# Patient Record
Sex: FEMALE | Race: WHITE | Employment: OTHER
[De-identification: names, ages, dates, MRNs, and addresses within clinical notes are randomized per-mention and may not be internally consistent; named-entity substitution may affect disease eponyms.]

---

## 2017-01-03 PROBLEM — N18.32 CKD STAGE G3B/A2, GFR 30-44 AND ALBUMIN CREATININE RATIO 30-299 MG/G (HCC): Chronic | Status: ACTIVE | Noted: 2017-01-03

## 2017-01-04 DIAGNOSIS — N18.30 CHRONIC RENAL INSUFFICIENCY, STAGE 3 (MODERATE) (HCC): Primary | ICD-10-CM

## 2017-01-18 ENCOUNTER — OFFICE VISIT (OUTPATIENT)
Dept: CARDIOLOGY | Facility: CLINIC | Age: 82
End: 2017-01-18

## 2017-01-18 VITALS
BODY MASS INDEX: 32.23 KG/M2 | OXYGEN SATURATION: 95 % | HEIGHT: 64 IN | SYSTOLIC BLOOD PRESSURE: 124 MMHG | RESPIRATION RATE: 16 BRPM | HEART RATE: 64 BPM | DIASTOLIC BLOOD PRESSURE: 75 MMHG | WEIGHT: 188.8 LBS

## 2017-01-18 DIAGNOSIS — E86.0 DEHYDRATION, MILD: ICD-10-CM

## 2017-01-18 DIAGNOSIS — I50.32 CHRONIC DIASTOLIC HF (HEART FAILURE), NYHA CLASS 3 (HCC): Primary | ICD-10-CM

## 2017-01-18 DIAGNOSIS — R53.81 PHYSICAL DECONDITIONING: ICD-10-CM

## 2017-01-18 PROBLEM — I50.42 CHRONIC COMBINED SYSTOLIC AND DIASTOLIC CHF, NYHA CLASS 3 (HCC): Status: ACTIVE | Noted: 2017-01-18

## 2017-01-18 PROCEDURE — 99214 OFFICE O/P EST MOD 30 MIN: CPT | Performed by: FAMILY MEDICINE

## 2017-01-18 PROCEDURE — 1036F TOBACCO NON-USER: CPT | Performed by: FAMILY MEDICINE

## 2017-01-18 PROCEDURE — G8484 FLU IMMUNIZE NO ADMIN: HCPCS | Performed by: FAMILY MEDICINE

## 2017-01-18 PROCEDURE — 1111F DSCHRG MED/CURRENT MED MERGE: CPT | Performed by: FAMILY MEDICINE

## 2017-01-18 PROCEDURE — G8427 DOCREV CUR MEDS BY ELIG CLIN: HCPCS | Performed by: FAMILY MEDICINE

## 2017-01-18 PROCEDURE — 1090F PRES/ABSN URINE INCON ASSESS: CPT | Performed by: FAMILY MEDICINE

## 2017-01-18 PROCEDURE — 4040F PNEUMOC VAC/ADMIN/RCVD: CPT | Performed by: FAMILY MEDICINE

## 2017-01-18 PROCEDURE — G8419 CALC BMI OUT NRM PARAM NOF/U: HCPCS | Performed by: FAMILY MEDICINE

## 2017-01-18 PROCEDURE — 1123F ACP DISCUSS/DSCN MKR DOCD: CPT | Performed by: FAMILY MEDICINE

## 2017-01-18 RX ORDER — FUROSEMIDE 20 MG/1
20 TABLET ORAL DAILY
Qty: 90 TABLET | Refills: 3 | Status: SHIPPED | OUTPATIENT
Start: 2017-01-18 | End: 2017-04-26

## 2017-01-18 RX ORDER — LISINOPRIL 20 MG/1
20 TABLET ORAL DAILY
Qty: 90 TABLET | Refills: 3 | Status: ON HOLD | OUTPATIENT
Start: 2017-01-18 | End: 2017-09-01

## 2017-01-18 RX ORDER — CARVEDILOL 25 MG/1
25 TABLET ORAL 2 TIMES DAILY
Qty: 180 TABLET | Refills: 3 | Status: SHIPPED | OUTPATIENT
Start: 2017-01-18 | End: 2017-07-12 | Stop reason: SDUPTHER

## 2017-02-22 PROBLEM — N18.32 CKD STAGE G3B/A2, GFR 30-44 AND ALBUMIN CREATININE RATIO 30-299 MG/G (HCC): Chronic | Status: RESOLVED | Noted: 2017-01-03 | Resolved: 2017-02-22

## 2017-02-22 PROBLEM — E86.0 DEHYDRATION, MILD: Status: RESOLVED | Noted: 2017-01-18 | Resolved: 2017-02-22

## 2017-02-22 PROBLEM — I50.32 CHRONIC DIASTOLIC HF (HEART FAILURE), NYHA CLASS 3 (HCC): Chronic | Status: ACTIVE | Noted: 2017-01-18

## 2017-02-22 PROBLEM — N18.4 CKD (CHRONIC KIDNEY DISEASE) STAGE 4, GFR 15-29 ML/MIN (HCC): Status: ACTIVE | Noted: 2017-02-22

## 2017-02-22 PROBLEM — N18.4 CKD (CHRONIC KIDNEY DISEASE) STAGE 4, GFR 15-29 ML/MIN (HCC): Chronic | Status: ACTIVE | Noted: 2017-02-22

## 2017-03-30 ENCOUNTER — HOSPITAL ENCOUNTER (OUTPATIENT)
Age: 82
Discharge: HOME | End: 2017-03-30
Payer: MEDICARE

## 2017-03-30 LAB
ANION GAP SERPL CALCULATED.3IONS-SCNC: 14 MMOL/L (ref 9–17)
BUN BLDV-MCNC: 46 MG/DL (ref 8–23)
BUN/CREAT BLD: 19 (ref 9–20)
CALCIUM SERPL-MCNC: 9.7 MG/DL (ref 8.6–10.4)
CHLORIDE BLD-SCNC: 98 MMOL/L (ref 98–107)
CO2: 24 MMOL/L (ref 20–31)
CREAT SERPL-MCNC: 2.36 MG/DL (ref 0.5–0.9)
GFR AFRICAN AMERICAN: 23 ML/MIN
GFR NON-AFRICAN AMERICAN: 19 ML/MIN
GFR SERPL CREATININE-BSD FRML MDRD: ABNORMAL ML/MIN/{1.73_M2}
GFR SERPL CREATININE-BSD FRML MDRD: ABNORMAL ML/MIN/{1.73_M2}
GLUCOSE BLD-MCNC: 131 MG/DL (ref 70–99)
POTASSIUM SERPL-SCNC: 5.3 MMOL/L (ref 3.7–5.3)
SODIUM BLD-SCNC: 136 MMOL/L (ref 135–144)

## 2017-03-30 PROCEDURE — 36415 COLL VENOUS BLD VENIPUNCTURE: CPT

## 2017-03-30 PROCEDURE — 80048 BASIC METABOLIC PNL TOTAL CA: CPT

## 2017-04-18 ENCOUNTER — HOSPITAL ENCOUNTER (OUTPATIENT)
Age: 82
Discharge: HOME | End: 2017-04-18
Payer: MEDICARE

## 2017-04-18 LAB
ANION GAP SERPL CALCULATED.3IONS-SCNC: 10 MMOL/L (ref 9–17)
BUN BLDV-MCNC: 49 MG/DL (ref 8–23)
BUN/CREAT BLD: 20 (ref 9–20)
CALCIUM SERPL-MCNC: 9.6 MG/DL (ref 8.6–10.4)
CHLORIDE BLD-SCNC: 100 MMOL/L (ref 98–107)
CO2: 28 MMOL/L (ref 20–31)
CREAT SERPL-MCNC: 2.45 MG/DL (ref 0.5–0.9)
GFR AFRICAN AMERICAN: 22 ML/MIN
GFR NON-AFRICAN AMERICAN: 19 ML/MIN
GFR SERPL CREATININE-BSD FRML MDRD: ABNORMAL ML/MIN/{1.73_M2}
GFR SERPL CREATININE-BSD FRML MDRD: ABNORMAL ML/MIN/{1.73_M2}
GLUCOSE BLD-MCNC: 147 MG/DL (ref 70–99)
POTASSIUM SERPL-SCNC: 5.6 MMOL/L (ref 3.7–5.3)
SODIUM BLD-SCNC: 138 MMOL/L (ref 135–144)
T3 FREE: 2.43 PG/ML (ref 2.02–4.43)
T4 TOTAL: 6.1 UG/DL (ref 4.5–12)
TSH SERPL DL<=0.05 MIU/L-ACNC: 3.99 MIU/L (ref 0.3–5)

## 2017-04-18 PROCEDURE — 84443 ASSAY THYROID STIM HORMONE: CPT

## 2017-04-18 PROCEDURE — 84481 FREE ASSAY (FT-3): CPT

## 2017-04-18 PROCEDURE — 80048 BASIC METABOLIC PNL TOTAL CA: CPT

## 2017-04-18 PROCEDURE — 36415 COLL VENOUS BLD VENIPUNCTURE: CPT

## 2017-04-18 PROCEDURE — 84436 ASSAY OF TOTAL THYROXINE: CPT

## 2017-04-26 ENCOUNTER — OFFICE VISIT (OUTPATIENT)
Dept: CARDIOLOGY | Age: 82
End: 2017-04-26
Payer: MEDICARE

## 2017-04-26 ENCOUNTER — TELEPHONE (OUTPATIENT)
Dept: CARDIOLOGY | Age: 82
End: 2017-04-26

## 2017-04-26 VITALS
RESPIRATION RATE: 20 BRPM | HEIGHT: 64 IN | BODY MASS INDEX: 31 KG/M2 | HEART RATE: 62 BPM | OXYGEN SATURATION: 93 % | DIASTOLIC BLOOD PRESSURE: 69 MMHG | WEIGHT: 181.6 LBS | SYSTOLIC BLOOD PRESSURE: 111 MMHG

## 2017-04-26 DIAGNOSIS — I50.32 CHRONIC DIASTOLIC HF (HEART FAILURE), NYHA CLASS 3 (HCC): Chronic | ICD-10-CM

## 2017-04-26 DIAGNOSIS — N17.9 ACUTE RENAL FAILURE, UNSPECIFIED ACUTE RENAL FAILURE TYPE (HCC): Primary | ICD-10-CM

## 2017-04-26 PROBLEM — I36.1 NON-RHEUMATIC TRICUSPID VALVE INSUFFICIENCY: Status: ACTIVE | Noted: 2017-04-26

## 2017-04-26 PROBLEM — I27.20 PULMONARY HTN (HCC): Status: ACTIVE | Noted: 2017-04-26

## 2017-04-26 PROBLEM — I27.20 PULMONARY HTN (HCC): Chronic | Status: ACTIVE | Noted: 2017-04-26

## 2017-04-26 PROBLEM — I36.1 NON-RHEUMATIC TRICUSPID VALVE INSUFFICIENCY: Chronic | Status: ACTIVE | Noted: 2017-04-26

## 2017-04-26 PROCEDURE — G8428 CUR MEDS NOT DOCUMENT: HCPCS | Performed by: FAMILY MEDICINE

## 2017-04-26 PROCEDURE — 99214 OFFICE O/P EST MOD 30 MIN: CPT | Performed by: FAMILY MEDICINE

## 2017-04-26 PROCEDURE — 1090F PRES/ABSN URINE INCON ASSESS: CPT | Performed by: FAMILY MEDICINE

## 2017-04-26 PROCEDURE — 1036F TOBACCO NON-USER: CPT | Performed by: FAMILY MEDICINE

## 2017-04-26 PROCEDURE — 1123F ACP DISCUSS/DSCN MKR DOCD: CPT | Performed by: FAMILY MEDICINE

## 2017-04-26 PROCEDURE — 4040F PNEUMOC VAC/ADMIN/RCVD: CPT | Performed by: FAMILY MEDICINE

## 2017-04-26 PROCEDURE — G8417 CALC BMI ABV UP PARAM F/U: HCPCS | Performed by: FAMILY MEDICINE

## 2017-05-02 ENCOUNTER — HOSPITAL ENCOUNTER (OUTPATIENT)
Age: 82
Discharge: HOME | End: 2017-05-02
Payer: MEDICARE

## 2017-05-02 DIAGNOSIS — N17.9 ACUTE RENAL FAILURE, UNSPECIFIED ACUTE RENAL FAILURE TYPE (HCC): ICD-10-CM

## 2017-05-02 LAB
ANION GAP SERPL CALCULATED.3IONS-SCNC: 12 MMOL/L (ref 9–17)
BUN BLDV-MCNC: 52 MG/DL (ref 8–23)
BUN/CREAT BLD: 25 (ref 9–20)
CALCIUM SERPL-MCNC: 9.8 MG/DL (ref 8.6–10.4)
CHLORIDE BLD-SCNC: 100 MMOL/L (ref 98–107)
CO2: 26 MMOL/L (ref 20–31)
CREAT SERPL-MCNC: 2.1 MG/DL (ref 0.5–0.9)
GFR AFRICAN AMERICAN: 27 ML/MIN
GFR NON-AFRICAN AMERICAN: 22 ML/MIN
GFR SERPL CREATININE-BSD FRML MDRD: ABNORMAL ML/MIN/{1.73_M2}
GFR SERPL CREATININE-BSD FRML MDRD: ABNORMAL ML/MIN/{1.73_M2}
GLUCOSE BLD-MCNC: 153 MG/DL (ref 70–99)
POTASSIUM SERPL-SCNC: 5 MMOL/L (ref 3.7–5.3)
SODIUM BLD-SCNC: 138 MMOL/L (ref 135–144)

## 2017-05-02 PROCEDURE — 36415 COLL VENOUS BLD VENIPUNCTURE: CPT

## 2017-05-02 PROCEDURE — 80048 BASIC METABOLIC PNL TOTAL CA: CPT

## 2017-06-03 ENCOUNTER — HOSPITAL ENCOUNTER (OUTPATIENT)
Age: 82
Discharge: HOME | End: 2017-06-03
Payer: MEDICARE

## 2017-06-03 LAB
ABSOLUTE EOS #: 0.1 K/UL (ref 0–0.4)
ABSOLUTE LYMPH #: 1.7 K/UL (ref 1–4.8)
ABSOLUTE MONO #: 0.4 K/UL (ref 0–1)
ALBUMIN SERPL-MCNC: 4 G/DL (ref 3.5–5.2)
ALBUMIN/GLOBULIN RATIO: 1.4 (ref 1–2.5)
ALP BLD-CCNC: 44 U/L (ref 35–104)
ALT SERPL-CCNC: 7 U/L (ref 5–33)
ANION GAP SERPL CALCULATED.3IONS-SCNC: 12 MMOL/L (ref 9–17)
AST SERPL-CCNC: 12 U/L
BASOPHILS # BLD: 0 %
BASOPHILS ABSOLUTE: 0 K/UL (ref 0–0.2)
BILIRUB SERPL-MCNC: 0.57 MG/DL (ref 0.3–1.2)
BUN BLDV-MCNC: 62 MG/DL (ref 8–23)
BUN/CREAT BLD: 32 (ref 9–20)
CALCIUM SERPL-MCNC: 9.7 MG/DL (ref 8.6–10.4)
CHLORIDE BLD-SCNC: 104 MMOL/L (ref 98–107)
CHOLESTEROL/HDL RATIO: 4
CHOLESTEROL: 160 MG/DL
CO2: 23 MMOL/L (ref 20–31)
CREAT SERPL-MCNC: 1.95 MG/DL (ref 0.5–0.9)
DIFFERENTIAL TYPE: ABNORMAL
EOSINOPHILS RELATIVE PERCENT: 3 %
ESTIMATED AVERAGE GLUCOSE: 108 MG/DL
GFR AFRICAN AMERICAN: 29 ML/MIN
GFR NON-AFRICAN AMERICAN: 24 ML/MIN
GFR SERPL CREATININE-BSD FRML MDRD: ABNORMAL ML/MIN/{1.73_M2}
GFR SERPL CREATININE-BSD FRML MDRD: ABNORMAL ML/MIN/{1.73_M2}
GLUCOSE BLD-MCNC: 98 MG/DL (ref 70–99)
HBA1C MFR BLD: 5.4 % (ref 4.8–5.9)
HCT VFR BLD CALC: 35.3 % (ref 36–46)
HDLC SERPL-MCNC: 40 MG/DL
HEMOGLOBIN: 12 G/DL (ref 12–16)
LDL CHOLESTEROL: 77 MG/DL (ref 0–130)
LYMPHOCYTES # BLD: 30 %
MCH RBC QN AUTO: 33.4 PG (ref 26–34)
MCHC RBC AUTO-ENTMCNC: 34 G/DL (ref 31–37)
MCV RBC AUTO: 98.3 FL (ref 80–100)
MONOCYTES # BLD: 7 %
PDW BLD-RTO: 13.9 % (ref 12.1–15.2)
PLATELET # BLD: 211 K/UL (ref 140–450)
PLATELET ESTIMATE: ABNORMAL
PMV BLD AUTO: ABNORMAL FL (ref 6–12)
POTASSIUM SERPL-SCNC: 5.4 MMOL/L (ref 3.7–5.3)
RBC # BLD: 3.59 M/UL (ref 4–5.2)
RBC # BLD: ABNORMAL 10*6/UL
SEG NEUTROPHILS: 60 %
SEGMENTED NEUTROPHILS ABSOLUTE COUNT: 3.5 K/UL (ref 1.8–7.7)
SODIUM BLD-SCNC: 139 MMOL/L (ref 135–144)
TOTAL PROTEIN: 6.9 G/DL (ref 6.4–8.3)
TRIGL SERPL-MCNC: 213 MG/DL
VLDLC SERPL CALC-MCNC: ABNORMAL MG/DL (ref 1–30)
WBC # BLD: 5.8 K/UL (ref 3.5–11)
WBC # BLD: ABNORMAL 10*3/UL

## 2017-06-03 PROCEDURE — 36415 COLL VENOUS BLD VENIPUNCTURE: CPT

## 2017-06-03 PROCEDURE — 83036 HEMOGLOBIN GLYCOSYLATED A1C: CPT

## 2017-06-03 PROCEDURE — 80061 LIPID PANEL: CPT

## 2017-06-03 PROCEDURE — 80053 COMPREHEN METABOLIC PANEL: CPT

## 2017-06-03 PROCEDURE — 85025 COMPLETE CBC W/AUTO DIFF WBC: CPT

## 2017-06-25 ENCOUNTER — APPOINTMENT (OUTPATIENT)
Dept: GENERAL RADIOLOGY | Age: 82
DRG: 309 | End: 2017-06-25
Payer: MEDICARE

## 2017-06-25 ENCOUNTER — HOSPITAL ENCOUNTER (INPATIENT)
Age: 82
LOS: 4 days | Discharge: ANOTHER HOSPITAL | DRG: 309 | End: 2017-06-29
Attending: EMERGENCY MEDICINE | Admitting: FAMILY MEDICINE
Payer: MEDICARE

## 2017-06-25 DIAGNOSIS — N17.9 ACUTE KIDNEY INJURY (HCC): ICD-10-CM

## 2017-06-25 DIAGNOSIS — R55 SYNCOPE AND COLLAPSE: Primary | ICD-10-CM

## 2017-06-25 LAB
-: NORMAL
ABSOLUTE EOS #: 0.1 K/UL (ref 0–0.4)
ABSOLUTE LYMPH #: 1.6 K/UL (ref 1–4.8)
ABSOLUTE MONO #: 0.6 K/UL (ref 0–1)
ALBUMIN SERPL-MCNC: 4.1 G/DL (ref 3.5–5.2)
ALBUMIN/GLOBULIN RATIO: 1.3 (ref 1–2.5)
ALP BLD-CCNC: 45 U/L (ref 35–104)
ALT SERPL-CCNC: 11 U/L (ref 5–33)
AMORPHOUS: NORMAL
ANION GAP SERPL CALCULATED.3IONS-SCNC: 12 MMOL/L (ref 9–17)
AST SERPL-CCNC: 13 U/L
BACTERIA: NORMAL
BASOPHILS # BLD: 0 %
BASOPHILS ABSOLUTE: 0 K/UL (ref 0–0.2)
BILIRUB SERPL-MCNC: 0.63 MG/DL (ref 0.3–1.2)
BILIRUBIN URINE: NEGATIVE
BUN BLDV-MCNC: 47 MG/DL (ref 8–23)
BUN/CREAT BLD: 22 (ref 9–20)
CALCIUM SERPL-MCNC: 9.6 MG/DL (ref 8.6–10.4)
CASTS UA: NORMAL /LPF
CHLORIDE BLD-SCNC: 100 MMOL/L (ref 98–107)
CO2: 25 MMOL/L (ref 20–31)
COLOR: YELLOW
COMMENT UA: NORMAL
CREAT SERPL-MCNC: 2.09 MG/DL (ref 0.5–0.9)
CRYSTALS, UA: NORMAL /HPF
DIFFERENTIAL TYPE: ABNORMAL
EOSINOPHILS RELATIVE PERCENT: 1 %
EPITHELIAL CELLS UA: NORMAL /HPF (ref 0–25)
ESTIMATED AVERAGE GLUCOSE: 111 MG/DL
GFR AFRICAN AMERICAN: 27 ML/MIN
GFR NON-AFRICAN AMERICAN: 22 ML/MIN
GFR SERPL CREATININE-BSD FRML MDRD: ABNORMAL ML/MIN/{1.73_M2}
GFR SERPL CREATININE-BSD FRML MDRD: ABNORMAL ML/MIN/{1.73_M2}
GLUCOSE BLD-MCNC: 96 MG/DL (ref 74–100)
GLUCOSE BLD-MCNC: 99 MG/DL (ref 70–99)
GLUCOSE BLD-MCNC: 99 MG/DL (ref 74–100)
GLUCOSE URINE: NEGATIVE
HBA1C MFR BLD: 5.5 % (ref 4.8–5.9)
HCT VFR BLD CALC: 37 % (ref 36–46)
HEMOGLOBIN: 12.6 G/DL (ref 12–16)
KETONES, URINE: NEGATIVE
LEUKOCYTE ESTERASE, URINE: NEGATIVE
LYMPHOCYTES # BLD: 20 %
MCH RBC QN AUTO: 33.4 PG (ref 26–34)
MCHC RBC AUTO-ENTMCNC: 34.2 G/DL (ref 31–37)
MCV RBC AUTO: 97.9 FL (ref 80–100)
MONOCYTES # BLD: 8 %
MUCUS: NORMAL
NITRITE, URINE: NEGATIVE
OTHER OBSERVATIONS UA: NORMAL
PDW BLD-RTO: 13.5 % (ref 12.1–15.2)
PH UA: 6 (ref 5–9)
PLATELET # BLD: 167 K/UL (ref 140–450)
PLATELET ESTIMATE: ABNORMAL
PMV BLD AUTO: ABNORMAL FL (ref 6–12)
POTASSIUM SERPL-SCNC: 5.4 MMOL/L (ref 3.7–5.3)
PROTEIN UA: NEGATIVE
RBC # BLD: 3.78 M/UL (ref 4–5.2)
RBC # BLD: ABNORMAL 10*6/UL
RBC UA: NORMAL /HPF (ref 0–2)
RENAL EPITHELIAL, UA: NORMAL /HPF
SEG NEUTROPHILS: 71 %
SEGMENTED NEUTROPHILS ABSOLUTE COUNT: 5.6 K/UL (ref 1.8–7.7)
SODIUM BLD-SCNC: 137 MMOL/L (ref 135–144)
SPECIFIC GRAVITY UA: 1.01 (ref 1.01–1.02)
TOTAL PROTEIN: 7.3 G/DL (ref 6.4–8.3)
TRICHOMONAS: NORMAL
TROPONIN INTERP: NORMAL
TROPONIN T: <0.03 NG/ML
TSH SERPL DL<=0.05 MIU/L-ACNC: 2.95 MIU/L (ref 0.3–5)
TURBIDITY: CLEAR
URINE HGB: NEGATIVE
UROBILINOGEN, URINE: NORMAL
WBC # BLD: 8 K/UL (ref 3.5–11)
WBC # BLD: ABNORMAL 10*3/UL
WBC UA: NORMAL /HPF (ref 0–5)
YEAST: NORMAL

## 2017-06-25 PROCEDURE — 93005 ELECTROCARDIOGRAM TRACING: CPT

## 2017-06-25 PROCEDURE — 1200000000 HC SEMI PRIVATE

## 2017-06-25 PROCEDURE — 94761 N-INVAS EAR/PLS OXIMETRY MLT: CPT

## 2017-06-25 PROCEDURE — 99285 EMERGENCY DEPT VISIT HI MDM: CPT

## 2017-06-25 PROCEDURE — 6370000000 HC RX 637 (ALT 250 FOR IP): Performed by: EMERGENCY MEDICINE

## 2017-06-25 PROCEDURE — 81001 URINALYSIS AUTO W/SCOPE: CPT

## 2017-06-25 PROCEDURE — 83036 HEMOGLOBIN GLYCOSYLATED A1C: CPT

## 2017-06-25 PROCEDURE — 84443 ASSAY THYROID STIM HORMONE: CPT

## 2017-06-25 PROCEDURE — 36415 COLL VENOUS BLD VENIPUNCTURE: CPT

## 2017-06-25 PROCEDURE — 85025 COMPLETE CBC W/AUTO DIFF WBC: CPT

## 2017-06-25 PROCEDURE — 6370000000 HC RX 637 (ALT 250 FOR IP): Performed by: FAMILY MEDICINE

## 2017-06-25 PROCEDURE — 82947 ASSAY GLUCOSE BLOOD QUANT: CPT

## 2017-06-25 PROCEDURE — 84484 ASSAY OF TROPONIN QUANT: CPT

## 2017-06-25 PROCEDURE — 71020 XR CHEST STANDARD TWO VW: CPT

## 2017-06-25 PROCEDURE — 80053 COMPREHEN METABOLIC PANEL: CPT

## 2017-06-25 PROCEDURE — 2580000003 HC RX 258: Performed by: FAMILY MEDICINE

## 2017-06-25 RX ORDER — SIMVASTATIN 10 MG
5 TABLET ORAL NIGHTLY
Status: DISCONTINUED | OUTPATIENT
Start: 2017-06-25 | End: 2017-06-29 | Stop reason: HOSPADM

## 2017-06-25 RX ORDER — HYDROCODONE BITARTRATE AND ACETAMINOPHEN 5; 325 MG/1; MG/1
1 TABLET ORAL EVERY 6 HOURS PRN
Status: DISCONTINUED | OUTPATIENT
Start: 2017-06-25 | End: 2017-06-27

## 2017-06-25 RX ORDER — FUROSEMIDE 20 MG/1
10 TABLET ORAL DAILY
COMMUNITY
End: 2017-07-24 | Stop reason: ALTCHOICE

## 2017-06-25 RX ORDER — FEBUXOSTAT 40 MG/1
40 TABLET, FILM COATED ORAL
Status: DISCONTINUED | OUTPATIENT
Start: 2017-06-26 | End: 2017-06-26

## 2017-06-25 RX ORDER — LEVOTHYROXINE SODIUM 0.05 MG/1
50 TABLET ORAL DAILY
Status: DISCONTINUED | OUTPATIENT
Start: 2017-06-26 | End: 2017-06-29 | Stop reason: HOSPADM

## 2017-06-25 RX ORDER — ACETAMINOPHEN 325 MG/1
650 TABLET ORAL EVERY 4 HOURS PRN
Status: DISCONTINUED | OUTPATIENT
Start: 2017-06-25 | End: 2017-06-29 | Stop reason: HOSPADM

## 2017-06-25 RX ORDER — REPAGLINIDE 0.5 MG/1
0.5 TABLET ORAL
Status: DISCONTINUED | OUTPATIENT
Start: 2017-06-25 | End: 2017-06-29 | Stop reason: HOSPADM

## 2017-06-25 RX ORDER — NIFEDIPINE 30 MG/1
30 TABLET, EXTENDED RELEASE ORAL DAILY
Status: DISCONTINUED | OUTPATIENT
Start: 2017-06-26 | End: 2017-06-26

## 2017-06-25 RX ORDER — LISINOPRIL 20 MG/1
20 TABLET ORAL DAILY
Status: DISCONTINUED | OUTPATIENT
Start: 2017-06-26 | End: 2017-06-29 | Stop reason: HOSPADM

## 2017-06-25 RX ORDER — CARVEDILOL 25 MG/1
25 TABLET ORAL 2 TIMES DAILY
Status: DISCONTINUED | OUTPATIENT
Start: 2017-06-25 | End: 2017-06-27

## 2017-06-25 RX ORDER — NICOTINE POLACRILEX 4 MG
15 LOZENGE BUCCAL PRN
Status: DISCONTINUED | OUTPATIENT
Start: 2017-06-25 | End: 2017-06-29 | Stop reason: HOSPADM

## 2017-06-25 RX ORDER — SODIUM CHLORIDE 9 MG/ML
INJECTION, SOLUTION INTRAVENOUS CONTINUOUS
Status: DISCONTINUED | OUTPATIENT
Start: 2017-06-25 | End: 2017-06-26

## 2017-06-25 RX ORDER — FUROSEMIDE 20 MG/1
10 TABLET ORAL DAILY
Status: DISCONTINUED | OUTPATIENT
Start: 2017-06-26 | End: 2017-06-26

## 2017-06-25 RX ORDER — DEXTROSE MONOHYDRATE 50 MG/ML
100 INJECTION, SOLUTION INTRAVENOUS PRN
Status: DISCONTINUED | OUTPATIENT
Start: 2017-06-25 | End: 2017-06-29 | Stop reason: HOSPADM

## 2017-06-25 RX ORDER — METOPROLOL TARTRATE 5 MG/5ML
5 INJECTION INTRAVENOUS EVERY 6 HOURS PRN
Status: DISCONTINUED | OUTPATIENT
Start: 2017-06-25 | End: 2017-06-29 | Stop reason: HOSPADM

## 2017-06-25 RX ORDER — SODIUM CHLORIDE 0.9 % (FLUSH) 0.9 %
10 SYRINGE (ML) INJECTION EVERY 12 HOURS SCHEDULED
Status: DISCONTINUED | OUTPATIENT
Start: 2017-06-25 | End: 2017-06-29 | Stop reason: HOSPADM

## 2017-06-25 RX ORDER — ONDANSETRON 2 MG/ML
4 INJECTION INTRAMUSCULAR; INTRAVENOUS EVERY 6 HOURS PRN
Status: DISCONTINUED | OUTPATIENT
Start: 2017-06-25 | End: 2017-06-29 | Stop reason: HOSPADM

## 2017-06-25 RX ORDER — DEXTROSE MONOHYDRATE 25 G/50ML
12.5 INJECTION, SOLUTION INTRAVENOUS PRN
Status: DISCONTINUED | OUTPATIENT
Start: 2017-06-25 | End: 2017-06-29 | Stop reason: HOSPADM

## 2017-06-25 RX ORDER — ASPIRIN 81 MG/1
81 TABLET ORAL EVERY OTHER DAY
Status: DISCONTINUED | OUTPATIENT
Start: 2017-06-27 | End: 2017-06-29 | Stop reason: HOSPADM

## 2017-06-25 RX ORDER — ACETAMINOPHEN 500 MG
1000 TABLET ORAL ONCE
Status: COMPLETED | OUTPATIENT
Start: 2017-06-25 | End: 2017-06-25

## 2017-06-25 RX ORDER — SODIUM CHLORIDE 0.9 % (FLUSH) 0.9 %
10 SYRINGE (ML) INJECTION PRN
Status: DISCONTINUED | OUTPATIENT
Start: 2017-06-25 | End: 2017-06-29 | Stop reason: HOSPADM

## 2017-06-25 RX ADMIN — REPAGLINIDE 0.5 MG: 0.5 TABLET ORAL at 18:20

## 2017-06-25 RX ADMIN — SODIUM CHLORIDE: 9 INJECTION, SOLUTION INTRAVENOUS at 18:52

## 2017-06-25 RX ADMIN — SIMVASTATIN 5 MG: 10 TABLET, FILM COATED ORAL at 20:46

## 2017-06-25 RX ADMIN — HYDROCODONE BITARTRATE AND ACETAMINOPHEN 1 TABLET: 5; 325 TABLET ORAL at 18:20

## 2017-06-25 RX ADMIN — CARVEDILOL 25 MG: 25 TABLET, FILM COATED ORAL at 20:45

## 2017-06-25 RX ADMIN — ACETAMINOPHEN 1000 MG: 500 TABLET ORAL at 15:19

## 2017-06-25 ASSESSMENT — ENCOUNTER SYMPTOMS
NAUSEA: 0
SORE THROAT: 0
ABDOMINAL PAIN: 0
BACK PAIN: 0
DIARRHEA: 0
WHEEZING: 0
FACIAL SWELLING: 0
BLOOD IN STOOL: 0
TROUBLE SWALLOWING: 0
PHOTOPHOBIA: 0
VOICE CHANGE: 0
COUGH: 0
SHORTNESS OF BREATH: 0
VOMITING: 0
CHEST TIGHTNESS: 0

## 2017-06-25 ASSESSMENT — PAIN SCALES - GENERAL
PAINLEVEL_OUTOF10: 8
PAINLEVEL_OUTOF10: 4
PAINLEVEL_OUTOF10: 6
PAINLEVEL_OUTOF10: 8
PAINLEVEL_OUTOF10: 3

## 2017-06-25 ASSESSMENT — PAIN DESCRIPTION - PAIN TYPE
TYPE: ACUTE PAIN
TYPE: ACUTE PAIN

## 2017-06-25 ASSESSMENT — PAIN DESCRIPTION - LOCATION
LOCATION: LEG
LOCATION: CHEST
LOCATION: LEG

## 2017-06-25 ASSESSMENT — PAIN DESCRIPTION - ORIENTATION
ORIENTATION: LEFT
ORIENTATION: LEFT

## 2017-06-25 ASSESSMENT — PAIN DESCRIPTION - DESCRIPTORS
DESCRIPTORS: CONSTANT
DESCRIPTORS: TIGHTNESS

## 2017-06-26 ENCOUNTER — APPOINTMENT (OUTPATIENT)
Dept: VASCULAR LAB | Age: 82
DRG: 309 | End: 2017-06-26
Payer: MEDICARE

## 2017-06-26 ENCOUNTER — APPOINTMENT (OUTPATIENT)
Dept: GENERAL RADIOLOGY | Age: 82
DRG: 309 | End: 2017-06-26
Payer: MEDICARE

## 2017-06-26 PROBLEM — N28.9 ACUTE ON CHRONIC RENAL INSUFFICIENCY: Status: ACTIVE | Noted: 2017-06-26

## 2017-06-26 PROBLEM — N18.9 ACUTE ON CHRONIC RENAL INSUFFICIENCY: Status: ACTIVE | Noted: 2017-06-26

## 2017-06-26 PROBLEM — E86.0 DEHYDRATION, MODERATE: Status: ACTIVE | Noted: 2017-06-26

## 2017-06-26 LAB
ALBUMIN SERPL-MCNC: 3.8 G/DL (ref 3.5–5.2)
ALBUMIN/GLOBULIN RATIO: 1.4 (ref 1–2.5)
ALP BLD-CCNC: 38 U/L (ref 35–104)
ALT SERPL-CCNC: 10 U/L (ref 5–33)
ANION GAP SERPL CALCULATED.3IONS-SCNC: 14 MMOL/L (ref 9–17)
AST SERPL-CCNC: 13 U/L
BILIRUB SERPL-MCNC: 0.63 MG/DL (ref 0.3–1.2)
BUN BLDV-MCNC: 37 MG/DL (ref 8–23)
BUN/CREAT BLD: 25 (ref 9–20)
CALCIUM SERPL-MCNC: 9 MG/DL (ref 8.6–10.4)
CHLORIDE BLD-SCNC: 100 MMOL/L (ref 98–107)
CO2: 23 MMOL/L (ref 20–31)
CREAT SERPL-MCNC: 1.46 MG/DL (ref 0.5–0.9)
EKG ATRIAL RATE: 56 BPM
EKG P AXIS: 47 DEGREES
EKG P-R INTERVAL: 180 MS
EKG Q-T INTERVAL: 460 MS
EKG QRS DURATION: 150 MS
EKG QTC CALCULATION (BAZETT): 443 MS
EKG R AXIS: -32 DEGREES
EKG T AXIS: 125 DEGREES
EKG VENTRICULAR RATE: 56 BPM
GFR AFRICAN AMERICAN: 41 ML/MIN
GFR NON-AFRICAN AMERICAN: 34 ML/MIN
GFR SERPL CREATININE-BSD FRML MDRD: ABNORMAL ML/MIN/{1.73_M2}
GFR SERPL CREATININE-BSD FRML MDRD: ABNORMAL ML/MIN/{1.73_M2}
GLUCOSE BLD-MCNC: 81 MG/DL (ref 74–100)
GLUCOSE BLD-MCNC: 91 MG/DL (ref 70–99)
GLUCOSE BLD-MCNC: 92 MG/DL (ref 74–100)
GLUCOSE BLD-MCNC: 94 MG/DL (ref 74–100)
GLUCOSE BLD-MCNC: 99 MG/DL (ref 74–100)
LV EF: 55 %
LVEF MODALITY: NORMAL
POTASSIUM SERPL-SCNC: 4.9 MMOL/L (ref 3.7–5.3)
SODIUM BLD-SCNC: 137 MMOL/L (ref 135–144)
TOTAL PROTEIN: 6.5 G/DL (ref 6.4–8.3)

## 2017-06-26 PROCEDURE — 6360000002 HC RX W HCPCS: Performed by: FAMILY MEDICINE

## 2017-06-26 PROCEDURE — 82947 ASSAY GLUCOSE BLOOD QUANT: CPT

## 2017-06-26 PROCEDURE — 97110 THERAPEUTIC EXERCISES: CPT

## 2017-06-26 PROCEDURE — G8987 SELF CARE CURRENT STATUS: HCPCS

## 2017-06-26 PROCEDURE — 73502 X-RAY EXAM HIP UNI 2-3 VIEWS: CPT

## 2017-06-26 PROCEDURE — 97530 THERAPEUTIC ACTIVITIES: CPT

## 2017-06-26 PROCEDURE — 6370000000 HC RX 637 (ALT 250 FOR IP): Performed by: FAMILY MEDICINE

## 2017-06-26 PROCEDURE — G8988 SELF CARE GOAL STATUS: HCPCS

## 2017-06-26 PROCEDURE — 1200000000 HC SEMI PRIVATE

## 2017-06-26 PROCEDURE — G8979 MOBILITY GOAL STATUS: HCPCS

## 2017-06-26 PROCEDURE — 93971 EXTREMITY STUDY: CPT

## 2017-06-26 PROCEDURE — 97166 OT EVAL MOD COMPLEX 45 MIN: CPT

## 2017-06-26 PROCEDURE — 2580000003 HC RX 258: Performed by: FAMILY MEDICINE

## 2017-06-26 PROCEDURE — 80053 COMPREHEN METABOLIC PANEL: CPT

## 2017-06-26 PROCEDURE — 93306 TTE W/DOPPLER COMPLETE: CPT

## 2017-06-26 PROCEDURE — 93880 EXTRACRANIAL BILAT STUDY: CPT

## 2017-06-26 PROCEDURE — 36415 COLL VENOUS BLD VENIPUNCTURE: CPT

## 2017-06-26 PROCEDURE — 97162 PT EVAL MOD COMPLEX 30 MIN: CPT

## 2017-06-26 PROCEDURE — 99222 1ST HOSP IP/OBS MODERATE 55: CPT | Performed by: FAMILY MEDICINE

## 2017-06-26 PROCEDURE — 97116 GAIT TRAINING THERAPY: CPT

## 2017-06-26 PROCEDURE — G8978 MOBILITY CURRENT STATUS: HCPCS

## 2017-06-26 RX ADMIN — CARVEDILOL 25 MG: 25 TABLET, FILM COATED ORAL at 09:14

## 2017-06-26 RX ADMIN — FUROSEMIDE 10 MG: 20 TABLET ORAL at 09:14

## 2017-06-26 RX ADMIN — NIFEDIPINE 30 MG: 30 TABLET, FILM COATED, EXTENDED RELEASE ORAL at 09:14

## 2017-06-26 RX ADMIN — REPAGLINIDE 0.5 MG: 0.5 TABLET ORAL at 17:14

## 2017-06-26 RX ADMIN — REPAGLINIDE 0.5 MG: 0.5 TABLET ORAL at 07:06

## 2017-06-26 RX ADMIN — SIMVASTATIN 5 MG: 10 TABLET, FILM COATED ORAL at 20:37

## 2017-06-26 RX ADMIN — Medication 10 ML: at 20:38

## 2017-06-26 RX ADMIN — Medication 400 MG: at 09:15

## 2017-06-26 RX ADMIN — LEVOTHYROXINE SODIUM 50 MCG: 50 TABLET ORAL at 07:06

## 2017-06-26 RX ADMIN — CARVEDILOL 25 MG: 25 TABLET, FILM COATED ORAL at 20:38

## 2017-06-26 RX ADMIN — HYDROCODONE BITARTRATE AND ACETAMINOPHEN 1 TABLET: 5; 325 TABLET ORAL at 11:38

## 2017-06-26 RX ADMIN — ACETAMINOPHEN 650 MG: 325 TABLET, FILM COATED ORAL at 09:14

## 2017-06-26 RX ADMIN — LISINOPRIL 20 MG: 20 TABLET ORAL at 09:15

## 2017-06-26 RX ADMIN — HYDROCODONE BITARTRATE AND ACETAMINOPHEN 1 TABLET: 5; 325 TABLET ORAL at 17:57

## 2017-06-26 RX ADMIN — ENOXAPARIN SODIUM 30 MG: 30 INJECTION SUBCUTANEOUS at 09:13

## 2017-06-26 RX ADMIN — HYDROCODONE BITARTRATE AND ACETAMINOPHEN 1 TABLET: 5; 325 TABLET ORAL at 00:11

## 2017-06-26 ASSESSMENT — PAIN SCALES - GENERAL
PAINLEVEL_OUTOF10: 5
PAINLEVEL_OUTOF10: 3
PAINLEVEL_OUTOF10: 2
PAINLEVEL_OUTOF10: 8
PAINLEVEL_OUTOF10: 8
PAINLEVEL_OUTOF10: 7
PAINLEVEL_OUTOF10: 4
PAINLEVEL_OUTOF10: 6
PAINLEVEL_OUTOF10: 8
PAINLEVEL_OUTOF10: 4
PAINLEVEL_OUTOF10: 3
PAINLEVEL_OUTOF10: 8

## 2017-06-26 ASSESSMENT — PAIN DESCRIPTION - LOCATION
LOCATION: LEG;CHEST;BACK
LOCATION: LEG
LOCATION: LEG;CHEST;BACK
LOCATION: LEG
LOCATION: LEG

## 2017-06-26 ASSESSMENT — PAIN DESCRIPTION - PAIN TYPE
TYPE: ACUTE PAIN

## 2017-06-26 ASSESSMENT — PAIN DESCRIPTION - ORIENTATION
ORIENTATION: LEFT

## 2017-06-26 ASSESSMENT — PAIN DESCRIPTION - DESCRIPTORS
DESCRIPTORS: ACHING;STABBING
DESCRIPTORS: ACHING
DESCRIPTORS: ACHING;STABBING
DESCRIPTORS: CONSTANT
DESCRIPTORS: ACHING

## 2017-06-26 ASSESSMENT — PAIN DESCRIPTION - PROGRESSION
CLINICAL_PROGRESSION: GRADUALLY IMPROVING
CLINICAL_PROGRESSION: GRADUALLY WORSENING
CLINICAL_PROGRESSION: GRADUALLY IMPROVING

## 2017-06-27 ENCOUNTER — APPOINTMENT (OUTPATIENT)
Dept: CT IMAGING | Age: 82
DRG: 309 | End: 2017-06-27
Payer: MEDICARE

## 2017-06-27 LAB
ALBUMIN SERPL-MCNC: 3.9 G/DL (ref 3.5–5.2)
ALBUMIN/GLOBULIN RATIO: 1.4 (ref 1–2.5)
ALP BLD-CCNC: 43 U/L (ref 35–104)
ALT SERPL-CCNC: 9 U/L (ref 5–33)
ANION GAP SERPL CALCULATED.3IONS-SCNC: 14 MMOL/L (ref 9–17)
AST SERPL-CCNC: 14 U/L
BILIRUB SERPL-MCNC: 0.6 MG/DL (ref 0.3–1.2)
BUN BLDV-MCNC: 33 MG/DL (ref 8–23)
BUN/CREAT BLD: 21 (ref 9–20)
CALCIUM SERPL-MCNC: 9.3 MG/DL (ref 8.6–10.4)
CHLORIDE BLD-SCNC: 101 MMOL/L (ref 98–107)
CO2: 23 MMOL/L (ref 20–31)
CREAT SERPL-MCNC: 1.54 MG/DL (ref 0.5–0.9)
EKG ATRIAL RATE: 42 BPM
EKG ATRIAL RATE: 65 BPM
EKG P AXIS: 22 DEGREES
EKG P AXIS: 36 DEGREES
EKG P-R INTERVAL: 188 MS
EKG P-R INTERVAL: 234 MS
EKG Q-T INTERVAL: 430 MS
EKG Q-T INTERVAL: 430 MS
EKG QRS DURATION: 152 MS
EKG QRS DURATION: 152 MS
EKG QTC CALCULATION (BAZETT): 418 MS
EKG QTC CALCULATION (BAZETT): 447 MS
EKG R AXIS: -14 DEGREES
EKG R AXIS: -24 DEGREES
EKG T AXIS: 140 DEGREES
EKG T AXIS: 147 DEGREES
EKG VENTRICULAR RATE: 57 BPM
EKG VENTRICULAR RATE: 65 BPM
GFR AFRICAN AMERICAN: 38 ML/MIN
GFR NON-AFRICAN AMERICAN: 32 ML/MIN
GFR SERPL CREATININE-BSD FRML MDRD: ABNORMAL ML/MIN/{1.73_M2}
GFR SERPL CREATININE-BSD FRML MDRD: ABNORMAL ML/MIN/{1.73_M2}
GLUCOSE BLD-MCNC: 100 MG/DL (ref 74–100)
GLUCOSE BLD-MCNC: 102 MG/DL (ref 74–100)
GLUCOSE BLD-MCNC: 89 MG/DL (ref 74–100)
GLUCOSE BLD-MCNC: 95 MG/DL (ref 70–99)
GLUCOSE BLD-MCNC: 95 MG/DL (ref 74–100)
POTASSIUM SERPL-SCNC: 4.6 MMOL/L (ref 3.7–5.3)
SODIUM BLD-SCNC: 138 MMOL/L (ref 135–144)
TOTAL PROTEIN: 6.7 G/DL (ref 6.4–8.3)

## 2017-06-27 PROCEDURE — 93005 ELECTROCARDIOGRAM TRACING: CPT

## 2017-06-27 PROCEDURE — 36415 COLL VENOUS BLD VENIPUNCTURE: CPT

## 2017-06-27 PROCEDURE — 6370000000 HC RX 637 (ALT 250 FOR IP): Performed by: FAMILY MEDICINE

## 2017-06-27 PROCEDURE — 82947 ASSAY GLUCOSE BLOOD QUANT: CPT

## 2017-06-27 PROCEDURE — 97530 THERAPEUTIC ACTIVITIES: CPT

## 2017-06-27 PROCEDURE — 1200000000 HC SEMI PRIVATE

## 2017-06-27 PROCEDURE — 80053 COMPREHEN METABOLIC PANEL: CPT

## 2017-06-27 PROCEDURE — 72131 CT LUMBAR SPINE W/O DYE: CPT

## 2017-06-27 PROCEDURE — 6360000002 HC RX W HCPCS: Performed by: FAMILY MEDICINE

## 2017-06-27 PROCEDURE — 2580000003 HC RX 258: Performed by: FAMILY MEDICINE

## 2017-06-27 PROCEDURE — 97110 THERAPEUTIC EXERCISES: CPT

## 2017-06-27 RX ORDER — TRAMADOL HYDROCHLORIDE 50 MG/1
50 TABLET ORAL EVERY 6 HOURS PRN
Status: DISCONTINUED | OUTPATIENT
Start: 2017-06-27 | End: 2017-06-29 | Stop reason: HOSPADM

## 2017-06-27 RX ADMIN — Medication 10 ML: at 20:52

## 2017-06-27 RX ADMIN — REPAGLINIDE 0.5 MG: 0.5 TABLET ORAL at 07:48

## 2017-06-27 RX ADMIN — LEVOTHYROXINE SODIUM 50 MCG: 50 TABLET ORAL at 07:48

## 2017-06-27 RX ADMIN — Medication 400 MG: at 08:41

## 2017-06-27 RX ADMIN — TRAMADOL HYDROCHLORIDE 50 MG: 50 TABLET, FILM COATED ORAL at 07:42

## 2017-06-27 RX ADMIN — LISINOPRIL 20 MG: 20 TABLET ORAL at 08:41

## 2017-06-27 RX ADMIN — ENOXAPARIN SODIUM 30 MG: 30 INJECTION SUBCUTANEOUS at 08:41

## 2017-06-27 RX ADMIN — SIMVASTATIN 5 MG: 10 TABLET, FILM COATED ORAL at 20:31

## 2017-06-27 RX ADMIN — CARVEDILOL 25 MG: 25 TABLET, FILM COATED ORAL at 08:41

## 2017-06-27 RX ADMIN — TRAMADOL HYDROCHLORIDE 50 MG: 50 TABLET, FILM COATED ORAL at 14:21

## 2017-06-27 RX ADMIN — ASPIRIN 81 MG: 81 TABLET, COATED ORAL at 08:41

## 2017-06-27 RX ADMIN — REPAGLINIDE 0.5 MG: 0.5 TABLET ORAL at 16:19

## 2017-06-27 RX ADMIN — Medication 10 ML: at 08:49

## 2017-06-27 ASSESSMENT — PAIN DESCRIPTION - FREQUENCY
FREQUENCY: CONTINUOUS
FREQUENCY: CONTINUOUS

## 2017-06-27 ASSESSMENT — PAIN DESCRIPTION - ORIENTATION
ORIENTATION: LEFT
ORIENTATION: LEFT
ORIENTATION: LEFT;UPPER
ORIENTATION: LEFT

## 2017-06-27 ASSESSMENT — PAIN SCALES - GENERAL
PAINLEVEL_OUTOF10: 8
PAINLEVEL_OUTOF10: 3
PAINLEVEL_OUTOF10: 8
PAINLEVEL_OUTOF10: 3
PAINLEVEL_OUTOF10: 8
PAINLEVEL_OUTOF10: 4

## 2017-06-27 ASSESSMENT — PAIN DESCRIPTION - PAIN TYPE
TYPE: ACUTE PAIN

## 2017-06-27 ASSESSMENT — PAIN DESCRIPTION - PROGRESSION: CLINICAL_PROGRESSION: GRADUALLY WORSENING

## 2017-06-27 ASSESSMENT — PAIN DESCRIPTION - ONSET: ONSET: ON-GOING

## 2017-06-27 ASSESSMENT — PAIN DESCRIPTION - LOCATION
LOCATION: HIP
LOCATION: HIP
LOCATION: LEG
LOCATION: HIP

## 2017-06-27 ASSESSMENT — PAIN DESCRIPTION - DESCRIPTORS
DESCRIPTORS: SHARP
DESCRIPTORS: ACHING;CONSTANT;SHARP
DESCRIPTORS: SHARP

## 2017-06-28 PROBLEM — N18.4 ACUTE RENAL FAILURE SUPERIMPOSED ON STAGE 4 CHRONIC KIDNEY DISEASE (HCC): Status: ACTIVE | Noted: 2017-06-28

## 2017-06-28 PROBLEM — I44.1 MOBITZ TYPE 2 SECOND DEGREE HEART BLOCK: Status: ACTIVE | Noted: 2017-06-28

## 2017-06-28 PROBLEM — I44.1 AV BLOCK, 2ND DEGREE: Status: ACTIVE | Noted: 2017-06-28

## 2017-06-28 PROBLEM — N17.9 ACUTE RENAL FAILURE SUPERIMPOSED ON STAGE 4 CHRONIC KIDNEY DISEASE (HCC): Status: ACTIVE | Noted: 2017-06-28

## 2017-06-28 LAB
ALBUMIN SERPL-MCNC: 3.6 G/DL (ref 3.5–5.2)
ALBUMIN/GLOBULIN RATIO: 1.4 (ref 1–2.5)
ALP BLD-CCNC: 38 U/L (ref 35–104)
ALT SERPL-CCNC: 10 U/L (ref 5–33)
ANION GAP SERPL CALCULATED.3IONS-SCNC: 12 MMOL/L (ref 9–17)
AST SERPL-CCNC: 17 U/L
BILIRUB SERPL-MCNC: 0.53 MG/DL (ref 0.3–1.2)
BUN BLDV-MCNC: 34 MG/DL (ref 8–23)
BUN/CREAT BLD: 21 (ref 9–20)
CALCIUM SERPL-MCNC: 9.1 MG/DL (ref 8.6–10.4)
CHLORIDE BLD-SCNC: 102 MMOL/L (ref 98–107)
CO2: 24 MMOL/L (ref 20–31)
CREAT SERPL-MCNC: 1.65 MG/DL (ref 0.5–0.9)
GFR AFRICAN AMERICAN: 35 ML/MIN
GFR NON-AFRICAN AMERICAN: 29 ML/MIN
GFR SERPL CREATININE-BSD FRML MDRD: ABNORMAL ML/MIN/{1.73_M2}
GFR SERPL CREATININE-BSD FRML MDRD: ABNORMAL ML/MIN/{1.73_M2}
GLUCOSE BLD-MCNC: 77 MG/DL (ref 74–100)
GLUCOSE BLD-MCNC: 78 MG/DL (ref 74–100)
GLUCOSE BLD-MCNC: 83 MG/DL (ref 70–99)
GLUCOSE BLD-MCNC: 86 MG/DL (ref 74–100)
GLUCOSE BLD-MCNC: 88 MG/DL (ref 74–100)
POTASSIUM SERPL-SCNC: 4.7 MMOL/L (ref 3.7–5.3)
SODIUM BLD-SCNC: 138 MMOL/L (ref 135–144)
TOTAL PROTEIN: 6.2 G/DL (ref 6.4–8.3)

## 2017-06-28 PROCEDURE — 99233 SBSQ HOSP IP/OBS HIGH 50: CPT | Performed by: FAMILY MEDICINE

## 2017-06-28 PROCEDURE — 97110 THERAPEUTIC EXERCISES: CPT

## 2017-06-28 PROCEDURE — 97116 GAIT TRAINING THERAPY: CPT

## 2017-06-28 PROCEDURE — 6360000002 HC RX W HCPCS: Performed by: FAMILY MEDICINE

## 2017-06-28 PROCEDURE — 36415 COLL VENOUS BLD VENIPUNCTURE: CPT

## 2017-06-28 PROCEDURE — 80053 COMPREHEN METABOLIC PANEL: CPT

## 2017-06-28 PROCEDURE — 82947 ASSAY GLUCOSE BLOOD QUANT: CPT

## 2017-06-28 PROCEDURE — 1200000000 HC SEMI PRIVATE

## 2017-06-28 PROCEDURE — 2580000003 HC RX 258: Performed by: FAMILY MEDICINE

## 2017-06-28 PROCEDURE — 6370000000 HC RX 637 (ALT 250 FOR IP): Performed by: FAMILY MEDICINE

## 2017-06-28 RX ADMIN — REPAGLINIDE 0.5 MG: 0.5 TABLET ORAL at 07:34

## 2017-06-28 RX ADMIN — TRAMADOL HYDROCHLORIDE 50 MG: 50 TABLET, FILM COATED ORAL at 02:35

## 2017-06-28 RX ADMIN — Medication 10 ML: at 08:26

## 2017-06-28 RX ADMIN — TRAMADOL HYDROCHLORIDE 50 MG: 50 TABLET, FILM COATED ORAL at 16:35

## 2017-06-28 RX ADMIN — MAGNESIUM HYDROXIDE 30 ML: 400 SUSPENSION ORAL at 08:26

## 2017-06-28 RX ADMIN — Medication 400 MG: at 08:26

## 2017-06-28 RX ADMIN — LEVOTHYROXINE SODIUM 50 MCG: 50 TABLET ORAL at 07:34

## 2017-06-28 RX ADMIN — ENOXAPARIN SODIUM 30 MG: 30 INJECTION SUBCUTANEOUS at 08:26

## 2017-06-28 RX ADMIN — SIMVASTATIN 5 MG: 10 TABLET, FILM COATED ORAL at 21:37

## 2017-06-28 RX ADMIN — LISINOPRIL 20 MG: 20 TABLET ORAL at 08:26

## 2017-06-28 RX ADMIN — Medication 10 ML: at 21:39

## 2017-06-28 RX ADMIN — TRAMADOL HYDROCHLORIDE 50 MG: 50 TABLET, FILM COATED ORAL at 08:26

## 2017-06-28 RX ADMIN — REPAGLINIDE 0.5 MG: 0.5 TABLET ORAL at 16:35

## 2017-06-28 ASSESSMENT — PAIN DESCRIPTION - FREQUENCY
FREQUENCY: INTERMITTENT
FREQUENCY: CONTINUOUS

## 2017-06-28 ASSESSMENT — PAIN DESCRIPTION - LOCATION
LOCATION: HIP

## 2017-06-28 ASSESSMENT — PAIN DESCRIPTION - ORIENTATION
ORIENTATION: LEFT

## 2017-06-28 ASSESSMENT — PAIN DESCRIPTION - PAIN TYPE
TYPE: ACUTE PAIN

## 2017-06-28 ASSESSMENT — PAIN SCALES - GENERAL
PAINLEVEL_OUTOF10: 7
PAINLEVEL_OUTOF10: 5
PAINLEVEL_OUTOF10: 4
PAINLEVEL_OUTOF10: 7
PAINLEVEL_OUTOF10: 2
PAINLEVEL_OUTOF10: 7
PAINLEVEL_OUTOF10: 7
PAINLEVEL_OUTOF10: 3
PAINLEVEL_OUTOF10: 3

## 2017-06-28 ASSESSMENT — PAIN DESCRIPTION - DESCRIPTORS
DESCRIPTORS: SHARP
DESCRIPTORS: SHARP

## 2017-06-29 ENCOUNTER — HOSPITAL ENCOUNTER (INPATIENT)
Age: 82
LOS: 1 days | Discharge: NURSING, REHAB OR LONG-TERM CARE | DRG: 243 | End: 2017-06-30
Attending: INTERNAL MEDICINE | Admitting: INTERNAL MEDICINE
Payer: MEDICARE

## 2017-06-29 VITALS
SYSTOLIC BLOOD PRESSURE: 140 MMHG | RESPIRATION RATE: 16 BRPM | HEIGHT: 64 IN | BODY MASS INDEX: 29.47 KG/M2 | WEIGHT: 172.6 LBS | TEMPERATURE: 98 F | DIASTOLIC BLOOD PRESSURE: 60 MMHG | HEART RATE: 59 BPM | OXYGEN SATURATION: 95 %

## 2017-06-29 LAB
GLUCOSE BLD-MCNC: 117 MG/DL (ref 65–105)
GLUCOSE BLD-MCNC: 86 MG/DL (ref 65–105)
GLUCOSE BLD-MCNC: 90 MG/DL (ref 65–105)
GLUCOSE BLD-MCNC: 91 MG/DL (ref 65–105)
GLUCOSE BLD-MCNC: 96 MG/DL (ref 65–105)

## 2017-06-29 PROCEDURE — 2500000003 HC RX 250 WO HCPCS

## 2017-06-29 PROCEDURE — 0JH606Z INSERTION OF PACEMAKER, DUAL CHAMBER INTO CHEST SUBCUTANEOUS TISSUE AND FASCIA, OPEN APPROACH: ICD-10-PCS | Performed by: INTERNAL MEDICINE

## 2017-06-29 PROCEDURE — C1785 PMKR, DUAL, RATE-RESP: HCPCS

## 2017-06-29 PROCEDURE — 2060000000 HC ICU INTERMEDIATE R&B

## 2017-06-29 PROCEDURE — C1894 INTRO/SHEATH, NON-LASER: HCPCS

## 2017-06-29 PROCEDURE — 6370000000 HC RX 637 (ALT 250 FOR IP): Performed by: INTERNAL MEDICINE

## 2017-06-29 PROCEDURE — 2580000003 HC RX 258

## 2017-06-29 PROCEDURE — 02HK3JZ INSERTION OF PACEMAKER LEAD INTO RIGHT VENTRICLE, PERCUTANEOUS APPROACH: ICD-10-PCS | Performed by: INTERNAL MEDICINE

## 2017-06-29 PROCEDURE — 2580000003 HC RX 258: Performed by: INTERNAL MEDICINE

## 2017-06-29 PROCEDURE — 6360000002 HC RX W HCPCS

## 2017-06-29 PROCEDURE — 33208 INSRT HEART PM ATRIAL & VENT: CPT | Performed by: INTERNAL MEDICINE

## 2017-06-29 PROCEDURE — C1779 LEAD, PMKR, TRANSVENOUS VDD: HCPCS

## 2017-06-29 PROCEDURE — C1777 LEAD, AICD, ENDO SINGLE COIL: HCPCS

## 2017-06-29 PROCEDURE — 6370000000 HC RX 637 (ALT 250 FOR IP): Performed by: FAMILY MEDICINE

## 2017-06-29 PROCEDURE — 82947 ASSAY GLUCOSE BLOOD QUANT: CPT

## 2017-06-29 PROCEDURE — 02H63JZ INSERTION OF PACEMAKER LEAD INTO RIGHT ATRIUM, PERCUTANEOUS APPROACH: ICD-10-PCS | Performed by: INTERNAL MEDICINE

## 2017-06-29 RX ORDER — SIMVASTATIN 5 MG
5 TABLET ORAL NIGHTLY
Status: DISCONTINUED | OUTPATIENT
Start: 2017-06-29 | End: 2017-06-30 | Stop reason: HOSPADM

## 2017-06-29 RX ORDER — NIFEDIPINE 30 MG/1
30 TABLET, EXTENDED RELEASE ORAL DAILY
Status: DISCONTINUED | OUTPATIENT
Start: 2017-06-29 | End: 2017-06-30 | Stop reason: HOSPADM

## 2017-06-29 RX ORDER — LEVOTHYROXINE SODIUM 0.05 MG/1
50 TABLET ORAL DAILY
Status: DISCONTINUED | OUTPATIENT
Start: 2017-06-29 | End: 2017-06-30 | Stop reason: HOSPADM

## 2017-06-29 RX ORDER — ACETAMINOPHEN 325 MG/1
650 TABLET ORAL EVERY 4 HOURS PRN
Status: DISCONTINUED | OUTPATIENT
Start: 2017-06-29 | End: 2017-06-29

## 2017-06-29 RX ORDER — DEXTROSE MONOHYDRATE 50 MG/ML
100 INJECTION, SOLUTION INTRAVENOUS PRN
Status: DISCONTINUED | OUTPATIENT
Start: 2017-06-29 | End: 2017-06-30 | Stop reason: HOSPADM

## 2017-06-29 RX ORDER — DEXTROSE MONOHYDRATE 25 G/50ML
12.5 INJECTION, SOLUTION INTRAVENOUS PRN
Status: DISCONTINUED | OUTPATIENT
Start: 2017-06-29 | End: 2017-06-30 | Stop reason: HOSPADM

## 2017-06-29 RX ORDER — SODIUM CHLORIDE 0.9 % (FLUSH) 0.9 %
10 SYRINGE (ML) INJECTION PRN
Status: DISCONTINUED | OUTPATIENT
Start: 2017-06-29 | End: 2017-06-30 | Stop reason: HOSPADM

## 2017-06-29 RX ORDER — SODIUM CHLORIDE 0.9 % (FLUSH) 0.9 %
10 SYRINGE (ML) INJECTION EVERY 12 HOURS SCHEDULED
Status: DISCONTINUED | OUTPATIENT
Start: 2017-06-29 | End: 2017-06-30 | Stop reason: HOSPADM

## 2017-06-29 RX ORDER — ACETAMINOPHEN 325 MG/1
650 TABLET ORAL EVERY 4 HOURS PRN
Status: DISCONTINUED | OUTPATIENT
Start: 2017-06-29 | End: 2017-06-30 | Stop reason: HOSPADM

## 2017-06-29 RX ORDER — NICOTINE POLACRILEX 4 MG
15 LOZENGE BUCCAL PRN
Status: DISCONTINUED | OUTPATIENT
Start: 2017-06-29 | End: 2017-06-30 | Stop reason: HOSPADM

## 2017-06-29 RX ORDER — TRAMADOL HYDROCHLORIDE 50 MG/1
25 TABLET ORAL EVERY 6 HOURS PRN
Status: DISCONTINUED | OUTPATIENT
Start: 2017-06-29 | End: 2017-06-30 | Stop reason: HOSPADM

## 2017-06-29 RX ORDER — ONDANSETRON 2 MG/ML
4 INJECTION INTRAMUSCULAR; INTRAVENOUS EVERY 6 HOURS PRN
Status: DISCONTINUED | OUTPATIENT
Start: 2017-06-29 | End: 2017-06-29 | Stop reason: SDUPTHER

## 2017-06-29 RX ORDER — ACETAMINOPHEN 325 MG/1
650 TABLET ORAL EVERY 4 HOURS PRN
Status: DISCONTINUED | OUTPATIENT
Start: 2017-06-29 | End: 2017-06-29 | Stop reason: SDUPTHER

## 2017-06-29 RX ORDER — ONDANSETRON 2 MG/ML
4 INJECTION INTRAMUSCULAR; INTRAVENOUS EVERY 6 HOURS PRN
Status: DISCONTINUED | OUTPATIENT
Start: 2017-06-29 | End: 2017-06-30 | Stop reason: HOSPADM

## 2017-06-29 RX ORDER — ASPIRIN 81 MG/1
81 TABLET, CHEWABLE ORAL DAILY
Status: DISCONTINUED | OUTPATIENT
Start: 2017-06-30 | End: 2017-06-30 | Stop reason: HOSPADM

## 2017-06-29 RX ORDER — VANCOMYCIN HYDROCHLORIDE 1 G/200ML
1000 INJECTION, SOLUTION INTRAVENOUS ONCE
Status: DISCONTINUED | OUTPATIENT
Start: 2017-06-29 | End: 2017-06-30 | Stop reason: HOSPADM

## 2017-06-29 RX ORDER — HYDRALAZINE HYDROCHLORIDE 20 MG/ML
5 INJECTION INTRAMUSCULAR; INTRAVENOUS EVERY 6 HOURS PRN
Status: DISCONTINUED | OUTPATIENT
Start: 2017-06-29 | End: 2017-06-30 | Stop reason: HOSPADM

## 2017-06-29 RX ORDER — LISINOPRIL 20 MG/1
20 TABLET ORAL DAILY
Status: DISCONTINUED | OUTPATIENT
Start: 2017-06-29 | End: 2017-06-30 | Stop reason: HOSPADM

## 2017-06-29 RX ADMIN — Medication 10 ML: at 20:15

## 2017-06-29 RX ADMIN — TRAMADOL HYDROCHLORIDE 25 MG: 50 TABLET, FILM COATED ORAL at 09:57

## 2017-06-29 RX ADMIN — SIMVASTATIN 5 MG: 5 TABLET, FILM COATED ORAL at 20:15

## 2017-06-29 RX ADMIN — Medication 10 ML: at 20:16

## 2017-06-29 RX ADMIN — TRAMADOL HYDROCHLORIDE 25 MG: 50 TABLET, FILM COATED ORAL at 19:31

## 2017-06-29 RX ADMIN — NIFEDIPINE 30 MG: 30 TABLET, FILM COATED, EXTENDED RELEASE ORAL at 08:31

## 2017-06-29 RX ADMIN — LEVOTHYROXINE SODIUM 50 MCG: 50 TABLET ORAL at 08:31

## 2017-06-29 RX ADMIN — LISINOPRIL 20 MG: 20 TABLET ORAL at 08:30

## 2017-06-29 RX ADMIN — TRAMADOL HYDROCHLORIDE 50 MG: 50 TABLET, FILM COATED ORAL at 02:53

## 2017-06-29 ASSESSMENT — PAIN SCALES - GENERAL
PAINLEVEL_OUTOF10: 0
PAINLEVEL_OUTOF10: 3
PAINLEVEL_OUTOF10: 3
PAINLEVEL_OUTOF10: 6
PAINLEVEL_OUTOF10: 6
PAINLEVEL_OUTOF10: 0
PAINLEVEL_OUTOF10: 7

## 2017-06-29 ASSESSMENT — PAIN DESCRIPTION - FREQUENCY
FREQUENCY: CONTINUOUS
FREQUENCY: CONTINUOUS

## 2017-06-29 ASSESSMENT — PAIN DESCRIPTION - ONSET
ONSET: ON-GOING
ONSET: ON-GOING

## 2017-06-29 ASSESSMENT — PAIN DESCRIPTION - ORIENTATION
ORIENTATION: LEFT
ORIENTATION: LEFT

## 2017-06-29 ASSESSMENT — PAIN DESCRIPTION - PROGRESSION
CLINICAL_PROGRESSION: GRADUALLY WORSENING
CLINICAL_PROGRESSION: GRADUALLY WORSENING

## 2017-06-29 ASSESSMENT — PAIN DESCRIPTION - PAIN TYPE
TYPE: ACUTE PAIN
TYPE: ACUTE PAIN

## 2017-06-29 ASSESSMENT — PAIN DESCRIPTION - LOCATION
LOCATION: HIP
LOCATION: HIP

## 2017-06-30 ENCOUNTER — APPOINTMENT (OUTPATIENT)
Dept: GENERAL RADIOLOGY | Age: 82
DRG: 243 | End: 2017-06-30
Attending: INTERNAL MEDICINE
Payer: MEDICARE

## 2017-06-30 VITALS
DIASTOLIC BLOOD PRESSURE: 61 MMHG | BODY MASS INDEX: 29.14 KG/M2 | HEIGHT: 64 IN | HEART RATE: 66 BPM | WEIGHT: 170.7 LBS | OXYGEN SATURATION: 98 % | RESPIRATION RATE: 18 BRPM | TEMPERATURE: 97.6 F | SYSTOLIC BLOOD PRESSURE: 153 MMHG

## 2017-06-30 LAB
ANION GAP SERPL CALCULATED.3IONS-SCNC: 10 MMOL/L (ref 9–17)
BUN BLDV-MCNC: 37 MG/DL (ref 8–23)
BUN/CREAT BLD: ABNORMAL (ref 9–20)
CALCIUM SERPL-MCNC: 9.1 MG/DL (ref 8.6–10.4)
CHLORIDE BLD-SCNC: 97 MMOL/L (ref 98–107)
CO2: 26 MMOL/L (ref 20–31)
CREAT SERPL-MCNC: 1.58 MG/DL (ref 0.5–0.9)
GFR AFRICAN AMERICAN: 37 ML/MIN
GFR NON-AFRICAN AMERICAN: 31 ML/MIN
GFR SERPL CREATININE-BSD FRML MDRD: ABNORMAL ML/MIN/{1.73_M2}
GFR SERPL CREATININE-BSD FRML MDRD: ABNORMAL ML/MIN/{1.73_M2}
GLUCOSE BLD-MCNC: 115 MG/DL (ref 65–105)
GLUCOSE BLD-MCNC: 81 MG/DL (ref 65–105)
GLUCOSE BLD-MCNC: 87 MG/DL (ref 65–105)
GLUCOSE BLD-MCNC: 90 MG/DL (ref 70–99)
GLUCOSE BLD-MCNC: 92 MG/DL (ref 65–105)
HCT VFR BLD CALC: 35.4 % (ref 36–46)
HEMOGLOBIN: 12.1 G/DL (ref 12–16)
PLATELET # BLD: 141 K/UL (ref 140–450)
POTASSIUM SERPL-SCNC: 5.2 MMOL/L (ref 3.7–5.3)
SODIUM BLD-SCNC: 133 MMOL/L (ref 135–144)

## 2017-06-30 PROCEDURE — G8987 SELF CARE CURRENT STATUS: HCPCS

## 2017-06-30 PROCEDURE — 80048 BASIC METABOLIC PNL TOTAL CA: CPT

## 2017-06-30 PROCEDURE — 71020 XR CHEST STANDARD TWO VW: CPT

## 2017-06-30 PROCEDURE — 6370000000 HC RX 637 (ALT 250 FOR IP): Performed by: INTERNAL MEDICINE

## 2017-06-30 PROCEDURE — 36415 COLL VENOUS BLD VENIPUNCTURE: CPT

## 2017-06-30 PROCEDURE — 2580000003 HC RX 258: Performed by: INTERNAL MEDICINE

## 2017-06-30 PROCEDURE — 97162 PT EVAL MOD COMPLEX 30 MIN: CPT

## 2017-06-30 PROCEDURE — 97166 OT EVAL MOD COMPLEX 45 MIN: CPT

## 2017-06-30 PROCEDURE — 82947 ASSAY GLUCOSE BLOOD QUANT: CPT

## 2017-06-30 PROCEDURE — 97535 SELF CARE MNGMENT TRAINING: CPT

## 2017-06-30 PROCEDURE — 85014 HEMATOCRIT: CPT

## 2017-06-30 PROCEDURE — 85049 AUTOMATED PLATELET COUNT: CPT

## 2017-06-30 PROCEDURE — G8979 MOBILITY GOAL STATUS: HCPCS

## 2017-06-30 PROCEDURE — 85018 HEMOGLOBIN: CPT

## 2017-06-30 PROCEDURE — 6360000002 HC RX W HCPCS: Performed by: INTERNAL MEDICINE

## 2017-06-30 PROCEDURE — G8978 MOBILITY CURRENT STATUS: HCPCS

## 2017-06-30 PROCEDURE — G8988 SELF CARE GOAL STATUS: HCPCS

## 2017-06-30 RX ORDER — TRAMADOL HYDROCHLORIDE 50 MG/1
25 TABLET ORAL EVERY 6 HOURS PRN
Qty: 20 TABLET | Refills: 0 | Status: SHIPPED | OUTPATIENT
Start: 2017-06-30 | End: 2017-07-10

## 2017-06-30 RX ORDER — ACETAMINOPHEN 325 MG/1
650 TABLET ORAL EVERY 4 HOURS PRN
Qty: 120 TABLET | Refills: 3 | Status: SHIPPED | OUTPATIENT
Start: 2017-06-30

## 2017-06-30 RX ADMIN — NIFEDIPINE 30 MG: 30 TABLET, FILM COATED, EXTENDED RELEASE ORAL at 08:11

## 2017-06-30 RX ADMIN — Medication 10 ML: at 08:11

## 2017-06-30 RX ADMIN — ASPIRIN 81 MG: 81 TABLET, CHEWABLE ORAL at 08:11

## 2017-06-30 RX ADMIN — LEVOTHYROXINE SODIUM 50 MCG: 50 TABLET ORAL at 08:11

## 2017-06-30 RX ADMIN — ENOXAPARIN SODIUM 30 MG: 30 INJECTION SUBCUTANEOUS at 08:11

## 2017-06-30 RX ADMIN — TRAMADOL HYDROCHLORIDE 25 MG: 50 TABLET, FILM COATED ORAL at 14:57

## 2017-06-30 RX ADMIN — TRAMADOL HYDROCHLORIDE 25 MG: 50 TABLET, FILM COATED ORAL at 01:47

## 2017-06-30 RX ADMIN — LISINOPRIL 20 MG: 20 TABLET ORAL at 08:11

## 2017-06-30 RX ADMIN — ACETAMINOPHEN 650 MG: 325 TABLET ORAL at 18:56

## 2017-06-30 RX ADMIN — TRAMADOL HYDROCHLORIDE 25 MG: 50 TABLET, FILM COATED ORAL at 08:10

## 2017-06-30 ASSESSMENT — PAIN SCALES - GENERAL
PAINLEVEL_OUTOF10: 6
PAINLEVEL_OUTOF10: 6
PAINLEVEL_OUTOF10: 5
PAINLEVEL_OUTOF10: 8

## 2017-07-10 PROBLEM — I10 ESSENTIAL HYPERTENSION: Chronic | Status: ACTIVE | Noted: 2017-07-10

## 2017-07-10 PROBLEM — M54.42 CHRONIC MIDLINE LOW BACK PAIN WITH LEFT-SIDED SCIATICA: Status: ACTIVE | Noted: 2017-07-10

## 2017-07-10 PROBLEM — G89.29 CHRONIC MIDLINE LOW BACK PAIN WITH LEFT-SIDED SCIATICA: Status: ACTIVE | Noted: 2017-07-10

## 2017-07-12 ENCOUNTER — TELEPHONE (OUTPATIENT)
Dept: CARDIOLOGY | Age: 82
End: 2017-07-12

## 2017-07-12 ENCOUNTER — OFFICE VISIT (OUTPATIENT)
Dept: CARDIOLOGY | Age: 82
End: 2017-07-12
Payer: MEDICARE

## 2017-07-12 ENCOUNTER — HOSPITAL ENCOUNTER (OUTPATIENT)
Dept: NON INVASIVE DIAGNOSTICS | Age: 82
Discharge: HOME | End: 2017-07-12
Payer: COMMERCIAL

## 2017-07-12 ENCOUNTER — HOSPITAL ENCOUNTER (OUTPATIENT)
Age: 82
Discharge: HOME | End: 2017-07-12
Payer: COMMERCIAL

## 2017-07-12 VITALS
OXYGEN SATURATION: 94 % | SYSTOLIC BLOOD PRESSURE: 93 MMHG | RESPIRATION RATE: 18 BRPM | WEIGHT: 168.8 LBS | HEIGHT: 64 IN | HEART RATE: 60 BPM | DIASTOLIC BLOOD PRESSURE: 60 MMHG | BODY MASS INDEX: 28.82 KG/M2

## 2017-07-12 DIAGNOSIS — R07.89 CHEST PRESSURE: ICD-10-CM

## 2017-07-12 DIAGNOSIS — I95.0 IDIOPATHIC HYPOTENSION: Primary | ICD-10-CM

## 2017-07-12 DIAGNOSIS — I95.0 IDIOPATHIC HYPOTENSION: ICD-10-CM

## 2017-07-12 DIAGNOSIS — Z95.0 CARDIAC PACEMAKER IN SITU: ICD-10-CM

## 2017-07-12 LAB
ANION GAP SERPL CALCULATED.3IONS-SCNC: 13 MMOL/L (ref 9–17)
BUN BLDV-MCNC: 64 MG/DL (ref 8–23)
BUN/CREAT BLD: 27 (ref 9–20)
CALCIUM SERPL-MCNC: 9.4 MG/DL (ref 8.6–10.4)
CHLORIDE BLD-SCNC: 99 MMOL/L (ref 98–107)
CO2: 25 MMOL/L (ref 20–31)
CREAT SERPL-MCNC: 2.35 MG/DL (ref 0.5–0.9)
GFR AFRICAN AMERICAN: 24 ML/MIN
GFR NON-AFRICAN AMERICAN: 19 ML/MIN
GFR SERPL CREATININE-BSD FRML MDRD: ABNORMAL ML/MIN/{1.73_M2}
GFR SERPL CREATININE-BSD FRML MDRD: ABNORMAL ML/MIN/{1.73_M2}
GLUCOSE BLD-MCNC: 111 MG/DL (ref 70–99)
LV EF: 50 %
LVEF MODALITY: NORMAL
POTASSIUM SERPL-SCNC: 5.6 MMOL/L (ref 3.7–5.3)
SODIUM BLD-SCNC: 137 MMOL/L (ref 135–144)

## 2017-07-12 PROCEDURE — 36415 COLL VENOUS BLD VENIPUNCTURE: CPT

## 2017-07-12 PROCEDURE — 93306 TTE W/DOPPLER COMPLETE: CPT

## 2017-07-12 PROCEDURE — 1111F DSCHRG MED/CURRENT MED MERGE: CPT | Performed by: FAMILY MEDICINE

## 2017-07-12 PROCEDURE — 4040F PNEUMOC VAC/ADMIN/RCVD: CPT | Performed by: FAMILY MEDICINE

## 2017-07-12 PROCEDURE — 93288 INTERROG EVL PM/LDLS PM IP: CPT | Performed by: FAMILY MEDICINE

## 2017-07-12 PROCEDURE — 1036F TOBACCO NON-USER: CPT | Performed by: FAMILY MEDICINE

## 2017-07-12 PROCEDURE — 99214 OFFICE O/P EST MOD 30 MIN: CPT | Performed by: FAMILY MEDICINE

## 2017-07-12 PROCEDURE — 93000 ELECTROCARDIOGRAM COMPLETE: CPT | Performed by: FAMILY MEDICINE

## 2017-07-12 PROCEDURE — 80048 BASIC METABOLIC PNL TOTAL CA: CPT

## 2017-07-12 PROCEDURE — 1090F PRES/ABSN URINE INCON ASSESS: CPT | Performed by: FAMILY MEDICINE

## 2017-07-12 PROCEDURE — G8427 DOCREV CUR MEDS BY ELIG CLIN: HCPCS | Performed by: FAMILY MEDICINE

## 2017-07-12 PROCEDURE — 1123F ACP DISCUSS/DSCN MKR DOCD: CPT | Performed by: FAMILY MEDICINE

## 2017-07-12 PROCEDURE — G8417 CALC BMI ABV UP PARAM F/U: HCPCS | Performed by: FAMILY MEDICINE

## 2017-07-12 RX ORDER — CALCIUM CARBONATE 200(500)MG
1 TABLET,CHEWABLE ORAL EVERY 6 HOURS PRN
COMMUNITY
End: 2017-09-26

## 2017-07-12 RX ORDER — NITROGLYCERIN 0.4 MG/1
0.4 TABLET SUBLINGUAL EVERY 5 MIN PRN
COMMUNITY
End: 2017-08-29 | Stop reason: ALTCHOICE

## 2017-07-12 RX ORDER — CARVEDILOL 25 MG/1
12.5 TABLET ORAL 2 TIMES DAILY
Qty: 90 TABLET | Refills: 3 | Status: ON HOLD
Start: 2017-07-12 | End: 2017-10-05

## 2017-07-12 RX ORDER — TRAMADOL HYDROCHLORIDE 50 MG/1
50 TABLET ORAL EVERY 6 HOURS PRN
COMMUNITY
End: 2017-12-29 | Stop reason: ALTCHOICE

## 2017-07-13 DIAGNOSIS — Z95.0 CARDIAC PACEMAKER IN SITU: Primary | ICD-10-CM

## 2017-07-13 DIAGNOSIS — I44.1 AV BLOCK, 2ND DEGREE: ICD-10-CM

## 2017-07-13 DIAGNOSIS — I44.1 MOBITZ TYPE 2 SECOND DEGREE HEART BLOCK: ICD-10-CM

## 2017-07-14 ENCOUNTER — HOSPITAL ENCOUNTER (OUTPATIENT)
Age: 82
Setting detail: SPECIMEN
Discharge: HOME | End: 2017-07-14
Payer: MEDICARE

## 2017-07-14 LAB
ANION GAP SERPL CALCULATED.3IONS-SCNC: 12 MMOL/L (ref 9–17)
BUN BLDV-MCNC: 56 MG/DL (ref 8–23)
BUN/CREAT BLD: 29 (ref 9–20)
CALCIUM SERPL-MCNC: 9 MG/DL (ref 8.6–10.4)
CHLORIDE BLD-SCNC: 102 MMOL/L (ref 98–107)
CO2: 22 MMOL/L (ref 20–31)
CREAT SERPL-MCNC: 1.93 MG/DL (ref 0.5–0.9)
GFR AFRICAN AMERICAN: 30 ML/MIN
GFR NON-AFRICAN AMERICAN: 24 ML/MIN
GFR SERPL CREATININE-BSD FRML MDRD: ABNORMAL ML/MIN/{1.73_M2}
GFR SERPL CREATININE-BSD FRML MDRD: ABNORMAL ML/MIN/{1.73_M2}
GLUCOSE BLD-MCNC: 75 MG/DL (ref 70–99)
POTASSIUM SERPL-SCNC: 5.2 MMOL/L (ref 3.7–5.3)
SODIUM BLD-SCNC: 136 MMOL/L (ref 135–144)

## 2017-07-14 PROCEDURE — 80048 BASIC METABOLIC PNL TOTAL CA: CPT

## 2017-07-14 PROCEDURE — 36415 COLL VENOUS BLD VENIPUNCTURE: CPT

## 2017-07-14 PROCEDURE — P9604 ONE-WAY ALLOW PRORATED TRIP: HCPCS

## 2017-07-17 ENCOUNTER — HOSPITAL ENCOUNTER (OUTPATIENT)
Age: 82
Setting detail: SPECIMEN
Discharge: HOME | End: 2017-07-17
Payer: MEDICARE

## 2017-07-17 LAB
ANION GAP SERPL CALCULATED.3IONS-SCNC: 13 MMOL/L (ref 9–17)
BUN BLDV-MCNC: 30 MG/DL (ref 8–23)
BUN/CREAT BLD: 19 (ref 9–20)
CALCIUM SERPL-MCNC: 9.5 MG/DL (ref 8.6–10.4)
CHLORIDE BLD-SCNC: 100 MMOL/L (ref 98–107)
CO2: 24 MMOL/L (ref 20–31)
CREAT SERPL-MCNC: 1.61 MG/DL (ref 0.5–0.9)
GFR AFRICAN AMERICAN: 36 ML/MIN
GFR NON-AFRICAN AMERICAN: 30 ML/MIN
GFR SERPL CREATININE-BSD FRML MDRD: ABNORMAL ML/MIN/{1.73_M2}
GFR SERPL CREATININE-BSD FRML MDRD: ABNORMAL ML/MIN/{1.73_M2}
GLUCOSE BLD-MCNC: 95 MG/DL (ref 70–99)
POTASSIUM SERPL-SCNC: 4.5 MMOL/L (ref 3.7–5.3)
SODIUM BLD-SCNC: 137 MMOL/L (ref 135–144)

## 2017-07-17 PROCEDURE — 36415 COLL VENOUS BLD VENIPUNCTURE: CPT

## 2017-07-17 PROCEDURE — 80048 BASIC METABOLIC PNL TOTAL CA: CPT

## 2017-07-17 PROCEDURE — P9604 ONE-WAY ALLOW PRORATED TRIP: HCPCS

## 2017-07-24 ENCOUNTER — HOSPITAL ENCOUNTER (OUTPATIENT)
Age: 82
Setting detail: SPECIMEN
Discharge: HOME | End: 2017-07-24
Payer: MEDICARE

## 2017-07-24 LAB
ANION GAP SERPL CALCULATED.3IONS-SCNC: 10 MMOL/L (ref 9–17)
BUN BLDV-MCNC: 46 MG/DL (ref 8–23)
BUN/CREAT BLD: 20 (ref 9–20)
CALCIUM SERPL-MCNC: 9.4 MG/DL (ref 8.6–10.4)
CHLORIDE BLD-SCNC: 98 MMOL/L (ref 98–107)
CO2: 26 MMOL/L (ref 20–31)
CREAT SERPL-MCNC: 2.29 MG/DL (ref 0.5–0.9)
GFR AFRICAN AMERICAN: 24 ML/MIN
GFR NON-AFRICAN AMERICAN: 20 ML/MIN
GFR SERPL CREATININE-BSD FRML MDRD: ABNORMAL ML/MIN/{1.73_M2}
GFR SERPL CREATININE-BSD FRML MDRD: ABNORMAL ML/MIN/{1.73_M2}
GLUCOSE BLD-MCNC: 79 MG/DL (ref 70–99)
POTASSIUM SERPL-SCNC: 5.7 MMOL/L (ref 3.7–5.3)
SODIUM BLD-SCNC: 134 MMOL/L (ref 135–144)

## 2017-07-24 PROCEDURE — 36415 COLL VENOUS BLD VENIPUNCTURE: CPT

## 2017-07-24 PROCEDURE — 80048 BASIC METABOLIC PNL TOTAL CA: CPT

## 2017-07-24 PROCEDURE — P9604 ONE-WAY ALLOW PRORATED TRIP: HCPCS

## 2017-07-25 ENCOUNTER — HOSPITAL ENCOUNTER (OUTPATIENT)
Age: 82
Setting detail: SPECIMEN
Discharge: HOME | End: 2017-07-25
Payer: MEDICARE

## 2017-07-25 LAB
ANION GAP SERPL CALCULATED.3IONS-SCNC: 10 MMOL/L (ref 9–17)
BUN BLDV-MCNC: 44 MG/DL (ref 8–23)
BUN/CREAT BLD: 21 (ref 9–20)
CALCIUM SERPL-MCNC: 9.1 MG/DL (ref 8.6–10.4)
CHLORIDE BLD-SCNC: 99 MMOL/L (ref 98–107)
CO2: 26 MMOL/L (ref 20–31)
CREAT SERPL-MCNC: 2.14 MG/DL (ref 0.5–0.9)
GFR AFRICAN AMERICAN: 26 ML/MIN
GFR NON-AFRICAN AMERICAN: 22 ML/MIN
GFR SERPL CREATININE-BSD FRML MDRD: ABNORMAL ML/MIN/{1.73_M2}
GFR SERPL CREATININE-BSD FRML MDRD: ABNORMAL ML/MIN/{1.73_M2}
GLUCOSE BLD-MCNC: 85 MG/DL (ref 70–99)
POTASSIUM SERPL-SCNC: 5.4 MMOL/L (ref 3.7–5.3)
SODIUM BLD-SCNC: 135 MMOL/L (ref 135–144)

## 2017-07-25 PROCEDURE — 36415 COLL VENOUS BLD VENIPUNCTURE: CPT

## 2017-07-25 PROCEDURE — 80048 BASIC METABOLIC PNL TOTAL CA: CPT

## 2017-07-25 PROCEDURE — P9604 ONE-WAY ALLOW PRORATED TRIP: HCPCS

## 2017-07-26 ENCOUNTER — HOSPITAL ENCOUNTER (OUTPATIENT)
Age: 82
Setting detail: SPECIMEN
Discharge: HOME | End: 2017-07-26
Payer: MEDICARE

## 2017-07-26 LAB
ANION GAP SERPL CALCULATED.3IONS-SCNC: 8 MMOL/L (ref 9–17)
BUN BLDV-MCNC: 37 MG/DL (ref 8–23)
BUN/CREAT BLD: 22 (ref 9–20)
CALCIUM SERPL-MCNC: 9.1 MG/DL (ref 8.6–10.4)
CHLORIDE BLD-SCNC: 102 MMOL/L (ref 98–107)
CO2: 27 MMOL/L (ref 20–31)
CREAT SERPL-MCNC: 1.68 MG/DL (ref 0.5–0.9)
GFR AFRICAN AMERICAN: 35 ML/MIN
GFR NON-AFRICAN AMERICAN: 29 ML/MIN
GFR SERPL CREATININE-BSD FRML MDRD: ABNORMAL ML/MIN/{1.73_M2}
GFR SERPL CREATININE-BSD FRML MDRD: ABNORMAL ML/MIN/{1.73_M2}
GLUCOSE BLD-MCNC: 75 MG/DL (ref 70–99)
POTASSIUM SERPL-SCNC: 5.5 MMOL/L (ref 3.7–5.3)
SODIUM BLD-SCNC: 137 MMOL/L (ref 135–144)

## 2017-07-26 PROCEDURE — 80048 BASIC METABOLIC PNL TOTAL CA: CPT

## 2017-07-26 PROCEDURE — P9604 ONE-WAY ALLOW PRORATED TRIP: HCPCS

## 2017-07-26 PROCEDURE — 36415 COLL VENOUS BLD VENIPUNCTURE: CPT

## 2017-07-31 ENCOUNTER — HOSPITAL ENCOUNTER (OUTPATIENT)
Age: 82
Setting detail: SPECIMEN
Discharge: HOME | End: 2017-07-31
Payer: MEDICARE

## 2017-07-31 LAB
ANION GAP SERPL CALCULATED.3IONS-SCNC: 9 MMOL/L (ref 9–17)
BUN BLDV-MCNC: 32 MG/DL (ref 8–23)
BUN/CREAT BLD: 19 (ref 9–20)
CALCIUM SERPL-MCNC: 9.4 MG/DL (ref 8.6–10.4)
CHLORIDE BLD-SCNC: 101 MMOL/L (ref 98–107)
CO2: 28 MMOL/L (ref 20–31)
CREAT SERPL-MCNC: 1.71 MG/DL (ref 0.5–0.9)
GFR AFRICAN AMERICAN: 34 ML/MIN
GFR NON-AFRICAN AMERICAN: 28 ML/MIN
GFR SERPL CREATININE-BSD FRML MDRD: ABNORMAL ML/MIN/{1.73_M2}
GFR SERPL CREATININE-BSD FRML MDRD: ABNORMAL ML/MIN/{1.73_M2}
GLUCOSE BLD-MCNC: 84 MG/DL (ref 70–99)
POTASSIUM SERPL-SCNC: 5.2 MMOL/L (ref 3.7–5.3)
SODIUM BLD-SCNC: 138 MMOL/L (ref 135–144)

## 2017-07-31 PROCEDURE — 80048 BASIC METABOLIC PNL TOTAL CA: CPT

## 2017-07-31 PROCEDURE — P9604 ONE-WAY ALLOW PRORATED TRIP: HCPCS

## 2017-07-31 PROCEDURE — 36415 COLL VENOUS BLD VENIPUNCTURE: CPT

## 2017-08-02 ENCOUNTER — OFFICE VISIT (OUTPATIENT)
Dept: CARDIOLOGY | Age: 82
End: 2017-08-02
Payer: MEDICARE

## 2017-08-02 ENCOUNTER — HOSPITAL ENCOUNTER (OUTPATIENT)
Age: 82
Setting detail: SPECIMEN
Discharge: HOME | End: 2017-08-02
Payer: MEDICARE

## 2017-08-02 VITALS
HEART RATE: 60 BPM | OXYGEN SATURATION: 95 % | HEIGHT: 64 IN | SYSTOLIC BLOOD PRESSURE: 118 MMHG | WEIGHT: 171.6 LBS | BODY MASS INDEX: 29.3 KG/M2 | DIASTOLIC BLOOD PRESSURE: 70 MMHG | RESPIRATION RATE: 14 BRPM

## 2017-08-02 DIAGNOSIS — I44.1 AV BLOCK, 2ND DEGREE: ICD-10-CM

## 2017-08-02 DIAGNOSIS — Z95.0 CARDIAC PACEMAKER IN SITU: ICD-10-CM

## 2017-08-02 DIAGNOSIS — I50.32 CHRONIC DIASTOLIC HF (HEART FAILURE), NYHA CLASS 3 (HCC): Primary | Chronic | ICD-10-CM

## 2017-08-02 LAB
ANION GAP SERPL CALCULATED.3IONS-SCNC: 10 MMOL/L (ref 9–17)
BUN BLDV-MCNC: 34 MG/DL (ref 8–23)
BUN/CREAT BLD: 19 (ref 9–20)
CALCIUM SERPL-MCNC: 9.4 MG/DL (ref 8.6–10.4)
CHLORIDE BLD-SCNC: 97 MMOL/L (ref 98–107)
CO2: 28 MMOL/L (ref 20–31)
CREAT SERPL-MCNC: 1.79 MG/DL (ref 0.5–0.9)
GFR AFRICAN AMERICAN: 32 ML/MIN
GFR NON-AFRICAN AMERICAN: 27 ML/MIN
GFR SERPL CREATININE-BSD FRML MDRD: ABNORMAL ML/MIN/{1.73_M2}
GFR SERPL CREATININE-BSD FRML MDRD: ABNORMAL ML/MIN/{1.73_M2}
GLUCOSE BLD-MCNC: 88 MG/DL (ref 70–99)
POTASSIUM SERPL-SCNC: 4.8 MMOL/L (ref 3.7–5.3)
SODIUM BLD-SCNC: 135 MMOL/L (ref 135–144)

## 2017-08-02 PROCEDURE — 36415 COLL VENOUS BLD VENIPUNCTURE: CPT

## 2017-08-02 PROCEDURE — 1090F PRES/ABSN URINE INCON ASSESS: CPT | Performed by: FAMILY MEDICINE

## 2017-08-02 PROCEDURE — G8417 CALC BMI ABV UP PARAM F/U: HCPCS | Performed by: FAMILY MEDICINE

## 2017-08-02 PROCEDURE — P9604 ONE-WAY ALLOW PRORATED TRIP: HCPCS

## 2017-08-02 PROCEDURE — 4040F PNEUMOC VAC/ADMIN/RCVD: CPT | Performed by: FAMILY MEDICINE

## 2017-08-02 PROCEDURE — G8427 DOCREV CUR MEDS BY ELIG CLIN: HCPCS | Performed by: FAMILY MEDICINE

## 2017-08-02 PROCEDURE — 1036F TOBACCO NON-USER: CPT | Performed by: FAMILY MEDICINE

## 2017-08-02 PROCEDURE — 93288 INTERROG EVL PM/LDLS PM IP: CPT | Performed by: FAMILY MEDICINE

## 2017-08-02 PROCEDURE — 1123F ACP DISCUSS/DSCN MKR DOCD: CPT | Performed by: FAMILY MEDICINE

## 2017-08-02 PROCEDURE — 99213 OFFICE O/P EST LOW 20 MIN: CPT | Performed by: FAMILY MEDICINE

## 2017-08-02 PROCEDURE — 80048 BASIC METABOLIC PNL TOTAL CA: CPT

## 2017-08-10 DIAGNOSIS — I44.1 MOBITZ TYPE 2 SECOND DEGREE HEART BLOCK: ICD-10-CM

## 2017-08-10 DIAGNOSIS — Z95.0 CARDIAC PACEMAKER IN SITU: Primary | ICD-10-CM

## 2017-08-10 DIAGNOSIS — I44.1 AV BLOCK, 2ND DEGREE: ICD-10-CM

## 2017-08-18 ENCOUNTER — HOSPITAL ENCOUNTER (OUTPATIENT)
Age: 82
Setting detail: SPECIMEN
Discharge: HOME | End: 2017-08-18
Payer: MEDICARE

## 2017-08-18 LAB
ABSOLUTE EOS #: 0.3 K/UL (ref 0–0.4)
ABSOLUTE LYMPH #: 1.6 K/UL (ref 1–4.8)
ABSOLUTE MONO #: 0.7 K/UL (ref 0–1)
ALBUMIN SERPL-MCNC: 3.6 G/DL (ref 3.5–5.2)
ALBUMIN/GLOBULIN RATIO: 1.1 (ref 1–2.5)
ALP BLD-CCNC: 56 U/L (ref 35–104)
ALT SERPL-CCNC: 7 U/L (ref 5–33)
ANION GAP SERPL CALCULATED.3IONS-SCNC: 10 MMOL/L (ref 9–17)
AST SERPL-CCNC: 13 U/L
BASOPHILS # BLD: 1 %
BASOPHILS ABSOLUTE: 0 K/UL (ref 0–0.2)
BILIRUB SERPL-MCNC: 0.45 MG/DL (ref 0.3–1.2)
BUN BLDV-MCNC: 39 MG/DL (ref 8–23)
BUN/CREAT BLD: 18 (ref 9–20)
CALCIUM SERPL-MCNC: 9.7 MG/DL (ref 8.6–10.4)
CHLORIDE BLD-SCNC: 100 MMOL/L (ref 98–107)
CHOLESTEROL/HDL RATIO: 4.2
CHOLESTEROL: 156 MG/DL
CO2: 26 MMOL/L (ref 20–31)
CREAT SERPL-MCNC: 2.14 MG/DL (ref 0.5–0.9)
DIFFERENTIAL TYPE: ABNORMAL
EOSINOPHILS RELATIVE PERCENT: 3 %
ESTIMATED AVERAGE GLUCOSE: 100 MG/DL
GFR AFRICAN AMERICAN: 26 ML/MIN
GFR NON-AFRICAN AMERICAN: 22 ML/MIN
GFR SERPL CREATININE-BSD FRML MDRD: ABNORMAL ML/MIN/{1.73_M2}
GFR SERPL CREATININE-BSD FRML MDRD: ABNORMAL ML/MIN/{1.73_M2}
GLUCOSE BLD-MCNC: 85 MG/DL (ref 70–99)
HBA1C MFR BLD: 5.1 % (ref 4.8–5.9)
HCT VFR BLD CALC: 33.7 % (ref 36–46)
HDLC SERPL-MCNC: 37 MG/DL
HEMOGLOBIN: 11.3 G/DL (ref 12–16)
LDL CHOLESTEROL: 84 MG/DL (ref 0–130)
LYMPHOCYTES # BLD: 19 %
MCH RBC QN AUTO: 32.9 PG (ref 26–34)
MCHC RBC AUTO-ENTMCNC: 33.4 G/DL (ref 31–37)
MCV RBC AUTO: 98.4 FL (ref 80–100)
MONOCYTES # BLD: 8 %
PDW BLD-RTO: 13.7 % (ref 12.1–15.2)
PLATELET # BLD: 259 K/UL (ref 140–450)
PLATELET ESTIMATE: ABNORMAL
PMV BLD AUTO: 8.5 FL (ref 6–12)
POTASSIUM SERPL-SCNC: 5.2 MMOL/L (ref 3.7–5.3)
RBC # BLD: 3.42 M/UL (ref 4–5.2)
RBC # BLD: ABNORMAL 10*6/UL
SEG NEUTROPHILS: 69 %
SEGMENTED NEUTROPHILS ABSOLUTE COUNT: 5.8 K/UL (ref 1.8–7.7)
SODIUM BLD-SCNC: 136 MMOL/L (ref 135–144)
TOTAL PROTEIN: 6.8 G/DL (ref 6.4–8.3)
TRIGL SERPL-MCNC: 173 MG/DL
URIC ACID: 8.7 MG/DL (ref 2.4–5.7)
VLDLC SERPL CALC-MCNC: ABNORMAL MG/DL (ref 1–30)
WBC # BLD: 8.4 K/UL (ref 3.5–11)
WBC # BLD: ABNORMAL 10*3/UL

## 2017-08-18 PROCEDURE — 83036 HEMOGLOBIN GLYCOSYLATED A1C: CPT

## 2017-08-18 PROCEDURE — 84550 ASSAY OF BLOOD/URIC ACID: CPT

## 2017-08-18 PROCEDURE — 80053 COMPREHEN METABOLIC PANEL: CPT

## 2017-08-18 PROCEDURE — 80061 LIPID PANEL: CPT

## 2017-08-18 PROCEDURE — 85025 COMPLETE CBC W/AUTO DIFF WBC: CPT

## 2017-08-28 ENCOUNTER — HOSPITAL ENCOUNTER (OUTPATIENT)
Age: 82
Setting detail: SPECIMEN
Discharge: HOME | End: 2017-08-28
Payer: MEDICARE

## 2017-08-29 ENCOUNTER — APPOINTMENT (OUTPATIENT)
Dept: CT IMAGING | Age: 82
DRG: 291 | End: 2017-08-29
Payer: MEDICARE

## 2017-08-29 ENCOUNTER — APPOINTMENT (OUTPATIENT)
Dept: GENERAL RADIOLOGY | Age: 82
DRG: 291 | End: 2017-08-29
Payer: MEDICARE

## 2017-08-29 ENCOUNTER — HOSPITAL ENCOUNTER (EMERGENCY)
Age: 82
Discharge: HOME | DRG: 291 | End: 2017-08-29
Attending: EMERGENCY MEDICINE
Payer: MEDICARE

## 2017-08-29 ENCOUNTER — APPOINTMENT (OUTPATIENT)
Dept: NUCLEAR MEDICINE | Age: 82
DRG: 291 | End: 2017-08-29
Payer: MEDICARE

## 2017-08-29 ENCOUNTER — HOSPITAL ENCOUNTER (INPATIENT)
Age: 82
LOS: 2 days | Discharge: HOME | DRG: 291 | End: 2017-09-01
Attending: EMERGENCY MEDICINE | Admitting: INTERNAL MEDICINE
Payer: MEDICARE

## 2017-08-29 VITALS
OXYGEN SATURATION: 95 % | RESPIRATION RATE: 18 BRPM | SYSTOLIC BLOOD PRESSURE: 175 MMHG | HEART RATE: 60 BPM | TEMPERATURE: 98.6 F | DIASTOLIC BLOOD PRESSURE: 80 MMHG | BODY MASS INDEX: 29.18 KG/M2 | WEIGHT: 170 LBS

## 2017-08-29 DIAGNOSIS — N18.4 CKD (CHRONIC KIDNEY DISEASE) STAGE 4, GFR 15-29 ML/MIN (HCC): Chronic | ICD-10-CM

## 2017-08-29 DIAGNOSIS — R06.02 SOB (SHORTNESS OF BREATH): ICD-10-CM

## 2017-08-29 DIAGNOSIS — I50.22 CHRONIC SYSTOLIC CONGESTIVE HEART FAILURE (HCC): Primary | ICD-10-CM

## 2017-08-29 DIAGNOSIS — N18.9 ACUTE ON CHRONIC RENAL INSUFFICIENCY: ICD-10-CM

## 2017-08-29 DIAGNOSIS — N28.9 ACUTE ON CHRONIC RENAL INSUFFICIENCY: ICD-10-CM

## 2017-08-29 DIAGNOSIS — I50.9 CONGESTIVE HEART FAILURE, UNSPECIFIED CONGESTIVE HEART FAILURE CHRONICITY, UNSPECIFIED CONGESTIVE HEART FAILURE TYPE: Primary | ICD-10-CM

## 2017-08-29 LAB
-: NORMAL
ABSOLUTE EOS #: 0.1 K/UL (ref 0–0.4)
ABSOLUTE EOS #: 0.2 K/UL (ref 0–0.4)
ABSOLUTE LYMPH #: 1.3 K/UL (ref 1–4.8)
ABSOLUTE LYMPH #: 1.4 K/UL (ref 1–4.8)
ABSOLUTE MONO #: 0.7 K/UL (ref 0–1)
ABSOLUTE MONO #: 0.8 K/UL (ref 0–1)
AMORPHOUS: NORMAL
ANION GAP SERPL CALCULATED.3IONS-SCNC: 13 MMOL/L (ref 9–17)
ANION GAP SERPL CALCULATED.3IONS-SCNC: 15 MMOL/L (ref 9–17)
BACTERIA: NORMAL
BASOPHILS # BLD: 0 %
BASOPHILS # BLD: 0 %
BASOPHILS ABSOLUTE: 0 K/UL (ref 0–0.2)
BASOPHILS ABSOLUTE: 0 K/UL (ref 0–0.2)
BILIRUBIN URINE: NEGATIVE
BNP INTERPRETATION: ABNORMAL
BNP INTERPRETATION: ABNORMAL
BUN BLDV-MCNC: 41 MG/DL (ref 8–23)
BUN BLDV-MCNC: 46 MG/DL (ref 8–23)
BUN/CREAT BLD: 17 (ref 9–20)
BUN/CREAT BLD: 19 (ref 9–20)
CALCIUM SERPL-MCNC: 9.7 MG/DL (ref 8.6–10.4)
CALCIUM SERPL-MCNC: 9.9 MG/DL (ref 8.6–10.4)
CASTS UA: NORMAL /LPF
CHLORIDE BLD-SCNC: 100 MMOL/L (ref 98–107)
CHLORIDE BLD-SCNC: 95 MMOL/L (ref 98–107)
CO2: 25 MMOL/L (ref 20–31)
CO2: 25 MMOL/L (ref 20–31)
COLOR: YELLOW
COMMENT UA: NORMAL
CREAT SERPL-MCNC: 2.36 MG/DL (ref 0.5–0.9)
CREAT SERPL-MCNC: 2.43 MG/DL (ref 0.5–0.9)
CRYSTALS, UA: NORMAL /HPF
D-DIMER QUANTITATIVE: 15.28 MG/L FEU (ref 0.19–0.5)
DIFFERENTIAL TYPE: ABNORMAL
DIFFERENTIAL TYPE: ABNORMAL
EOSINOPHILS RELATIVE PERCENT: 2 %
EOSINOPHILS RELATIVE PERCENT: 3 %
EPITHELIAL CELLS UA: NORMAL /HPF (ref 0–25)
GFR AFRICAN AMERICAN: 23 ML/MIN
GFR AFRICAN AMERICAN: 23 ML/MIN
GFR NON-AFRICAN AMERICAN: 19 ML/MIN
GFR NON-AFRICAN AMERICAN: 19 ML/MIN
GFR SERPL CREATININE-BSD FRML MDRD: ABNORMAL ML/MIN/{1.73_M2}
GLUCOSE BLD-MCNC: 124 MG/DL (ref 70–99)
GLUCOSE BLD-MCNC: 142 MG/DL (ref 70–99)
GLUCOSE URINE: NEGATIVE
HCT VFR BLD CALC: 35.5 % (ref 36–46)
HCT VFR BLD CALC: 37.6 % (ref 36–46)
HEMOGLOBIN: 11.9 G/DL (ref 12–16)
HEMOGLOBIN: 12.4 G/DL (ref 12–16)
KETONES, URINE: NEGATIVE
LEUKOCYTE ESTERASE, URINE: NEGATIVE
LYMPHOCYTES # BLD: 16 %
LYMPHOCYTES # BLD: 17 %
MCH RBC QN AUTO: 32.7 PG (ref 26–34)
MCH RBC QN AUTO: 32.9 PG (ref 26–34)
MCHC RBC AUTO-ENTMCNC: 33.1 G/DL (ref 31–37)
MCHC RBC AUTO-ENTMCNC: 33.5 G/DL (ref 31–37)
MCV RBC AUTO: 98.1 FL (ref 80–100)
MCV RBC AUTO: 98.8 FL (ref 80–100)
MONOCYTES # BLD: 9 %
MONOCYTES # BLD: 9 %
MUCUS: NORMAL
NITRITE, URINE: NEGATIVE
OTHER OBSERVATIONS UA: NORMAL
PDW BLD-RTO: 13.6 % (ref 12.1–15.2)
PDW BLD-RTO: 13.6 % (ref 12.1–15.2)
PH UA: 6.5 (ref 5–9)
PLATELET # BLD: 222 K/UL (ref 140–450)
PLATELET # BLD: 235 K/UL (ref 140–450)
PLATELET ESTIMATE: ABNORMAL
PLATELET ESTIMATE: ABNORMAL
PMV BLD AUTO: 8.3 FL (ref 6–12)
PMV BLD AUTO: 9.1 FL (ref 6–12)
POTASSIUM SERPL-SCNC: 4.8 MMOL/L (ref 3.7–5.3)
POTASSIUM SERPL-SCNC: 4.8 MMOL/L (ref 3.7–5.3)
PRO-BNP: 2885 PG/ML
PRO-BNP: 5615 PG/ML
PROTEIN UA: NEGATIVE
RBC # BLD: 3.62 M/UL (ref 4–5.2)
RBC # BLD: 3.8 M/UL (ref 4–5.2)
RBC # BLD: ABNORMAL 10*6/UL
RBC # BLD: ABNORMAL 10*6/UL
RBC UA: NORMAL /HPF (ref 0–2)
RENAL EPITHELIAL, UA: NORMAL /HPF
SEG NEUTROPHILS: 72 %
SEG NEUTROPHILS: 72 %
SEGMENTED NEUTROPHILS ABSOLUTE COUNT: 5.6 K/UL (ref 1.8–7.7)
SEGMENTED NEUTROPHILS ABSOLUTE COUNT: 6.2 K/UL (ref 1.8–7.7)
SODIUM BLD-SCNC: 135 MMOL/L (ref 135–144)
SODIUM BLD-SCNC: 138 MMOL/L (ref 135–144)
SPECIFIC GRAVITY UA: 1.01 (ref 1.01–1.02)
TRICHOMONAS: NORMAL
TROPONIN INTERP: NORMAL
TROPONIN INTERP: NORMAL
TROPONIN T: <0.03 NG/ML
TROPONIN T: <0.03 NG/ML
TURBIDITY: CLEAR
URINE HGB: NEGATIVE
UROBILINOGEN, URINE: NORMAL
WBC # BLD: 7.8 K/UL (ref 3.5–11)
WBC # BLD: 8.7 K/UL (ref 3.5–11)
WBC # BLD: ABNORMAL 10*3/UL
WBC # BLD: ABNORMAL 10*3/UL
WBC UA: NORMAL /HPF (ref 0–5)
YEAST: NORMAL

## 2017-08-29 PROCEDURE — 71010 XR CHEST PORTABLE: CPT

## 2017-08-29 PROCEDURE — 84484 ASSAY OF TROPONIN QUANT: CPT

## 2017-08-29 PROCEDURE — 36415 COLL VENOUS BLD VENIPUNCTURE: CPT

## 2017-08-29 PROCEDURE — A9540 TC99M MAA: HCPCS | Performed by: EMERGENCY MEDICINE

## 2017-08-29 PROCEDURE — 6370000000 HC RX 637 (ALT 250 FOR IP): Performed by: EMERGENCY MEDICINE

## 2017-08-29 PROCEDURE — 6360000002 HC RX W HCPCS: Performed by: EMERGENCY MEDICINE

## 2017-08-29 PROCEDURE — 3430000000 HC RX DIAGNOSTIC RADIOPHARMACEUTICAL: Performed by: EMERGENCY MEDICINE

## 2017-08-29 PROCEDURE — 81001 URINALYSIS AUTO W/SCOPE: CPT

## 2017-08-29 PROCEDURE — 83880 ASSAY OF NATRIURETIC PEPTIDE: CPT

## 2017-08-29 PROCEDURE — 71250 CT THORAX DX C-: CPT

## 2017-08-29 PROCEDURE — 99285 EMERGENCY DEPT VISIT HI MDM: CPT

## 2017-08-29 PROCEDURE — 85025 COMPLETE CBC W/AUTO DIFF WBC: CPT

## 2017-08-29 PROCEDURE — 85379 FIBRIN DEGRADATION QUANT: CPT

## 2017-08-29 PROCEDURE — 93005 ELECTROCARDIOGRAM TRACING: CPT

## 2017-08-29 PROCEDURE — 80048 BASIC METABOLIC PNL TOTAL CA: CPT

## 2017-08-29 PROCEDURE — A9539 TC99M PENTETATE: HCPCS | Performed by: EMERGENCY MEDICINE

## 2017-08-29 PROCEDURE — 78582 LUNG VENTILAT&PERFUS IMAGING: CPT

## 2017-08-29 RX ORDER — FUROSEMIDE 10 MG/ML
60 INJECTION INTRAMUSCULAR; INTRAVENOUS ONCE
Status: COMPLETED | OUTPATIENT
Start: 2017-08-29 | End: 2017-08-29

## 2017-08-29 RX ORDER — FUROSEMIDE 20 MG/1
20 TABLET ORAL DAILY
Qty: 30 TABLET | Refills: 0 | Status: ON HOLD | OUTPATIENT
Start: 2017-08-29 | End: 2017-09-01 | Stop reason: HOSPADM

## 2017-08-29 RX ORDER — LISINOPRIL 10 MG/1
10 TABLET ORAL DAILY
Status: DISCONTINUED | OUTPATIENT
Start: 2017-08-29 | End: 2017-08-29 | Stop reason: HOSPADM

## 2017-08-29 RX ORDER — KIT FOR THE PREPARATION OF TECHNETIUM TC 99M PENTETATE 20 MG/1
38.6 INJECTION, POWDER, LYOPHILIZED, FOR SOLUTION INTRAVENOUS; RESPIRATORY (INHALATION)
Status: COMPLETED | OUTPATIENT
Start: 2017-08-29 | End: 2017-08-29

## 2017-08-29 RX ADMIN — FUROSEMIDE 60 MG: 10 INJECTION, SOLUTION INTRAMUSCULAR; INTRAVENOUS at 10:24

## 2017-08-29 RX ADMIN — KIT FOR THE PREPARATION OF TECHNETIUM TC 99M PENTETATE 38.6 MILLICURIE: 20 INJECTION, POWDER, LYOPHILIZED, FOR SOLUTION INTRAVENOUS; RESPIRATORY (INHALATION) at 11:53

## 2017-08-29 RX ADMIN — LISINOPRIL 10 MG: 10 TABLET ORAL at 10:45

## 2017-08-29 RX ADMIN — Medication 5.9 MILLICURIE: at 12:13

## 2017-08-29 ASSESSMENT — ENCOUNTER SYMPTOMS
ABDOMINAL PAIN: 0
WHEEZING: 0
GASTROINTESTINAL NEGATIVE: 1
CONSTIPATION: 0
EYES NEGATIVE: 1
EYE REDNESS: 0
FACIAL SWELLING: 0
WHEEZING: 0
FACIAL SWELLING: 0
GASTROINTESTINAL NEGATIVE: 1
SHORTNESS OF BREATH: 1
SHORTNESS OF BREATH: 1
VOMITING: 0
ABDOMINAL PAIN: 0
EYES NEGATIVE: 1
VOMITING: 0
COUGH: 0
BLOOD IN STOOL: 0
EYE PAIN: 0
EYE REDNESS: 0
DIARRHEA: 0
BLOOD IN STOOL: 0
SINUS PRESSURE: 0
CHEST TIGHTNESS: 0
EYE PAIN: 0
CONSTIPATION: 0
COUGH: 0
CHEST TIGHTNESS: 0
DIARRHEA: 0
SINUS PRESSURE: 0

## 2017-08-30 ENCOUNTER — APPOINTMENT (OUTPATIENT)
Dept: NON INVASIVE DIAGNOSTICS | Age: 82
DRG: 291 | End: 2017-08-30
Payer: MEDICARE

## 2017-08-30 LAB
ABSOLUTE EOS #: 0.2 K/UL (ref 0–0.4)
ABSOLUTE LYMPH #: 1.8 K/UL (ref 1–4.8)
ABSOLUTE MONO #: 0.9 K/UL (ref 0–1)
ANION GAP SERPL CALCULATED.3IONS-SCNC: 14 MMOL/L (ref 9–17)
BASOPHILS # BLD: 1 %
BASOPHILS ABSOLUTE: 0 K/UL (ref 0–0.2)
BNP INTERPRETATION: ABNORMAL
BUN BLDV-MCNC: 42 MG/DL (ref 8–23)
BUN/CREAT BLD: 19 (ref 9–20)
CALCIUM SERPL-MCNC: 9.6 MG/DL (ref 8.6–10.4)
CHLORIDE BLD-SCNC: 97 MMOL/L (ref 98–107)
CHOLESTEROL/HDL RATIO: 3.5
CHOLESTEROL: 145 MG/DL
CO2: 26 MMOL/L (ref 20–31)
CREAT SERPL-MCNC: 2.19 MG/DL (ref 0.5–0.9)
DIFFERENTIAL TYPE: ABNORMAL
EOSINOPHILS RELATIVE PERCENT: 3 %
GFR AFRICAN AMERICAN: 26 ML/MIN
GFR NON-AFRICAN AMERICAN: 21 ML/MIN
GFR SERPL CREATININE-BSD FRML MDRD: ABNORMAL ML/MIN/{1.73_M2}
GFR SERPL CREATININE-BSD FRML MDRD: ABNORMAL ML/MIN/{1.73_M2}
GLUCOSE BLD-MCNC: 94 MG/DL (ref 70–99)
HCT VFR BLD CALC: 36.2 % (ref 36–46)
HDLC SERPL-MCNC: 41 MG/DL
HEMOGLOBIN: 12.2 G/DL (ref 12–16)
LDL CHOLESTEROL: 78 MG/DL (ref 0–130)
LYMPHOCYTES # BLD: 25 %
MAGNESIUM: 2.7 MG/DL (ref 1.6–2.6)
MCH RBC QN AUTO: 33.1 PG (ref 26–34)
MCHC RBC AUTO-ENTMCNC: 33.6 G/DL (ref 31–37)
MCV RBC AUTO: 98.5 FL (ref 80–100)
MONOCYTES # BLD: 12 %
PDW BLD-RTO: 13.6 % (ref 12.1–15.2)
PLATELET # BLD: 229 K/UL (ref 140–450)
PLATELET ESTIMATE: ABNORMAL
PMV BLD AUTO: 8.8 FL (ref 6–12)
POTASSIUM SERPL-SCNC: 4.3 MMOL/L (ref 3.7–5.3)
PRO-BNP: 5337 PG/ML
RBC # BLD: 3.68 M/UL (ref 4–5.2)
RBC # BLD: ABNORMAL 10*6/UL
SEG NEUTROPHILS: 59 %
SEGMENTED NEUTROPHILS ABSOLUTE COUNT: 4.4 K/UL (ref 1.8–7.7)
SODIUM BLD-SCNC: 137 MMOL/L (ref 135–144)
THYROXINE, FREE: 1.24 NG/DL (ref 0.93–1.7)
TRIGL SERPL-MCNC: 131 MG/DL
TSH SERPL DL<=0.05 MIU/L-ACNC: 2.46 MIU/L (ref 0.3–5)
VLDLC SERPL CALC-MCNC: NORMAL MG/DL (ref 1–30)
WBC # BLD: 7.4 K/UL (ref 3.5–11)
WBC # BLD: ABNORMAL 10*3/UL

## 2017-08-30 PROCEDURE — 80061 LIPID PANEL: CPT

## 2017-08-30 PROCEDURE — 6370000000 HC RX 637 (ALT 250 FOR IP): Performed by: INTERNAL MEDICINE

## 2017-08-30 PROCEDURE — 6370000000 HC RX 637 (ALT 250 FOR IP): Performed by: PHYSICIAN ASSISTANT

## 2017-08-30 PROCEDURE — 1200000000 HC SEMI PRIVATE

## 2017-08-30 PROCEDURE — 6360000002 HC RX W HCPCS: Performed by: INTERNAL MEDICINE

## 2017-08-30 PROCEDURE — 2580000003 HC RX 258: Performed by: INTERNAL MEDICINE

## 2017-08-30 PROCEDURE — 36415 COLL VENOUS BLD VENIPUNCTURE: CPT

## 2017-08-30 PROCEDURE — 83880 ASSAY OF NATRIURETIC PEPTIDE: CPT

## 2017-08-30 PROCEDURE — 6360000002 HC RX W HCPCS

## 2017-08-30 PROCEDURE — 84439 ASSAY OF FREE THYROXINE: CPT

## 2017-08-30 PROCEDURE — 6370000000 HC RX 637 (ALT 250 FOR IP): Performed by: FAMILY MEDICINE

## 2017-08-30 PROCEDURE — 83735 ASSAY OF MAGNESIUM: CPT

## 2017-08-30 PROCEDURE — 80048 BASIC METABOLIC PNL TOTAL CA: CPT

## 2017-08-30 PROCEDURE — 85025 COMPLETE CBC W/AUTO DIFF WBC: CPT

## 2017-08-30 PROCEDURE — 84443 ASSAY THYROID STIM HORMONE: CPT

## 2017-08-30 RX ORDER — ONDANSETRON 2 MG/ML
4 INJECTION INTRAMUSCULAR; INTRAVENOUS EVERY 6 HOURS PRN
Status: DISCONTINUED | OUTPATIENT
Start: 2017-08-30 | End: 2017-09-01 | Stop reason: HOSPADM

## 2017-08-30 RX ORDER — LEVOTHYROXINE SODIUM 0.05 MG/1
50 TABLET ORAL DAILY
Status: DISCONTINUED | OUTPATIENT
Start: 2017-08-30 | End: 2017-09-01 | Stop reason: HOSPADM

## 2017-08-30 RX ORDER — SODIUM CHLORIDE 0.9 % (FLUSH) 0.9 %
10 SYRINGE (ML) INJECTION EVERY 12 HOURS SCHEDULED
Status: DISCONTINUED | OUTPATIENT
Start: 2017-08-30 | End: 2017-09-01 | Stop reason: HOSPADM

## 2017-08-30 RX ORDER — POTASSIUM CHLORIDE 20 MEQ/1
40 TABLET, EXTENDED RELEASE ORAL PRN
Status: DISCONTINUED | OUTPATIENT
Start: 2017-08-30 | End: 2017-09-01 | Stop reason: HOSPADM

## 2017-08-30 RX ORDER — CARVEDILOL 12.5 MG/1
12.5 TABLET ORAL 2 TIMES DAILY
Status: DISCONTINUED | OUTPATIENT
Start: 2017-08-30 | End: 2017-09-01 | Stop reason: HOSPADM

## 2017-08-30 RX ORDER — SIMVASTATIN 10 MG
5 TABLET ORAL NIGHTLY
Status: DISCONTINUED | OUTPATIENT
Start: 2017-08-30 | End: 2017-09-01 | Stop reason: HOSPADM

## 2017-08-30 RX ORDER — FUROSEMIDE 10 MG/ML
INJECTION INTRAMUSCULAR; INTRAVENOUS
Status: COMPLETED
Start: 2017-08-30 | End: 2017-08-30

## 2017-08-30 RX ORDER — POTASSIUM CHLORIDE 7.45 MG/ML
10 INJECTION INTRAVENOUS PRN
Status: DISCONTINUED | OUTPATIENT
Start: 2017-08-30 | End: 2017-09-01 | Stop reason: HOSPADM

## 2017-08-30 RX ORDER — SODIUM CHLORIDE 0.9 % (FLUSH) 0.9 %
10 SYRINGE (ML) INJECTION PRN
Status: DISCONTINUED | OUTPATIENT
Start: 2017-08-30 | End: 2017-09-01 | Stop reason: HOSPADM

## 2017-08-30 RX ORDER — ASPIRIN 81 MG/1
81 TABLET ORAL EVERY OTHER DAY
Status: DISCONTINUED | OUTPATIENT
Start: 2017-08-30 | End: 2017-09-01 | Stop reason: HOSPADM

## 2017-08-30 RX ORDER — REPAGLINIDE 0.5 MG/1
0.5 TABLET ORAL
Status: DISCONTINUED | OUTPATIENT
Start: 2017-08-30 | End: 2017-09-01 | Stop reason: HOSPADM

## 2017-08-30 RX ORDER — DIPHENHYDRAMINE HCL 12.5MG/5ML
12.5 LIQUID (ML) ORAL NIGHTLY PRN
Status: DISCONTINUED | OUTPATIENT
Start: 2017-08-30 | End: 2017-09-01 | Stop reason: HOSPADM

## 2017-08-30 RX ORDER — ACETAMINOPHEN 325 MG/1
650 TABLET ORAL EVERY 4 HOURS PRN
Status: DISCONTINUED | OUTPATIENT
Start: 2017-08-30 | End: 2017-09-01 | Stop reason: HOSPADM

## 2017-08-30 RX ORDER — FAMOTIDINE 20 MG/1
20 TABLET, FILM COATED ORAL DAILY
Status: DISCONTINUED | OUTPATIENT
Start: 2017-08-30 | End: 2017-09-01 | Stop reason: HOSPADM

## 2017-08-30 RX ORDER — POTASSIUM CHLORIDE 20MEQ/15ML
40 LIQUID (ML) ORAL PRN
Status: DISCONTINUED | OUTPATIENT
Start: 2017-08-30 | End: 2017-09-01 | Stop reason: HOSPADM

## 2017-08-30 RX ORDER — FUROSEMIDE 10 MG/ML
40 INJECTION INTRAMUSCULAR; INTRAVENOUS 2 TIMES DAILY
Status: DISCONTINUED | OUTPATIENT
Start: 2017-08-30 | End: 2017-08-31

## 2017-08-30 RX ORDER — LISINOPRIL 20 MG/1
20 TABLET ORAL DAILY
Status: DISCONTINUED | OUTPATIENT
Start: 2017-08-30 | End: 2017-08-30

## 2017-08-30 RX ORDER — TRAMADOL HYDROCHLORIDE 50 MG/1
50 TABLET ORAL EVERY 6 HOURS PRN
Status: DISCONTINUED | OUTPATIENT
Start: 2017-08-30 | End: 2017-09-01

## 2017-08-30 RX ORDER — LISINOPRIL 20 MG/1
20 TABLET ORAL 2 TIMES DAILY
Status: DISCONTINUED | OUTPATIENT
Start: 2017-08-30 | End: 2017-09-01 | Stop reason: HOSPADM

## 2017-08-30 RX ORDER — MAGNESIUM SULFATE 1 G/100ML
1 INJECTION INTRAVENOUS PRN
Status: DISCONTINUED | OUTPATIENT
Start: 2017-08-30 | End: 2017-09-01 | Stop reason: HOSPADM

## 2017-08-30 RX ADMIN — TRAMADOL HYDROCHLORIDE 50 MG: 50 TABLET, FILM COATED ORAL at 14:05

## 2017-08-30 RX ADMIN — TRAMADOL HYDROCHLORIDE 50 MG: 50 TABLET, FILM COATED ORAL at 20:26

## 2017-08-30 RX ADMIN — LISINOPRIL 20 MG: 20 TABLET ORAL at 07:44

## 2017-08-30 RX ADMIN — ASPIRIN 81 MG: 81 TABLET, COATED ORAL at 08:46

## 2017-08-30 RX ADMIN — Medication 10 ML: at 20:27

## 2017-08-30 RX ADMIN — SIMVASTATIN 5 MG: 10 TABLET, FILM COATED ORAL at 20:26

## 2017-08-30 RX ADMIN — CARVEDILOL 12.5 MG: 12.5 TABLET, FILM COATED ORAL at 07:44

## 2017-08-30 RX ADMIN — CARVEDILOL 12.5 MG: 12.5 TABLET, FILM COATED ORAL at 20:26

## 2017-08-30 RX ADMIN — REPAGLINIDE 0.5 MG: 0.5 TABLET ORAL at 07:13

## 2017-08-30 RX ADMIN — ENOXAPARIN SODIUM 30 MG: 30 INJECTION SUBCUTANEOUS at 08:46

## 2017-08-30 RX ADMIN — FUROSEMIDE 40 MG: 10 INJECTION, SOLUTION INTRAMUSCULAR; INTRAVENOUS at 18:47

## 2017-08-30 RX ADMIN — REPAGLINIDE 0.5 MG: 0.5 TABLET ORAL at 17:03

## 2017-08-30 RX ADMIN — FUROSEMIDE 40 MG: 10 INJECTION, SOLUTION INTRAMUSCULAR; INTRAVENOUS at 07:43

## 2017-08-30 RX ADMIN — LISINOPRIL 20 MG: 20 TABLET ORAL at 21:24

## 2017-08-30 RX ADMIN — LEVOTHYROXINE SODIUM 50 MCG: 0.05 TABLET ORAL at 07:44

## 2017-08-30 RX ADMIN — DIPHENHYDRAMINE HYDROCHLORIDE 12.5 MG: 12.5 SOLUTION ORAL at 20:26

## 2017-08-30 RX ADMIN — TRAMADOL HYDROCHLORIDE 50 MG: 50 TABLET, FILM COATED ORAL at 01:06

## 2017-08-30 RX ADMIN — TRAMADOL HYDROCHLORIDE 50 MG: 50 TABLET, FILM COATED ORAL at 07:44

## 2017-08-30 ASSESSMENT — PAIN SCALES - GENERAL
PAINLEVEL_OUTOF10: 8
PAINLEVEL_OUTOF10: 8
PAINLEVEL_OUTOF10: 4
PAINLEVEL_OUTOF10: 4
PAINLEVEL_OUTOF10: 8
PAINLEVEL_OUTOF10: 8
PAINLEVEL_OUTOF10: 7

## 2017-08-30 ASSESSMENT — PAIN DESCRIPTION - LOCATION
LOCATION: HIP

## 2017-08-30 ASSESSMENT — PAIN DESCRIPTION - FREQUENCY: FREQUENCY: CONTINUOUS

## 2017-08-30 ASSESSMENT — PAIN DESCRIPTION - PAIN TYPE
TYPE: CHRONIC PAIN
TYPE: CHRONIC PAIN
TYPE: ACUTE PAIN

## 2017-08-30 ASSESSMENT — PAIN DESCRIPTION - ONSET
ONSET: ON-GOING
ONSET: ON-GOING

## 2017-08-30 ASSESSMENT — PAIN DESCRIPTION - PROGRESSION
CLINICAL_PROGRESSION: GRADUALLY IMPROVING
CLINICAL_PROGRESSION: NOT CHANGED

## 2017-08-30 ASSESSMENT — PAIN DESCRIPTION - ORIENTATION
ORIENTATION: LEFT

## 2017-08-30 ASSESSMENT — PAIN DESCRIPTION - DESCRIPTORS
DESCRIPTORS: DISCOMFORT
DESCRIPTORS: SHARP

## 2017-08-31 PROBLEM — J81.0 FLASH PULMONARY EDEMA (HCC): Status: ACTIVE | Noted: 2017-08-31

## 2017-08-31 PROBLEM — I16.1 HYPERTENSIVE EMERGENCY: Status: ACTIVE | Noted: 2017-08-31

## 2017-08-31 LAB
ANION GAP SERPL CALCULATED.3IONS-SCNC: 13 MMOL/L (ref 9–17)
BUN BLDV-MCNC: 52 MG/DL (ref 8–23)
BUN/CREAT BLD: 21 (ref 9–20)
CALCIUM SERPL-MCNC: 9.6 MG/DL (ref 8.6–10.4)
CHLORIDE BLD-SCNC: 96 MMOL/L (ref 98–107)
CO2: 28 MMOL/L (ref 20–31)
CREAT SERPL-MCNC: 2.42 MG/DL (ref 0.5–0.9)
GFR AFRICAN AMERICAN: 23 ML/MIN
GFR NON-AFRICAN AMERICAN: 19 ML/MIN
GFR SERPL CREATININE-BSD FRML MDRD: ABNORMAL ML/MIN/{1.73_M2}
GFR SERPL CREATININE-BSD FRML MDRD: ABNORMAL ML/MIN/{1.73_M2}
GLUCOSE BLD-MCNC: 95 MG/DL (ref 70–99)
HCT VFR BLD CALC: 35.1 % (ref 36–46)
HEMOGLOBIN: 11.7 G/DL (ref 12–16)
MCH RBC QN AUTO: 32.8 PG (ref 26–34)
MCHC RBC AUTO-ENTMCNC: 33.3 G/DL (ref 31–37)
MCV RBC AUTO: 98.5 FL (ref 80–100)
PDW BLD-RTO: 13.9 % (ref 12.1–15.2)
PLATELET # BLD: 215 K/UL (ref 140–450)
PMV BLD AUTO: 8.9 FL (ref 6–12)
POTASSIUM SERPL-SCNC: 4.9 MMOL/L (ref 3.7–5.3)
RBC # BLD: 3.56 M/UL (ref 4–5.2)
SODIUM BLD-SCNC: 137 MMOL/L (ref 135–144)
WBC # BLD: 7.2 K/UL (ref 3.5–11)

## 2017-08-31 PROCEDURE — G8978 MOBILITY CURRENT STATUS: HCPCS

## 2017-08-31 PROCEDURE — G8988 SELF CARE GOAL STATUS: HCPCS

## 2017-08-31 PROCEDURE — 97116 GAIT TRAINING THERAPY: CPT

## 2017-08-31 PROCEDURE — 97530 THERAPEUTIC ACTIVITIES: CPT

## 2017-08-31 PROCEDURE — 97110 THERAPEUTIC EXERCISES: CPT

## 2017-08-31 PROCEDURE — 1200000000 HC SEMI PRIVATE

## 2017-08-31 PROCEDURE — 85027 COMPLETE CBC AUTOMATED: CPT

## 2017-08-31 PROCEDURE — 36415 COLL VENOUS BLD VENIPUNCTURE: CPT

## 2017-08-31 PROCEDURE — 2580000003 HC RX 258: Performed by: INTERNAL MEDICINE

## 2017-08-31 PROCEDURE — G8987 SELF CARE CURRENT STATUS: HCPCS

## 2017-08-31 PROCEDURE — 97162 PT EVAL MOD COMPLEX 30 MIN: CPT

## 2017-08-31 PROCEDURE — 6370000000 HC RX 637 (ALT 250 FOR IP): Performed by: PHYSICIAN ASSISTANT

## 2017-08-31 PROCEDURE — 6360000002 HC RX W HCPCS: Performed by: INTERNAL MEDICINE

## 2017-08-31 PROCEDURE — 80048 BASIC METABOLIC PNL TOTAL CA: CPT

## 2017-08-31 PROCEDURE — 99222 1ST HOSP IP/OBS MODERATE 55: CPT | Performed by: FAMILY MEDICINE

## 2017-08-31 PROCEDURE — G8979 MOBILITY GOAL STATUS: HCPCS

## 2017-08-31 PROCEDURE — 97165 OT EVAL LOW COMPLEX 30 MIN: CPT

## 2017-08-31 PROCEDURE — 6370000000 HC RX 637 (ALT 250 FOR IP): Performed by: FAMILY MEDICINE

## 2017-08-31 PROCEDURE — 6370000000 HC RX 637 (ALT 250 FOR IP): Performed by: INTERNAL MEDICINE

## 2017-08-31 RX ORDER — FUROSEMIDE 20 MG/1
20 TABLET ORAL EVERY OTHER DAY
Status: DISCONTINUED | OUTPATIENT
Start: 2017-09-01 | End: 2017-09-01

## 2017-08-31 RX ADMIN — Medication 10 ML: at 20:40

## 2017-08-31 RX ADMIN — FUROSEMIDE 40 MG: 10 INJECTION, SOLUTION INTRAMUSCULAR; INTRAVENOUS at 08:34

## 2017-08-31 RX ADMIN — TRAMADOL HYDROCHLORIDE 50 MG: 50 TABLET, FILM COATED ORAL at 13:26

## 2017-08-31 RX ADMIN — Medication 10 ML: at 08:34

## 2017-08-31 RX ADMIN — TRAMADOL HYDROCHLORIDE 50 MG: 50 TABLET, FILM COATED ORAL at 20:40

## 2017-08-31 RX ADMIN — SIMVASTATIN 5 MG: 10 TABLET, FILM COATED ORAL at 20:40

## 2017-08-31 RX ADMIN — REPAGLINIDE 0.5 MG: 0.5 TABLET ORAL at 16:48

## 2017-08-31 RX ADMIN — CARVEDILOL 12.5 MG: 12.5 TABLET, FILM COATED ORAL at 20:40

## 2017-08-31 RX ADMIN — ENOXAPARIN SODIUM 30 MG: 30 INJECTION SUBCUTANEOUS at 09:47

## 2017-08-31 RX ADMIN — CARVEDILOL 12.5 MG: 12.5 TABLET, FILM COATED ORAL at 10:00

## 2017-08-31 RX ADMIN — Medication 10 ML: at 09:00

## 2017-08-31 RX ADMIN — LEVOTHYROXINE SODIUM 50 MCG: 0.05 TABLET ORAL at 08:33

## 2017-08-31 RX ADMIN — LISINOPRIL 20 MG: 20 TABLET ORAL at 09:46

## 2017-08-31 RX ADMIN — TRAMADOL HYDROCHLORIDE 50 MG: 50 TABLET, FILM COATED ORAL at 05:27

## 2017-08-31 RX ADMIN — DIPHENHYDRAMINE HYDROCHLORIDE 12.5 MG: 12.5 SOLUTION ORAL at 20:40

## 2017-08-31 RX ADMIN — REPAGLINIDE 0.5 MG: 0.5 TABLET ORAL at 07:05

## 2017-08-31 RX ADMIN — FAMOTIDINE 20 MG: 20 TABLET ORAL at 09:46

## 2017-08-31 RX ADMIN — MAGNESIUM HYDROXIDE 30 ML: 400 SUSPENSION ORAL at 08:33

## 2017-08-31 RX ADMIN — LISINOPRIL 20 MG: 20 TABLET ORAL at 20:40

## 2017-08-31 ASSESSMENT — PAIN DESCRIPTION - ORIENTATION
ORIENTATION: LEFT

## 2017-08-31 ASSESSMENT — PAIN DESCRIPTION - DESCRIPTORS: DESCRIPTORS: SHARP

## 2017-08-31 ASSESSMENT — PAIN DESCRIPTION - LOCATION
LOCATION: HIP

## 2017-08-31 ASSESSMENT — PAIN DESCRIPTION - PAIN TYPE
TYPE: CHRONIC PAIN

## 2017-08-31 ASSESSMENT — PAIN SCALES - GENERAL
PAINLEVEL_OUTOF10: 0
PAINLEVEL_OUTOF10: 5
PAINLEVEL_OUTOF10: 7
PAINLEVEL_OUTOF10: 5
PAINLEVEL_OUTOF10: 0

## 2017-08-31 ASSESSMENT — PAIN DESCRIPTION - FREQUENCY: FREQUENCY: CONTINUOUS

## 2017-08-31 ASSESSMENT — PAIN DESCRIPTION - ONSET: ONSET: ON-GOING

## 2017-09-01 VITALS
WEIGHT: 163.9 LBS | SYSTOLIC BLOOD PRESSURE: 155 MMHG | DIASTOLIC BLOOD PRESSURE: 84 MMHG | OXYGEN SATURATION: 97 % | TEMPERATURE: 98 F | RESPIRATION RATE: 16 BRPM | HEIGHT: 64 IN | BODY MASS INDEX: 27.98 KG/M2 | HEART RATE: 68 BPM

## 2017-09-01 LAB
ANION GAP SERPL CALCULATED.3IONS-SCNC: 12 MMOL/L (ref 9–17)
BUN BLDV-MCNC: 61 MG/DL (ref 8–23)
BUN/CREAT BLD: 20 (ref 9–20)
CALCIUM SERPL-MCNC: 9.4 MG/DL (ref 8.6–10.4)
CHLORIDE BLD-SCNC: 96 MMOL/L (ref 98–107)
CO2: 28 MMOL/L (ref 20–31)
CREAT SERPL-MCNC: 3 MG/DL (ref 0.5–0.9)
EKG ATRIAL RATE: 79 BPM
EKG Q-T INTERVAL: 396 MS
EKG QRS DURATION: 140 MS
EKG QTC CALCULATION (BAZETT): 454 MS
EKG R AXIS: -27 DEGREES
EKG T AXIS: 153 DEGREES
EKG VENTRICULAR RATE: 79 BPM
GFR AFRICAN AMERICAN: 18 ML/MIN
GFR NON-AFRICAN AMERICAN: 15 ML/MIN
GFR SERPL CREATININE-BSD FRML MDRD: ABNORMAL ML/MIN/{1.73_M2}
GFR SERPL CREATININE-BSD FRML MDRD: ABNORMAL ML/MIN/{1.73_M2}
GLUCOSE BLD-MCNC: 83 MG/DL (ref 70–99)
HCT VFR BLD CALC: 35.4 % (ref 36–46)
HEMOGLOBIN: 11.8 G/DL (ref 12–16)
MCH RBC QN AUTO: 33 PG (ref 26–34)
MCHC RBC AUTO-ENTMCNC: 33.2 G/DL (ref 31–37)
MCV RBC AUTO: 99.2 FL (ref 80–100)
PDW BLD-RTO: 13.6 % (ref 12.1–15.2)
PLATELET # BLD: 227 K/UL (ref 140–450)
PMV BLD AUTO: 8.4 FL (ref 6–12)
POTASSIUM SERPL-SCNC: 5 MMOL/L (ref 3.7–5.3)
RBC # BLD: 3.56 M/UL (ref 4–5.2)
SODIUM BLD-SCNC: 136 MMOL/L (ref 135–144)
WBC # BLD: 7.7 K/UL (ref 3.5–11)

## 2017-09-01 PROCEDURE — 6370000000 HC RX 637 (ALT 250 FOR IP): Performed by: FAMILY MEDICINE

## 2017-09-01 PROCEDURE — 6370000000 HC RX 637 (ALT 250 FOR IP): Performed by: INTERNAL MEDICINE

## 2017-09-01 PROCEDURE — 2580000003 HC RX 258: Performed by: INTERNAL MEDICINE

## 2017-09-01 PROCEDURE — 85027 COMPLETE CBC AUTOMATED: CPT

## 2017-09-01 PROCEDURE — 6360000002 HC RX W HCPCS: Performed by: PHYSICIAN ASSISTANT

## 2017-09-01 PROCEDURE — 97116 GAIT TRAINING THERAPY: CPT

## 2017-09-01 PROCEDURE — 97110 THERAPEUTIC EXERCISES: CPT

## 2017-09-01 PROCEDURE — 6370000000 HC RX 637 (ALT 250 FOR IP): Performed by: PHYSICIAN ASSISTANT

## 2017-09-01 PROCEDURE — 36415 COLL VENOUS BLD VENIPUNCTURE: CPT

## 2017-09-01 PROCEDURE — 80048 BASIC METABOLIC PNL TOTAL CA: CPT

## 2017-09-01 PROCEDURE — 97535 SELF CARE MNGMENT TRAINING: CPT

## 2017-09-01 RX ORDER — HEPARIN SODIUM 5000 [USP'U]/ML
5000 INJECTION, SOLUTION INTRAVENOUS; SUBCUTANEOUS 2 TIMES DAILY
Status: DISCONTINUED | OUTPATIENT
Start: 2017-09-01 | End: 2017-09-01 | Stop reason: HOSPADM

## 2017-09-01 RX ORDER — LISINOPRIL 20 MG/1
20 TABLET ORAL 2 TIMES DAILY
Qty: 60 TABLET | Refills: 0 | Status: ON HOLD | OUTPATIENT
Start: 2017-09-01 | End: 2017-09-29

## 2017-09-01 RX ORDER — HEPARIN SODIUM 5000 [USP'U]/ML
5000 INJECTION, SOLUTION INTRAVENOUS; SUBCUTANEOUS 2 TIMES DAILY
Status: DISCONTINUED | OUTPATIENT
Start: 2017-09-01 | End: 2017-09-01

## 2017-09-01 RX ORDER — TRAMADOL HYDROCHLORIDE 50 MG/1
50 TABLET ORAL EVERY 12 HOURS PRN
Status: DISCONTINUED | OUTPATIENT
Start: 2017-09-01 | End: 2017-09-01 | Stop reason: HOSPADM

## 2017-09-01 RX ADMIN — FUROSEMIDE 20 MG: 20 TABLET ORAL at 08:41

## 2017-09-01 RX ADMIN — TRAMADOL HYDROCHLORIDE 50 MG: 50 TABLET, FILM COATED ORAL at 03:55

## 2017-09-01 RX ADMIN — FAMOTIDINE 20 MG: 20 TABLET ORAL at 08:41

## 2017-09-01 RX ADMIN — Medication 10 ML: at 08:41

## 2017-09-01 RX ADMIN — LEVOTHYROXINE SODIUM 50 MCG: 0.05 TABLET ORAL at 08:09

## 2017-09-01 RX ADMIN — TRAMADOL HYDROCHLORIDE 50 MG: 50 TABLET, FILM COATED ORAL at 13:40

## 2017-09-01 RX ADMIN — CARVEDILOL 12.5 MG: 12.5 TABLET, FILM COATED ORAL at 08:09

## 2017-09-01 RX ADMIN — LISINOPRIL 20 MG: 20 TABLET ORAL at 08:41

## 2017-09-01 RX ADMIN — HEPARIN SODIUM 5000 UNITS: 5000 INJECTION, SOLUTION INTRAVENOUS; SUBCUTANEOUS at 08:40

## 2017-09-01 RX ADMIN — ASPIRIN 81 MG: 81 TABLET, COATED ORAL at 08:41

## 2017-09-01 RX ADMIN — REPAGLINIDE 0.5 MG: 0.5 TABLET ORAL at 08:09

## 2017-09-01 ASSESSMENT — PAIN DESCRIPTION - ORIENTATION
ORIENTATION: LEFT

## 2017-09-01 ASSESSMENT — PAIN DESCRIPTION - DESCRIPTORS
DESCRIPTORS: SHARP;RADIATING
DESCRIPTORS: SHARP;RADIATING

## 2017-09-01 ASSESSMENT — PAIN SCALES - GENERAL
PAINLEVEL_OUTOF10: 6
PAINLEVEL_OUTOF10: 5

## 2017-09-01 ASSESSMENT — PAIN DESCRIPTION - ONSET
ONSET: ON-GOING
ONSET: ON-GOING

## 2017-09-01 ASSESSMENT — PAIN DESCRIPTION - PAIN TYPE
TYPE: CHRONIC PAIN

## 2017-09-01 ASSESSMENT — PAIN DESCRIPTION - LOCATION
LOCATION: HIP

## 2017-09-01 ASSESSMENT — PAIN DESCRIPTION - FREQUENCY
FREQUENCY: CONTINUOUS
FREQUENCY: CONTINUOUS

## 2017-09-05 ENCOUNTER — HOSPITAL ENCOUNTER (OUTPATIENT)
Age: 82
Discharge: HOME | End: 2017-09-05
Payer: MEDICARE

## 2017-09-05 DIAGNOSIS — N18.4 CKD (CHRONIC KIDNEY DISEASE) STAGE 4, GFR 15-29 ML/MIN (HCC): Chronic | ICD-10-CM

## 2017-09-05 DIAGNOSIS — N18.9 ACUTE ON CHRONIC RENAL INSUFFICIENCY: ICD-10-CM

## 2017-09-05 DIAGNOSIS — N28.9 ACUTE ON CHRONIC RENAL INSUFFICIENCY: ICD-10-CM

## 2017-09-05 LAB
ANION GAP SERPL CALCULATED.3IONS-SCNC: 10 MMOL/L (ref 9–17)
BUN BLDV-MCNC: 60 MG/DL (ref 8–23)
BUN/CREAT BLD: 26 (ref 9–20)
CALCIUM SERPL-MCNC: 9.6 MG/DL (ref 8.6–10.4)
CHLORIDE BLD-SCNC: 102 MMOL/L (ref 98–107)
CO2: 25 MMOL/L (ref 20–31)
CREAT SERPL-MCNC: 2.34 MG/DL (ref 0.5–0.9)
GFR AFRICAN AMERICAN: 24 ML/MIN
GFR NON-AFRICAN AMERICAN: 20 ML/MIN
GFR SERPL CREATININE-BSD FRML MDRD: ABNORMAL ML/MIN/{1.73_M2}
GFR SERPL CREATININE-BSD FRML MDRD: ABNORMAL ML/MIN/{1.73_M2}
GLUCOSE BLD-MCNC: 97 MG/DL (ref 70–99)
POTASSIUM SERPL-SCNC: 5.5 MMOL/L (ref 3.7–5.3)
SODIUM BLD-SCNC: 137 MMOL/L (ref 135–144)

## 2017-09-05 PROCEDURE — 80048 BASIC METABOLIC PNL TOTAL CA: CPT

## 2017-09-05 PROCEDURE — 36415 COLL VENOUS BLD VENIPUNCTURE: CPT

## 2017-09-06 LAB
EKG ATRIAL RATE: 75 BPM
EKG P AXIS: 42 DEGREES
EKG P-R INTERVAL: 198 MS
EKG Q-T INTERVAL: 450 MS
EKG QRS DURATION: 152 MS
EKG QTC CALCULATION (BAZETT): 502 MS
EKG R AXIS: -69 DEGREES
EKG T AXIS: 94 DEGREES
EKG VENTRICULAR RATE: 75 BPM

## 2017-09-07 ENCOUNTER — APPOINTMENT (OUTPATIENT)
Dept: GENERAL RADIOLOGY | Age: 82
End: 2017-09-07
Payer: MEDICARE

## 2017-09-07 ENCOUNTER — HOSPITAL ENCOUNTER (EMERGENCY)
Age: 82
Discharge: HOME | End: 2017-09-07
Attending: FAMILY MEDICINE
Payer: MEDICARE

## 2017-09-07 VITALS
DIASTOLIC BLOOD PRESSURE: 76 MMHG | BODY MASS INDEX: 28.68 KG/M2 | OXYGEN SATURATION: 94 % | HEART RATE: 65 BPM | RESPIRATION RATE: 18 BRPM | WEIGHT: 168 LBS | HEIGHT: 64 IN | TEMPERATURE: 98.1 F | SYSTOLIC BLOOD PRESSURE: 182 MMHG

## 2017-09-07 DIAGNOSIS — J06.9 UPPER RESPIRATORY TRACT INFECTION, UNSPECIFIED TYPE: ICD-10-CM

## 2017-09-07 DIAGNOSIS — K59.00 CONSTIPATION, UNSPECIFIED CONSTIPATION TYPE: ICD-10-CM

## 2017-09-07 DIAGNOSIS — I10 ACCELERATED ESSENTIAL HYPERTENSION: Primary | ICD-10-CM

## 2017-09-07 DIAGNOSIS — R82.90 URINE FINDINGS ABNORMAL: ICD-10-CM

## 2017-09-07 LAB
-: ABNORMAL
ABSOLUTE EOS #: 0.2 K/UL (ref 0–0.4)
ABSOLUTE LYMPH #: 1.3 K/UL (ref 1–4.8)
ABSOLUTE MONO #: 0.6 K/UL (ref 0–1)
AMORPHOUS: ABNORMAL
ANION GAP SERPL CALCULATED.3IONS-SCNC: 12 MMOL/L (ref 9–17)
BACTERIA: ABNORMAL
BASOPHILS # BLD: 1 %
BASOPHILS ABSOLUTE: 0.1 K/UL (ref 0–0.2)
BILIRUBIN URINE: NEGATIVE
BNP INTERPRETATION: ABNORMAL
BUN BLDV-MCNC: 62 MG/DL (ref 8–23)
BUN/CREAT BLD: 24 (ref 9–20)
CALCIUM SERPL-MCNC: 9.5 MG/DL (ref 8.6–10.4)
CASTS UA: ABNORMAL /LPF
CHLORIDE BLD-SCNC: 99 MMOL/L (ref 98–107)
CO2: 24 MMOL/L (ref 20–31)
COLOR: YELLOW
COMMENT UA: ABNORMAL
CREAT SERPL-MCNC: 2.59 MG/DL (ref 0.5–0.9)
CRYSTALS, UA: ABNORMAL /HPF
DIFFERENTIAL TYPE: ABNORMAL
EOSINOPHILS RELATIVE PERCENT: 2 %
EPITHELIAL CELLS UA: ABNORMAL /HPF (ref 0–25)
GFR AFRICAN AMERICAN: 21 ML/MIN
GFR NON-AFRICAN AMERICAN: 17 ML/MIN
GFR SERPL CREATININE-BSD FRML MDRD: ABNORMAL ML/MIN/{1.73_M2}
GFR SERPL CREATININE-BSD FRML MDRD: ABNORMAL ML/MIN/{1.73_M2}
GLUCOSE BLD-MCNC: 117 MG/DL (ref 70–99)
GLUCOSE URINE: NEGATIVE
HCT VFR BLD CALC: 34.8 % (ref 36–46)
HEMOGLOBIN: 11.7 G/DL (ref 12–16)
INR BLD: 1 (ref 0.9–1.2)
KETONES, URINE: NEGATIVE
LEUKOCYTE ESTERASE, URINE: ABNORMAL
LYMPHOCYTES # BLD: 18 %
MCH RBC QN AUTO: 32.8 PG (ref 26–34)
MCHC RBC AUTO-ENTMCNC: 33.5 G/DL (ref 31–37)
MCV RBC AUTO: 98.1 FL (ref 80–100)
MONOCYTES # BLD: 9 %
MUCUS: ABNORMAL
NITRITE, URINE: NEGATIVE
OTHER OBSERVATIONS UA: ABNORMAL
PDW BLD-RTO: 13.7 % (ref 12.1–15.2)
PH UA: 6.5 (ref 5–9)
PLATELET # BLD: 219 K/UL (ref 140–450)
PLATELET ESTIMATE: ABNORMAL
PMV BLD AUTO: 8.6 FL (ref 6–12)
POTASSIUM SERPL-SCNC: 5.5 MMOL/L (ref 3.7–5.3)
PRO-BNP: 2531 PG/ML
PROTEIN UA: NEGATIVE
PROTHROMBIN TIME: 10.7 SEC (ref 9.7–12.2)
RBC # BLD: 3.55 M/UL (ref 4–5.2)
RBC # BLD: ABNORMAL 10*6/UL
RBC UA: ABNORMAL /HPF (ref 0–2)
RENAL EPITHELIAL, UA: ABNORMAL /HPF
SEG NEUTROPHILS: 70 %
SEGMENTED NEUTROPHILS ABSOLUTE COUNT: 4.9 K/UL (ref 1.8–7.7)
SODIUM BLD-SCNC: 135 MMOL/L (ref 135–144)
SPECIFIC GRAVITY UA: 1.01 (ref 1.01–1.02)
TRICHOMONAS: ABNORMAL
TROPONIN INTERP: NORMAL
TROPONIN T: <0.03 NG/ML
TURBIDITY: CLEAR
URINE HGB: ABNORMAL
UROBILINOGEN, URINE: NORMAL
WBC # BLD: 7 K/UL (ref 3.5–11)
WBC # BLD: ABNORMAL 10*3/UL
WBC UA: ABNORMAL /HPF (ref 0–5)
YEAST: ABNORMAL

## 2017-09-07 PROCEDURE — 74000 XR ABDOMEN LIMITED (KUB): CPT

## 2017-09-07 PROCEDURE — 84484 ASSAY OF TROPONIN QUANT: CPT

## 2017-09-07 PROCEDURE — 6360000002 HC RX W HCPCS: Performed by: FAMILY MEDICINE

## 2017-09-07 PROCEDURE — 80048 BASIC METABOLIC PNL TOTAL CA: CPT

## 2017-09-07 PROCEDURE — 85025 COMPLETE CBC W/AUTO DIFF WBC: CPT

## 2017-09-07 PROCEDURE — 87077 CULTURE AEROBIC IDENTIFY: CPT

## 2017-09-07 PROCEDURE — 93005 ELECTROCARDIOGRAM TRACING: CPT

## 2017-09-07 PROCEDURE — 83880 ASSAY OF NATRIURETIC PEPTIDE: CPT

## 2017-09-07 PROCEDURE — 87086 URINE CULTURE/COLONY COUNT: CPT

## 2017-09-07 PROCEDURE — 85610 PROTHROMBIN TIME: CPT

## 2017-09-07 PROCEDURE — 99285 EMERGENCY DEPT VISIT HI MDM: CPT

## 2017-09-07 PROCEDURE — 87186 SC STD MICRODIL/AGAR DIL: CPT

## 2017-09-07 PROCEDURE — 6370000000 HC RX 637 (ALT 250 FOR IP): Performed by: FAMILY MEDICINE

## 2017-09-07 PROCEDURE — 96375 TX/PRO/DX INJ NEW DRUG ADDON: CPT

## 2017-09-07 PROCEDURE — 96374 THER/PROPH/DIAG INJ IV PUSH: CPT

## 2017-09-07 PROCEDURE — 2500000003 HC RX 250 WO HCPCS: Performed by: FAMILY MEDICINE

## 2017-09-07 PROCEDURE — 71010 XR CHEST PORTABLE: CPT

## 2017-09-07 PROCEDURE — 81001 URINALYSIS AUTO W/SCOPE: CPT

## 2017-09-07 RX ORDER — HYDRALAZINE HYDROCHLORIDE 20 MG/ML
10 INJECTION INTRAMUSCULAR; INTRAVENOUS ONCE
Status: COMPLETED | OUTPATIENT
Start: 2017-09-07 | End: 2017-09-07

## 2017-09-07 RX ORDER — ENALAPRILAT 2.5 MG/2ML
0.62 INJECTION INTRAVENOUS ONCE
Status: COMPLETED | OUTPATIENT
Start: 2017-09-07 | End: 2017-09-07

## 2017-09-07 RX ORDER — POLYETHYLENE GLYCOL 3350 17 G/17G
17 POWDER, FOR SOLUTION ORAL DAILY
Qty: 510 G | Refills: 0 | Status: SHIPPED | OUTPATIENT
Start: 2017-09-07 | End: 2017-10-07

## 2017-09-07 RX ORDER — ASPIRIN 81 MG/1
324 TABLET, CHEWABLE ORAL ONCE
Status: COMPLETED | OUTPATIENT
Start: 2017-09-07 | End: 2017-09-07

## 2017-09-07 RX ORDER — SULFAMETHOXAZOLE AND TRIMETHOPRIM 800; 160 MG/1; MG/1
1 TABLET ORAL ONCE
Status: COMPLETED | OUTPATIENT
Start: 2017-09-07 | End: 2017-09-07

## 2017-09-07 RX ORDER — SULFAMETHOXAZOLE AND TRIMETHOPRIM 800; 160 MG/1; MG/1
1 TABLET ORAL 2 TIMES DAILY
Qty: 20 TABLET | Refills: 0 | Status: SHIPPED | OUTPATIENT
Start: 2017-09-07 | End: 2017-09-17

## 2017-09-07 RX ADMIN — SULFAMETHOXAZOLE AND TRIMETHOPRIM 1 TABLET: 800; 160 TABLET ORAL at 17:53

## 2017-09-07 RX ADMIN — ENALAPRILAT 0.62 MG: 1.25 INJECTION INTRAVENOUS at 17:53

## 2017-09-07 RX ADMIN — ASPIRIN 324 MG: 81 TABLET, CHEWABLE ORAL at 15:23

## 2017-09-07 RX ADMIN — HYDRALAZINE HYDROCHLORIDE 10 MG: 20 INJECTION INTRAMUSCULAR; INTRAVENOUS at 15:42

## 2017-09-07 ASSESSMENT — PAIN DESCRIPTION - PAIN TYPE: TYPE: ACUTE PAIN

## 2017-09-07 ASSESSMENT — PAIN DESCRIPTION - LOCATION: LOCATION: CHEST

## 2017-09-07 ASSESSMENT — PAIN SCALES - GENERAL
PAINLEVEL_OUTOF10: 5
PAINLEVEL_OUTOF10: 0

## 2017-09-08 LAB
EKG ATRIAL RATE: 73 BPM
EKG P AXIS: 42 DEGREES
EKG P-R INTERVAL: 188 MS
EKG Q-T INTERVAL: 418 MS
EKG QRS DURATION: 150 MS
EKG QTC CALCULATION (BAZETT): 460 MS
EKG R AXIS: -28 DEGREES
EKG T AXIS: 138 DEGREES
EKG VENTRICULAR RATE: 73 BPM

## 2017-09-09 LAB
CULTURE: ABNORMAL
CULTURE: ABNORMAL
Lab: ABNORMAL
Lab: ABNORMAL
ORGANISM: ABNORMAL
SPECIMEN DESCRIPTION: ABNORMAL
SPECIMEN DESCRIPTION: ABNORMAL
STATUS: ABNORMAL

## 2017-09-26 ENCOUNTER — OFFICE VISIT (OUTPATIENT)
Dept: CARDIOLOGY | Age: 82
End: 2017-09-26
Payer: MEDICARE

## 2017-09-26 VITALS
DIASTOLIC BLOOD PRESSURE: 77 MMHG | HEART RATE: 80 BPM | SYSTOLIC BLOOD PRESSURE: 131 MMHG | RESPIRATION RATE: 18 BRPM | WEIGHT: 164 LBS | OXYGEN SATURATION: 96 % | BODY MASS INDEX: 28 KG/M2 | HEIGHT: 64 IN

## 2017-09-26 DIAGNOSIS — I50.32 CHRONIC DIASTOLIC HF (HEART FAILURE), NYHA CLASS 3 (HCC): Primary | Chronic | ICD-10-CM

## 2017-09-26 DIAGNOSIS — Z95.0 CARDIAC PACEMAKER IN SITU: ICD-10-CM

## 2017-09-26 DIAGNOSIS — I10 ESSENTIAL HYPERTENSION: Chronic | ICD-10-CM

## 2017-09-26 DIAGNOSIS — I44.1 MOBITZ TYPE 2 SECOND DEGREE HEART BLOCK: ICD-10-CM

## 2017-09-26 PROCEDURE — G8427 DOCREV CUR MEDS BY ELIG CLIN: HCPCS | Performed by: FAMILY MEDICINE

## 2017-09-26 PROCEDURE — 1090F PRES/ABSN URINE INCON ASSESS: CPT | Performed by: FAMILY MEDICINE

## 2017-09-26 PROCEDURE — 4040F PNEUMOC VAC/ADMIN/RCVD: CPT | Performed by: FAMILY MEDICINE

## 2017-09-26 PROCEDURE — G8417 CALC BMI ABV UP PARAM F/U: HCPCS | Performed by: FAMILY MEDICINE

## 2017-09-26 PROCEDURE — 1036F TOBACCO NON-USER: CPT | Performed by: FAMILY MEDICINE

## 2017-09-26 PROCEDURE — 1111F DSCHRG MED/CURRENT MED MERGE: CPT | Performed by: FAMILY MEDICINE

## 2017-09-26 PROCEDURE — 1123F ACP DISCUSS/DSCN MKR DOCD: CPT | Performed by: FAMILY MEDICINE

## 2017-09-26 PROCEDURE — 99213 OFFICE O/P EST LOW 20 MIN: CPT | Performed by: FAMILY MEDICINE

## 2017-09-26 RX ORDER — GABAPENTIN 100 MG/1
300 CAPSULE ORAL NIGHTLY
Status: ON HOLD | COMMUNITY
Start: 2017-09-22 | End: 2018-01-31 | Stop reason: HOSPADM

## 2017-09-27 ENCOUNTER — APPOINTMENT (OUTPATIENT)
Dept: GENERAL RADIOLOGY | Age: 82
DRG: 312 | End: 2017-09-27
Payer: MEDICARE

## 2017-09-27 ENCOUNTER — HOSPITAL ENCOUNTER (INPATIENT)
Age: 82
LOS: 2 days | Discharge: HOME | DRG: 312 | End: 2017-09-29
Attending: EMERGENCY MEDICINE | Admitting: FAMILY MEDICINE
Payer: MEDICARE

## 2017-09-27 DIAGNOSIS — N18.4 ACUTE RENAL FAILURE SUPERIMPOSED ON STAGE 4 CHRONIC KIDNEY DISEASE (HCC): ICD-10-CM

## 2017-09-27 DIAGNOSIS — N17.9 ACUTE RENAL FAILURE SUPERIMPOSED ON STAGE 4 CHRONIC KIDNEY DISEASE (HCC): ICD-10-CM

## 2017-09-27 DIAGNOSIS — N30.01 ACUTE CYSTITIS WITH HEMATURIA: ICD-10-CM

## 2017-09-27 DIAGNOSIS — R55 SYNCOPE, UNSPECIFIED SYNCOPE TYPE: Primary | ICD-10-CM

## 2017-09-27 DIAGNOSIS — I10 ESSENTIAL HYPERTENSION: ICD-10-CM

## 2017-09-27 DIAGNOSIS — E86.0 DEHYDRATION: ICD-10-CM

## 2017-09-27 PROBLEM — I16.0 HYPERTENSIVE URGENCY: Status: ACTIVE | Noted: 2017-09-27

## 2017-09-27 PROBLEM — N30.00 ACUTE CYSTITIS: Status: ACTIVE | Noted: 2017-09-27

## 2017-09-27 LAB
% CKMB: 6.6 % (ref 0–3)
-: ABNORMAL
ABSOLUTE EOS #: 0.2 K/UL (ref 0–0.4)
ABSOLUTE LYMPH #: 1.8 K/UL (ref 1–4.8)
ABSOLUTE MONO #: 0.7 K/UL (ref 0–1)
AMORPHOUS: ABNORMAL
ANION GAP SERPL CALCULATED.3IONS-SCNC: 12 MMOL/L (ref 9–17)
BACTERIA: ABNORMAL
BASOPHILS # BLD: 1 %
BASOPHILS ABSOLUTE: 0 K/UL (ref 0–0.2)
BILIRUBIN URINE: NEGATIVE
BNP INTERPRETATION: ABNORMAL
BUN BLDV-MCNC: 60 MG/DL (ref 8–23)
BUN/CREAT BLD: 28 (ref 9–20)
CALCIUM SERPL-MCNC: 9.2 MG/DL (ref 8.6–10.4)
CASTS UA: ABNORMAL /LPF
CHLORIDE BLD-SCNC: 104 MMOL/L (ref 98–107)
CK MB: 2.7 NG/ML
CKMB INTERPRETATION: ABNORMAL
CO2: 23 MMOL/L (ref 20–31)
COLOR: YELLOW
COMMENT UA: ABNORMAL
CREAT SERPL-MCNC: 2.13 MG/DL (ref 0.5–0.9)
CRYSTALS, UA: ABNORMAL /HPF
DIFFERENTIAL TYPE: ABNORMAL
EKG ATRIAL RATE: 65 BPM
EKG P AXIS: 57 DEGREES
EKG P-R INTERVAL: 222 MS
EKG Q-T INTERVAL: 410 MS
EKG QRS DURATION: 150 MS
EKG QTC CALCULATION (BAZETT): 426 MS
EKG R AXIS: -30 DEGREES
EKG T AXIS: -141 DEGREES
EKG VENTRICULAR RATE: 65 BPM
EOSINOPHILS RELATIVE PERCENT: 2 %
EPITHELIAL CELLS UA: ABNORMAL /HPF (ref 0–25)
GFR AFRICAN AMERICAN: 26 ML/MIN
GFR NON-AFRICAN AMERICAN: 22 ML/MIN
GFR SERPL CREATININE-BSD FRML MDRD: ABNORMAL ML/MIN/{1.73_M2}
GFR SERPL CREATININE-BSD FRML MDRD: ABNORMAL ML/MIN/{1.73_M2}
GLUCOSE BLD-MCNC: 116 MG/DL (ref 70–99)
GLUCOSE URINE: NEGATIVE
HCT VFR BLD CALC: 34.3 % (ref 36–46)
HEMOGLOBIN: 11.3 G/DL (ref 12–16)
INR BLD: 1 (ref 0.9–1.2)
KETONES, URINE: NEGATIVE
LEUKOCYTE ESTERASE, URINE: ABNORMAL
LYMPHOCYTES # BLD: 23 %
MAGNESIUM: 2 MG/DL (ref 1.6–2.6)
MCH RBC QN AUTO: 33 PG (ref 26–34)
MCHC RBC AUTO-ENTMCNC: 33 G/DL (ref 31–37)
MCV RBC AUTO: 100.1 FL (ref 80–100)
MONOCYTES # BLD: 9 %
MUCUS: ABNORMAL
MYOGLOBIN: 55 NG/ML (ref 25–58)
NITRITE, URINE: NEGATIVE
OTHER OBSERVATIONS UA: ABNORMAL
PARTIAL THROMBOPLASTIN TIME: 24.8 SEC (ref 23.2–34.4)
PDW BLD-RTO: 13.6 % (ref 12.1–15.2)
PH UA: 6 (ref 5–9)
PLATELET # BLD: 177 K/UL (ref 140–450)
PLATELET ESTIMATE: ABNORMAL
PMV BLD AUTO: 9.3 FL (ref 6–12)
POTASSIUM SERPL-SCNC: 5 MMOL/L (ref 3.7–5.3)
PRO-BNP: 3169 PG/ML
PROTEIN UA: NEGATIVE
PROTHROMBIN TIME: 10.7 SEC (ref 9.7–12.2)
RBC # BLD: 3.43 M/UL (ref 4–5.2)
RBC # BLD: ABNORMAL 10*6/UL
RBC UA: ABNORMAL /HPF (ref 0–2)
RENAL EPITHELIAL, UA: ABNORMAL /HPF
SEG NEUTROPHILS: 65 %
SEGMENTED NEUTROPHILS ABSOLUTE COUNT: 5.2 K/UL (ref 1.8–7.7)
SODIUM BLD-SCNC: 139 MMOL/L (ref 135–144)
SPECIFIC GRAVITY UA: 1.01 (ref 1.01–1.02)
TOTAL CK: 41 U/L (ref 26–192)
TRICHOMONAS: ABNORMAL
TROPONIN INTERP: NORMAL
TROPONIN T: <0.03 NG/ML
TURBIDITY: CLEAR
URINE HGB: ABNORMAL
UROBILINOGEN, URINE: NORMAL
WBC # BLD: 8 K/UL (ref 3.5–11)
WBC # BLD: ABNORMAL 10*3/UL
WBC UA: ABNORMAL /HPF (ref 0–5)
YEAST: ABNORMAL

## 2017-09-27 PROCEDURE — 84484 ASSAY OF TROPONIN QUANT: CPT

## 2017-09-27 PROCEDURE — 85730 THROMBOPLASTIN TIME PARTIAL: CPT

## 2017-09-27 PROCEDURE — 36415 COLL VENOUS BLD VENIPUNCTURE: CPT

## 2017-09-27 PROCEDURE — 2580000003 HC RX 258: Performed by: EMERGENCY MEDICINE

## 2017-09-27 PROCEDURE — 93280 PM DEVICE PROGR EVAL DUAL: CPT | Performed by: FAMILY MEDICINE

## 2017-09-27 PROCEDURE — 6370000000 HC RX 637 (ALT 250 FOR IP): Performed by: FAMILY MEDICINE

## 2017-09-27 PROCEDURE — 6370000000 HC RX 637 (ALT 250 FOR IP): Performed by: PHYSICIAN ASSISTANT

## 2017-09-27 PROCEDURE — 82553 CREATINE MB FRACTION: CPT

## 2017-09-27 PROCEDURE — 83874 ASSAY OF MYOGLOBIN: CPT

## 2017-09-27 PROCEDURE — 96361 HYDRATE IV INFUSION ADD-ON: CPT

## 2017-09-27 PROCEDURE — 99222 1ST HOSP IP/OBS MODERATE 55: CPT | Performed by: FAMILY MEDICINE

## 2017-09-27 PROCEDURE — 99285 EMERGENCY DEPT VISIT HI MDM: CPT

## 2017-09-27 PROCEDURE — 81001 URINALYSIS AUTO W/SCOPE: CPT

## 2017-09-27 PROCEDURE — 85025 COMPLETE CBC W/AUTO DIFF WBC: CPT

## 2017-09-27 PROCEDURE — 83735 ASSAY OF MAGNESIUM: CPT

## 2017-09-27 PROCEDURE — 71010 XR CHEST LIMITED ONE VIEW: CPT

## 2017-09-27 PROCEDURE — 83880 ASSAY OF NATRIURETIC PEPTIDE: CPT

## 2017-09-27 PROCEDURE — 6370000000 HC RX 637 (ALT 250 FOR IP): Performed by: EMERGENCY MEDICINE

## 2017-09-27 PROCEDURE — 6360000002 HC RX W HCPCS: Performed by: PHYSICIAN ASSISTANT

## 2017-09-27 PROCEDURE — 2580000003 HC RX 258: Performed by: PHYSICIAN ASSISTANT

## 2017-09-27 PROCEDURE — 2500000003 HC RX 250 WO HCPCS: Performed by: PHYSICIAN ASSISTANT

## 2017-09-27 PROCEDURE — 96360 HYDRATION IV INFUSION INIT: CPT

## 2017-09-27 PROCEDURE — 82550 ASSAY OF CK (CPK): CPT

## 2017-09-27 PROCEDURE — 1200000000 HC SEMI PRIVATE

## 2017-09-27 PROCEDURE — 93005 ELECTROCARDIOGRAM TRACING: CPT

## 2017-09-27 PROCEDURE — 80048 BASIC METABOLIC PNL TOTAL CA: CPT

## 2017-09-27 PROCEDURE — 85610 PROTHROMBIN TIME: CPT

## 2017-09-27 RX ORDER — ENALAPRILAT 2.5 MG/2ML
1.25 INJECTION INTRAVENOUS EVERY 6 HOURS PRN
Status: DISCONTINUED | OUTPATIENT
Start: 2017-09-27 | End: 2017-09-29 | Stop reason: HOSPADM

## 2017-09-27 RX ORDER — DILTIAZEM HYDROCHLORIDE 120 MG/1
120 CAPSULE, COATED, EXTENDED RELEASE ORAL DAILY
Status: DISCONTINUED | OUTPATIENT
Start: 2017-09-27 | End: 2017-09-28

## 2017-09-27 RX ORDER — GABAPENTIN 100 MG/1
100 CAPSULE ORAL 2 TIMES DAILY
Status: DISCONTINUED | OUTPATIENT
Start: 2017-09-27 | End: 2017-09-29 | Stop reason: HOSPADM

## 2017-09-27 RX ORDER — POLYETHYLENE GLYCOL 3350 17 G/17G
17 POWDER, FOR SOLUTION ORAL DAILY
Status: DISCONTINUED | OUTPATIENT
Start: 2017-09-28 | End: 2017-09-29 | Stop reason: HOSPADM

## 2017-09-27 RX ORDER — LISINOPRIL 20 MG/1
20 TABLET ORAL 2 TIMES DAILY
Status: DISCONTINUED | OUTPATIENT
Start: 2017-09-27 | End: 2017-09-28

## 2017-09-27 RX ORDER — SODIUM CHLORIDE 9 MG/ML
INJECTION, SOLUTION INTRAVENOUS CONTINUOUS
Status: DISCONTINUED | OUTPATIENT
Start: 2017-09-27 | End: 2017-09-29

## 2017-09-27 RX ORDER — ASPIRIN 81 MG/1
81 TABLET ORAL EVERY OTHER DAY
Status: DISCONTINUED | OUTPATIENT
Start: 2017-09-29 | End: 2017-09-29 | Stop reason: HOSPADM

## 2017-09-27 RX ORDER — FLUCONAZOLE 100 MG/1
200 TABLET ORAL ONCE
Status: COMPLETED | OUTPATIENT
Start: 2017-09-27 | End: 2017-09-27

## 2017-09-27 RX ORDER — SODIUM CHLORIDE 9 MG/ML
150 INJECTION, SOLUTION INTRAVENOUS CONTINUOUS
Status: DISCONTINUED | OUTPATIENT
Start: 2017-09-27 | End: 2017-09-27

## 2017-09-27 RX ORDER — SODIUM CHLORIDE 0.9 % (FLUSH) 0.9 %
10 SYRINGE (ML) INJECTION EVERY 12 HOURS SCHEDULED
Status: DISCONTINUED | OUTPATIENT
Start: 2017-09-27 | End: 2017-09-29 | Stop reason: HOSPADM

## 2017-09-27 RX ORDER — CARVEDILOL 12.5 MG/1
12.5 TABLET ORAL 2 TIMES DAILY
Status: DISCONTINUED | OUTPATIENT
Start: 2017-09-27 | End: 2017-09-29 | Stop reason: HOSPADM

## 2017-09-27 RX ORDER — REPAGLINIDE 0.5 MG/1
0.5 TABLET ORAL
Status: DISCONTINUED | OUTPATIENT
Start: 2017-09-27 | End: 2017-09-29 | Stop reason: HOSPADM

## 2017-09-27 RX ORDER — DOCUSATE SODIUM 100 MG/1
100 CAPSULE, LIQUID FILLED ORAL 2 TIMES DAILY
Status: DISCONTINUED | OUTPATIENT
Start: 2017-09-27 | End: 2017-09-29 | Stop reason: HOSPADM

## 2017-09-27 RX ORDER — SODIUM CHLORIDE 0.9 % (FLUSH) 0.9 %
10 SYRINGE (ML) INJECTION PRN
Status: DISCONTINUED | OUTPATIENT
Start: 2017-09-27 | End: 2017-09-29 | Stop reason: HOSPADM

## 2017-09-27 RX ORDER — LEVOTHYROXINE SODIUM 0.05 MG/1
50 TABLET ORAL DAILY
Status: DISCONTINUED | OUTPATIENT
Start: 2017-09-28 | End: 2017-09-29 | Stop reason: HOSPADM

## 2017-09-27 RX ORDER — SIMVASTATIN 10 MG
5 TABLET ORAL NIGHTLY
Status: DISCONTINUED | OUTPATIENT
Start: 2017-09-27 | End: 2017-09-28

## 2017-09-27 RX ORDER — SULFAMETHOXAZOLE AND TRIMETHOPRIM 800; 160 MG/1; MG/1
1 TABLET ORAL ONCE
Status: COMPLETED | OUTPATIENT
Start: 2017-09-27 | End: 2017-09-27

## 2017-09-27 RX ADMIN — DILTIAZEM HYDROCHLORIDE 120 MG: 120 CAPSULE, EXTENDED RELEASE ORAL at 17:54

## 2017-09-27 RX ADMIN — DOCUSATE SODIUM 100 MG: 100 CAPSULE, LIQUID FILLED ORAL at 20:25

## 2017-09-27 RX ADMIN — ENALAPRILAT 1.25 MG: 1.25 INJECTION INTRAVENOUS at 16:58

## 2017-09-27 RX ADMIN — REPAGLINIDE 0.5 MG: 0.5 TABLET ORAL at 15:47

## 2017-09-27 RX ADMIN — SIMVASTATIN 5 MG: 10 TABLET, FILM COATED ORAL at 20:25

## 2017-09-27 RX ADMIN — FLUCONAZOLE 200 MG: 100 TABLET ORAL at 13:58

## 2017-09-27 RX ADMIN — SODIUM CHLORIDE 150 ML/HR: 9 INJECTION, SOLUTION INTRAVENOUS at 11:34

## 2017-09-27 RX ADMIN — LISINOPRIL 20 MG: 20 TABLET ORAL at 20:25

## 2017-09-27 RX ADMIN — ENOXAPARIN SODIUM 30 MG: 30 INJECTION SUBCUTANEOUS at 15:46

## 2017-09-27 RX ADMIN — GABAPENTIN 100 MG: 100 CAPSULE ORAL at 20:25

## 2017-09-27 RX ADMIN — CARVEDILOL 12.5 MG: 12.5 TABLET, FILM COATED ORAL at 20:25

## 2017-09-27 RX ADMIN — SULFAMETHOXAZOLE AND TRIMETHOPRIM 1 TABLET: 800; 160 TABLET ORAL at 13:58

## 2017-09-27 RX ADMIN — CEFTRIAXONE 1 G: 1 INJECTION, POWDER, FOR SOLUTION INTRAMUSCULAR; INTRAVENOUS at 15:46

## 2017-09-27 ASSESSMENT — ENCOUNTER SYMPTOMS
NAUSEA: 0
VOMITING: 0
SHORTNESS OF BREATH: 0
BACK PAIN: 0
COUGH: 0
TROUBLE SWALLOWING: 1
DIARRHEA: 0
ABDOMINAL PAIN: 0
CONSTIPATION: 0

## 2017-09-27 ASSESSMENT — PAIN SCALES - GENERAL
PAINLEVEL_OUTOF10: 0

## 2017-09-27 NOTE — CONSULTS
Inpatient consult to Cardiology  Consult performed by: Dejon Jacinto ordered by: Angelia Karimi  Reason for consult: Syncope          Patient: Lord Dominguez  : 3/7/1927  Date of Admission: 2017  Primary Care Physician: Taya Reed  Today's Date: 2017    REASON FOR CONSULTATION: Loss of Consciousness (pt at home on porch when LOC occured, Son-in-Law attempted CPR, upon starting chest compression pt woke up-pacemaker placed in 2017) and Dizziness (onset prior to LOC)      HPI: I had the pleasure of seeing your patient Lord Dominguez in consultation today. Ms. Juan Soto is a 80 y.o. female well known to me who presents with a history of a recent syncopal episode leading to hospitalization and this consultation. Ms. Juan Soto had similar episodes in the past, most recently in [place] that ultimately led to hospitalization, identification of 2nd degree AV block and a [employer] pacemaker for this. She also has a known history of difficult to control blood pressure. Apparently, she had been feeling fine at 8 am when she got up and took her medications. Around 8:30, she reported feeling a bit of lightheadedness and dizziness causing her to sit down. Then at 9:30 she apparently suddenly lost consciousness and apparently was not breathing leading to CPR for about 4-5 minutes leading to the ER visit. She denied any current or recent chest pain, shortness of breath, abdominal pain, bleeding problems, problems with her medications or any other concerns at this time. She denied any symptoms before or after the reported syncopal episode.       Past Medical History:   Diagnosis Date    Anxiety     Asthma     Cardiac pacemaker 2017    B[employer]ron[employer] Pacemaker implant    CHF [facility]congestive heart failure) ([facility] [address] [facility] 2016    Chronic kidney disease     Diabetes mellitus ([facility] [address]     Gout     Heart disease     Hyperlipidemia     Hypertension     Hypothyroidism     Mobitz type 2 second degree heart block     Syncope and collapse 06/25/2017       CURRENT ALLERGIES: Norco [hydrocodone-acetaminophen] REVIEW OF SYSTEMS: 10 systems were reviewed. Pertinent positives and negatives as above, all else negative. Past Surgical History:   Procedure Laterality Date    BREAST BIOPSY Bilateral 1980    BREAST SURGERY  1965    cyst removed    CARDIAC PACEMAKER PLACEMENT  06/29/2017    DR Michelle Roca EYE SURGERY      Bilat cataract    OVARY REMOVAL Right     PACEMAKER INSERTION  06/29/2017    Social History:  Social History   Substance Use Topics    Smoking status: Never Smoker    Smokeless tobacco: Never Used    Alcohol use 0.6 oz/week     1 Cans of beer per week      Comment: weekly        CURRENT MEDICATIONS:  Outpatient Prescriptions Marked as Taking for the 9/27/17 encounter [facility] Encounter)   Medication Sig Dispense Refill    gabapentin (NEURONTIN) 100 MG capsule Take by mouth 2 times daily       polyethylene glycol (MIRALAX) powder Take 17 g by mouth daily 510 g 0    lisinopril (PRINIVIL;ZESTRIL) 20 MG tablet Take 1 tablet by mouth 2 times daily 60 tablet 0    traMADol (ULTRAM) 50 MG tablet Take 50 mg by mouth every 6 hours as needed for Pain       carvedilol (COREG) 25 MG tablet Take 0.5 tablets by mouth 2 times daily 90 tablet 3    repaglinide (PRANDIN) 0.5 MG tablet Take 0.5 mg by mouth 2 times daily (before meals)      simvastatin (ZOCOR) 5 MG tablet Take 5 mg by mouth nightly      levothyroxine (SYNTHROID) 50 MCG tablet Take 50 mcg by mouth daily Takes 1 tablet daily except for 2 tabs on Sunday      aspirin 81 MG tablet Take 81 mg by mouth every other day         FAMILY HISTORY: family history includes Heart Attack in her father; Heart Disease in her father and mother. PHYSICAL EXAM:   BP (!) 191/92  Pulse 67  Temp 97.9 °F (36.6 °C) (Temporal)   Resp 18  Ht 5' 4\" (1.626 m)  Wt 168 lb 6.4 oz (76.4 kg)  SpO2 99%  BMI 28.91 kg/m2 Body mass index is 28.91 kg/(m^2).     Constitutional:

## 2017-09-27 NOTE — IP AVS SNAPSHOT
After Visit Summary  (Discharge Instructions)    Medication List for Home    Based on the information you provided to us as well as any changes during this visit, the following is your updated medication list.  Compare this with your prescription bottles at home. If you have any questions or concerns, contact your primary care physician's office. Daily Medication List (This medication list can be shared with any healthcare provider who is helping you manage your medications)      There are NEW medications for you.  START taking them after you leave the hospital        Last Dose    Next Dose Due AM NOON PM NIGHT    ciprofloxacin 500 MG tablet   Commonly known as:  CIPRO   Take 0.5 tablets by mouth 2 times daily for 10 days                  Today                            cloNIDine 0.1 MG tablet   Commonly known as:  CATAPRES   Take 1 tablet by mouth 2 times daily                0.1 mg on 9/29/2017  8:54 AM     Tonight                            diltiazem 180 MG extended release capsule   Commonly known as:  CARDIZEM CD   Take 1 capsule by mouth daily                180 mg on 9/29/2017  8:55 AM     Tomorrow                              You told us you were taking these medications at home, but the amount or how often you take this medication has CHANGED        Last Dose    Next Dose Due AM NOON PM NIGHT    lisinopril 20 MG tablet   Commonly known as:  PRINIVIL;ZESTRIL   Take 1 tablet by mouth daily   What changed:  when to take this                20 mg on 9/29/2017 10:25 AM     Tomorrow                              These are medications you told us you were taking at home, CONTINUE taking them after you leave the hospital        Last Dose    Next Dose Due AM NOON PM NIGHT    acetaminophen 325 MG tablet   Commonly known as:  TYLENOL   Take 2 tablets by mouth every 4 hours as needed for Pain                  Take as needed                         aspirin 81 MG tablet Take 81 mg by mouth every other day                  [place] 10/1/17                            carvedilol 25 MG tablet   Commonly known as:  COREG   Take 0.5 tablets by mouth 2 times daily                12.5 mg on 9/29/2017  8:54 AM     Tonight                            gabapentin 100 MG capsule   Commonly known as:  NEURONTIN   Take by mouth 2 times daily                100 mg on 9/29/2017  8:55 AM     Tonight                            levothyroxine 50 MCG tablet   Commonly known as:  SYNTHROID   Take 50 mcg by mouth daily Takes 1 tablet daily except for 2 tabs on Sunday                50 mcg on 9/29/2017  7:17 AM     Tomorrow                            polyethylene glycol powder   Commonly known as:  MIRALAX   Take 17 g by mouth daily                  Tomorrow                            repaglinide 0.5 MG tablet   Commonly known as:  PRANDIN   Take 0.5 mg by mouth 2 times daily (before meals)                0.5 mg on 9/29/2017  7:17 AM     Today                 Before supper             simvastatin 5 MG tablet   Commonly known as:  ZOCOR   Take 5 mg by mouth nightly                5 mg on 9/27/2017  8:25 PM     Tonight                            traMADol 50 MG tablet   Commonly known as:  ULTRAM   Take 50 mg by mouth every 6 hours as needed for Pain                  Take as needed                              Where to Get Your Medications      These medications were sent to [place] [address] [id number]  [address] [address]     Phone:  [phone]     ciprofloxacin 500 MG tablet    cloNIDine 0.1 MG tablet    diltiazem 180 MG extended release capsule         Information about where to get these medications is not yet available     !  Ask your nurse or doctor about these medications     lisinopril 20 MG tablet               Allergies as of 9/29/2017        Reactions    Norco [Hydrocodone-acetaminophen] Other (See Comments)    Confusion Immunizations as of 2017     Name Date Dose VIS Date Route    Influenza, Aleksandra Montenegro, 3 yrs and older, IM, Preservative Free 2017 0.5 mL 2015 Intramuscular    Comment: temperature 97.3    Pneumococcal 13-valent Conjugate (Fnibauw90) 2017 0.5 mL 2015 Intramuscular    Comment: [email or web address]      Last Vitals          Most Recent Value    Temp  98.2 °F (36.8 °C)    Pulse  80    Resp  18    BP  (!)  140/77 [heart rate 90]         After Visit Summary    This summary was created for you. Thank you for entrusting your care to us. The following information includes details about your hospital/visit stay along with steps you should take to help with your recovery once you leave the hospital.  In this packet, you will find information about the topics listed below:    · Instructions about your medications including a list of your home medications  · A summary of your hospital visit  · Follow-up appointments once you have left the hospital  · Your care plan at home      You may receive a survey regarding the care you received during your stay. Your input is valuable to us. We encourage you to complete and return your survey in the envelope provided. We hope you will choose us in the future for your healthcare needs. Patient Information     Patient Name RADHAMES Weller 3/7/1927      Care Provided at:     Name Address Phone       Raphael Waller [facility] [place] [phone]            Your Visit    Here you will find information about your visit, including the reason for your visit. Please take this sheet with you when you visit your doctor or other health care provider in the future. It will help determine the best possible medical care for you at that time. If you have any questions once you leave the hospital, please call the department phone number listed below.         Why you were here     Your primary diagnosis was:  Syncope And Collapse Your diagnoses also included:  Acute Cystitis, Chronic Diastolic Hf (Heart Failure), Nyha Class 3 (Hcc), Cardiac Pacemaker In Situ, Anxiety, Hypothyroidism, Pulmonary Htn (Hcc), Ckd (Chronic Kidney Disease) Stage 4, Gfr 15-29 Ml/Min (Hcc), Hypertensive Urgency, Mobitz Type 2 Second Degree Heart Block, Hyperkalemia      Visit Information     Date & Time Provider Department Dept. Phone    9/27/2017 [facility] [phone]       Follow-up Appointments    Below is a list of your follow-up and future appointments. This may not be a complete list as you may have made appointments directly with providers that we are not aware of or your providers may have made some for you. Please call your providers to confirm appointments. It is important to keep your appointments. Please bring your current insurance card, photo ID, co-pay, and all medication bottles to your appointment. If self-pay, payment is expected at the time of service. Follow-up Information     Follow up with [address] [place] [address]. Go on 10/2/2017. Specialty:  Family Medicine    Why:  at 2:00 PM, take discharge information with you. Have lab work completed prior to appt. Contact information:    [address] [place] [address]  [phone]          Follow up with Toribio Nyhan, MD. Schedule an appointment as soon as possible for a visit in 3 weeks. Specialty:  Cardiology    Contact information:    [facility]  [phone]        Future Appointments     Around 10/2/2017     Lab:  Basic Metabolic Panel        82/00/9973 11:30 AM     Appointment with Chucho Santiago MD at [facility] [address] [place] [address] ([phone])   Please arrive 15 minutes prior to appointment, bring photo ID and insurance card.    [address] [place] [address]       2/14/2018 9:00 AM     Appointment with Urban Metz MD at [address] [place] [address] ([phone]) Please arrive 15 minutes prior to appointment time, bring insurance card and photo ID. [address] [place] [address]       8/9/2018 8:00 AM     Appointment with SCHEDULE, KIRIT BROWN CAR BIOTRONIK at [facility] ([phone])   If this is a fasting lab, please do not eat or drink past midnight other than water. [address] [place] [address]         Preventive Care        Date Due    Tetanus Combination Vaccine (1 - Tdap) 3/7/1946    Zoster Vaccine 3/7/1987    Pneumococcal Vaccines (two) for age 72 years & over with: cerebrospinal fluid leaks, cochlear implants, hemoglobinopathies,  asplenia, immunodeficiencies, HIV infection, or chronic renal failure (2 of 2 - PPSV23) 2/27/2017    Yearly Flu Vaccine (1) 9/1/2017                 Care Plan Once You Return Home    This section includes instructions you will need to follow once you leave the hospital.  Your care team will discuss these with you, so you and those caring for you know how to best care for your health needs at home. This section may also include educational information about certain health topics that may be of help to you. Discharge Instructions       Dr. Tera Montez 3-4 weeks  BMP Monday      Labs and Other Follow-ups after Discharge     Basic Metabolic Panel       Results to Dr. Tonio Hooper records indicate that you have declined [employer] signup. View your information online  ? Review your current list of  medications, immunization, and allergies. ? Review your future test results online . ? Review your discharge instructions provided by your caregivers at discharge    Certain functionality such as prescription refills, scheduling appointments or sending messages to your provider are not activated if your provider does not use [employer] in his/her office    For questions regarding your [employer] account call [phone].  If you have a clinical question, please call your doctor's office. The information on all pages of the After Visit Summary has been reviewed with me, the patient and/or responsible adult, by my health care provider(s). I had the opportunity to ask questions regarding this information. I understand I should dispose of my armband safely at home to protect my health information. A complete copy of the After Visit Summary has been given to me, the patient and/or responsible adult.            Patient Signature/Responsible Adult:____________________    Clinician Signature:_____________________    Date:_____________________    Time:_____________________

## 2017-09-27 NOTE — ED PROVIDER NOTES
HPI the patient is a 80-year-old female, who had a syncopal episode. Earlier in the morning she had felt dizzy but the dizziness had resolved. She was sitting outside, where she became unresponsive. Her daughter tried to wake her up but the patient did not wake up. Her son-in-law did chest compressions and the patient woke up. Patient currently is denying any pain. There was no significant head injury. Glucose was 116. She did not notice any palpitations or irregular heartbeat. Her daughter states there was no seizure activity. Review of Systems   Constitutional: Positive for activity change. Negative for fatigue and fever. HENT: Positive for trouble swallowing. Negative for congestion. Eyes: Negative for visual disturbance. Respiratory: Negative for cough and shortness of breath. Cardiovascular: Negative for chest pain, palpitations and leg swelling. Gastrointestinal: Negative for abdominal pain, constipation, diarrhea, nausea and vomiting. Endocrine: Negative for cold intolerance and heat intolerance. Genitourinary: Negative for difficulty urinating, dysuria, flank pain, frequency and hematuria. Musculoskeletal: Negative for arthralgias, back pain, gait problem, myalgias, neck pain and neck stiffness. Neurological: Positive for dizziness and syncope. Negative for tremors, seizures, weakness, light-headedness, numbness and headaches. Psychiatric/Behavioral: Negative for confusion, decreased concentration and dysphoric mood. The patient is not nervous/anxious. Physical Exam   Constitutional: She is oriented to person, place, and time. She appears well-developed. No distress. HENT:   Head: Normocephalic and atraumatic. Right Ear: External ear normal.   Left Ear: External ear normal.   Mouth/Throat: Oropharynx is clear and moist.   Eyes: Conjunctivae and EOM are normal. Pupils are equal, round, and reactive to light. Right eye exhibits no discharge.  Left eye exhibits no

## 2017-09-27 NOTE — IP AVS SNAPSHOT
Patient Information     Patient Name RADHAMES Powers 3/7/1927      Important Information for Heart Failure       If your condition worsens or if you have any concerns, call your doctor or seek emergency medical services (dial 9--1) as needed. If you have any of the following symptoms/conditions, call your doctor. Call your primary care physician to obtain results of outstanding lab tests, cultures, x-rays, or other tests. If you have a current diagnosis or history of any of the following, please review the information carefully. HEART FAILURE PATIENTS:   DISCHARGE WEIGHT: Weight: 165 lb 9.6 oz (75.1 kg)  Record daily weights. Notify your doctor if you have a weight gain 2 pounds in a day, or 3-5 pounds in 1 week. Notify your doctor for increased shortness of breath, or swelling in legs or feet. Follow a low sodium diet. Resume normal activity unless otherwise instructed by your doctor.

## 2017-09-27 NOTE — H&P
Hospital Medicine  History and Physical    Patient:  Kathleen Iqbal  MRN: [id number]    Chief Complaint:  Syncope and collapse    History Obtained From:  patient, electronic medical record    PCP: [address] [place] [address]    History of Present Illness: The patient is a 80 y.o. female who presents with syncopal episode. Morning 9/27/17 she felt dizzy but the symptom had resolved. While she was sitting outside in 75+ degree weather, she became unresponsive. She does not remember passing out. Her daughter apparently tried to wake her up but the patient did not wake up. Her son-in-law did chest compressions and the patient woke up. Patient denies and SOB or chest pain There was no significant head injury or other fall. She did not notice any palpitations or irregular heartbeat. Her daughter states there was no seizure activity, loss of bowel or bladder control. Does report increased urinary frequency X 1 week, denies dysuria. She has pacer placed this year after similar syncopal event, resulting in a pacer for type II AV block. She was seen in Dr. Mello Coombs office 9/26/17 and was doing well. + U/A in ER. Dose bactrim and dilfucan given in ER. SBP 150s-190s since arrival. Dr. Montey Runner did see her in the ER and interrogated/adjusted her pacer. Admitted for syncope and collapse, UTI, hypertension.      Past Medical History:        Diagnosis Date    Anxiety     Asthma     Cardiac pacemaker 06/29/2017    Biotronik Pacemaker implant    CHF [facility]congestive heart failure) [facility] [facility] 2016    Chronic kidney disease     Diabetes mellitus ([facility] [facility]     Gout     Heart disease     Hyperlipidemia     Hypertension     Hypothyroidism     Mobitz type 2 second degree heart block     Syncope and collapse 06/25/2017       Past Surgical History:        Procedure Laterality Date    BREAST BIOPSY Bilateral 1980    BREAST SURGERY  1965    cyst removed    CARDIAC PACEMAKER PLACEMENT  06/29/2017    DR Emmanuel Harris   [facility] EYE SURGERY      Bilat cataract    OVARY REMOVAL Right     PACEMAKER INSERTION  06/29/2017       Family History:       Problem Relation Age of Onset    Heart Disease Mother      chf    Heart Disease Father     Heart Attack Father        Social History:   TOBACCO:   reports that she has never smoked. She has never used smokeless tobacco.  ETOH:   reports that she drinks about 0.6 oz of alcohol per week   OCCUPATION: none    Allergies:  Norco [hydrocodone-acetaminophen]    Medications Prior to Admission:    Prior to Admission medications    Medication Sig Start Date End Date Taking? Authorizing Provider   gabapentin (NEURONTIN) 100 MG capsule Take by mouth 2 times daily  9/22/17  Yes Historical Provider, MD   polyethylene glycol (MIRALAX) powder Take 17 g by mouth daily 9/7/17 10/7/17 Yes David Colon MD   lisinopril (PRINIVIL;ZESTRIL) 20 MG tablet Take 1 tablet by mouth 2 times daily 9/1/17  Yes Jeffery Phelps PA-C   traMADol (ULTRAM) 50 MG tablet Take 50 mg by mouth every 6 hours as needed for Pain    Yes Historical Provider, MD   carvedilol (COREG) 25 MG tablet Take 0.5 tablets by mouth 2 times daily 7/12/17  Yes Gordon Benavidez MD   repaglinide (PRANDIN) 0.5 MG tablet Take 0.5 mg by mouth 2 times daily (before meals)   Yes Historical Provider, MD   simvastatin (ZOCOR) 5 MG tablet Take 5 mg by mouth nightly   Yes Historical Provider, MD   levothyroxine (SYNTHROID) 50 MCG tablet Take 50 mcg by mouth daily Takes 1 tablet daily except for 2 tabs on Sunday   Yes Historical Provider, MD   aspirin 81 MG tablet Take 81 mg by mouth every other day   Yes Historical Provider, MD   acetaminophen (TYLENOL) 325 MG tablet Take 2 tablets by mouth every 4 hours as needed for Pain 6/30/17   Jamaal Courtney CNP       Review of Systems:  Constitutional:negative  for fevers, and negative for chills.   Eyes: negative for visual disturbance   ENT: negative for sore throat, negative nasal congestion, and negative for earache  Respiratory: negative for shortness of breath, negative for cough, and negative for wheezing  Cardiovascular: negative for chest pain, negative for palpitations, and negative for syncope  Gastrointestinal: negative for abdominal pain, negative for nausea,negative for vomiting, negative for diarrhea, negative for constipation, and negative for hematochezia or melena  Genitourinary: negative for dysuria, negative for urinary urgency, positive for urinary frequency, and negative for hematuria  Skin: negative for skin rash, and negative for skin lesions  Neurological: negative for unilateral weakness, numbness or tingling.     Physical Exam:    Vitals:   Temp: 97.7 °F (36.5 °C)  BP: (!) 191/92  Resp: 18  Pulse: 67  SpO2: 95 %  24HR INTAKE/OUTPUT:      Intake/Output Summary (Last 24 hours) at 09/27/17 1524  Last data filed at 09/27/17 1345   Gross per 24 hour   Intake              240 ml   Output                0 ml   Net              240 ml       Exam:  GEN:  alert and oriented to person, place and time, well-developed and well-nourished, in no acute distress  EYES: PERRL  NECK: normal, supple,  no carotid bruits  PULM: diminished bilaterally- no wheezes, rales or rhonchi, normal air movement, no respiratory distress  COR: regular rate & rhythm and systolic murmur, pacer  ABD: Obese, soft, non-tender, non-distended, normal bowel sounds, no masses or organomegaly  EXT:edema: none  NEURO: follows commands, GARCIA, no deficits  SKIN: no rashes or significant lesions  -----------------------------------------------------------------  Diagnostic Data:   Lab Results   Component Value Date    WBC 8.0 09/27/2017    HGB 11.3 (L) 09/27/2017     09/27/2017       Lab Results   Component Value Date    BUN 60 (H) 09/27/2017    CREATININE 2.13 (H) 09/27/2017     09/27/2017    K 5.0 09/27/2017    CALCIUM 9.2 09/27/2017     09/27/2017    CO2 23 09/27/2017    LABGLOM 22 (L) 09/27/2017       Lab Results   Component Value Date    WBCUA 5 TO 10 09/27/2017 RBCUA 2 TO 5 09/27/2017    EPITHUA 2 TO 5 09/27/2017    LEUKOCYTESUR SMALL (A) 09/27/2017    SPECGRAV 1.015 09/27/2017    GLUCOSEU NEGATIVE 09/27/2017    KETUA NEGATIVE 09/27/2017    PROTEINU NEGATIVE 09/27/2017    HGBUR 1+ (A) 09/27/2017    CASTUA NOT REPORTED 09/27/2017    CRYSTUA NOT REPORTED 09/27/2017    BACTERIA 2+ (A) 09/27/2017    YEAST 2+ (A) 09/27/2017       Lab Results   Component Value Date    MYOGLOBIN 55 09/27/2017    TROPONINT <0.03 09/27/2017    CKTOTAL 41 09/27/2017    CKMB 2.7 09/27/2017    PROBNP 3169 (H) 09/27/2017       Xr Chest 1 Vw    Result Date: 9/27/2017  REPORT: Portable AP radiograph of the chest obtained at 1100 hours INDICATION: Syncopal episode, chest pain FINDINGS: Compared to 9/7/2017. The lungs are well expanded and clear bilaterally. No focal consolidation, pleural effusion or pneumothorax seen. Stable cardiomegaly. Left-sided pacemaker. No free intraperitoneal air.   Final report electronically signed by Filomena Henley on 9/27/2017 11:27 AM    Stable chest        Assessment:    Principal Problem:    Syncope and collapse  Active Problems:    Hypothyroidism    Anxiety    Chronic diastolic HF (heart failure), NYHA class 3 ([facility])    CKD (chronic kidney disease) stage 4, GFR 15-29 ml/min ([facility])    Pulmonary HTN ([facility])    Cardiac pacemaker in situ    Acute cystitis    Hypertensive urgency      Patient Active Problem List    Diagnosis Date Noted    Syncope and collapse - secondary to 2nd degree AV block 06/25/2017     Priority: High    Acute on chronic combined systolic and diastolic CHF (congestive heart failure) ([facility] [address] 12/31/2016     Priority: High     Class: Acute    Syncope and collapse 09/27/2017    Acute cystitis 09/27/2017    Hypertensive urgency 09/27/2017    Hypertensive emergency 08/31/2017    Flash pulmonary edema ([facility] [address] 08/31/2017    Cardiac pacemaker in situ 07/12/2017    Chronic midline low back pain with left-sided sciatica 07/10/2017    Essential hypertension 07/10/2017

## 2017-09-27 NOTE — ED NOTES
Called Dr. Sidra Lunsford office said he is with a patient and will call us when he is avaliable.  [facility]  09/27/17 3018

## 2017-09-27 NOTE — H&P
I interviewed the patient and she described a prodrome of dizziness and \"weird\" sensation prior to her syncope  She admits to stool incontinence by the time squad brought her to ER  She had feeling of fatigue and tiredness with headache  No seizure activity witnessed but prior to collapse witness said she had a blank stare  We will get EEG

## 2017-09-28 ENCOUNTER — APPOINTMENT (OUTPATIENT)
Dept: CT IMAGING | Age: 82
DRG: 312 | End: 2017-09-28
Payer: MEDICARE

## 2017-09-28 PROBLEM — E87.5 HYPERKALEMIA: Status: ACTIVE | Noted: 2017-09-28

## 2017-09-28 LAB
ABSOLUTE EOS #: 0.1 K/UL (ref 0–0.4)
ABSOLUTE LYMPH #: 1.5 K/UL (ref 1–4.8)
ABSOLUTE MONO #: 0.6 K/UL (ref 0–1)
ANION GAP SERPL CALCULATED.3IONS-SCNC: 9 MMOL/L (ref 9–17)
BASOPHILS # BLD: 0 %
BASOPHILS ABSOLUTE: 0 K/UL (ref 0–0.2)
BUN BLDV-MCNC: 47 MG/DL (ref 8–23)
BUN/CREAT BLD: 29 (ref 9–20)
CALCIUM SERPL-MCNC: 9.2 MG/DL (ref 8.6–10.4)
CHLORIDE BLD-SCNC: 104 MMOL/L (ref 98–107)
CO2: 24 MMOL/L (ref 20–31)
CREAT SERPL-MCNC: 1.62 MG/DL (ref 0.5–0.9)
DIFFERENTIAL TYPE: ABNORMAL
EOSINOPHILS RELATIVE PERCENT: 2 %
GFR AFRICAN AMERICAN: 36 ML/MIN
GFR NON-AFRICAN AMERICAN: 30 ML/MIN
GFR SERPL CREATININE-BSD FRML MDRD: ABNORMAL ML/MIN/{1.73_M2}
GFR SERPL CREATININE-BSD FRML MDRD: ABNORMAL ML/MIN/{1.73_M2}
GLUCOSE BLD-MCNC: 89 MG/DL (ref 70–99)
HCT VFR BLD CALC: 33.4 % (ref 36–46)
HEMOGLOBIN: 11.1 G/DL (ref 12–16)
LYMPHOCYTES # BLD: 23 %
MCH RBC QN AUTO: 32.9 PG (ref 26–34)
MCHC RBC AUTO-ENTMCNC: 33.2 G/DL (ref 31–37)
MCV RBC AUTO: 99 FL (ref 80–100)
MONOCYTES # BLD: 9 %
PDW BLD-RTO: 13.8 % (ref 12.1–15.2)
PLATELET # BLD: 189 K/UL (ref 140–450)
PLATELET ESTIMATE: ABNORMAL
PMV BLD AUTO: 8.8 FL (ref 6–12)
POTASSIUM SERPL-SCNC: 5.6 MMOL/L (ref 3.7–5.3)
RBC # BLD: 3.37 M/UL (ref 4–5.2)
RBC # BLD: ABNORMAL 10*6/UL
SEG NEUTROPHILS: 66 %
SEGMENTED NEUTROPHILS ABSOLUTE COUNT: 4.3 K/UL (ref 1.8–7.7)
SODIUM BLD-SCNC: 137 MMOL/L (ref 135–144)
TOTAL CK: 23 U/L (ref 26–192)
TROPONIN INTERP: NORMAL
TROPONIN T: <0.03 NG/ML
WBC # BLD: 6.6 K/UL (ref 3.5–11)
WBC # BLD: ABNORMAL 10*3/UL

## 2017-09-28 PROCEDURE — 82550 ASSAY OF CK (CPK): CPT

## 2017-09-28 PROCEDURE — 84484 ASSAY OF TROPONIN QUANT: CPT

## 2017-09-28 PROCEDURE — 80048 BASIC METABOLIC PNL TOTAL CA: CPT

## 2017-09-28 PROCEDURE — 6360000002 HC RX W HCPCS: Performed by: PHYSICIAN ASSISTANT

## 2017-09-28 PROCEDURE — 2500000003 HC RX 250 WO HCPCS: Performed by: PHYSICIAN ASSISTANT

## 2017-09-28 PROCEDURE — 70450 CT HEAD/BRAIN W/O DYE: CPT

## 2017-09-28 PROCEDURE — 95816 EEG AWAKE AND DROWSY: CPT

## 2017-09-28 PROCEDURE — 6370000000 HC RX 637 (ALT 250 FOR IP): Performed by: PHYSICIAN ASSISTANT

## 2017-09-28 PROCEDURE — 36415 COLL VENOUS BLD VENIPUNCTURE: CPT

## 2017-09-28 PROCEDURE — 6370000000 HC RX 637 (ALT 250 FOR IP): Performed by: FAMILY MEDICINE

## 2017-09-28 PROCEDURE — 1200000000 HC SEMI PRIVATE

## 2017-09-28 PROCEDURE — 85025 COMPLETE CBC W/AUTO DIFF WBC: CPT

## 2017-09-28 PROCEDURE — 2580000003 HC RX 258: Performed by: PHYSICIAN ASSISTANT

## 2017-09-28 PROCEDURE — 99232 SBSQ HOSP IP/OBS MODERATE 35: CPT | Performed by: FAMILY MEDICINE

## 2017-09-28 RX ORDER — DILTIAZEM HYDROCHLORIDE 180 MG/1
180 CAPSULE, COATED, EXTENDED RELEASE ORAL DAILY
Status: DISCONTINUED | OUTPATIENT
Start: 2017-09-29 | End: 2017-09-29 | Stop reason: HOSPADM

## 2017-09-28 RX ORDER — CLONIDINE HYDROCHLORIDE 0.1 MG/1
0.1 TABLET ORAL 2 TIMES DAILY
Status: DISCONTINUED | OUTPATIENT
Start: 2017-09-28 | End: 2017-09-29 | Stop reason: HOSPADM

## 2017-09-28 RX ADMIN — ENOXAPARIN SODIUM 30 MG: 30 INJECTION SUBCUTANEOUS at 15:30

## 2017-09-28 RX ADMIN — CLONIDINE HYDROCHLORIDE 0.1 MG: 0.1 TABLET ORAL at 20:00

## 2017-09-28 RX ADMIN — CLONIDINE HYDROCHLORIDE 0.1 MG: 0.1 TABLET ORAL at 08:07

## 2017-09-28 RX ADMIN — ENALAPRILAT 1.25 MG: 1.25 INJECTION INTRAVENOUS at 15:36

## 2017-09-28 RX ADMIN — LEVOTHYROXINE SODIUM 50 MCG: 0.05 TABLET ORAL at 06:50

## 2017-09-28 RX ADMIN — CARVEDILOL 12.5 MG: 12.5 TABLET, FILM COATED ORAL at 20:00

## 2017-09-28 RX ADMIN — GABAPENTIN 100 MG: 100 CAPSULE ORAL at 08:07

## 2017-09-28 RX ADMIN — ENALAPRILAT 1.25 MG: 1.25 INJECTION INTRAVENOUS at 06:54

## 2017-09-28 RX ADMIN — DOCUSATE SODIUM 100 MG: 100 CAPSULE, LIQUID FILLED ORAL at 08:07

## 2017-09-28 RX ADMIN — REPAGLINIDE 0.5 MG: 0.5 TABLET ORAL at 06:50

## 2017-09-28 RX ADMIN — DILTIAZEM HYDROCHLORIDE 120 MG: 120 CAPSULE, EXTENDED RELEASE ORAL at 08:08

## 2017-09-28 RX ADMIN — SODIUM CHLORIDE: 9 INJECTION, SOLUTION INTRAVENOUS at 12:17

## 2017-09-28 RX ADMIN — DOCUSATE SODIUM 100 MG: 100 CAPSULE, LIQUID FILLED ORAL at 20:00

## 2017-09-28 RX ADMIN — CARVEDILOL 12.5 MG: 12.5 TABLET, FILM COATED ORAL at 08:07

## 2017-09-28 RX ADMIN — GABAPENTIN 100 MG: 100 CAPSULE ORAL at 20:00

## 2017-09-28 RX ADMIN — CEFTRIAXONE 1 G: 1 INJECTION, POWDER, FOR SOLUTION INTRAMUSCULAR; INTRAVENOUS at 15:33

## 2017-09-28 RX ADMIN — REPAGLINIDE 0.5 MG: 0.5 TABLET ORAL at 15:30

## 2017-09-28 ASSESSMENT — PAIN SCALES - GENERAL
PAINLEVEL_OUTOF10: 0

## 2017-09-28 NOTE — PLAN OF CARE
Problem: Falls - Risk of  Goal: Absence of falls  Outcome: Ongoing  Remains free from falls. Fall precautions in place, slipper socks on. Pathway clear. Call light and belongings in reach. Instructed to use call light prior to getting up. Problem: SAFETY  Goal: Free from accidental physical injury  Outcome: Ongoing  Remains free from injury. Pathway clear for ambulation. Bed in lowest position with wheels locked and side rails up x 2. Call light and belongings within reach. Problem: DAILY CARE  Goal: Daily care needs are met  Outcome: Ongoing  Pt. Completes daily care with minimal assistance for set up, completes to max abilities. Problem: PAIN  Goal: Patients pain/discomfort is manageable  Outcome: Ongoing  Pt. Denies and pain or discomfort. Will continue to monitor. Problem: Infection:  Goal: Will remain free from infection  Will remain free from infection   Outcome: Ongoing  Pt afebrile at this time. No signs or symptoms of infection present. Will continue to monitor.

## 2017-09-28 NOTE — PATIENT CARE CONFERENCE
Natalie Banerjee was admitted on 9/27/2017 10:06 AM with Syncope and collapse [R55]    Quality Flow Rounds held. Currently sleeping. Meeting held outside of room. Had EEG this AM.    Had dose of prn Vasotec due to elevated BP. Orthostatics ordered q8hr.   Stand and pivot - could use a PT eval  Lives at [address] [place] [address] or any other assistance - per social service

## 2017-09-28 NOTE — PLAN OF CARE
Problem: Safety:  Goal: Free from accidental physical injury  Free from accidental physical injury  Outcome: Ongoing  Pt has no falls and is continuing to be monitored. Fall precautions remain intact. Bracelet on pt, star on, bed low and locked, alarm on, side rails up, call light and personal belongings within reach, and room clear and clean of clutter. Problem: Daily Care:  Goal: Daily care needs are met  Daily care needs are met  Outcome: Ongoing  Patient's daily cares and needs are being assessed each shift. Consideration is given according to patient's ability to assist with ADL's, and hourly rounding consists of asking patient if any help is needed. Will continue to monitor. Problem: Pain:  Goal: Patients pain/discomfort is manageable  Patients pain/discomfort is manageable  Outcome: Ongoing  Patient's pain level has been assessed using the 1-10 scale and medication has been administered as ordered depending on stated pain level. Pain rating and characteristics are being charted to track progress and reassessment of pain is being assessed. Will continue to monitor. Problem: Skin Integrity:  Goal: Skin integrity will stabilize  Skin integrity will stabilize  Outcome: Ongoing  Patient's skin condition is being assessed each shift and changes are being charted. Pillows are being used to relieve bony prominences and patient is being reminded to turn frequently to help maintain integrity of skin. Heels are also being elevated when patient is in bed. Will continue to monitor.

## 2017-09-28 NOTE — CONSULTS
9/27/17 encounter [facility] Encounter)   Medication Sig Dispense Refill    gabapentin (NEURONTIN) 100 MG capsule Take by mouth 2 times daily       polyethylene glycol (MIRALAX) powder Take 17 g by mouth daily 510 g 0    lisinopril (PRINIVIL;ZESTRIL) 20 MG tablet Take 1 tablet by mouth 2 times daily 60 tablet 0    traMADol (ULTRAM) 50 MG tablet Take 50 mg by mouth every 6 hours as needed for Pain       carvedilol (COREG) 25 MG tablet Take 0.5 tablets by mouth 2 times daily 90 tablet 3    repaglinide (PRANDIN) 0.5 MG tablet Take 0.5 mg by mouth 2 times daily (before meals)      simvastatin (ZOCOR) 5 MG tablet Take 5 mg by mouth nightly      levothyroxine (SYNTHROID) 50 MCG tablet Take 50 mcg by mouth daily Takes 1 tablet daily except for 2 tabs on Sunday      aspirin 81 MG tablet Take 81 mg by mouth every other day         FAMILY HISTORY: family history includes Heart Attack in her father; Heart Disease in her father and mother. PHYSICAL EXAM:   BP (!) 161/68  Pulse 72  Temp 98.2 °F (36.8 °C) (Temporal)   Resp 16  Ht 5' 4\" (1.626 m)  Wt 168 lb (76.2 kg)  SpO2 96%  BMI 28.84 kg/m2 Body mass index is 28.84 kg/(m^2). Constitutional: She is oriented to person, place, and time. She appears well-developed and well-nourished. In no acute distress. HEENT: Normocephalic and atraumatic. No JVD present. Carotid bruit is not present. No mass and no thyromegaly present. No lymphadenopathy present. Cardiovascular: Normal rate, regular rhythm, normal heart sounds and intact distal pulses. Exam reveals no gallop and no friction rub. Pulmonary/Chest: Effort normal and breath sounds normal. No respiratory distress. She has no wheezes, rhonchi or rales. Abdominal: Soft, non-tender. Bowel sounds and aorta are normal. She exhibits no organomegaly, mass or bruit. Extremities: No edema, cyanosis, or clubbing. Pulses are 2+ radial/carotid/dorsalis pedis and posterior tibial bilaterally.   Neurological: She is alert and oriented to person, place, and time. No evidence of gross cranial nerve deficit. Coordination appeared normal.   Skin: Skin is warm and dry. There is no rash or diaphoresis. Psychiatric: She has a normal mood and affect. Her speech is normal and behavior is normal.      MOST RECENT LABS ON RECORD:   Lab Results   Component Value Date    WBC 6.6 09/28/2017    HGB 11.1 (L) 09/28/2017    HCT 33.4 (L) 09/28/2017     09/28/2017    CHOL 145 08/30/2017    TRIG 131 08/30/2017    HDL 41 08/30/2017    ALT 7 08/18/2017    AST 13 08/18/2017     09/28/2017    K 5.6 (H) 09/28/2017     09/28/2017    CREATININE 1.62 (H) 09/28/2017    BUN 47 (H) 09/28/2017    CO2 24 09/28/2017    TSH 2.46 08/30/2017    INR 1.0 09/27/2017    LABA1C 5.1 08/18/2017       ASSESSMENT:  1. Syncope, unspecified syncope type    2. Acute cystitis with hematuria    3. Essential hypertension    4. Dehydration       PLAN:  From a cardiovascular standpoint, I am uncertain as to the source of Ms. Lutz's recent syncopal episode and I think that an EEG sounds very reasonable. If the results of this are unremarkable, I think that tilt table testing may be reasonable, and although my preference would probably be to avoid an ischemic workup, if there is really no other obvious etiology, a stress test may be reasonable. Finally, I think that hypertensive emergency still might be a potential and therefore I further increased her cardizem to 180 mg daily. In the meantime, I encouraged Ms. Gee Alva to continue to take her current medications and follow up with you as previously scheduled. Once again, thank you for allowing me to participate in this patients care. Please do not hesitate to contact me could I be of further assistance. Sincerely,  Home Velazco MD, MS, F.A.C.C.   [facility]  [address], [place], [address] [place] [address]  Phone: [phone], Fax: [phone]  Based on the patients current medical conditions, I believe the risk of significant morbidity and mortality is: Intermediate-High

## 2017-09-29 VITALS
DIASTOLIC BLOOD PRESSURE: 77 MMHG | HEART RATE: 80 BPM | TEMPERATURE: 98.2 F | HEIGHT: 64 IN | SYSTOLIC BLOOD PRESSURE: 140 MMHG | WEIGHT: 165.6 LBS | RESPIRATION RATE: 18 BRPM | BODY MASS INDEX: 28.27 KG/M2 | OXYGEN SATURATION: 97 %

## 2017-09-29 LAB
ABSOLUTE EOS #: 0.1 K/UL (ref 0–0.4)
ABSOLUTE LYMPH #: 1.8 K/UL (ref 1–4.8)
ABSOLUTE MONO #: 0.5 K/UL (ref 0–1)
ANION GAP SERPL CALCULATED.3IONS-SCNC: 11 MMOL/L (ref 9–17)
BASOPHILS # BLD: 1 %
BASOPHILS ABSOLUTE: 0 K/UL (ref 0–0.2)
BUN BLDV-MCNC: 40 MG/DL (ref 8–23)
BUN/CREAT BLD: 26 (ref 9–20)
CALCIUM SERPL-MCNC: 9.2 MG/DL (ref 8.6–10.4)
CHLORIDE BLD-SCNC: 105 MMOL/L (ref 98–107)
CO2: 22 MMOL/L (ref 20–31)
CREAT SERPL-MCNC: 1.56 MG/DL (ref 0.5–0.9)
DIFFERENTIAL TYPE: ABNORMAL
EOSINOPHILS RELATIVE PERCENT: 2 %
GFR AFRICAN AMERICAN: 38 ML/MIN
GFR NON-AFRICAN AMERICAN: 31 ML/MIN
GFR SERPL CREATININE-BSD FRML MDRD: ABNORMAL ML/MIN/{1.73_M2}
GFR SERPL CREATININE-BSD FRML MDRD: ABNORMAL ML/MIN/{1.73_M2}
GLUCOSE BLD-MCNC: 91 MG/DL (ref 70–99)
HCT VFR BLD CALC: 33.2 % (ref 36–46)
HEMOGLOBIN: 11.2 G/DL (ref 12–16)
LYMPHOCYTES # BLD: 28 %
MCH RBC QN AUTO: 33 PG (ref 26–34)
MCHC RBC AUTO-ENTMCNC: 33.6 G/DL (ref 31–37)
MCV RBC AUTO: 98.1 FL (ref 80–100)
MONOCYTES # BLD: 9 %
PDW BLD-RTO: 13.8 % (ref 12.1–15.2)
PLATELET # BLD: 165 K/UL (ref 140–450)
PLATELET ESTIMATE: ABNORMAL
PMV BLD AUTO: 9.1 FL (ref 6–12)
POTASSIUM SERPL-SCNC: 5 MMOL/L (ref 3.7–5.3)
RBC # BLD: 3.38 M/UL (ref 4–5.2)
RBC # BLD: ABNORMAL 10*6/UL
SEG NEUTROPHILS: 60 %
SEGMENTED NEUTROPHILS ABSOLUTE COUNT: 3.8 K/UL (ref 1.8–7.7)
SODIUM BLD-SCNC: 138 MMOL/L (ref 135–144)
WBC # BLD: 6.3 K/UL (ref 3.5–11)
WBC # BLD: ABNORMAL 10*3/UL

## 2017-09-29 PROCEDURE — 97166 OT EVAL MOD COMPLEX 45 MIN: CPT

## 2017-09-29 PROCEDURE — G8988 SELF CARE GOAL STATUS: HCPCS

## 2017-09-29 PROCEDURE — 6370000000 HC RX 637 (ALT 250 FOR IP): Performed by: FAMILY MEDICINE

## 2017-09-29 PROCEDURE — 2580000003 HC RX 258: Performed by: PHYSICIAN ASSISTANT

## 2017-09-29 PROCEDURE — 97535 SELF CARE MNGMENT TRAINING: CPT

## 2017-09-29 PROCEDURE — G8987 SELF CARE CURRENT STATUS: HCPCS

## 2017-09-29 PROCEDURE — G8979 MOBILITY GOAL STATUS: HCPCS

## 2017-09-29 PROCEDURE — 6370000000 HC RX 637 (ALT 250 FOR IP): Performed by: PHYSICIAN ASSISTANT

## 2017-09-29 PROCEDURE — 80048 BASIC METABOLIC PNL TOTAL CA: CPT

## 2017-09-29 PROCEDURE — 85025 COMPLETE CBC W/AUTO DIFF WBC: CPT

## 2017-09-29 PROCEDURE — G8978 MOBILITY CURRENT STATUS: HCPCS

## 2017-09-29 PROCEDURE — 36415 COLL VENOUS BLD VENIPUNCTURE: CPT

## 2017-09-29 RX ORDER — CLONIDINE HYDROCHLORIDE 0.1 MG/1
0.1 TABLET ORAL 2 TIMES DAILY
Qty: 60 TABLET | Refills: 0 | Status: ON HOLD | OUTPATIENT
Start: 2017-09-29 | End: 2017-10-05 | Stop reason: HOSPADM

## 2017-09-29 RX ORDER — DILTIAZEM HYDROCHLORIDE 180 MG/1
180 CAPSULE, COATED, EXTENDED RELEASE ORAL DAILY
Qty: 30 CAPSULE | Refills: 0 | Status: ON HOLD | OUTPATIENT
Start: 2017-09-29 | End: 2017-10-05

## 2017-09-29 RX ORDER — LISINOPRIL 20 MG/1
20 TABLET ORAL DAILY
Status: DISCONTINUED | OUTPATIENT
Start: 2017-09-29 | End: 2017-09-29 | Stop reason: HOSPADM

## 2017-09-29 RX ORDER — CIPROFLOXACIN 500 MG/1
250 TABLET, FILM COATED ORAL 2 TIMES DAILY
Qty: 4 TABLET | Refills: 0 | Status: ON HOLD | OUTPATIENT
Start: 2017-09-29 | End: 2017-10-05 | Stop reason: HOSPADM

## 2017-09-29 RX ORDER — LISINOPRIL 20 MG/1
20 TABLET ORAL DAILY
Qty: 60 TABLET | Refills: 0 | Status: ON HOLD
Start: 2017-09-29 | End: 2017-10-05 | Stop reason: HOSPADM

## 2017-09-29 RX ADMIN — CLONIDINE HYDROCHLORIDE 0.1 MG: 0.1 TABLET ORAL at 08:54

## 2017-09-29 RX ADMIN — LISINOPRIL 20 MG: 20 TABLET ORAL at 10:25

## 2017-09-29 RX ADMIN — DILTIAZEM HYDROCHLORIDE 180 MG: 180 CAPSULE, COATED, EXTENDED RELEASE ORAL at 08:55

## 2017-09-29 RX ADMIN — POLYETHYLENE GLYCOL 3350 17 G: 17 POWDER, FOR SOLUTION ORAL at 08:55

## 2017-09-29 RX ADMIN — ASPIRIN 81 MG: 81 TABLET, COATED ORAL at 08:54

## 2017-09-29 RX ADMIN — GABAPENTIN 100 MG: 100 CAPSULE ORAL at 08:55

## 2017-09-29 RX ADMIN — CARVEDILOL 12.5 MG: 12.5 TABLET, FILM COATED ORAL at 08:54

## 2017-09-29 RX ADMIN — SODIUM CHLORIDE: 9 INJECTION, SOLUTION INTRAVENOUS at 00:50

## 2017-09-29 RX ADMIN — LEVOTHYROXINE SODIUM 50 MCG: 0.05 TABLET ORAL at 07:17

## 2017-09-29 RX ADMIN — REPAGLINIDE 0.5 MG: 0.5 TABLET ORAL at 07:17

## 2017-09-29 RX ADMIN — Medication 10 ML: at 08:55

## 2017-09-29 RX ADMIN — DOCUSATE SODIUM 100 MG: 100 CAPSULE, LIQUID FILLED ORAL at 08:55

## 2017-09-29 ASSESSMENT — PAIN SCALES - GENERAL
PAINLEVEL_OUTOF10: 0
PAINLEVEL_OUTOF10: 4
PAINLEVEL_OUTOF10: 0
PAINLEVEL_OUTOF10: 0

## 2017-09-29 ASSESSMENT — PAIN DESCRIPTION - ORIENTATION: ORIENTATION: LEFT

## 2017-09-29 ASSESSMENT — PAIN DESCRIPTION - PAIN TYPE: TYPE: CHRONIC PAIN

## 2017-09-29 ASSESSMENT — PAIN DESCRIPTION - LOCATION: LOCATION: HIP

## 2017-09-29 NOTE — PLAN OF CARE
Problem: Falls - Risk of  Goal: Absence of falls  Outcome: Ongoing  Patient is alert and oriented and has demonstrated the use of using the call light for assistance before getting up. Education has been provided to defer the use of the bed alarm and/or restraint free alarm as the patient has shown competency in calling for assistance and verbalizing the risk. Problem: SAFETY  Goal: Free from accidental physical injury  Outcome: Ongoing  Patient remain free from injury. Fall risk assessment complete. Bed in low position with bed wheels locked and bed alarm on. Call light within reach. ID band and fall band on. Problem: PAIN  Goal: Patients pain/discomfort is manageable  Outcome: Ongoing  Pain scale of 0-10 being used to assess pain. Patient rates pain at 0/10, denies at this time. Pain will be assessed with hourly rounding and medication administered as ordered. Problem: SKIN INTEGRITY  Goal: Skin integrity is maintained or improved  Outcome: Ongoing  Bruising noted to BUE/BLE, abrasion to posterior head. No open areas noted. Hira scale complete. Patient self turn in bed. Problem: KNOWLEDGE DEFICIT  Goal: Patient/S.O. demonstrates understanding of disease process, treatment plan, medications, and discharge instructions. Outcome: Ongoing  Patient verbalize understanding of treatment plan and medications being administered. Problem: Safety:  Goal: Free from accidental physical injury  Free from accidental physical injury   Outcome: Ongoing  Patient remain free from injury. Fall risk assessment complete. Bed in low position with bed wheels locked and bed alarm on. Call light within reach. ID band and fall band on.

## 2017-10-02 ENCOUNTER — HOSPITAL ENCOUNTER (EMERGENCY)
Age: 82
Discharge: ANOTHER HOSPITAL | End: 2017-10-02
Attending: FAMILY MEDICINE
Payer: MEDICARE

## 2017-10-02 ENCOUNTER — APPOINTMENT (OUTPATIENT)
Dept: GENERAL RADIOLOGY | Age: 82
DRG: 683 | End: 2017-10-02
Payer: MEDICARE

## 2017-10-02 ENCOUNTER — APPOINTMENT (OUTPATIENT)
Dept: CT IMAGING | Age: 82
End: 2017-10-02
Payer: MEDICARE

## 2017-10-02 ENCOUNTER — HOSPITAL ENCOUNTER (INPATIENT)
Age: 82
LOS: 3 days | Discharge: HOME | DRG: 683 | End: 2017-10-05
Attending: EMERGENCY MEDICINE | Admitting: INTERNAL MEDICINE
Payer: MEDICARE

## 2017-10-02 ENCOUNTER — APPOINTMENT (OUTPATIENT)
Dept: GENERAL RADIOLOGY | Age: 82
End: 2017-10-02
Payer: MEDICARE

## 2017-10-02 VITALS
BODY MASS INDEX: 28 KG/M2 | TEMPERATURE: 97.8 F | WEIGHT: 164 LBS | HEART RATE: 90 BPM | SYSTOLIC BLOOD PRESSURE: 119 MMHG | HEIGHT: 64 IN | OXYGEN SATURATION: 97 % | DIASTOLIC BLOOD PRESSURE: 69 MMHG | RESPIRATION RATE: 15 BRPM

## 2017-10-02 DIAGNOSIS — R55 SYNCOPE AND COLLAPSE: Primary | ICD-10-CM

## 2017-10-02 PROBLEM — R94.01 ABNORMAL EEG: Status: ACTIVE | Noted: 2017-10-02

## 2017-10-02 PROBLEM — N17.9 AKI (ACUTE KIDNEY INJURY) (HCC): Status: ACTIVE | Noted: 2017-10-02

## 2017-10-02 LAB
-: ABNORMAL
ABSOLUTE EOS #: 0.2 K/UL (ref 0–0.4)
ABSOLUTE LYMPH #: 2.1 K/UL (ref 1–4.8)
ABSOLUTE MONO #: 0.6 K/UL (ref 0–1)
AMORPHOUS: ABNORMAL
ANION GAP SERPL CALCULATED.3IONS-SCNC: 12 MMOL/L (ref 9–17)
BACTERIA: ABNORMAL
BASOPHILS # BLD: 0 %
BASOPHILS ABSOLUTE: 0 K/UL (ref 0–0.2)
BILIRUBIN URINE: NEGATIVE
BUN BLDV-MCNC: 53 MG/DL (ref 8–23)
BUN/CREAT BLD: 23 (ref 9–20)
CALCIUM SERPL-MCNC: 9.6 MG/DL (ref 8.6–10.4)
CASTS UA: ABNORMAL /LPF
CHLORIDE BLD-SCNC: 100 MMOL/L (ref 98–107)
CO2: 22 MMOL/L (ref 20–31)
COLOR: YELLOW
COMMENT UA: ABNORMAL
CREAT SERPL-MCNC: 2.29 MG/DL (ref 0.5–0.9)
CRYSTALS, UA: ABNORMAL /HPF
DIFFERENTIAL TYPE: ABNORMAL
EOSINOPHILS RELATIVE PERCENT: 2 %
EPITHELIAL CELLS UA: ABNORMAL /HPF (ref 0–25)
GFR AFRICAN AMERICAN: 24 ML/MIN
GFR NON-AFRICAN AMERICAN: 20 ML/MIN
GFR SERPL CREATININE-BSD FRML MDRD: ABNORMAL ML/MIN/{1.73_M2}
GFR SERPL CREATININE-BSD FRML MDRD: ABNORMAL ML/MIN/{1.73_M2}
GLUCOSE BLD-MCNC: 105 MG/DL (ref 65–105)
GLUCOSE BLD-MCNC: 109 MG/DL (ref 70–99)
GLUCOSE BLD-MCNC: 145 MG/DL (ref 65–105)
GLUCOSE URINE: NEGATIVE
HCT VFR BLD CALC: 35 % (ref 36–46)
HEMOGLOBIN: 11.5 G/DL (ref 12–16)
INR BLD: 1.1 (ref 0.9–1.2)
KETONES, URINE: NEGATIVE
LEUKOCYTE ESTERASE, URINE: NEGATIVE
LYMPHOCYTES # BLD: 24 %
MCH RBC QN AUTO: 32.8 PG (ref 26–34)
MCHC RBC AUTO-ENTMCNC: 32.9 G/DL (ref 31–37)
MCV RBC AUTO: 99.4 FL (ref 80–100)
MONOCYTES # BLD: 7 %
MUCUS: ABNORMAL
NITRITE, URINE: NEGATIVE
OTHER OBSERVATIONS UA: ABNORMAL
PDW BLD-RTO: 14.1 % (ref 12.1–15.2)
PH UA: 5.5 (ref 5–9)
PLATELET # BLD: 194 K/UL (ref 140–450)
PLATELET ESTIMATE: ABNORMAL
PMV BLD AUTO: 8.5 FL (ref 6–12)
POC TROPONIN I: 0.02 NG/ML (ref 0–0.1)
POC TROPONIN INTERP: NORMAL
POTASSIUM SERPL-SCNC: 5.5 MMOL/L (ref 3.7–5.3)
PROTEIN UA: NEGATIVE
PROTHROMBIN TIME: 11.6 SEC (ref 9.7–12.2)
RBC # BLD: 3.52 M/UL (ref 4–5.2)
RBC # BLD: ABNORMAL 10*6/UL
RBC UA: ABNORMAL /HPF (ref 0–2)
RENAL EPITHELIAL, UA: ABNORMAL /HPF
SEG NEUTROPHILS: 67 %
SEGMENTED NEUTROPHILS ABSOLUTE COUNT: 5.8 K/UL (ref 1.8–7.7)
SODIUM BLD-SCNC: 134 MMOL/L (ref 135–144)
SPECIFIC GRAVITY UA: 1.01 (ref 1.01–1.02)
TRICHOMONAS: ABNORMAL
TROPONIN INTERP: NORMAL
TROPONIN INTERP: NORMAL
TROPONIN T: <0.03 NG/ML
TROPONIN T: <0.03 NG/ML
TURBIDITY: CLEAR
URINE HGB: ABNORMAL
UROBILINOGEN, URINE: NORMAL
WBC # BLD: 8.7 K/UL (ref 3.5–11)
WBC # BLD: ABNORMAL 10*3/UL
WBC UA: ABNORMAL /HPF (ref 0–5)
YEAST: ABNORMAL

## 2017-10-02 PROCEDURE — 70450 CT HEAD/BRAIN W/O DYE: CPT

## 2017-10-02 PROCEDURE — 93005 ELECTROCARDIOGRAM TRACING: CPT

## 2017-10-02 PROCEDURE — 84484 ASSAY OF TROPONIN QUANT: CPT

## 2017-10-02 PROCEDURE — 1200000000 HC SEMI PRIVATE

## 2017-10-02 PROCEDURE — 36415 COLL VENOUS BLD VENIPUNCTURE: CPT

## 2017-10-02 PROCEDURE — 80048 BASIC METABOLIC PNL TOTAL CA: CPT

## 2017-10-02 PROCEDURE — 85610 PROTHROMBIN TIME: CPT

## 2017-10-02 PROCEDURE — 81001 URINALYSIS AUTO W/SCOPE: CPT

## 2017-10-02 PROCEDURE — 71010 XR CHEST PORTABLE: CPT

## 2017-10-02 PROCEDURE — 99223 1ST HOSP IP/OBS HIGH 75: CPT | Performed by: INTERNAL MEDICINE

## 2017-10-02 PROCEDURE — 6370000000 HC RX 637 (ALT 250 FOR IP): Performed by: INTERNAL MEDICINE

## 2017-10-02 PROCEDURE — 85025 COMPLETE CBC W/AUTO DIFF WBC: CPT

## 2017-10-02 PROCEDURE — 6360000002 HC RX W HCPCS: Performed by: INTERNAL MEDICINE

## 2017-10-02 PROCEDURE — 2580000003 HC RX 258: Performed by: INTERNAL MEDICINE

## 2017-10-02 PROCEDURE — 99285 EMERGENCY DEPT VISIT HI MDM: CPT

## 2017-10-02 PROCEDURE — 82947 ASSAY GLUCOSE BLOOD QUANT: CPT

## 2017-10-02 PROCEDURE — 99222 1ST HOSP IP/OBS MODERATE 55: CPT | Performed by: NURSE PRACTITIONER

## 2017-10-02 RX ORDER — REPAGLINIDE 0.5 MG/1
0.5 TABLET ORAL
Status: DISCONTINUED | OUTPATIENT
Start: 2017-10-02 | End: 2017-10-05 | Stop reason: HOSPADM

## 2017-10-02 RX ORDER — CARVEDILOL 12.5 MG/1
12.5 TABLET ORAL 2 TIMES DAILY
Status: DISCONTINUED | OUTPATIENT
Start: 2017-10-02 | End: 2017-10-04

## 2017-10-02 RX ORDER — CIPROFLOXACIN 500 MG/1
500 TABLET, FILM COATED ORAL ONCE
Status: COMPLETED | OUTPATIENT
Start: 2017-10-02 | End: 2017-10-02

## 2017-10-02 RX ORDER — SODIUM CHLORIDE 0.9 % (FLUSH) 0.9 %
10 SYRINGE (ML) INJECTION EVERY 12 HOURS SCHEDULED
Status: DISCONTINUED | OUTPATIENT
Start: 2017-10-02 | End: 2017-10-05 | Stop reason: HOSPADM

## 2017-10-02 RX ORDER — LEVOTHYROXINE SODIUM 0.05 MG/1
50 TABLET ORAL DAILY
Status: DISCONTINUED | OUTPATIENT
Start: 2017-10-02 | End: 2017-10-05 | Stop reason: HOSPADM

## 2017-10-02 RX ORDER — POLYETHYLENE GLYCOL 3350 17 G/17G
17 POWDER, FOR SOLUTION ORAL DAILY
Status: DISCONTINUED | OUTPATIENT
Start: 2017-10-02 | End: 2017-10-05 | Stop reason: HOSPADM

## 2017-10-02 RX ORDER — GABAPENTIN 600 MG/1
300 TABLET ORAL NIGHTLY
Status: DISCONTINUED | OUTPATIENT
Start: 2017-10-02 | End: 2017-10-05 | Stop reason: HOSPADM

## 2017-10-02 RX ORDER — DILTIAZEM HYDROCHLORIDE 180 MG/1
180 CAPSULE, COATED, EXTENDED RELEASE ORAL DAILY
Status: DISCONTINUED | OUTPATIENT
Start: 2017-10-02 | End: 2017-10-04

## 2017-10-02 RX ORDER — LISINOPRIL 20 MG/1
20 TABLET ORAL DAILY
Status: DISCONTINUED | OUTPATIENT
Start: 2017-10-02 | End: 2017-10-03

## 2017-10-02 RX ORDER — CLONIDINE HYDROCHLORIDE 0.1 MG/1
0.1 TABLET ORAL 2 TIMES DAILY
Status: DISCONTINUED | OUTPATIENT
Start: 2017-10-02 | End: 2017-10-04

## 2017-10-02 RX ORDER — ACETAMINOPHEN 325 MG/1
650 TABLET ORAL EVERY 4 HOURS PRN
Status: DISCONTINUED | OUTPATIENT
Start: 2017-10-02 | End: 2017-10-05 | Stop reason: HOSPADM

## 2017-10-02 RX ORDER — TRAMADOL HYDROCHLORIDE 50 MG/1
50 TABLET ORAL EVERY 6 HOURS PRN
Status: DISCONTINUED | OUTPATIENT
Start: 2017-10-02 | End: 2017-10-03

## 2017-10-02 RX ORDER — GABAPENTIN 100 MG/1
100 CAPSULE ORAL 2 TIMES DAILY
Status: DISCONTINUED | OUTPATIENT
Start: 2017-10-02 | End: 2017-10-02

## 2017-10-02 RX ORDER — LEVETIRACETAM 500 MG/1
500 TABLET ORAL 2 TIMES DAILY
Status: DISCONTINUED | OUTPATIENT
Start: 2017-10-02 | End: 2017-10-02

## 2017-10-02 RX ORDER — ASPIRIN 81 MG/1
81 TABLET, CHEWABLE ORAL EVERY OTHER DAY
Status: DISCONTINUED | OUTPATIENT
Start: 2017-10-02 | End: 2017-10-05 | Stop reason: HOSPADM

## 2017-10-02 RX ORDER — SODIUM CHLORIDE 0.9 % (FLUSH) 0.9 %
10 SYRINGE (ML) INJECTION PRN
Status: DISCONTINUED | OUTPATIENT
Start: 2017-10-02 | End: 2017-10-05 | Stop reason: HOSPADM

## 2017-10-02 RX ORDER — SIMVASTATIN 5 MG
5 TABLET ORAL NIGHTLY
Status: DISCONTINUED | OUTPATIENT
Start: 2017-10-02 | End: 2017-10-05 | Stop reason: HOSPADM

## 2017-10-02 RX ORDER — ONDANSETRON 2 MG/ML
4 INJECTION INTRAMUSCULAR; INTRAVENOUS EVERY 6 HOURS PRN
Status: DISCONTINUED | OUTPATIENT
Start: 2017-10-02 | End: 2017-10-05 | Stop reason: HOSPADM

## 2017-10-02 RX ADMIN — REPAGLINIDE 0.5 MG: 0.5 TABLET ORAL at 18:28

## 2017-10-02 RX ADMIN — POLYETHYLENE GLYCOL 3350 17 G: 17 POWDER, FOR SOLUTION ORAL at 22:09

## 2017-10-02 RX ADMIN — Medication 10 ML: at 21:42

## 2017-10-02 RX ADMIN — CARVEDILOL 12.5 MG: 12.5 TABLET, FILM COATED ORAL at 22:08

## 2017-10-02 RX ADMIN — CIPROFLOXACIN 500 MG: 500 TABLET, FILM COATED ORAL at 22:08

## 2017-10-02 RX ADMIN — CLONIDINE HYDROCHLORIDE 0.1 MG: 0.1 TABLET ORAL at 22:06

## 2017-10-02 RX ADMIN — ENOXAPARIN SODIUM 30 MG: 30 INJECTION SUBCUTANEOUS at 18:28

## 2017-10-02 RX ADMIN — SIMVASTATIN 5 MG: 5 TABLET, FILM COATED ORAL at 22:08

## 2017-10-02 RX ADMIN — GABAPENTIN 300 MG: 600 TABLET ORAL at 22:09

## 2017-10-02 ASSESSMENT — ENCOUNTER SYMPTOMS
WHEEZING: 0
ABDOMINAL DISTENTION: 0
ABDOMINAL PAIN: 0
DIARRHEA: 0
COUGH: 0
BLOOD IN STOOL: 0
STRIDOR: 0
VOMITING: 0
SHORTNESS OF BREATH: 0
NAUSEA: 0
CHEST TIGHTNESS: 0

## 2017-10-02 NOTE — ED PROVIDER NOTES
This is a 80year old female who presents as a transfer from [place] s/ a seizure vs syncopal episode. She has had three episodes of this in the past. The first episode was in June when she was found to have heart block and had a pacemaker placed. She had a second episode about a week ago and she was admitted to the hospital and had an EEG done of which the results are pending. Today, the daughter states that the patient slumped over in her chair and was unresponsive for about 5 minutes. She eventually awoke to verbal stimuli and was somewhat \"groggy\" per the daughter but was alert and oriented. Patient was taken to [facility] where she had a workup which included a negative head CT w/o contrast. She denies feeling sick recently. No fevers, chills, nausea, vomiting, urinary complaints, numbness/weakness in any extremities currently. Per the daughter the patient is at baseline mental status.

## 2017-10-02 NOTE — PROCEDURES
as well as intermittent generalized  slowing consistent with mild-to-moderate dysfunction of broad  underlying cortex. In addition, there is a focal rhythmic slowing suggesting injury to  the underlying cortex in the left posterior temporal area noted  electrically, maximal at T5. No clinical correlate was noted. This  is consistent with possible injury of the underlying focal cortex in  that area and may have epileptogenic potential.  Further clinical  correlation is advised.         Royal Hannah M.D.    D: 10/02/2017 13:26:00       T: 10/02/2017 14:16:59     [email or web address]  Job#: [id number]     Doc#: [id number]

## 2017-10-02 NOTE — CARE COORDINATION
Order was changed to inpatient as patient was coming up to [facility]. Spoke with Ruma Painter and [facility] is going to put in request for a med surg bed.

## 2017-10-02 NOTE — H&P
[address]    HISTORY AND PHYSICAL EXAMINATION            Date:   10/2/2017  Patient name:  Supriya Payne  Date of admission:  10/2/2017  1:50 PM  MRN:   [id number]  Account:  [de-identified]  YOB: 1927  PCP:    Alethea HOWARD [facility]  Room:   5977/6798-13  Code Status:    Prior    Chief Complaint:     Chief Complaint   Patient presents with    Loss of Consciousness     pt states that she was sitting in a chair and had syncopal episode. it's reported she was \"in a blank stare. \"  transfer from [place]. History Obtained From:     Patient, daughter at bedside    History of Present Illness:     Miss Milana Anton is a nice 80year old lady with history of hypertension, CKD stage III, recent pacemaker placement for second degree AV block in May 2017. Also has CHF with recent medication changes. She has mainly orthostatic dizziness started few months back, with three definite episodes of syncope. Got pacer placed with first episode of syncope. Today she seems to have syncope like episode while sat in chair, with no abnormal movements but confusion present, found by daughter slumped in chair, un able to wake her up for two minutes, then she got up but just staring for few minutes with slow return after, that lasted for around half an hour. No chest pain, palpitations, dizziness with todays visit. Recent admission for second episode of syncope with EEG at that time showing possible seizure focus.      Past Medical History:     Past Medical History:   Diagnosis Date    Anxiety     Asthma     Cardiac pacemaker 06/29/2017    Biotronik Pacemaker implant    CH[facility]congestive heart failure) ([facility] [address] 2016    Chronic kidney disease     Diabetes mellitus ([facility] [address]     Gout     Heart disease     Hyperlipidemia     Hypertension     Hypothyroidism     Mobitz type 2 second degree heart block     Syncope and collapse 06/25/2017      Past Surgical History: Past Surgical History:   Procedure Laterality Date    BREAST BIOPSY Bilateral 1980    BREAST SURGERY  1965    cyst removed    CARDIAC PACEMAKER PLACEMENT  06/29/2017    DR Su Brain    EYE SURGERY      Bilat cataract    OVARY REMOVAL Right     PACEMAKER INSERTION  06/29/2017        Medications Prior to Admission:     Prior to Admission medications    Medication Sig Start Date End Date Taking?  Authorizing Provider   cloNIDine (CATAPRES) 0.1 MG tablet Take 1 tablet by mouth 2 times daily 9/29/17  Yes Jeffery Phelps PA-C   lisinopril (PRINIVIL;ZESTRIL) 20 MG tablet Take 1 tablet by mouth daily 9/29/17  Yes Jeffery Phelps PA-C   diltiazem (CARDIZEM CD) 180 MG extended release capsule Take 1 capsule by mouth daily 9/29/17  Yes Jeffery Phelps PA-C   gabapentin (NEURONTIN) 100 MG capsule Take by mouth 2 times daily  9/22/17  Yes Historical Provider, MD   polyethylene glycol (MIRALAX) powder Take 17 g by mouth daily 9/7/17 10/7/17 Yes Hoang Crawford MD   traMADol (ULTRAM) 50 MG tablet Take 50 mg by mouth every 6 hours as needed for Pain    Yes Historical Provider, MD   carvedilol (COREG) 25 MG tablet Take 0.5 tablets by mouth 2 times daily 7/12/17  Yes Dakota Nation MD   acetaminophen (TYLENOL) 325 MG tablet Take 2 tablets by mouth every 4 hours as needed for Pain 6/30/17  Yes Rosa Maria Blanco CNP   repaglinide (PRANDIN) 0.5 MG tablet Take 0.5 mg by mouth 2 times daily (before meals)   Yes Historical Provider, MD   simvastatin (ZOCOR) 5 MG tablet Take 5 mg by mouth nightly   Yes Historical Provider, MD   levothyroxine (SYNTHROID) 50 MCG tablet Take 50 mcg by mouth daily Takes 1 tablet daily except for 2 tabs on Sunday   Yes Historical Provider, MD   aspirin 81 MG tablet Take 81 mg by mouth every other day   Yes Historical Provider, MD   ciprofloxacin (CIPRO) 500 MG tablet Take 0.5 tablets by mouth 2 times daily for 10 days 9/29/17 10/9/17  Angelia Phelps PA-C        Allergies:     Yuval Stokes POCT troponin    Collection Time: 10/02/17  2:58 PM   Result Value Ref Range    POC Troponin I 0.02 0.00 - 0.10 ng/mL    POC Troponin Interp       The Troponin-I (POC) results cannot be compared to the Troponin-T results. Assessment :      Principal Problem:    GRAYSON (acute kidney injury) ([facility] [address]  Active Problems:    Hypothyroidism    Diabetes mellitus ([facility] [address]    Chronic diastolic HF (heart failure), NYHA class 3 (HCC)    Acute renal failure superimposed on stage 4 chronic kidney disease (HCC)    Essential hypertension    Syncope    Abnormal EEG    Plan:     Principal Problem:    Syncope vs Seizure like episode: Un clear aetiology. Repeat EEG this admission. Cardiology and neurology consults. Pacer check. Telemetry continued. Discussed with family at bedside. Keppra started. Seizure precautions    GRAYSON (acute kidney injury) ([facility] [address]): Hold lisinopril. Recheck in am    Hypothyroidism, home dose continued    Diabetes mellitus ([facility] [address]    Chronic diastolic HF (heart failure), NYHA class 3 (HCC)    Acute renal failure superimposed on stage 4 chronic kidney disease ([facility] [address]    Essential hypertension      Consultations:   IP CONSULT TO INTERNAL MEDICINE  IP CONSULT TO HOSPITALIST  IP CONSULT TO CARDIOLOGY  IP CONSULT TO NEUROLOGY    Patient is admitted as inpatient status because of co-morbidities listed above, severity of signs and symptoms as outlined, requirement for current medical therapies and most importantly because of direct risk to patient if care not provided in a hospital setting.     Stephen Hammer MD  10/2/2017  4:09 PM    Copy sent to Dr. Esme Dutton

## 2017-10-02 NOTE — ED NOTES
Pt arrives per EMS transferred from [place] with possible seizure versus syncopal episode. Pt reports she was sitting in a chair talking with daughter when she \"blanked out\". Pt did not fall or hit head. Pt reports she was unresponsive for approx 3-5 minutes. Pt reports similar incident earlier this year and pt had pacemaker placed. Pt denies any chest pain, SOB, dizziness or n/v/d. CT of head performed at [place], which was negative.       Ezra Powers RN  10/02/17 1400

## 2017-10-02 NOTE — CARE COORDINATION
Case Management Initial Discharge [facility],         Readmission Risk              Readmission Risk:        24.75       Age 72 or Greater:  1    Admitted from SNF or Requires Paid or Family Care:  0    Currently has CHF,COPD,ARF,CRI,or is on dialysis:  4    Takes more than 5 Prescription Medications:  4    Takes Digoxin,Insulin,Anticoagulants,Narcotics or ASA/Plavix:  2    [address] in Past 12 Months:  10    On Disability:  0    Patient Considers own Health:  3.75            Met with:patient or family member patient and daughter to discuss discharge plans. Information verified: address, contacts, phone number, , insurance Yes  PCP: Lopez Sim  Date of last visit: 1 week ago    Insurance Provider: Medicare/ AARP supplement    Discharge Planning  Current Residence:  Private Residence, Independent Living  Living Arrangements:  Alone   Home has 1 stories/no stairs to climb  Support Systems:  Children  Current Services PTA:  Durable Medical Equipment Supplier: na  Patient able to perform ADL's:Independent  DME used to aid ambulation prior to admission: cane walker prn/during admission to be deterined    Potential Assistance Needed:  [address]: [employer] in [place]   Potential Assistance Purchasing Medications:  No  Does patient want to participate in local refill/ meds to beds program?  No    Patient agreeable to home care: No  [place] of choice provided:  n/a      Type of Home Care Services:  None  Patient expects to be discharged to: To be determined    Prior SNF/Rehab Placement and Facility: yes, [facility]  after pacemaker placement  Agreeable to SNF/Rehab: No  [place] of choice provided: n/a   Evaluation: yes    Expected Discharge date:      Follow Up Appointment: Best Day/ Time:      Transportation provider: family  Transportation arrangements needed for discharge: No    Discharge Plan: Pt lives alone in a senior living

## 2017-10-02 NOTE — PROGRESS NOTES
NEUROLOGY INPATIENT CONSULT NOTE    10/2/2017         I had the pleasure of seeing your patient in neurology consultation for her symptoms. As you would recall Antonio Boland is a  80 y.o. female with H/O HTN, HLD, CHF, CKD, who was admitted as a transfer from [facility] on 10/2/2017 with syncope. 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 She went back to [facility] on 9/27/2017 with another syncope with concern of a \"staring spell'; pacemaker settings & anti-HTN were adjusted. CT head - normal. EEG was done that reported epileptogenic potential L posterior temporal region where she had some focal rhythmic slowing; injury was suspected in that area. Now the pt has been transferred from [facility] when she had yet another syncope preceded by a \"staring spell\"; per daughter pt was unresponsive for almost 5 minute; when she came to she was confused, had a blank stare & took 30 minutes before being fully alert & oriented. Her BP was recorded at 50/30 mm Hg. CT head done at [facility] was normal. Orthostatic BP is borderline. EEG - ordered. Pt denied any other focal motor, sensory, visual or bulbar symptoms. At baseline, she usually ambulates with a walker; denied any falls. She gave up driving 3 yrs ago. Outpatient she takes ASA 81 mg QD, Zocor 5 mg HS, Tramadol 50 mg Q6hr PRN. No current facility-administered medications on file prior to encounter.       Current Outpatient Prescriptions on File Prior to Encounter   Medication Sig Dispense Refill    cloNIDine (CATAPRES) 0.1 MG tablet Take 1 tablet by mouth 2 times daily 60 tablet 0    lisinopril (PRINIVIL;ZESTRIL) 20 MG tablet Take 1 tablet by mouth daily 60 tablet 0    diltiazem (CARDIZEM CD) 180 MG extended release capsule Take 1 capsule by mouth daily 30 capsule 0    gabapentin (NEURONTIN) 100 MG capsule Take by mouth 2 times daily       polyethylene glycol (MIRALAX) powder Take 17 g by mouth daily 510 g 0    traMADol (ULTRAM) 50 MG tablet Take 50 mg by mouth every 6 hours as needed for Pain       carvedilol (COREG) 25 MG tablet Take 0.5 tablets by mouth 2 times daily 90 tablet 3    acetaminophen (TYLENOL) 325 MG tablet Take 2 tablets by mouth every 4 hours as needed for Pain 120 tablet 3    repaglinide (PRANDIN) 0.5 MG tablet Take 0.5 mg by mouth 2 times daily (before meals)      simvastatin (ZOCOR) 5 MG tablet Take 5 mg by mouth nightly      levothyroxine (SYNTHROID) 50 MCG tablet Take 50 mcg by mouth daily Takes 1 tablet daily except for 2 tabs on Sunday      aspirin 81 MG tablet Take 81 mg by mouth every other day      ciprofloxacin (CIPRO) 500 MG tablet Take 0.5 tablets by mouth 2 times daily for 10 days 4 tablet 0       Allergies: Dionne Fuller is allergic to norco [hydrocodone-acetaminophen]. Past Medical History:   Diagnosis Date    Anxiety     Asthma     Cardiac pacemaker 06/29/2017    Biotronik Pacemaker implant    CHF (congestive heart failure) ([facility] [address] 2016    Chronic kidney disease     Diabetes mellitus ([facility] [address]     Gout     Heart disease     Hyperlipidemia     Hypertension     Hypothyroidism     Mobitz type 2 second degree heart block     Syncope and collapse 06/25/2017       Past Surgical History:   Procedure Laterality Date    BREAST BIOPSY Bilateral 1980    BREAST SURGERY  1965    cyst removed    CARDIAC PACEMAKER PLACEMENT  06/29/2017    DR Jesse Lundborg    EYE SURGERY      Bilat cataract    OVARY REMOVAL Right     PACEMAKER INSERTION  06/29/2017       Social History: Dionne Fuller  reports that she has never smoked. She has never used smokeless tobacco. She reports that she drinks about 0.6 oz of alcohol per week  She reports that she does not use illicit drugs.     Family History   Problem Relation Age of Onset    Heart Disease Mother      chf    Heart Disease Father     Heart Attack Father        ROS:  Constitutional  Negative for fever and chills    HEENT  Negative for ear discharge, ear pain, nosebleed    Eyes  Negative for photophobia, pain and discharge    Respiratory  Negative for hemoptysis and sputum    Cardiovascular  Negative for orthopnea, claudication and PND    Gastrointestinal  Negative for abdominal pain, diarrhea, blood in stool    Musculoskeletal  Negative for joint pain, negative for myalgia    Skin  Negative for rash or itching    Endo/heme/allergies  Negative for polydipsia, environmental allergy    Psychiatric/behavioral  Negative for suicidal ideation. Patient is not anxious        Medications:     aspirin  81 mg Oral Every Other Day    carvedilol  12.5 mg Oral BID    cloNIDine  0.1 mg Oral BID    diltiazem  180 mg Oral Daily    gabapentin  100 mg Oral BID    levothyroxine  50 mcg Oral Daily    lisinopril  20 mg Oral Daily    polyethylene glycol  17 g Oral Daily    repaglinide  0.5 mg Oral BID AC    simvastatin  5 mg Oral Nightly    sodium chloride flush  10 mL Intravenous 2 times per day    enoxaparin  30 mg Subcutaneous Daily    levETIRAcetam  500 mg Oral BID     PRN Meds include: traMADol, sodium chloride flush, acetaminophen, magnesium hydroxide, ondansetron    Objective:   BP (!) 145/77  Pulse 75  Temp 96.7 °F (35.9 °C) (Oral)   Resp 18  Ht 5' 4\" (1.626 m)  Wt 166 lb 6.4 oz (75.5 kg)  SpO2 99%  BMI 28.56 kg/m2    Blood pressure range: Systolic (73QZW), OVX:382 , Min:78 , VDH:306   ; Diastolic (58YQH), GXL:87, Min:47, Max:80      General examination:   Head: Normocephalic, atraumatic  Eyes: Extraocular movements intact  Lungs: Respirations unlabored, chest wall no deformity  ENT: Normal external ear canals, no sinus tenderness  Heart: Regular rate rhythm  Abdomen: No masses, tenderness  Extremities: No cyanosis or edema, 2+ pulses  Skin: Intact, normal skin color      NEUROLOGIC EXAMINATION  GENERAL  Appears comfortable.  Jaw tremors    HEENT  NC/ AT addition, there is a focal rhythmic slowing suggesting injury to the underlying cortex in the left posterior temporal area; this may have epileptogenic potential. No clinical correlate was noted      EEG      ORTHOSTATIC BP:  Supine      184/102         160/83  Sitting        184/92           153/89  Standing    159/83           143/91                              Impression and Plan: Ms. Jenny Reyes is a 80 y.o. female with   Recurrent syncope x 3 since 6/2017; this time, recorded BP was 50/30 mm Hg; orthostatics borderline; will do EEG, if that is normal we don't recommend AEDs & attribute the syncope to hemodynamic issues. Lillie DC'd as per Dr. Prem Carrington    Type II AV block; s/p pacemaker insertion on 6/29/2017 after the 1st syncope; cardiology consulted to re-check the pacer    GRAYSON on CKD    UTI; is on Cipro    Continue ASA 81 mg QD, Zocor 5 mg HS    Continue PT/OT    Comorbid conditions - HTN, HLD, CHF     Will follow with you.  Thank you for the consult

## 2017-10-02 NOTE — ED PROVIDER NOTES
[facility] ED  Emergency Department Encounter  Emergency Medicine Resident     Pt Name: Steward Kayser  MRN: [id number]  Jose Davidgfpaulette 3/7/1927  Date of evaluation: 10/2/17  PCP:  Misael Patten       Chief Complaint   Patient presents with    Loss of Consciousness     pt states that she was sitting in a chair and had syncopal episode. it's reported she was \"in a blank stare. \"  transfer from [place]. HISTORY OF PRESENT ILLNESS  (Location/Symptom, Timing/Onset, Context/Setting, Quality, Duration, Modifying Factors, Severity.)      Steward Kayser is a 80 y.o. female who  presents as a transfer from [place] s/p a seizure vs syncopal episode. She has had three episodes of this in the past. The first episode was in June when she was found to have heart block and had a pacemaker placed. She had a second episode about a week ago and she was admitted to the hospital and had an EEG done of which the results are pending. Today, the daughter states that the patient slumped over in her chair and was unresponsive for about 5 minutes. She eventually awoke to verbal stimuli and was somewhat \"groggy\" per the daughter but was alert and oriented. Patient was taken to [place] where she had a workup which included a negative head CT w/o contrast. She denies feeling sick recently. No fevers, chills, nausea, vomiting, urinary complaints, numbness/weakness in any extremities currently. Per the daughter the patient is at baseline mental status. The history and the HPI was written by the medical student. I confirmed what is listed above and made recommendations where necessary      PAST MEDICAL / SURGICAL / SOCIAL / FAMILY HISTORY      has a past medical history of Anxiety; Asthma; Cardiac pacemaker; CHF (congestive heart failure) ([address] Chronic kidney disease; Diabetes mellitus ([facility] [address] Gout; Heart disease; Hyperlipidemia; Hypertension; Hypothyroidism;  Mobitz type 2 second degree heart block; and Syncope and collapse.     has a past surgical history that includes Ovary removal (Right); Breast surgery (1965); Breast biopsy (Bilateral, 1980); eye surgery; Cardiac pacemaker placement (06/29/2017); and Pacemaker insertion (06/29/2017). Social History     Social History    Marital status:      Spouse name: N/A    Number of children: N/A    Years of education: N/A     Occupational History    Not on file. Social History Main Topics    Smoking status: Never Smoker    Smokeless tobacco: Never Used    Alcohol use 0.6 oz/week     1 Cans of beer per week      Comment: weekly    Drug use: No    Sexual activity: Not on file     Other Topics Concern    Not on file     Social History Narrative       Family History   Problem Relation Age of Onset    Heart Disease Mother      chf    Heart Disease Father     Heart Attack Father        Allergies:  Larue Eisenmenger [hydrocodone-acetaminophen]    Home Medications:  Prior to Admission medications    Medication Sig Start Date End Date Taking?  Authorizing Provider   cloNIDine (CATAPRES) 0.1 MG tablet Take 1 tablet by mouth 2 times daily 9/29/17  Yes Jeffery Phelps PA-C   lisinopril (PRINIVIL;ZESTRIL) 20 MG tablet Take 1 tablet by mouth daily 9/29/17  Yes Jeffery Phelps PA-C   diltiazem (CARDIZEM CD) 180 MG extended release capsule Take 1 capsule by mouth daily 9/29/17  Yes Jeffery Phelps PA-C   gabapentin (NEURONTIN) 100 MG capsule Take by mouth 2 times daily  9/22/17  Yes Historical Provider, MD   polyethylene glycol (MIRALAX) powder Take 17 g by mouth daily 9/7/17 10/7/17 Yes Melvi Bullock MD   traMADol (ULTRAM) 50 MG tablet Take 50 mg by mouth every 6 hours as needed for Pain    Yes Historical Provider, MD   carvedilol (COREG) 25 MG tablet Take 0.5 tablets by mouth 2 times daily 7/12/17  Yes Nitesh Renteria MD   acetaminophen (TYLENOL) 325 MG tablet Take 2 tablets by mouth every 4 hours as needed for Pain 6/30/17  Yes Corey Haines, GOPAL normal and breath sounds normal. No respiratory distress. She has no wheezes. She has no rales. She exhibits no tenderness. Abdominal: Soft. Normal appearance and bowel sounds are normal. She exhibits no distension. There is no tenderness. There is no rebound and no guarding. Musculoskeletal: Normal range of motion. She exhibits no edema or tenderness. Neurological: She is alert and oriented to person, place, and time. No cranial nerve deficit. Skin: Skin is dry. No rash noted. No erythema. Psychiatric: She has a normal mood and affect. DIFFERENTIAL  DIAGNOSIS     PLAN (LABS / IMAGING / EKG):  Orders Placed This Encounter   Procedures    XR Chest Portable    Inpatient consult to Internal Medicine    Inpatient consult to Hospitalist    POCT troponin    POCT troponin    EKG 12 Lead    PATIENT STATUS (FROM ED OR OR/PROCEDURAL) Observation       MEDICATIONS ORDERED:  No orders of the defined types were placed in this encounter. DIAGNOSTIC RESULTS / EMERGENCY DEPARTMENT COURSE / MDM     LABS:  Results for orders placed or performed during the hospital encounter of 10/02/17   POCT troponin   Result Value Ref Range    POC Troponin I 0.02 0.00 - 0.10 ng/mL    POC Troponin Interp       The Troponin-I (POC) results cannot be compared to the Troponin-T results. IMPRESSION: Patient is a 80-year-old lady who presents as transfer from [facility] with a chief complaint of syncopal episode. This is the patient's third such episode over the last 4-5 months. First episode was in June 2017. time pacemaker was placed. She had another episode last week and then again today. Ibuprofen the lab work was negative, ECG was nondiagnostic. CT scan of the head was obtained and was normal.  Patient was positive for orthostatic blood pressure measurement and was transferred here for further cardiology evaluation. On my initial encounter the patient is awake, she is alert and oriented ×4.   Has no complaints review of systems. PHYSICAL exam auscultation of the lungs without any adventitious sounds, there is equal bilateral breath sounds. Auscultation of the heart without any murmurs rubs or gallops. The abdomen is soft, nondistended with normal bowel sounds. I didn't repeat the EKG) atrial paced rhythm otherwise nondiagnostic. We'll obtain a second set of cardiac enzymes and a chest x-ray. Plan admission to medicine for further cardiology workup and evaluation for syncope. I have discussed this with the patient and daughter and both in agreement. Also been placed to [facility] waiting for call back. RADIOLOGY:  Ct Head Wo Contrast    Result Date: 10/2/2017  REPORT: CT scan of the brain without contrast. INDICATION: Syncopal event; loss of consciousness at home. FINDINGS: There is mild generalized cerebral atrophy, not unusual for the patient's age. There is no shift of the midline structures. There is small vessel ischemic change involving the white matter. No other abnormal areas of increased or decreased density are seen within the brain. There is no evidence of an intracranial hemorrhage or hematoma. There is no apparent fracture involving the calvarium. The visualized paranasal sinuses and mastoid air cells are clear. No significant change is noted from the study of 9/28/2017. Final report electronically signed by Lianet Madrid on 10/2/2017 10:42 AM    GENERALIZED CEREBRAL ATROPHY. SMALL VESSEL ISCHEMIC CHANGE INVOLVING THE WHITE MATTER. NO APPARENT ACUTE ABNORMALITY OR INTRACRANIAL HEMORRHAGE OR HEMATOMA.     EKG  EKG Interpretation    Interpreted by emergency department physician    Rhythm: normal sinus   Rate: 88, paced  Axis: normal  Ectopy: none  Conduction: normal  ST Segments: normal, no acute infarction evident  T Waves: normal  Q Waves: none    EKG  Impression:   Paced rhythm EKG, NO STEMI    All EKG's are interpreted by the Emergency Department Physician who either signs or Co-signs this

## 2017-10-02 NOTE — PROGRESS NOTES
Pharmacy Note     Renal Dose Adjustment    Travis Romeo is a 80 y.o. female. Pharmacist assessment of renally cleared medications. Recent Labs      10/02/17   0902   BUN  53*       Recent Labs      10/02/17   0902   CREATININE  2.29*       Estimated Creatinine Clearance: 16 mL/min (based on Cr of 2.29). Estimated CrCl using Ideal Body Weight: 14.1 mL/min (based on IBW 54.7 kg)    Height:   Ht Readings from Last 1 Encounters:   10/02/17 5' 4\" (1.626 m)     Weight:  Wt Readings from Last 1 Encounters:   10/02/17 166 lb 6.4 oz (75.5 kg)       The following medication dose has been adjusted based upon renal function per P&T Guidelines:             Enoxaparin reduced to 30 mg q24h for CrCl below 30 ml/min.   Ramos Calix, PharmD, [facility]  10/2/2017  4:53 PM

## 2017-10-02 NOTE — IP AVS SNAPSHOT
After Visit Summary  (Discharge Instructions)    Medication List for Home    Based on the information you provided to us as well as any changes during this visit, the following is your updated medication list.  Compare this with your prescription bottles at home. If you have any questions or concerns, contact your primary care physician's office. Daily Medication List (This medication list can be shared with any healthcare provider who is helping you manage your medications)      There are NEW medications for you.  START taking them after you leave the hospital        Last Dose    Next Dose Due AM NOON PM NIGHT    fludrocortisone 0.1 MG tablet   Commonly known as:  FLORINEF   Take 0.5 tablets by mouth daily                  tomorrow                            You told us you were taking these medications at home, but the amount or how often you take this medication has CHANGED        Last Dose    Next Dose Due AM NOON PM NIGHT    carvedilol 6.25 MG tablet   Commonly known as:  COREG   Take 2 tablets by mouth 2 times daily   What changed:  medication strength                6.25 mg on 10/5/2017  9:25 AM     tonight                          diltiazem 120 MG extended release capsule   Commonly known as:  CARDIZEM CD   Take 1 capsule by mouth daily   What changed:    - medication strength  - how much to take                120 mg on 10/5/2017  9:25 AM     Tomorrow                             These are medications you told us you were taking at home, CONTINUE taking them after you leave the hospital        Last Dose    Next Dose Due AM NOON PM NIGHT    acetaminophen 325 MG tablet   Commonly known as:  TYLENOL   Take 2 tablets by mouth every 4 hours as needed for Pain                  As needed                       aspirin 81 MG tablet   Take 81 mg by mouth every other day                  Tomorrow morning                          gabapentin 100 MG capsule   Commonly known as:  NEURONTIN Take 300 mg by mouth nightly                  tonight                          levothyroxine 50 MCG tablet   Commonly known as:  SYNTHROID   Take 50 mcg by mouth daily Takes 1 tablet daily except for 2 tabs on Sunday                50 mcg on 10/5/2017  8:43 AM     Tomorrow morning                          polyethylene glycol powder   Commonly known as:  MIRALAX   Take 17 g by mouth daily                  As needed                       repaglinide 0.5 MG tablet   Commonly known as:  PRANDIN   Take 0.5 mg by mouth 2 times daily (before meals)                0.5 mg on 10/5/2017  8:44 AM     tonight                          simvastatin 5 MG tablet   Commonly known as:  ZOCOR   Take 5 mg by mouth nightly                5 mg on 10/4/2017 10:15 PM     tonight                          traMADol 50 MG tablet   Commonly known as:  ULTRAM   Take 50 mg by mouth every 6 hours as needed for Pain                50 mg on 10/5/2017  6:39 AM     As needed                         These are the medications you have told us you were taking at home, STOP taking them after you leave the hospital     ciprofloxacin 500 MG tablet   Commonly known as:  CIPRO       cloNIDine 0.1 MG tablet   Commonly known as:  CATAPRES       lisinopril 20 MG tablet   Commonly known as:  PRINIVIL;ZESTRIL            Where to Get Your Medications      These [facility] [place] [id number] [id number] [address] [address]     Phone:  [phone]     fludrocortisone 0.1 MG tablet         You can get these medications from any pharmacy     Bring a paper prescription for each of these medications     carvedilol 6.25 MG tablet    diltiazem 120 MG extended release capsule               Allergies as of 10/5/2017        Reactions    Norco [Hydrocodone-acetaminophen] Other (See Comments)    Confusion      Immunizations as of 10/5/2017  Reviewed on 10/3/2017 Name Date Dose VIS Date Route    Influenza, Osmani Newman, 3 yrs and older, IM, Preservative Free 10/3/2017 0.5 mL 2015 Intramuscular    Influenza, Quadv, 3 yrs and older, IM, Preservative Free 2017 0.5 mL 2015 Intramuscular    Comment: temperature 97.3    Pneumococcal 13-valent Conjugate ([id number]) 2017 0.5 mL 2015 Intramuscular    Comment: 5 CUPS and some sugar.      Last Vitals          Most Recent Value    Temp  97.4 °F (36.3 °C)    Pulse  79    Resp  15    BP  137/65         After Visit Summary    This summary was created for you. Thank you for entrusting your care to us. The following information includes details about your hospital/visit stay along with steps you should take to help with your recovery once you leave the hospital.  In this packet, you will find information about the topics listed below:    · Instructions about your medications including a list of your home medications  · A summary of your hospital visit  · Follow-up appointments once you have left the hospital  · Your care plan at home      You may receive a survey regarding the care you received during your stay. Your input is valuable to us. We encourage you to complete and return your survey in the envelope provided. We hope you will choose us in the future for your healthcare needs. Patient Information     Patient Name RADHAMES Rodriguez 3/7/1927      Care Provided at:     Name Address Phone       [facility] [address] [place] [address] [phone]            Your Visit    Here you will find information about your visit, including the reason for your visit. Please take this sheet with you when you visit your doctor or other health care provider in the future. It will help determine the best possible medical care for you at that time. If you have any questions once you leave the hospital, please call the department phone number listed below.         Why you were here Appointment with SCHEDULE, BREA BROWN CAR BIOTRONIK at [address] [place] [address] ([phone])   If this is a fasting lab, please do not eat or drink past midnight other than water. [address]  [place] [facility] [address]         Preventive Care        Date Due    Tetanus Combination Vaccine (1 - Tdap) 3/7/1946    Zoster Vaccine 3/7/1987    Pneumococcal Vaccines (two) for age 72 years & over with: cerebrospinal fluid leaks, cochlear implants, hemoglobinopathies,  asplenia, immunodeficiencies, HIV infection, or chronic renal failure (2 of 2 - PPSV23) 2/27/2017                 Care Plan Once You Return Home    This section includes instructions you will need to follow once you leave the hospital.  Your care team will discuss these with you, so you and those caring for you know how to best care for your health needs at home. This section may also include educational information about certain health topics that may be of help to you. Diet Instructions     ? Good nutrition is important when healing from an illness, injury, or surgery. Follow any nutrition recommendations given to you during your hospital stay. ? If you were given an oral nutrition supplement while in the hospital, continue to take this supplement at home. You can take it with meals, in-between meals, and/or before bedtime. These supplements can be purchased at most local grocery stores, pharmacies, and chain [employer]-stores. ? If you have any questions about your diet or nutrition, call the hospital and ask for the dietitian. Renal diet low in potassium            Activity Instructions     Please resume home activity             MyChart Signup     Our records indicate that you have declined MyChart signup. View your information online  ? Review your current list of  medications, immunization, and allergies. ? Review your future test results online . ? Review your discharge instructions provided by your caregivers at discharge    Certain functionality such as prescription refills, scheduling appointments or sending messages to your provider are not activated if your provider does not use [employer] in his/her office    For questions regarding your [employer] account call [phone]. If you have a clinical question, please call your doctor's office. The information on all pages of the After Visit Summary has been reviewed with me, the patient and/or responsible adult, by my health care provider(s). I had the opportunity to ask questions regarding this information. I understand I should dispose of my armband safely at home to protect my health information. A complete copy of the After Visit Summary has been given to me, the patient and/or responsible adult. Patient Signature/Responsible Adult:____________________    Clinician Signature:_____________________    Date:_____________________    Time:_____________________        More Information           Vasovagal Syncope: Care Instructions  Your Care Instructions    Vasovagal syncope (say \"wlk-zxy-EJI-emoryl XGJQ-[id number]-drn\") is sudden dizziness or fainting that can be set off by things such as pain, stress, fear, or trauma. You may sweat or feel lightheaded, sick to your stomach, or tingly. The problem causes the heart rate to slow and the blood vessels to widen, or dilate, for a short time. When this happens, blood pools in the lower body, and less blood goes to the brain. You can usually get relief by lying down with your legs raised (elevated). This helps more blood to flow to your brain and may help relieve symptoms like feeling dizzy. Some doctors may recommend a technique that involves tensing your fists and arms. This type of fainting is often easy to predict. For example, it happens to some people when they see blood or have to get a shot. They may feel symptoms before they faint.

## 2017-10-02 NOTE — IP AVS SNAPSHOT
Patient Information     Patient Name RADHAMES Norwood 3/7/1927         This is your updated medication list to keep with you all times      TAKE these medications     acetaminophen 325 MG tablet   Commonly known as:  TYLENOL   Take 2 tablets by mouth every 4 hours as needed for Pain       aspirin 81 MG tablet       carvedilol 6.25 MG tablet   Commonly known as:  COREG   Take 2 tablets by mouth 2 times daily       diltiazem 120 MG extended release capsule   Commonly known as:  CARDIZEM CD   Take 1 capsule by mouth daily       fludrocortisone 0.1 MG tablet   Commonly known as:  FLORINEF   Take 0.5 tablets by mouth daily       gabapentin 100 MG capsule   Commonly known as:  NEURONTIN       levothyroxine 50 MCG tablet   Commonly known as:  SYNTHROID       polyethylene glycol powder   Commonly known as:  MIRALAX   Take 17 g by mouth daily       repaglinide 0.5 MG tablet   Commonly known as:  PRANDIN       simvastatin 5 MG tablet   Commonly known as:  ZOCOR       traMADol 50 MG tablet   Commonly known as:  Raya Hendricks

## 2017-10-03 LAB
ABSOLUTE EOS #: 0.2 K/UL (ref 0–0.4)
ABSOLUTE LYMPH #: 1.5 K/UL (ref 1–4.8)
ABSOLUTE MONO #: 0.6 K/UL (ref 0.1–1.2)
ANION GAP SERPL CALCULATED.3IONS-SCNC: 17 MMOL/L (ref 9–17)
BASOPHILS # BLD: 0 %
BASOPHILS ABSOLUTE: 0 K/UL (ref 0–0.2)
BUN BLDV-MCNC: 55 MG/DL (ref 8–23)
BUN/CREAT BLD: ABNORMAL (ref 9–20)
CALCIUM SERPL-MCNC: 9.3 MG/DL (ref 8.6–10.4)
CHLORIDE BLD-SCNC: 104 MMOL/L (ref 98–107)
CO2: 18 MMOL/L (ref 20–31)
CREAT SERPL-MCNC: 2.12 MG/DL (ref 0.5–0.9)
DIFFERENTIAL TYPE: ABNORMAL
EKG ATRIAL RATE: 88 BPM
EKG P AXIS: 15 DEGREES
EKG P-R INTERVAL: 168 MS
EKG Q-T INTERVAL: 406 MS
EKG QRS DURATION: 158 MS
EKG QTC CALCULATION (BAZETT): 491 MS
EKG R AXIS: -68 DEGREES
EKG T AXIS: 96 DEGREES
EKG VENTRICULAR RATE: 88 BPM
EOSINOPHILS RELATIVE PERCENT: 3 %
GFR AFRICAN AMERICAN: 27 ML/MIN
GFR NON-AFRICAN AMERICAN: 22 ML/MIN
GFR SERPL CREATININE-BSD FRML MDRD: ABNORMAL ML/MIN/{1.73_M2}
GFR SERPL CREATININE-BSD FRML MDRD: ABNORMAL ML/MIN/{1.73_M2}
GLUCOSE BLD-MCNC: 136 MG/DL (ref 65–105)
GLUCOSE BLD-MCNC: 142 MG/DL (ref 65–105)
GLUCOSE BLD-MCNC: 89 MG/DL (ref 65–105)
GLUCOSE BLD-MCNC: 93 MG/DL (ref 65–105)
GLUCOSE BLD-MCNC: 93 MG/DL (ref 70–99)
HCT VFR BLD CALC: 32.9 % (ref 36–46)
HEMOGLOBIN: 10.9 G/DL (ref 12–16)
LYMPHOCYTES # BLD: 24 %
MAGNESIUM: 2.1 MG/DL (ref 1.6–2.6)
MCH RBC QN AUTO: 32.6 PG (ref 26–34)
MCHC RBC AUTO-ENTMCNC: 33.2 G/DL (ref 31–37)
MCV RBC AUTO: 98 FL (ref 80–100)
MONOCYTES # BLD: 9 %
PDW BLD-RTO: 14.5 % (ref 12.5–15.4)
PLATELET # BLD: 169 K/UL (ref 140–450)
PLATELET ESTIMATE: ABNORMAL
PMV BLD AUTO: 8.1 FL (ref 6–12)
POTASSIUM SERPL-SCNC: 5.8 MMOL/L (ref 3.7–5.3)
RBC # BLD: 3.36 M/UL (ref 4–5.2)
RBC # BLD: ABNORMAL 10*6/UL
SEG NEUTROPHILS: 64 %
SEGMENTED NEUTROPHILS ABSOLUTE COUNT: 4.1 K/UL (ref 1.8–7.7)
SODIUM BLD-SCNC: 139 MMOL/L (ref 135–144)
TROPONIN INTERP: NORMAL
TROPONIN T: <0.03 NG/ML
WBC # BLD: 6.5 K/UL (ref 3.5–11)
WBC # BLD: ABNORMAL 10*3/UL

## 2017-10-03 PROCEDURE — G0008 ADMIN INFLUENZA VIRUS VAC: HCPCS | Performed by: INTERNAL MEDICINE

## 2017-10-03 PROCEDURE — 36415 COLL VENOUS BLD VENIPUNCTURE: CPT

## 2017-10-03 PROCEDURE — 99232 SBSQ HOSP IP/OBS MODERATE 35: CPT | Performed by: INTERNAL MEDICINE

## 2017-10-03 PROCEDURE — 85025 COMPLETE CBC W/AUTO DIFF WBC: CPT

## 2017-10-03 PROCEDURE — 83735 ASSAY OF MAGNESIUM: CPT

## 2017-10-03 PROCEDURE — 6360000002 HC RX W HCPCS: Performed by: INTERNAL MEDICINE

## 2017-10-03 PROCEDURE — 6370000000 HC RX 637 (ALT 250 FOR IP): Performed by: INTERNAL MEDICINE

## 2017-10-03 PROCEDURE — 90686 IIV4 VACC NO PRSV 0.5 ML IM: CPT | Performed by: INTERNAL MEDICINE

## 2017-10-03 PROCEDURE — 95819 EEG AWAKE AND ASLEEP: CPT

## 2017-10-03 PROCEDURE — 1200000000 HC SEMI PRIVATE

## 2017-10-03 PROCEDURE — 2580000003 HC RX 258: Performed by: INTERNAL MEDICINE

## 2017-10-03 PROCEDURE — 80048 BASIC METABOLIC PNL TOTAL CA: CPT

## 2017-10-03 PROCEDURE — 99232 SBSQ HOSP IP/OBS MODERATE 35: CPT | Performed by: NURSE PRACTITIONER

## 2017-10-03 PROCEDURE — 82947 ASSAY GLUCOSE BLOOD QUANT: CPT

## 2017-10-03 PROCEDURE — 84484 ASSAY OF TROPONIN QUANT: CPT

## 2017-10-03 RX ORDER — DEXTROSE MONOHYDRATE 25 G/50ML
25 INJECTION, SOLUTION INTRAVENOUS ONCE
Status: COMPLETED | OUTPATIENT
Start: 2017-10-03 | End: 2017-10-03

## 2017-10-03 RX ORDER — TRAMADOL HYDROCHLORIDE 50 MG/1
50 TABLET ORAL EVERY 6 HOURS PRN
Status: DISCONTINUED | OUTPATIENT
Start: 2017-10-03 | End: 2017-10-05 | Stop reason: HOSPADM

## 2017-10-03 RX ADMIN — Medication 10 ML: at 10:06

## 2017-10-03 RX ADMIN — ENOXAPARIN SODIUM 30 MG: 30 INJECTION SUBCUTANEOUS at 10:05

## 2017-10-03 RX ADMIN — INFLUENZA VIRUS VACCINE 0.5 ML: 15; 15; 15; 15 SUSPENSION INTRAMUSCULAR at 10:10

## 2017-10-03 RX ADMIN — GABAPENTIN 300 MG: 600 TABLET ORAL at 21:22

## 2017-10-03 RX ADMIN — CLONIDINE HYDROCHLORIDE 0.1 MG: 0.1 TABLET ORAL at 10:04

## 2017-10-03 RX ADMIN — DEXTROSE MONOHYDRATE 25 G: 25 INJECTION, SOLUTION INTRAVENOUS at 12:16

## 2017-10-03 RX ADMIN — DILTIAZEM HYDROCHLORIDE 180 MG: 180 CAPSULE, COATED, EXTENDED RELEASE ORAL at 10:04

## 2017-10-03 RX ADMIN — INSULIN HUMAN 10 UNITS: 100 INJECTION, SOLUTION PARENTERAL at 12:17

## 2017-10-03 RX ADMIN — Medication 10 ML: at 21:22

## 2017-10-03 RX ADMIN — LEVOTHYROXINE SODIUM 50 MCG: 50 TABLET ORAL at 10:04

## 2017-10-03 RX ADMIN — LISINOPRIL 20 MG: 20 TABLET ORAL at 10:04

## 2017-10-03 RX ADMIN — POLYETHYLENE GLYCOL 3350 17 G: 17 POWDER, FOR SOLUTION ORAL at 10:04

## 2017-10-03 RX ADMIN — SIMVASTATIN 5 MG: 5 TABLET, FILM COATED ORAL at 21:22

## 2017-10-03 RX ADMIN — REPAGLINIDE 0.5 MG: 0.5 TABLET ORAL at 17:19

## 2017-10-03 RX ADMIN — TRAMADOL HYDROCHLORIDE 50 MG: 50 TABLET ORAL at 21:21

## 2017-10-03 RX ADMIN — MAGNESIUM HYDROXIDE 30 ML: 400 SUSPENSION ORAL at 21:34

## 2017-10-03 RX ADMIN — CARVEDILOL 12.5 MG: 12.5 TABLET, FILM COATED ORAL at 10:04

## 2017-10-03 RX ADMIN — REPAGLINIDE 0.5 MG: 0.5 TABLET ORAL at 10:04

## 2017-10-03 ASSESSMENT — PAIN SCALES - GENERAL: PAINLEVEL_OUTOF10: 8

## 2017-10-03 ASSESSMENT — PAIN DESCRIPTION - LOCATION: LOCATION: HIP

## 2017-10-03 ASSESSMENT — PAIN DESCRIPTION - DESCRIPTORS: DESCRIPTORS: ACHING;DISCOMFORT

## 2017-10-03 ASSESSMENT — PAIN DESCRIPTION - ORIENTATION: ORIENTATION: LEFT

## 2017-10-03 ASSESSMENT — PAIN DESCRIPTION - PAIN TYPE: TYPE: CHRONIC PAIN

## 2017-10-03 ASSESSMENT — PAIN DESCRIPTION - PROGRESSION: CLINICAL_PROGRESSION: GRADUALLY WORSENING

## 2017-10-03 NOTE — CONSULTS
PRN  sodium chloride flush 0.9 % injection 10 mL, 10 mL, Intravenous, 2 times per day  sodium chloride flush 0.9 % injection 10 mL, 10 mL, Intravenous, PRN  acetaminophen (TYLENOL) tablet 650 mg, 650 mg, Oral, Q4H PRN  magnesium hydroxide (MILK OF MAGNESIA) 400 MG/5ML suspension 30 mL, 30 mL, Oral, Daily PRN  ondansetron (ZOFRAN) injection 4 mg, 4 mg, Intravenous, Q6H PRN  enoxaparin (LOVENOX) injection 30 mg, 30 mg, Subcutaneous, Daily  gabapentin (NEURONTIN) tablet 300 mg, 300 mg, Oral, Nightly    Allergies:  Norco [hydrocodone-acetaminophen]    Social History:   reports that she has never smoked. She has never used smokeless tobacco. She reports that she drinks about 0.6 oz of alcohol per week  She reports that she does not use illicit drugs. Family History: family history includes Heart Attack in her father; Heart Disease in her father and mother. No h/o sudden cardiac death. No for premature CAD    REVIEW OF SYSTEMS:    · Constitutional: there has been no unanticipated weight loss. There's been No change in energy level, No change in activity level. · Eyes: No visual changes or diplopia. No scleral icterus. · ENT: No Headaches  · Cardiovascular: No cardiac history  · Respiratory: No previous pulmonary problems, No cough  · Gastrointestinal: No abdominal pain. No change in bowel or bladder habits. · Genitourinary: No dysuria, trouble voiding, or hematuria. · Musculoskeletal:  No gait disturbance, No weakness or joint complaints. · Integumentary: No rash or pruritis. · Neurological: No headache, diplopia, change in muscle strength, numbness or tingling. No change in gait, balance, coordination, mood, affect, memory, mentation, behavior. · Psychiatric: No anxiety, or depression. · Endocrine: No temperature intolerance. No excessive thirst, fluid intake, or urination. No tremor. · Hematologic/Lymphatic: No abnormal bruising or bleeding, blood clots or swollen lymph nodes.   · Allergic/Immunologic: No nasal congestion or hives. PHYSICAL EXAM:      /78  Pulse 85  Temp 97.5 °F (36.4 °C) (Oral)   Resp 14  Ht 5' 4\" (1.626 m)  Wt 166 lb 6.4 oz (75.5 kg)  SpO2 97%  BMI 28.56 kg/m2   Constitutional and General Appearance: alert, cooperative, no distress and appears stated age  HEENT: PERRL, no cervical lymphadenopathy. No masses palpable. Normal oral mucosa  Respiratory:  · Normal excursion and expansion without use of accessory muscles  · Resp Auscultation: Good respiratory effort. No for increased work of breathing. On auscultation: clear to auscultation bilaterally  Cardiovascular:  · The apical impulse is not displaced  · Heart tones are crisp and normal. regular S1 and S2.  · Jugular venous pulsation Normal  · The carotid upstroke is normal in amplitude and contour without delay or bruit  · Peripheral pulses are symmetrical and full   Abdomen:   · No masses or tenderness  · Bowel sounds present  Extremities:  ·  No Cyanosis or Clubbing  ·  Lower extremity edema: No  ·  Skin: Warm and dry  Neurological:  · Alert and oriented. · Moves all extremities well  · No abnormalities of mood, affect, memory, mentation, or behavior are noted    DATA:    Diagnostics:    EKG: 10/2/17  AV dual-paced rhythm  Abnormal ECG  When compared with ECG of 27-SEP-2017 10:14,  Electronic ventricular pacemaker has replaced Sinus rhythm  Confirmed by Min Bucio MD, Khoi Grandchild ([id number]) on 10/2/2017 11:30:13 AM    ECHO: 7/12/2017  Poor image quality, likely due to patient body habitus and/or lung disease. Global left ventricular function is difficult to assess but appears  preserved with an estimated EF of 50%. Mild left ventricular hypertrophy with a normal left ventricular cavity  size. Unable to assess specific wall motion abnormalities due to image quality. Myxomatous thickening of the mitral leaflets. Mitral annular calcification is seen with mild mitral regurgitation. Moderate tricuspid regurgitation.   Mild pulmonary

## 2017-10-03 NOTE — PROGRESS NOTES
Cardiac Testing:    TTE 7/12/17: EF 50%. Mild LVH. Myxomatous thickening of the mitral leaflets. Mild MR. Mod TR. RVSP 34. Mild DD.      PPM 6/29/17: [facility]

## 2017-10-03 NOTE — PROGRESS NOTES
gabapentin (NEURONTIN) 100 MG capsule Take 300 mg by mouth nightly       polyethylene glycol (MIRALAX) powder Take 17 g by mouth daily 510 g 0    traMADol (ULTRAM) 50 MG tablet Take 50 mg by mouth every 6 hours as needed for Pain       carvedilol (COREG) 25 MG tablet Take 0.5 tablets by mouth 2 times daily 90 tablet 3    acetaminophen (TYLENOL) 325 MG tablet Take 2 tablets by mouth every 4 hours as needed for Pain 120 tablet 3    repaglinide (PRANDIN) 0.5 MG tablet Take 0.5 mg by mouth 2 times daily (before meals)      simvastatin (ZOCOR) 5 MG tablet Take 5 mg by mouth nightly      levothyroxine (SYNTHROID) 50 MCG tablet Take 50 mcg by mouth daily Takes 1 tablet daily except for 2 tabs on Sunday      aspirin 81 MG tablet Take 81 mg by mouth every other day      ciprofloxacin (CIPRO) 500 MG tablet Take 0.5 tablets by mouth 2 times daily for 10 days 4 tablet 0       Allergies: Kathleen Iqbal is allergic to norco [hydrocodone-acetaminophen]. Past Medical History:   Diagnosis Date    Anxiety     Asthma     Cardiac pacemaker 06/29/2017    [employer] Pacemaker implant    [facility]est[facility] failure[facility]    Chronic kidney disease     Diabetes mellitus ([facility]     Gout     Heart disease     Hyperlipidemia     Hypertension     Hypothyroidism     Mobitz type 2 second degree heart block     Syncope and collapse 06/25/2017       Past Surgical History:   Procedure Laterality Date    BREAST BIOPSY Bilateral 1980    BREAST SURGERY  1965    cyst removed    CARDIAC PACEMAKER PLACEMENT  06/29/2017    DR Emmanuel Harris    EYE SURGERY      Bilat cataract    OVARY REMOVAL Right     PACEMAKER INSERTION  06/29/2017       Social History: Kathleen Iqbal  reports that she has never smoked. She has never used smokeless tobacco. She reports that she drinks about 0.6 oz of alcohol per week  She reports that she does not use illicit drugs.     Family History   Problem Relation Age of Onset    Heart Disease Mother      chf    Heart Disease Father     Heart Attack Father        ROS:  Constitutional  Negative for fever and chills    HEENT  Negative for ear discharge, ear pain, nosebleed    Eyes  Negative for photophobia, pain and discharge    Respiratory  Negative for hemoptysis and sputum    Cardiovascular  Negative for orthopnea, claudication and PND    Gastrointestinal  Negative for abdominal pain, diarrhea, blood in stool    Musculoskeletal  Negative for joint pain, negative for myalgia    Skin  Negative for rash or itching    Endo/heme/allergies  Negative for polydipsia, environmental allergy    Psychiatric/behavioral  Negative for suicidal ideation. Patient is not anxious        Medications:     aspirin  81 mg Oral Every Other Day    carvedilol  12.5 mg Oral BID    cloNIDine  0.1 mg Oral BID    diltiazem  180 mg Oral Daily    levothyroxine  50 mcg Oral Daily    lisinopril  20 mg Oral Daily    polyethylene glycol  17 g Oral Daily    repaglinide  0.5 mg Oral BID AC    simvastatin  5 mg Oral Nightly    sodium chloride flush  10 mL Intravenous 2 times per day    enoxaparin  30 mg Subcutaneous Daily    gabapentin  300 mg Oral Nightly     PRN Meds include: traMADol, sodium chloride flush, acetaminophen, magnesium hydroxide, ondansetron    Objective:   /78  Pulse 85  Temp 97.5 °F (36.4 °C) (Oral)   Resp 14  Ht 5' 4\" (1.626 m)  Wt 166 lb 6.4 oz (75.5 kg)  SpO2 97%  BMI 28.56 kg/m2    Blood pressure range: Systolic (59KYT), SSQ:658 , Min:119 , LML:424   ; Diastolic (31SQV), LDD:50, Min:61, Max:80      General examination:   Head: Normocephalic, atraumatic  Eyes: Extraocular movements intact  Lungs: Respirations unlabored, chest wall no deformity  ENT: Normal external ear canals, no sinus tenderness  Heart: Regular rate rhythm  Abdomen: No masses, tenderness  Extremities: No cyanosis or edema, 2+ pulses  Skin: Intact, normal skin color      NEUROLOGIC EXAMINATION  GENERAL  Appears comfortable.  Jaw

## 2017-10-03 NOTE — PROGRESS NOTES
Physician Review for Code 40   Transfer d\from Inpatient to Observation    Agree that patient does not meet strict syncope inpatient criteria as described to me. Agree with Code 44. However, due to significant risk issues feel this case might need additional analysis. 1.  Age > 48.  (actual age is 80). - Increased risk  2. Unclear if seizure is an issue - loaded with Keppra - eeg pending - Increased risk  3. Know cardiac issues - pacemaker, CHF,  - increased risk  4. Known hyperlipidemia - increased risk  5. Renal insufficiency with hyperkalemia of 5.8 on admission  6. Borderline HCT with Hg 10.9  7.   Multiple syncope issues over the last 6 months    Glen Payne MD  Reviewer

## 2017-10-03 NOTE — PROGRESS NOTES
[address] [facility]    Progress Note    10/3/2017    2:35 PM    Name:   Mal Cobian  MRN:     [id number]     Acct:      [de-identified]   Room:   2010/2010-01  IP Day:  1  Admit Date:  10/2/2017  1:50 PM    PCP:   Louie TORRES  Code Status:  Full Code    Subjective:     C/C:   Chief Complaint   Patient presents with    Loss of Consciousness     pt states that she was sitting in a chair and had syncopal episode. it's reported she was \"in a blank stare. \"  transfer from [place]. Interval History Status: improved. No new issues overnight  No syncope / seizure like spells overnight  BP stable hyperkalemic this morning    Review of Systems:     Constitutional:  negative for chills, fevers, sweats  Respiratory:  negative for cough, dyspnea on exertion, hemoptysis, shortness of breath, wheezing  Cardiovascular:  negative for chest pain, chest pressure/discomfort, lower extremity edema, palpitations  Gastrointestinal:  negative for abdominal pain, constipation, diarrhea, nausea, vomiting  Neurological:  negative for dizziness, headache    Medications: Allergies: Allergies   Allergen Reactions    Norco [Hydrocodone-Acetaminophen] Other (See Comments)     Confusion       Current Meds:   Scheduled Meds:    aspirin  81 mg Oral Every Other Day    carvedilol  12.5 mg Oral BID    cloNIDine  0.1 mg Oral BID    diltiazem  180 mg Oral Daily    levothyroxine  50 mcg Oral Daily    polyethylene glycol  17 g Oral Daily    repaglinide  0.5 mg Oral BID AC    simvastatin  5 mg Oral Nightly    sodium chloride flush  10 mL Intravenous 2 times per day    enoxaparin  30 mg Subcutaneous Daily    gabapentin  300 mg Oral Nightly     Continuous Infusions:    PRN Meds: traMADol, sodium chloride flush, acetaminophen, magnesium hydroxide, ondansetron    Data:     Past Medical History:   has a past medical history of Anxiety;  Asthma; Cardiac pacemaker; CHF

## 2017-10-03 NOTE — PROGRESS NOTES
Smoking Cessation - topics covered   []  Health Risks  []  Benefits of Quitting   []  Smoking Cessation  [x]  Patient has no history of tobacco use  []  Patient is former smoker. Patient quit in   [x]  No need for tobacco cessation education. []  Booklet given  []  Patient verbalizes understanding. []  Patient denies need for tobacco cessation education.   Laren Lunch  9:10 AM

## 2017-10-03 NOTE — ED PROVIDER NOTES
[address]  eMERGENCY dEPARTMENT eNCOUnter          [facility]       Chief Complaint   Patient presents with    Loss of Consciousness     momentarily at home       Nurses Notes reviewed and I agree except as noted in the HPI. HISTORY OF PRESENT ILLNESS    Galilea Espinoza is a 80 y.o. female who presents  To emergency room from daughter's home via EMS, with concerns over loss of consciousness event. Patient's daughter who was present, states patient had gotten up today though did appear to have a somewhat slowed affect,, had breakfast, had gotten up and shortly after was found \"laid over\", family initially felt he did not get a pulse or there was any breathing, patient was noted to have very sweaty hands at this time, no reported gross seizure activity, and EMS was called. Family states his last approximately 3-5 minutes total during the event. Patient did have similar episode 3 months prior, as well as episode one week prior, for which patient was admitted to the hospital and evaluated, and at that time not felt to be cardiac related. There was an EEG still pending at time of discharge, with planned follow-up with PCP. EMS initially called with reported unresponsive patient, state upon their arrival patient appeared postictal light, with initial blood pressure 50/30 though improved immediately by putting patient's legs aboveground, blood glucose level of 90. REVIEW OF SYSTEMS     Review of Systems   Unable to perform ROS: Acuity of condition          PAST MEDICAL HISTORY    has a past medical history of Anxiety; Asthma; Cardiac pacemaker; CHF (congestive heart failure) ([address] Chronic kidney disease; Diabetes mellitus ([address] Gout; Heart disease; Hyperlipidemia; Hypertension; Hypothyroidism; Mobitz type 2 second degree heart block; and Syncope and collapse.     SURGICAL HISTORY      has a past surgical history that includes Ovary removal (Right); Breast surgery (1965); Breast biopsy (Bilateral, ); eye surgery; Cardiac pacemaker placement (2017); and Pacemaker insertion (2017). CURRENT MEDICATIONS       Discharge Medication List as of 10/2/2017 12:41 PM      CONTINUE these medications which have NOT CHANGED    Details   cloNIDine (CATAPRES) 0.1 MG tablet Take 1 tablet by mouth 2 times daily, Disp-60 tablet, R-0Normal      lisinopril (PRINIVIL;ZESTRIL) 20 MG tablet Take 1 tablet by mouth daily, Disp-60 tablet, R-0NO PRINT      diltiazem (CARDIZEM CD) 180 MG extended release capsule Take 1 capsule by mouth daily, Disp-30 capsule, R-0Normal      ciprofloxacin (CIPRO) 500 MG tablet Take 0.5 tablets by mouth 2 times daily for 10 days, Disp-4 tablet, R-0Normal      gabapentin (NEURONTIN) 100 MG capsule Take by mouth 2 times daily Historical Med      polyethylene glycol (MIRALAX) powder Take 17 g by mouth daily, Disp-510 g, R-0Print      carvedilol (COREG) 25 MG tablet Take 0.5 tablets by mouth 2 times daily, Disp-90 tablet, R-3NO PRINT      repaglinide (PRANDIN) 0.5 MG tablet Take 0.5 mg by mouth 2 times daily (before meals)      simvastatin (ZOCOR) 5 MG tablet Take 5 mg by mouth nightly      levothyroxine (SYNTHROID) 50 MCG tablet Take 50 mcg by mouth daily Takes 1 tablet daily except for 2 tabs on       traMADol (ULTRAM) 50 MG tablet Take 50 mg by mouth every 6 hours as needed for Pain Historical Med      acetaminophen (TYLENOL) 325 MG tablet Take 2 tablets by mouth every 4 hours as needed for Pain, Disp-120 tablet, R-3Normal      aspirin 81 MG tablet Take 81 mg by mouth every other day             ALLERGIES     is allergic to norco [hydrocodone-acetaminophen]. FAMILY HISTORY     indicated that her mother is . She indicated that her father is . family history includes Heart Attack in her father; Heart Disease in her father and mother. SOCIAL HISTORY      reports that she has never smoked.  She has never used smokeless tobacco. She reports EKG's are interpreted by the Emergency Department Physician who either signs or Co-signs this chart in the absence of a cardiologist.  EKG    The patient had an EKG which is interpreted by me in the absence of a Cardiologist.   [] Without comparison to previous. [] With comparison to a previous EKG Dated   AV dual paced rhythm, rate 87, axis -72,   , as compared to prior 9/27/17, noted dual paced rhythm over sinus rhythm from prior      RADIOLOGY: non-plain film images(s) such as CT, Ultrasound and MRI are read by the radiologist.  CT Head WO Contrast   Final Result   GENERALIZED CEREBRAL ATROPHY. SMALL VESSEL ISCHEMIC CHANGE INVOLVING THE WHITE MATTER. NO APPARENT ACUTE ABNORMALITY OR INTRACRANIAL HEMORRHAGE OR HEMATOMA.           LABS:   Labs Reviewed   CBC WITH AUTO DIFFERENTIAL - Abnormal; Notable for the following:        Result Value    RBC 3.52 (*)     Hemoglobin 11.5 (*)     Hematocrit 35.0 (*)     All other components within normal limits   BASIC METABOLIC PANEL - Abnormal; Notable for the following:     Glucose 109 (*)     BUN 53 (*)     CREATININE 2.29 (*)     Bun/Cre Ratio 23 (*)     Sodium 134 (*)     Potassium 5.5 (*)     GFR Non- 20 (*)     GFR  24 (*)     All other components within normal limits   UA W/REFLEX CULTURE - Abnormal; Notable for the following:     Urine Hgb TRACE (*)     All other components within normal limits   MICROSCOPIC URINALYSIS - Abnormal; Notable for the following:     Yeast, UA PRESENCE NOTED (*)     All other components within normal limits   TROPONIN   PROTIME-INR       EMERGENCY DEPARTMENT COURSE:   Vitals:    Vitals:    10/02/17 1123 10/02/17 1131 10/02/17 1147 10/02/17 1201   BP: 131/80 135/77 123/70 119/69   Pulse: 90 86 90 90   Resp: 13 15 13 15   Temp:       TempSrc:       SpO2: 96% 99%  97%   Weight:       Height:         Patient history physical exam taken at bedside, discussed patient's symptoms and exam findings as well as plan workup to include EKG, CT head, blood and urine studies. Patient resting semi-Fowlers in bed with family at bedside    EKG as above    CT head report reviewed    Lab workup reviewed    Discussed with patient and patient's family concern over patient's reported history both 3 months in 1 week prior as well as events of today, discussed EMS concern for postictal type state, possible seizure-like activity. Patient has no history of seizures. Discussed need for neurology evaluation, there was discussion regarding potential transfer, patient and family acknowledged. See consult #1 and #2 below    Discussed the patient transfer at this time, acknowledges      CONSULTS:  1) Dr. Kaylin Gandhi, neurology at [facility], called and discussed patient's presentation and current workup, advised except through the emergency room    2) Dr. Carolina Arauz, ED physician at [facility], called and discussed patient's presentation and current workup as well as discussion with neurology, accepted for transfer    FINAL IMPRESSION      1. Syncope and collapse          DISPOSITION/PLAN   Transfer    PATIENT REFERRED TO:  No follow-up provider specified. DISCHARGE MEDICATIONS:  Discharge Medication List as of 10/2/2017 12:41 PM              Summation      Patient Course:  Transfer    ED Medications administered this visit:  Medications - No data to display    New Prescriptions from this visit:    Discharge Medication List as of 10/2/2017 12:41 PM          Follow-up:  No follow-up provider specified. Final Impression:   1.  Syncope and collapse               (Please note that portions of this note were completed with a voice recognition program.  Efforts were made to edit the dictations but occasionally words are mis-transcribed.)    MD Marilee Ramos MD  10/02/17 7171

## 2017-10-04 LAB
ABSOLUTE EOS #: 0.2 K/UL (ref 0–0.4)
ABSOLUTE LYMPH #: 1.4 K/UL (ref 1–4.8)
ABSOLUTE MONO #: 0.6 K/UL (ref 0.1–1.2)
ANION GAP SERPL CALCULATED.3IONS-SCNC: 11 MMOL/L (ref 9–17)
BASOPHILS # BLD: 1 %
BASOPHILS ABSOLUTE: 0 K/UL (ref 0–0.2)
BUN BLDV-MCNC: 59 MG/DL (ref 8–23)
BUN/CREAT BLD: ABNORMAL (ref 9–20)
CALCIUM SERPL-MCNC: 8.8 MG/DL (ref 8.6–10.4)
CHLORIDE BLD-SCNC: 104 MMOL/L (ref 98–107)
CO2: 22 MMOL/L (ref 20–31)
CREAT SERPL-MCNC: 2.42 MG/DL (ref 0.5–0.9)
DIFFERENTIAL TYPE: ABNORMAL
EKG ATRIAL RATE: 87 BPM
EKG P AXIS: 20 DEGREES
EKG P-R INTERVAL: 174 MS
EKG Q-T INTERVAL: 416 MS
EKG QRS DURATION: 154 MS
EKG QTC CALCULATION (BAZETT): 500 MS
EKG R AXIS: -72 DEGREES
EKG T AXIS: 90 DEGREES
EKG VENTRICULAR RATE: 87 BPM
EOSINOPHILS RELATIVE PERCENT: 3 %
GFR AFRICAN AMERICAN: 23 ML/MIN
GFR NON-AFRICAN AMERICAN: 19 ML/MIN
GFR SERPL CREATININE-BSD FRML MDRD: ABNORMAL ML/MIN/{1.73_M2}
GFR SERPL CREATININE-BSD FRML MDRD: ABNORMAL ML/MIN/{1.73_M2}
GLUCOSE BLD-MCNC: 100 MG/DL (ref 65–105)
GLUCOSE BLD-MCNC: 111 MG/DL (ref 65–105)
GLUCOSE BLD-MCNC: 129 MG/DL (ref 65–105)
GLUCOSE BLD-MCNC: 93 MG/DL (ref 70–99)
GLUCOSE BLD-MCNC: 98 MG/DL (ref 65–105)
HCT VFR BLD CALC: 31.8 % (ref 36–46)
HEMOGLOBIN: 10.6 G/DL (ref 12–16)
LYMPHOCYTES # BLD: 24 %
MCH RBC QN AUTO: 32.8 PG (ref 26–34)
MCHC RBC AUTO-ENTMCNC: 33.4 G/DL (ref 31–37)
MCV RBC AUTO: 98.2 FL (ref 80–100)
MONOCYTES # BLD: 10 %
PDW BLD-RTO: 14.6 % (ref 12.5–15.4)
PLATELET # BLD: 179 K/UL (ref 140–450)
PLATELET ESTIMATE: ABNORMAL
PMV BLD AUTO: 8.6 FL (ref 6–12)
POTASSIUM SERPL-SCNC: 5.6 MMOL/L (ref 3.7–5.3)
POTASSIUM SERPL-SCNC: 5.7 MMOL/L (ref 3.7–5.3)
RBC # BLD: 3.24 M/UL (ref 4–5.2)
RBC # BLD: ABNORMAL 10*6/UL
SEG NEUTROPHILS: 62 %
SEGMENTED NEUTROPHILS ABSOLUTE COUNT: 3.8 K/UL (ref 1.8–7.7)
SODIUM BLD-SCNC: 137 MMOL/L (ref 135–144)
WBC # BLD: 6.1 K/UL (ref 3.5–11)
WBC # BLD: ABNORMAL 10*3/UL

## 2017-10-04 PROCEDURE — G8978 MOBILITY CURRENT STATUS: HCPCS

## 2017-10-04 PROCEDURE — 84132 ASSAY OF SERUM POTASSIUM: CPT

## 2017-10-04 PROCEDURE — 36415 COLL VENOUS BLD VENIPUNCTURE: CPT

## 2017-10-04 PROCEDURE — G8980 MOBILITY D/C STATUS: HCPCS

## 2017-10-04 PROCEDURE — 85025 COMPLETE CBC W/AUTO DIFF WBC: CPT

## 2017-10-04 PROCEDURE — 6360000002 HC RX W HCPCS: Performed by: INTERNAL MEDICINE

## 2017-10-04 PROCEDURE — 82947 ASSAY GLUCOSE BLOOD QUANT: CPT

## 2017-10-04 PROCEDURE — G8979 MOBILITY GOAL STATUS: HCPCS

## 2017-10-04 PROCEDURE — 99232 SBSQ HOSP IP/OBS MODERATE 35: CPT | Performed by: INTERNAL MEDICINE

## 2017-10-04 PROCEDURE — 1200000000 HC SEMI PRIVATE

## 2017-10-04 PROCEDURE — 80048 BASIC METABOLIC PNL TOTAL CA: CPT

## 2017-10-04 PROCEDURE — 6370000000 HC RX 637 (ALT 250 FOR IP): Performed by: INTERNAL MEDICINE

## 2017-10-04 PROCEDURE — 2580000003 HC RX 258: Performed by: INTERNAL MEDICINE

## 2017-10-04 PROCEDURE — G8988 SELF CARE GOAL STATUS: HCPCS

## 2017-10-04 PROCEDURE — G8987 SELF CARE CURRENT STATUS: HCPCS

## 2017-10-04 PROCEDURE — 97535 SELF CARE MNGMENT TRAINING: CPT

## 2017-10-04 PROCEDURE — 97165 OT EVAL LOW COMPLEX 30 MIN: CPT

## 2017-10-04 PROCEDURE — 99232 SBSQ HOSP IP/OBS MODERATE 35: CPT | Performed by: NURSE PRACTITIONER

## 2017-10-04 PROCEDURE — 97162 PT EVAL MOD COMPLEX 30 MIN: CPT

## 2017-10-04 PROCEDURE — 2580000003 HC RX 258

## 2017-10-04 PROCEDURE — 97530 THERAPEUTIC ACTIVITIES: CPT

## 2017-10-04 RX ORDER — DEXTROSE 25 % IN WATER 25 %
25 SYRINGE (ML) INTRAVENOUS ONCE
Status: DISCONTINUED | OUTPATIENT
Start: 2017-10-04 | End: 2017-10-05 | Stop reason: HOSPADM

## 2017-10-04 RX ORDER — CARVEDILOL 6.25 MG/1
6.25 TABLET ORAL 2 TIMES DAILY
Status: DISCONTINUED | OUTPATIENT
Start: 2017-10-04 | End: 2017-10-05 | Stop reason: HOSPADM

## 2017-10-04 RX ORDER — DEXTROSE MONOHYDRATE 25 G/50ML
INJECTION, SOLUTION INTRAVENOUS
Status: COMPLETED
Start: 2017-10-04 | End: 2017-10-04

## 2017-10-04 RX ORDER — DILTIAZEM HYDROCHLORIDE 120 MG/1
120 CAPSULE, COATED, EXTENDED RELEASE ORAL DAILY
Status: DISCONTINUED | OUTPATIENT
Start: 2017-10-05 | End: 2017-10-05 | Stop reason: HOSPADM

## 2017-10-04 RX ADMIN — CARVEDILOL 6.25 MG: 6.25 TABLET, FILM COATED ORAL at 10:00

## 2017-10-04 RX ADMIN — REPAGLINIDE 0.5 MG: 0.5 TABLET ORAL at 09:26

## 2017-10-04 RX ADMIN — CARVEDILOL 6.25 MG: 6.25 TABLET, FILM COATED ORAL at 22:14

## 2017-10-04 RX ADMIN — DILTIAZEM HYDROCHLORIDE 180 MG: 180 CAPSULE, COATED, EXTENDED RELEASE ORAL at 09:34

## 2017-10-04 RX ADMIN — POLYETHYLENE GLYCOL 3350 17 G: 17 POWDER, FOR SOLUTION ORAL at 09:26

## 2017-10-04 RX ADMIN — REPAGLINIDE 0.5 MG: 0.5 TABLET ORAL at 17:27

## 2017-10-04 RX ADMIN — DEXTROSE MONOHYDRATE 50 ML: 25 INJECTION, SOLUTION INTRAVENOUS at 10:04

## 2017-10-04 RX ADMIN — GABAPENTIN 300 MG: 600 TABLET ORAL at 22:16

## 2017-10-04 RX ADMIN — LEVOTHYROXINE SODIUM 50 MCG: 50 TABLET ORAL at 09:29

## 2017-10-04 RX ADMIN — ASPIRIN 81 MG: 81 TABLET, CHEWABLE ORAL at 09:25

## 2017-10-04 RX ADMIN — SIMVASTATIN 5 MG: 5 TABLET, FILM COATED ORAL at 22:15

## 2017-10-04 RX ADMIN — Medication 10 ML: at 22:18

## 2017-10-04 RX ADMIN — INSULIN HUMAN 10 UNITS: 100 INJECTION, SOLUTION PARENTERAL at 10:05

## 2017-10-04 RX ADMIN — ENOXAPARIN SODIUM 30 MG: 30 INJECTION SUBCUTANEOUS at 09:25

## 2017-10-04 ASSESSMENT — PAIN SCALES - GENERAL
PAINLEVEL_OUTOF10: 0
PAINLEVEL_OUTOF10: 0

## 2017-10-04 NOTE — PROGRESS NOTES
failure) ([facility] Chronic kidney disease; Diabetes mellitus ([facility] Gout; Heart disease; Hyperlipidemia; Hypertension; Hypothyroidism; Mobitz type 2 second degree heart block; and Syncope and collapse. Social History:   reports that she has never smoked. She has never used smokeless tobacco. She reports that she drinks about 0.6 oz of alcohol per week  She reports that she does not use illicit drugs. Family History:   Family History   Problem Relation Age of Onset    Heart Disease Mother      chf    Heart Disease Father     Heart Attack Father        Vitals:  /65  Pulse 74  Temp 97.6 °F (36.4 °C) (Oral)   Resp 15  Ht 5' 4\" (1.626 m)  Wt 164 lb 3.2 oz (74.5 kg)  SpO2 99%  BMI 28.18 kg/m2  Temp (24hrs), Av.8 °F (36.6 °C), Min:97.5 °F (36.4 °C), Max:98.2 °F (36.8 °C)    Recent Labs      10/03/17   1155  10/03/17   1708  10/03/17   2205  10/04/17   0714   POCGLU  136*  89  142*  100       I/O (24Hr):     Intake/Output Summary (Last 24 hours) at 10/04/17 1310  Last data filed at 10/04/17 7023   Gross per 24 hour   Intake               10 ml   Output              450 ml   Net             -440 ml     Labs:    Physical Examination:        General appearance:  alert, cooperative and no distress  Mental Status:  oriented to person, place and time and normal affect  Lungs:  clear to auscultation bilaterally, normal effort  Heart:  regular rate and rhythm, no murmur  Abdomen:  soft, nontender, nondistended, normal bowel sounds, no masses, hepatomegaly, splenomegaly  Extremities:  no edema, redness, tenderness in the calves  Skin:  no gross lesions, rashes, induration    Assessment:        Primary Problem  GRAYSON (acute kidney injury) [facility])    DONG/[address]ub[address] Problems    Diagnosis Date Noted    GRAYSON (acute kidney injury) ([facility] [N17.9] 10/02/2017    Abnormal EEG [R94.01] 10/02/2017    Syncope [R55] 2017    Essential hypertension [I10] 07/10/2017    Acute renal failure superimposed on stage 4 chronic

## 2017-10-04 NOTE — PLAN OF CARE
Problem: Falls - Risk of  Goal: Absence of falls  Outcome: Met This Shift    Problem: Infection:  Goal: Will remain free from infection  Will remain free from infection  Outcome: Met This Shift    Problem: Safety:  Goal: Free from accidental physical injury  Free from accidental physical injury  Outcome: Met This Shift  Goal: Free from intentional harm  Free from intentional harm  Outcome: Met This Shift    Problem: Daily Care:  Goal: Daily care needs are met  Daily care needs are met  Outcome: Met This Shift    Problem: Pain:  Goal: Patients pain/discomfort is manageable  Patients pain/discomfort is manageable  Outcome: Ongoing    Problem: Skin Integrity:  Goal: Skin integrity will stabilize  Skin integrity will stabilize  Outcome: Met This Shift    Problem: Discharge Planning:  Goal: Patients continuum of care needs are met  Patients continuum of care needs are met  Outcome: Ongoing

## 2017-10-04 NOTE — CONSULTS
Range: 0.50 - 0.90 mg/dL 1.62 (H) 1.56 (H) 2.29 (H) 2.12 (H) 2.42 (H)     No history of recent contrast exposure, No h/o prolonged NSAIDs use in the past, No h/o nephrolithiasis, No recent skin rashes or arthralgias, No hematuria or pyuria noticed in the recent past. Doesn't report any reduction in the urine output recently. Non report of urgency, weak stream, straining while urination. Past History/Allergies? Social History:     Past Medical History:   Diagnosis Date    Anxiety     Asthma     Cardiac pacemaker 06/29/2017    Biotronik Pacemaker implant    [facility]estive heart failure) [address] 2016    Chronic kidney disease     Diabetes mellitus ([facility]     Gout     Heart disease     Hyperlipidemia     Hypertension     Hypothyroidism     Mobitz type 2 second degree heart block     Syncope and collapse 06/25/2017       Allergies   Allergen Reactions    Norco [Hydrocodone-Acetaminophen] Other (See Comments)     Confusion       Social History     Social History    Marital status:      Spouse name: N/A    Number of children: N/A    Years of education: N/A     Occupational History    Not on file.      Social History Main Topics    Smoking status: Never Smoker    Smokeless tobacco: Never Used    Alcohol use 0.6 oz/week     1 Cans of beer per week      Comment: weekly    Drug use: No    Sexual activity: Not on file     Other Topics Concern    Not on file     Social History Narrative       Family History:        Family History   Problem Relation Age of Onset    Heart Disease Mother      chf    Heart Disease Father     Heart Attack Father        Outpatient Medications:     Prescriptions Prior to Admission: cloNIDine (CATAPRES) 0.1 MG tablet, Take 1 tablet by mouth 2 times daily  lisinopril (PRINIVIL;ZESTRIL) 20 MG tablet, Take 1 tablet by mouth daily  diltiazem (CARDIZEM CD) 180 MG extended release capsule, Take 1 capsule by mouth daily  gabapentin (NEURONTIN) 100 MG capsule, Take 300 mg by in the [address] Input/Output:       I/O last 3 completed shifts: In: 10 [I.V.:10]  Out: 900 [Urine:900]    Vital Signs:   Temperature:  Temp: 97.6 °F (36.4 °C)  TMax:   Temp (24hrs), Av.8 °F (36.6 °C), Min:97.5 °F (36.4 °C), Max:98.2 °F (36.8 °C)    Respirations:  Resp: 15  Pulse:   Pulse: 74  BP:    BP: 137/65  BP Range: Systolic (85DEB), DPX:935 , Min:90 , LKD:661       Diastolic (25YVD), BSK:72, Min:51, Max:65      Physical Examination:     General:  AAO x 3, speaking in full sentences, no accessory muscle use. HEENT: Atraumatic, normocephalic, no throat congestion, moist mucosa. Eyes:   Pupils equal, round and reactive to light, EOMI. Neck:   No JVD, no thyromegaly, no lymphadenopathy. Chest:   Bilateral vesicular breath sounds, no rales or wheezes. Cardiac:  S1 S2 RR, soft systolic murmur heard at apex, gallops or rubs, JVP not raised. Abdomen: Soft, non-tender, no masses or organomegaly, BS audible. :   No suprapubic or flank tenderness. Neuro:  AAO x 3, No FND. UE and LE strength intact. Tremors(generalized, unintentional). SKIN:  No rashes, good skin turgor. Bruising noted on bilateral hands/UE. Extremities:  No edema, palpable peripheral pulses, no calf tenderness. Labs:       Recent Labs      10/02/17   0902  10/03/17   0634  10/04/17   0649   WBC  8.7  6.5  6.1   RBC  3.52*  3.36*  3.24*   HGB  11.5*  10.9*  10.6*   HCT  35.0*  32.9*  31.8*   MCV  99.4  98.0  98.2   MCH  32.8  32.6  32.8   MCHC  32.9  33.2  33.4   RDW  14.1  14.5  14.6   PLT  194  169  179   MPV  8.5  8.1  8.6      BMP:   Recent Labs      10/02/17   0902  10/03/17   0634  10/04/17   0649   NA  134*  139  137   K  5.5*  5.8*  5.7*   CL  100  104  104   CO2  22  18*  22   BUN  53*  55*  59*   CREATININE  2.29*  2.12*  2.42*   GLUCOSE  109*  93  93   CALCIUM  9.6  9.3  8.8      Phosphorus:   No results for input(s): PHOS in the last 72 hours.   Magnesium:    Recent Labs      10/03/17   0634   MG  2.1     Albumin:  No mg oral daily. 5. Agree with discontinuation of the clonidine. 6. Agree with decreasing the Coreg dose to 6.25 mg oral twice daily. Nutrition   Please ensure that patient is on a renal diet/TF. Avoid nephrotoxic drugs/contrast exposure.     Garrett Chun MD  [facility]

## 2017-10-04 NOTE — PROGRESS NOTES
Occupational Therapy   Occupational Therapy Initial Assessment  Date: 10/4/2017   Patient Name: Eric West  MRN: [id number]     : 3/7/1927     History of Present Illness:      Miss Yanna Roger is a nice 80year old lady with history of hypertension, CKD stage III, recent pacemaker placement for second degree AV block in May 2017. Also has CHF with recent medication changes. She has mainly orthostatic dizziness started few months back, with three definite episodes of syncope. Got pacer placed with first episode of syncope. Today she seems to have syncope like episode while sat in chair, with no abnormal movements but confusion present, found by daughter slumped in chair, un able to wake her up for two minutes, then she got up but just staring for few minutes with slow return after, that lasted for around half an hour. No chest pain, palpitations, dizziness with todays visit. Recent admission for second episode of syncope with EEG at that time showing possible seizure focus. Patient Diagnosis(es): The encounter diagnosis was Syncope and collapse.     has a past medical history of Anxiety; Asthma; Cardiac pacemaker; CHF (congestive heart failure) ([address] Chronic kidney disease; Diabetes mellitus ([facility] Gout; Heart disease; Hyperlipidemia; Hypertension; Hypothyroidism; Mobitz type 2 second degree heart block; and Syncope and collapse.   has a past surgical history that includes Ovary removal (Right); Breast surgery (1965); Breast biopsy (Bilateral, ); eye surgery; Cardiac pacemaker placement (2017); and Pacemaker insertion (2017). Restrictions  Restrictions/Precautions  Restrictions/Precautions: General Precautions, Seizure  Required Braces or Orthoses?: No  Position Activity Restriction  Other position/activity restrictions:  Up with assistance per  [facility]  Patient assessed for rehabilitation services?: Yes  Family / Caregiver Present: Yes (daughters )  Pain

## 2017-10-04 NOTE — PROGRESS NOTES
walker  Receives Help From: Family  ADL Assistance: Independent (Patient unable to put socks on in hospital bed however patient able to put socks on in hospital chair. Patient reports she gets dressed on her own at home.)  Homemaking Assistance: Needs assistance (Patient reports that daughter does the cooking and cleaning)  Homemaking Responsibilities: Yes  Ambulation Assistance: Independent  Transfer Assistance: Independent  Active : No  Patient's  Info: daughter  Mode of Transportation: Family  Additional Comments: Pt reports pt has 24/7 assistance, lives wiht daughter and daughter's spouse.  If daughter goes out, pt reports other daughter comes and stays with pt  Objective     Observation/Palpation  Posture: Fair    AROM RLE (degrees)  RLE AROM: WFL  AROM LLE (degrees)  LLE AROM : WFL  AROM RUE (degrees)  RUE AROM : WFL  AROM LUE (degrees)  LUE AROM : WFL  Strength RLE  Strength RLE: WFL  Strength LLE  Strength LLE: WFL  Strength RUE  Strength RUE: WFL  Strength LUE  Strength LUE: WFL  Strength Other  Other: Grossly 4/5  Motor Control  Gross Motor?: WFL     Bed mobility  Rolling to Left: Independent  Supine to Sit: Independent  Scooting: Independent  Comment: Patient reports no signs and symptoms of dizziness during bed mobility  Transfers  Sit to Stand: Stand by assistance  Stand to sit: Stand by assistance  Comment: Patient reports no signs and symptoms of dizziness during transfers  Ambulation  Ambulation?: Yes  Ambulation 1  Surface: level tile  Device: Rolling Walker  Assistance: Stand by assistance  Quality of Gait: Patient presents with decreased step and stride length  Distance: 300 ft  Comments: Patient reports no signs and symptoms of dizziness during ambulation     Balance  Posture: Fair  Sitting - Static: Fair;+  Sitting - Dynamic: Fair  Standing - Static: Fair;+  Standing - Dynamic: Fair  Comments: Patient reports no signs and symptoms of dizziness in seated positon or standing position Assessment   Body structures, Functions, Activity limitations: Decreased functional mobility ; Decreased strength;Decreased balance  Assessment: Patient was able to complete bed mobility independently, transfers with stand by assist, and ambulated 300ft with SBA. Patient would benefit from acute PT in order to decrease assistance needed for transfers and ambulation as well as working on negotiating stairs. Patient would also benefit from acute PT in order to increase LE and UE strength as well as working on balance. Prognosis: Good  Decision Making: Medium Complexity  REQUIRES PT FOLLOW UP: Yes  Activity Tolerance  Activity Tolerance: Patient Tolerated treatment well     Discharge Recommendations:  24 hour supervision or assist      Plan   Plan  Times per week: 5-6x/wk  Times per day: Daily  Current Treatment Recommendations: Strengthening, Balance Training, Gait Training, Stair training, Functional Mobility Training  Safety Devices  Type of devices: Bed alarm in place, Call light within reach, Gait belt, Left in chair    G-Code  PT G-Codes  Functional Assessment Tool Used: [place] tool  Score: 26  Functional Limitation: Mobility: Walking and moving around  Mobility: Walking and Moving Around Current Status (): At least 1 percent but less than 20 percent impaired, limited or restricted  Mobility: Walking and Moving Around Goal Status (): At least 1 percent but less than 20 percent impaired, limited or restricted  Mobility: Walking and Moving Around Discharge Status ():  At least 1 percent but less than 20 percent impaired, limited or restricted    Goals  Short term goals  Time Frame for Short term goals: 14 visits  Short term goal 1: Patient will ambulate 300ft with supervision with rolling walker without signs or symptoms of dizziness  Short term goal 2: Patient will completed sit to stand and stand to sit transfers independently without signs or symptoms of dizziness  Short term goal 3: Patient will negotiate 3 stairs with ARON handrails with supervision without signs or symptoms of dizziness  Short term goal 4: Patient will sit EOB with good posture without cueing for 5 min        Therapy Time   Individual Concurrent Group Co-treatment   Time In 0917         Time Out 0934         Minutes 16                 TAYLOR Horowitz    Evaluation/treatment performed by Student PT under the supervision of co-signing PT who agrees with all evaluation/treatment and documentation.

## 2017-10-04 NOTE — PLAN OF CARE
[facility]   [address]    Second Note  For more detailed information please refer to the progress note of the day      10/4/2017    5:09 PM    Name:   Dionne Fuller  MRN:     [id number]     [place]:      [de-identified]   Room:   2010/2010-01  IP Day:  2  Admit Date:  10/2/2017  1:50 PM    PCP:   Jamal TORRES  Code Status:  Full Code    No new issues since my visit  Long discussion with family at bedside  No seizure medications for now  BP well controlled today, adjusted multiple medications  Nephrology consult    Rain Griffith MD  10/4/2017  5:09 PM

## 2017-10-04 NOTE — PROGRESS NOTES
NEUROLOGY INPATIENT PROGRESS NOTE    10/4/2017   Chart reviewed. Pt examined. Discussed with RN & pt. No acute events overnight. No new c/o. No more syncope witnessed since admission. She reports feeling well today. Has been able to ambulate with PT. Denies confusion, headache, vision changes. As you would recall Natalie Banerjee is a  80 y.o. female with H/O HTN, HLD, CHF, CKD, who was admitted as a transfer from [facility] on 10/2/2017 with syncope. 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 She went back to [facility] on 9/27/2017 with another syncope with concern of a \"staring spell'; pacemaker settings & anti-HTN were adjusted. CT head - normal. EEG was done that reported epileptogenic potential L posterior temporal region where she had some focal rhythmic slowing; injury was suspected in that area. Now the pt has been transferred from [facility] when she had yet another syncope preceded by a \"staring spell\"; per daughter pt was unresponsive for almost 5 minutes; when she came to she was confused, had a blank stare & took about 2 minutes before being fully alert & oriented. Her BP was recorded at 50/30 mm Hg at that time. CT head done at [facility] was normal. Orthostatic BP is positive. EEG is normal. Pt denied any other focal motor, sensory, visual or bulbar symptoms. At baseline, she usually ambulates with a walker; denied any falls. She gave up driving 3 yrs ago. Outpatient she takes ASA 81 mg QD, Zocor 5 mg HS, Tramadol 50 mg Q6hr PRN. No current facility-administered medications on file prior to encounter.       Current Outpatient Prescriptions on File Prior to Encounter   Medication Sig Dispense Refill    cloNIDine (CATAPRES) 0.1 MG tablet Take 1 tablet by mouth 2 times daily 60 tablet 0    lisinopril (PRINIVIL;ZESTRIL) 20 MG tablet Take 1 tablet by mouth daily 60 tablet 0    diltiazem (CARDIZEM CD) 180 MG extended release capsule Take 1 capsule by mouth daily 30 capsule 0    gabapentin (NEURONTIN) 100 MG capsule Take 300 mg by mouth nightly       polyethylene glycol (MIRALAX) powder Take 17 g by mouth daily 510 g 0    traMADol (ULTRAM) 50 MG tablet Take 50 mg by mouth every 6 hours as needed for Pain       carvedilol (COREG) 25 MG tablet Take 0.5 tablets by mouth 2 times daily 90 tablet 3    acetaminophen (TYLENOL) 325 MG tablet Take 2 tablets by mouth every 4 hours as needed for Pain 120 tablet 3    repaglinide (PRANDIN) 0.5 MG tablet Take 0.5 mg by mouth 2 times daily (before meals)      simvastatin (ZOCOR) 5 MG tablet Take 5 mg by mouth nightly      levothyroxine (SYNTHROID) 50 MCG tablet Take 50 mcg by mouth daily Takes 1 tablet daily except for 2 tabs on Sunday      aspirin 81 MG tablet Take 81 mg by mouth every other day      ciprofloxacin (CIPRO) 500 MG tablet Take 0.5 tablets by mouth 2 times daily for 10 days 4 tablet 0       Allergies: Jayson Chris is allergic to norco [hydrocodone-acetaminophen]. Past Medical History:   Diagnosis Date    Anxiety     Asthma     Cardiac pacemaker 06/29/2017    Biotronik Pacemaker implant    [facility]congestive [facility] failure[facility] ([facility] 2016    Chronic kidney disease     Diabetes mellitus ([facility]     Gout     Heart disease     Hyperlipidemia     Hypertension     Hypothyroidism     Mobitz type 2 second degree heart block     Syncope and collapse 06/25/2017       Past Surgical History:   Procedure Laterality Date    BREAST BIOPSY Bilateral 1980    BREAST SURGERY  1965    cyst removed    CARDIAC PACEMAKER PLACEMENT  06/29/2017    DR Nelsy Abdi    EYE SURGERY      Bilat cataract    OVARY REMOVAL Right     PACEMAKER INSERTION  06/29/2017       Social History: Jayson Chris  reports that she has never smoked.  She has never used smokeless tobacco. She reports that she drinks about 0.6 oz of alcohol per week  She reports that she does not use illicit drugs. Family History   Problem Relation Age of Onset    Heart Disease Mother      chf    Heart Disease Father     Heart Attack Father        ROS:  Constitutional  Negative for fever and chills    HEENT  Negative for ear discharge, ear pain, nosebleed    Eyes  Negative for photophobia, pain and discharge    Respiratory  Negative for hemoptysis and sputum    Cardiovascular  Negative for orthopnea, claudication and PND    Gastrointestinal  Negative for abdominal pain, diarrhea, blood in stool    Musculoskeletal  Negative for joint pain, negative for myalgia    Skin  Negative for rash or itching    Endo/heme/allergies  Negative for polydipsia, environmental allergy    Psychiatric/behavioral  Negative for suicidal ideation.  Patient is not anxious        Medications:     carvedilol  6.25 mg Oral BID    insulin regular  10 Units Intravenous Once    dextrose  25 g Intravenous Once    aspirin  81 mg Oral Every Other Day    diltiazem  180 mg Oral Daily    levothyroxine  50 mcg Oral Daily    polyethylene glycol  17 g Oral Daily    repaglinide  0.5 mg Oral BID AC    simvastatin  5 mg Oral Nightly    sodium chloride flush  10 mL Intravenous 2 times per day    enoxaparin  30 mg Subcutaneous Daily    gabapentin  300 mg Oral Nightly     PRN Meds include: traMADol, sodium chloride flush, acetaminophen, magnesium hydroxide, ondansetron    Objective:   BP (!) 105/58  Pulse 84  Temp 98.2 °F (36.8 °C) (Oral)   Resp 15  Ht 5' 4\" (1.626 m)  Wt 164 lb 3.2 oz (74.5 kg)  SpO2 97%  BMI 28.18 kg/m2    Blood pressure range: Systolic (22DAM), CIV:80 , Min:90 , WUL:066   ; Diastolic (64KYJ), PLR:52, Min:51, Max:58      General examination:   Head: Normocephalic, atraumatic  Eyes: Extraocular movements intact  Lungs: Respirations unlabored, chest wall no deformity  ENT: Normal external ear canals, no sinus tenderness  Heart: Regular rate rhythm  Abdomen: No masses, atrophy with chronic microangiopathy      BRAIN MRI: Cannot be done due to pacemaker      CAROTID DOPPLER (6/26/2017): Unremarkable      ECHO (7/12/2017): EF 50%. Mild LVH. Moderate TR      EEG (9/28/2017): This EEG is abnormal due to the presence of a poorly organized and slow background as well as intermittent generalized slowing consistent with mild-to-moderate dysfunction of broad underlying cortex. In addition, there is a focal rhythmic slowing suggesting injury to the underlying cortex in the left posterior temporal area; this may have epileptogenic potential. No clinical correlate was noted      EEG (10/3/2017): This EEG is within normal limits for an awake, drowsy,  and sleepy patient. No lateralized or epileptiform disturbance is  seen. ORTHOSTATIC BP:  Supine      184/102         160/83           127/68        148/74  Sitting        184/92           153/89          102/58         131/68   Standing    159/83           143/91           92/49          111/93                            Impression and Plan: Ms. Robby Haddad is a 80 y.o. female with   Recurrent syncope x 3 since 6/2017; orthostatic hypotension. With most recent syncopal event, recorded BP was 50/30 mm Hg. EEG is normal. Discussed with Dr. Keren Stephenson and patient's daughter. Dr. Keren Stephenson reports there is a low incidence of seizure as patient's episodes have been associated with hypotension/positional changes/hemodynamic issues in the past. Daughter is in agreement to not starting an AED at this point in time. Type II AV block; s/p pacemaker insertion on 6/29/2017 after the 1st syncope episode; cardiology following    Orthostatic hypotension; pt educated about compression stockings, hydration & leg exercises. Pt verbalized understanding & is in agreement    GRAYSON on CKD    Continue ASA 81 mg QD, Zocor 5 mg HS    Continue PT/OT; ambulates 300' with RW    Comorbid conditions - HTN, HLD, CHF     Will follow with you.

## 2017-10-05 VITALS
OXYGEN SATURATION: 99 % | BODY MASS INDEX: 27.96 KG/M2 | TEMPERATURE: 97.4 F | HEART RATE: 79 BPM | RESPIRATION RATE: 15 BRPM | WEIGHT: 163.8 LBS | DIASTOLIC BLOOD PRESSURE: 65 MMHG | SYSTOLIC BLOOD PRESSURE: 137 MMHG | HEIGHT: 64 IN

## 2017-10-05 LAB
ABSOLUTE EOS #: 0.2 K/UL (ref 0–0.4)
ABSOLUTE LYMPH #: 1.5 K/UL (ref 1–4.8)
ABSOLUTE MONO #: 0.7 K/UL (ref 0.1–1.2)
ANION GAP SERPL CALCULATED.3IONS-SCNC: 11 MMOL/L (ref 9–17)
BASOPHILS # BLD: 0 %
BASOPHILS ABSOLUTE: 0 K/UL (ref 0–0.2)
BUN BLDV-MCNC: 59 MG/DL (ref 8–23)
BUN/CREAT BLD: ABNORMAL (ref 9–20)
CALCIUM SERPL-MCNC: 9 MG/DL (ref 8.6–10.4)
CHLORIDE BLD-SCNC: 103 MMOL/L (ref 98–107)
CO2: 22 MMOL/L (ref 20–31)
CREAT SERPL-MCNC: 2.07 MG/DL (ref 0.5–0.9)
DIFFERENTIAL TYPE: ABNORMAL
EOSINOPHILS RELATIVE PERCENT: 3 %
GFR AFRICAN AMERICAN: 27 ML/MIN
GFR NON-AFRICAN AMERICAN: 22 ML/MIN
GFR SERPL CREATININE-BSD FRML MDRD: ABNORMAL ML/MIN/{1.73_M2}
GFR SERPL CREATININE-BSD FRML MDRD: ABNORMAL ML/MIN/{1.73_M2}
GLUCOSE BLD-MCNC: 100 MG/DL (ref 65–105)
GLUCOSE BLD-MCNC: 86 MG/DL (ref 70–99)
HCT VFR BLD CALC: 31.9 % (ref 36–46)
HEMOGLOBIN: 10.7 G/DL (ref 12–16)
LYMPHOCYTES # BLD: 24 %
MCH RBC QN AUTO: 32.9 PG (ref 26–34)
MCHC RBC AUTO-ENTMCNC: 33.5 G/DL (ref 31–37)
MCV RBC AUTO: 98.2 FL (ref 80–100)
MONOCYTES # BLD: 11 %
PDW BLD-RTO: 14 % (ref 12.5–15.4)
PLATELET # BLD: 182 K/UL (ref 140–450)
PLATELET ESTIMATE: ABNORMAL
PMV BLD AUTO: 8.6 FL (ref 6–12)
POTASSIUM SERPL-SCNC: 5.6 MMOL/L (ref 3.7–5.3)
RBC # BLD: 3.25 M/UL (ref 4–5.2)
RBC # BLD: ABNORMAL 10*6/UL
SEG NEUTROPHILS: 62 %
SEGMENTED NEUTROPHILS ABSOLUTE COUNT: 4.1 K/UL (ref 1.8–7.7)
SODIUM BLD-SCNC: 136 MMOL/L (ref 135–144)
WBC # BLD: 6.6 K/UL (ref 3.5–11)
WBC # BLD: ABNORMAL 10*3/UL

## 2017-10-05 PROCEDURE — 6360000002 HC RX W HCPCS: Performed by: INTERNAL MEDICINE

## 2017-10-05 PROCEDURE — 97535 SELF CARE MNGMENT TRAINING: CPT

## 2017-10-05 PROCEDURE — 99232 SBSQ HOSP IP/OBS MODERATE 35: CPT | Performed by: NURSE PRACTITIONER

## 2017-10-05 PROCEDURE — 97116 GAIT TRAINING THERAPY: CPT

## 2017-10-05 PROCEDURE — 80048 BASIC METABOLIC PNL TOTAL CA: CPT

## 2017-10-05 PROCEDURE — 36415 COLL VENOUS BLD VENIPUNCTURE: CPT

## 2017-10-05 PROCEDURE — 6370000000 HC RX 637 (ALT 250 FOR IP): Performed by: INTERNAL MEDICINE

## 2017-10-05 PROCEDURE — 97110 THERAPEUTIC EXERCISES: CPT

## 2017-10-05 PROCEDURE — 85025 COMPLETE CBC W/AUTO DIFF WBC: CPT

## 2017-10-05 PROCEDURE — 99239 HOSP IP/OBS DSCHRG MGMT >30: CPT | Performed by: INTERNAL MEDICINE

## 2017-10-05 PROCEDURE — 2580000003 HC RX 258: Performed by: INTERNAL MEDICINE

## 2017-10-05 PROCEDURE — 82947 ASSAY GLUCOSE BLOOD QUANT: CPT

## 2017-10-05 RX ORDER — FLUDROCORTISONE ACETATE 0.1 MG/1
0.05 TABLET ORAL DAILY
Qty: 30 TABLET | Refills: 3 | Status: SHIPPED | OUTPATIENT
Start: 2017-10-05 | End: 2017-11-04

## 2017-10-05 RX ORDER — CARVEDILOL 6.25 MG/1
12.5 TABLET ORAL 2 TIMES DAILY
Qty: 60 TABLET | Refills: 3 | Status: ON HOLD | OUTPATIENT
Start: 2017-10-05 | End: 2017-12-21

## 2017-10-05 RX ORDER — DILTIAZEM HYDROCHLORIDE 120 MG/1
180 CAPSULE, COATED, EXTENDED RELEASE ORAL DAILY
Qty: 30 CAPSULE | Refills: 3 | Status: SHIPPED | OUTPATIENT
Start: 2017-10-05 | End: 2017-10-05

## 2017-10-05 RX ORDER — DILTIAZEM HYDROCHLORIDE 120 MG/1
120 CAPSULE, COATED, EXTENDED RELEASE ORAL DAILY
Qty: 30 CAPSULE | Refills: 3 | Status: ON HOLD | OUTPATIENT
Start: 2017-10-05 | End: 2018-03-09

## 2017-10-05 RX ORDER — FLUDROCORTISONE ACETATE 0.1 MG/1
0.05 TABLET ORAL DAILY
Status: DISCONTINUED | OUTPATIENT
Start: 2017-10-05 | End: 2017-10-05 | Stop reason: HOSPADM

## 2017-10-05 RX ADMIN — DILTIAZEM HYDROCHLORIDE 120 MG: 120 CAPSULE, COATED, EXTENDED RELEASE ORAL at 09:25

## 2017-10-05 RX ADMIN — ENOXAPARIN SODIUM 30 MG: 30 INJECTION SUBCUTANEOUS at 08:42

## 2017-10-05 RX ADMIN — CARVEDILOL 6.25 MG: 6.25 TABLET, FILM COATED ORAL at 09:25

## 2017-10-05 RX ADMIN — POLYETHYLENE GLYCOL 3350 17 G: 17 POWDER, FOR SOLUTION ORAL at 08:50

## 2017-10-05 RX ADMIN — LEVOTHYROXINE SODIUM 50 MCG: 50 TABLET ORAL at 08:43

## 2017-10-05 RX ADMIN — REPAGLINIDE 0.5 MG: 0.5 TABLET ORAL at 08:44

## 2017-10-05 RX ADMIN — Medication 10 ML: at 08:48

## 2017-10-05 RX ADMIN — TRAMADOL HYDROCHLORIDE 50 MG: 50 TABLET ORAL at 06:39

## 2017-10-05 RX ADMIN — REPAGLINIDE 0.5 MG: 0.5 TABLET ORAL at 06:40

## 2017-10-05 ASSESSMENT — PAIN SCALES - GENERAL
PAINLEVEL_OUTOF10: 9
PAINLEVEL_OUTOF10: 4

## 2017-10-05 NOTE — PROGRESS NOTES
Social History: Peggy Pandya  reports that she has never smoked. She has never used smokeless tobacco. She reports that she drinks about 0.6 oz of alcohol per week  She reports that she does not use illicit drugs. Family History   Problem Relation Age of Onset    Heart Disease Mother      chf    Heart Disease Father     Heart Attack Father        ROS:  Constitutional  Negative for fever and chills    HEENT  Negative for ear discharge, ear pain, nosebleed    Eyes  Negative for photophobia, pain and discharge    Respiratory  Negative for hemoptysis and sputum    Cardiovascular  Negative for orthopnea, claudication and PND    Gastrointestinal  Negative for abdominal pain, diarrhea, blood in stool    Musculoskeletal  Negative for joint pain, negative for myalgia    Skin  Negative for rash or itching    Endo/heme/allergies  Negative for polydipsia, environmental allergy    Psychiatric/behavioral  Negative for suicidal ideation.  Patient is not anxious        Medications:     carvedilol  6.25 mg Oral BID    dextrose  25 g Intravenous Once    diltiazem  120 mg Oral Daily    aspirin  81 mg Oral Every Other Day    levothyroxine  50 mcg Oral Daily    polyethylene glycol  17 g Oral Daily    repaglinide  0.5 mg Oral BID AC    simvastatin  5 mg Oral Nightly    sodium chloride flush  10 mL Intravenous 2 times per day    enoxaparin  30 mg Subcutaneous Daily    gabapentin  300 mg Oral Nightly     PRN Meds include: traMADol, sodium chloride flush, acetaminophen, magnesium hydroxide, ondansetron    Objective:   /65  Pulse 79  Temp 97.4 °F (36.3 °C) (Oral)   Resp 15  Ht 5' 4\" (1.626 m)  Wt 163 lb 12.8 oz (74.3 kg)  SpO2 99%  BMI 28.12 kg/m2    Blood pressure range: Systolic (95DHH), JAD:209 , Min:137 , RHT:710   ; Diastolic (30QRB), TXR:02, Min:65, Max:65      General examination:   Head: Normocephalic, atraumatic  Eyes: Extraocular movements intact  Lungs: Respirations unlabored, chest wall no deformity  ENT: Normal external ear canals, no sinus tenderness  Heart: Regular rate rhythm  Abdomen: No masses, tenderness  Extremities: No cyanosis or edema, 2+ pulses  Skin: Intact, normal skin color      NEUROLOGIC EXAMINATION  GENERAL  Appears comfortable. Jaw tremors    HEENT  NC/ AT   NECK  Supple   MENTAL STATUS:  Alert, oriented, intact memory, no confusion, normal speech, normal language, no hallucination or delusion. Calm and cooperative. Naming intact. Can follow simple and serial commands.    CRANIAL NERVES: II     -      Visual fields intact to confrontation  III,IV,VI -  EOMs full, no afferent defect, no KATRIN, no ptosis  V     -     Normal facial sensation  VII    -     Normal facial symmetry  VIII   -     Intact hearing  IX,X -     Symmetrical palate  XI    -     Symmetrical shoulder shrug  XII   -     Midline tongue, no atrophy    MOTOR FUNCTION:  significant for good strength of grade 5/5 in bilateral proximal and distal muscle groups of both upper and lower extremities with normal bulk, normal tone and no involuntary movements, no tremor   SENSORY FUNCTION:  Normal touch, normal pin   CEREBELLAR FUNCTION:  Intact fine motor control over upper limbs   REFLEX FUNCTION:  Symmetric, no perverted reflex, no Babinski sign   STATION and GAIT  Not tested       Data:    Lab Results:   CBC:   Recent Labs      10/03/17   0634  10/04/17   0649  10/05/17   0601   WBC  6.5  6.1  6.6   HGB  10.9*  10.6*  10.7*   PLT  169  179  182     BMP:    Recent Labs      10/03/17   0634  10/04/17   0649  10/04/17   1815  10/05/17   0601   NA  139  137   --   136   K  5.8*  5.7*  5.6*  5.6*   CL  104  104   --   103   CO2  18*  22   --   22   BUN  55*  59*   --   59*   CREATININE  2.12*  2.42*   --   2.07*   GLUCOSE  93  93   --   86         Lab Results   Component Value Date    CHOL 145 08/30/2017    LDLCHOLESTEROL 78 08/30/2017    HDL 41 08/30/2017    TRIG 131 08/30/2017    ALT 7 08/18/2017    AST 13 08/18/2017    TSH 2.46 08/30/2017    INR 1.1 10/02/2017    LABA1C 5.1 08/18/2017           Diagnostic data reviewed:  CT HEAD (10/2/2017): Generalized atrophy with chronic microangiopathy      BRAIN MRI: Cannot be done due to pacemaker      CAROTID DOPPLER (6/26/2017): Unremarkable      ECHO (7/12/2017): EF 50%. Mild LVH. Moderate TR      EEG (9/28/2017): This EEG is abnormal due to the presence of a poorly organized and slow background as well as intermittent generalized slowing consistent with mild-to-moderate dysfunction of broad underlying cortex. In addition, there is a focal rhythmic slowing suggesting injury to the underlying cortex in the left posterior temporal area; this may have epileptogenic potential. No clinical correlate was noted      EEG (10/3/2017): This EEG is within normal limits for an awake, drowsy,  and sleepy patient. No lateralized or epileptiform disturbance is  seen. ORTHOSTATIC BP:  Supine      184/102         160/83           127/68        148/74  Sitting        184/92           153/89          102/58         131/68   Standing    159/83           143/91           92/49          111/93                            Impression and Plan: Ms. Natalie Banerjee is a 80 y.o. female with   Recurrent syncope x 3 since 6/2017; orthostatic hypotension. With most recent syncopal event, recorded BP was 50/30 mm Hg. EEG is normal. Discussed with Dr. Jayy Jenkins and patient's daughter. Dr. Jayy Jenkins reports there is a low incidence of seizure as each of patient's episodes have been associated with hypotension/positional changes/hemodynamic issues in the past. Daughter and patient are in agreement to not starting an AED at this point in time. Type II AV block; s/p pacemaker insertion on 6/29/2017 after the 1st syncope episode; cardiology following    Orthostatic hypotension; pt educated about compression stockings, hydration & leg exercises.  Pt verbalized understanding & is in agreement    GRAYSON on CKD; nephrology is

## 2017-10-05 NOTE — DISCHARGE SUMMARY
[facility] [address]    Discharge Summary     Patient ID: Lydia Dhillon  :  3/7/1927   MRN: [id number]     ACCOUNT:  [de-identified]   Patient's PCP: Ann Garcia Date: 10/2/2017   Discharge Date: 10/5/2017     Length of Stay: 3  Code Status:  Full Code  Admitting Physician: Charlotte Arvizu MD  Discharge Physician: Charlotte Arvizu MD     Active Discharge Diagnoses:     Primary Problem  GRAYSON (acute kidney injury) [facility])      [place] Problems    Diagnosis Date Noted    [facility] [N17.9] 10/02/2017    Abnormal EEG [R94.01] 10/02/2017    Syncope [R55] 2017    Essential hypertension [I10] 07/10/2017    Acute renal failure superimposed on stage 4 chronic kidney disease ([facility] [N17.9, N18.4] 2017    Chronic diastolic HF (heart failure), NYHA class 3 ([facility] [I50.32] 2017    Acquired hypothyroidism [E03.9]     Diabetes mellitus ([facility] [E11.9]        Admission Condition:  poor     Discharged Condition: fair    Hospital Stay:     Hospital Course: Miss Moose Thompson is a nice 80year old lady with history of hypertension, CKD stage III, recent pacemaker placement for second degree AV block in May 2017. Also has CHF with recent medication changes. She has mainly orthostatic dizziness started few months back, with three definite episodes of syncope. Got pacer placed with first episode of syncope. Today she seems to have syncope like episode while sat in chair, with no abnormal movements but confusion present, found by daughter slumped in chair, un able to wake her up for two minutes, then she got up but just staring for few minutes with slow return after, that lasted for around half an hour. No chest pain, palpitations, dizziness with todays visit. Recent admission for second episode of syncope with EEG at that time showing possible seizure focus.    She was diagnosed as likely syncope and pre syncope related to labile blood pressure with hypotension at admission. Also needed multiple BP medication changes during admission. Also has persistent hyperkalemia, seen by nephrology team with addition of fluorinef at discharge. EEG was done that showed no seizure focus    Significant therapeutic interventions: None    Significant Diagnostic Studies:   Xr Abdomen Limited (kub)    Result Date: 9/7/2017  REPORT: KUB INDICATION: Abdominal pain, constipation FINDINGS: Moderate amount of retained fecal material in the rectosigmoid colon and right hemicolon. No dilated loops of bowel. No discernible genitourinary calcifications. Degenerative changes of the spine and pelvis. Final report electronically signed by Santi Puente on 9/7/2017 4:17 PM    MODERATE COLONIC CONSTIPATION    Ct Head Wo Contrast    Result Date: 10/2/2017  REPORT: CT scan of the brain without contrast. INDICATION: Syncopal event; loss of consciousness at home. FINDINGS: There is mild generalized cerebral atrophy, not unusual for the patient's age. There is no shift of the midline structures. There is small vessel ischemic change involving the white matter. No other abnormal areas of increased or decreased density are seen within the brain. There is no evidence of an intracranial hemorrhage or hematoma. There is no apparent fracture involving the calvarium. The visualized paranasal sinuses and mastoid air cells are clear. No significant change is noted from the study of 9/28/2017. Final report electronically signed by Felton Hatfield on 10/2/2017 10:42 AM    GENERALIZED CEREBRAL ATROPHY. SMALL VESSEL ISCHEMIC CHANGE INVOLVING THE WHITE MATTER. NO APPARENT ACUTE ABNORMALITY OR INTRACRANIAL HEMORRHAGE OR HEMATOMA.     Ct Head Wo Contrast    Result Date: 9/28/2017  REPORT: CT head without contrast TECHNIQUE: Contiguous thin axial slices through the head obtained without IV contrast. INDICATION: Headache syncope FINDINGS: No evidence of acute cerebral cortical infarction, hemorrhage or mass lesion. Patchy and scattered foci  of hypoattenuation in the subcortical and periventricular white matter of both cerebral hemispheres. Although nonspecific, this is typically seen as a sequela of chronic ischemic small vessel disease in a patient of this age. Mild  diffuse cerebral and cerebellar atrophy. The ventricular system and cortical sulci are appropriate for degree of atrophy. Calvarium is intact. Paranasal sinuses and mastoid air cells are clear. 1. No acute intracranial process 2. Mild  chronic white matter changes and atrophy Final report electronically signed by Trudi Kelly on 9/28/2017 10:00 AM    Xr Chest Portable    Result Date: 10/2/2017  EXAMINATION: SINGLE VIEW OF THE CHEST 10/2/2017 3:20 pm COMPARISON: AP chest from 09/27/2017 HISTORY: ORDERING SYSTEM PROVIDED HISTORY: syncope TECHNOLOGIST PROVIDED HISTORY: Reason for exam:->syncope Additional history of diabetes, hypertension, hypothyroidism, chronic kidney disease, asthma, heart disease, CHF, and cardiac pacemaker placement. FINDINGS: Again noted are overlying ECG monitor leads, snaps and a left subclavian approach permanent pacemaker with 2 unchanged electrode leads. Cardiac silhouette appears prominent, likely mildly enlarged. Mediastinal structures midline unchanged, again with some calcification and uncoiling. Mildly elevated right hemidiaphragm. No consolidation or large pleural effusion. No pneumothorax. Mild convex right curvature thoracic spine with DJD. Osteopenia. No acute cardiopulmonary disease. Probable mild cardiomegaly. Xr Chest Portable    Result Date: 9/7/2017  REPORT: Portable AP radiograph of the chest obtained at 1537 hours INDICATION: Chest pain, shortness of breath FINDINGS: Compared to 8/29/2017. Stable chronic changes of both lungs. No focal consolidation, pleural effusion or pneumothorax seen. Stable cardiomegaly. No free intraperitoneal air. Left-sided pacemaker. Final report electronically signed by Mauri Boyer on 9/7/2017 4:16 PM    Stable appearing chest    Xr Chest 1 Vw    Result Date: 9/27/2017  REPORT: Portable AP radiograph of the chest obtained at 1100 hours INDICATION: Syncopal episode, chest pain FINDINGS: Compared to 9/7/2017. The lungs are well expanded and clear bilaterally. No focal consolidation, pleural effusion or pneumothorax seen. Stable cardiomegaly. Left-sided pacemaker. No free intraperitoneal air. Final report electronically signed by Mauri Boyer on 9/27/2017 11:27 AM    Stable chest    Consultations:    Consults:     Final Specialist Recommendations/Findings:   IP CONSULT TO INTERNAL MEDICINE  IP CONSULT TO HOSPITALIST  IP CONSULT TO CARDIOLOGY  IP CONSULT TO NEUROLOGY  IP CONSULT TO NEPHROLOGY      The patient was seen and examined on day of discharge and this discharge summary is in conjunction with any daily progress note from day of discharge. Discharge plan:     Disposition: Home    Physician Follow Up:   Bebe Magdaleno  [address]  [facility] [address] [place]  [phone]          Requiring Further Evaluation/Follow Up POST HOSPITALIZATION/Incidental Findings: None    Diet: cardiac diet    Activity: As tolerated    Instructions to Patient: Nephrology follow up    Discharge Medications:      Medication List      START taking these medications          fludrocortisone 0.1 MG tablet   Commonly known as:  FLORINEF   Take 0.5 tablets by mouth daily         CHANGE how you take these medications          carvedilol 6.25 MG tablet   Commonly known as:  COREG   Take 2 tablets by mouth 2 times daily   What changed:  medication strength       diltiazem 120 MG extended release capsule   Commonly known as:  CARDIZEM CD   Take 1 capsule by mouth daily   What changed:    - medication strength  - how much to take         CONTINUE taking these medications          acetaminophen 325 MG tablet   Commonly known as:  TYLENOL   Take 2 tablets by mouth every 4

## 2017-10-05 NOTE — PROGRESS NOTES
(congestive heart failure) ([facility] Chronic kidney disease; Diabetes mellitus ([facility] Gout; Heart disease; Hyperlipidemia; Hypertension; Hypothyroidism; Mobitz type 2 second degree heart block; and Syncope and collapse. Social History:   reports that she has never smoked. She has never used smokeless tobacco. She reports that she drinks about 0.6 oz of alcohol per week  She reports that she does not use illicit drugs. Family History:   Family History   Problem Relation Age of Onset    Heart Disease Mother      chf    Heart Disease Father     Heart Attack Father        Vitals:  /65  Pulse 79  Temp 97.4 °F (36.3 °C) (Oral)   Resp 15  Ht 5' 4\" (1.626 m)  Wt 163 lb 12.8 oz (74.3 kg)  SpO2 99%  BMI 28.12 kg/m2  Temp (24hrs), Av.5 °F (36.4 °C), Min:97.4 °F (36.3 °C), Max:97.6 °F (36.4 °C)    Recent Labs      10/04/17   1122  10/04/17   1635  10/04/17   1933  10/05/17   0638   POCGLU  129*  98  111*  100       I/O (24Hr):     Intake/Output Summary (Last 24 hours) at 10/05/17 1231  Last data filed at 10/05/17 0138   Gross per 24 hour   Intake              480 ml   Output              802 ml   Net             -322 ml     Labs:    Physical Examination:        General appearance:  alert, cooperative and no distress  Mental Status:  oriented to person, place and time and normal affect  Lungs:  clear to auscultation bilaterally, normal effort  Heart:  regular rate and rhythm, no murmur  Abdomen:  soft, nontender, nondistended, normal bowel sounds, no masses, hepatomegaly, splenomegaly  Extremities:  no edema, redness, tenderness in the calves  Skin:  no gross lesions, rashes, induration    Assessment:        Primary Problem  GRAYSON (acute kidney injury) [facility])    [facility] [address] Problems    Diagnosis Date Noted    [facility] [N17.9] 10/02/2017    Abnormal EEG [R94.01] 10/02/2017    Syncope [R55] 2017    Essential hypertension [I10] 07/10/2017    Acute renal failure superimposed

## 2017-10-05 NOTE — PROGRESS NOTES
Physical Therapy  Facility/Department: [facility] CAR 2  Daily Treatment Note  NAME: Jenny Reyes  : 3/7/1927  MRN: [id number]    Date of Service: 10/5/2017    Patient Diagnosis(es):   Patient Active Problem List    Diagnosis Date Noted    Syncope and collapse - secondary to 2nd degree AV block 2017     Priority: High    Acute on chronic combined systolic and diastolic CHF (congestive heart failure) ([address] 2016     Priority: High     Class: Acute    Orthostatic hypotension     GRAYSON (acute kidney injury) ([address] 10/02/2017    Abnormal EEG 10/02/2017    Hyperkalemia 2017    Syncope 2017    Acute cystitis 2017    Hypertensive urgency 2017    Hypertensive emergency 2017    Flash pulmonary edema ([address] 2017    Cardiac pacemaker in situ 2017    Chronic midline low back pain with left-sided sciatica 07/10/2017    Essential hypertension 07/10/2017    AV block, 2nd degree 2017    Acute renal failure superimposed on stage 4 chronic kidney disease ([address] 2017    Mobitz type 2 second degree heart block 2017    Acute on chronic renal insufficiency 2017    Dehydration, moderate 2017    Left hip pain     Non-rheumatic tricuspid valve insufficiency 2017    Pulmonary HTN 2017    CKD (chronic kidney disease) stage 4, GFR 15-29 ml/min ([facility]) 2017    Chronic diastolic HF (heart failure), NYHA class 3 ([address] 2017    Physical deconditioning 2017    Congestive heart failure ([address]     Acquired hypothyroidism     Hyperlipidemia     Diabetes mellitus ([address]     Anxiety        Past Medical History:   Diagnosis Date    Anxiety     Asthma     Cardiac pacemaker 2017    [employer] Pacemaker implant    CHF (congestive heart failure) ([address] 2016    Chronic kidney disease     Diabetes mellitus ([address]     Gout     Heart disease     Hyperlipidemia     Hypertension     Hypothyroidism     Mobitz type 2 assist PRN                        Goals  Short term goals  Time Frame for Short term goals: 14 visits  Short term goal 1: Patient will ambulate 300ft with supervision with rolling walker without signs or symptoms of dizziness  Short term goal 2: Patient will completed sit to stand and stand to sit transfers independently without signs or symptoms of dizziness  Short term goal 3: Patient will negotiate 3 stairs with ARON handrails with supervision without signs or symptoms of dizziness  Short term goal 4: Patient will sit EOB with good posture without cueing for 5 min     Plan    Plan  Times per week: 5-6x/wk  Times per day: Daily  Current Treatment Recommendations: Strengthening, Balance Training, Gait Training, Stair training, Functional Mobility Training  Safety Devices  Type of devices: Call light within reach, Gait belt, Left in chair, Nurse notified, All fall risk precautions in place     Therapy Time   Individual Concurrent Group Co-treatment   Time In 1020         Time Out 1046         Minutes 26                 Raine Lutz, PTA

## 2017-10-05 NOTE — PROGRESS NOTES
Occupational Therapy  Facility/Department: [facility] CAR 2  Daily Treatment Note  NAME: Lord Dominguez  : 3/7/1927  MRN: [id number]    Date of Service: 10/5/2017    Patient Diagnosis(es): The encounter diagnosis was Syncope and collapse.      has a past medical history of Anxiety; Asthma; Cardiac pacemaker; CHF (congestive heart failure) ([facility] Chronic kidney disease; Diabetes mellitus ([facility] Gout; Heart disease; Hyperlipidemia; Hypertension; Hypothyroidism; Mobitz type 2 second degree heart block; and Syncope and collapse.   has a past surgical history that includes Ovary removal (Right); Breast surgery (1965); Breast biopsy (Bilateral, ); eye surgery; Cardiac pacemaker placement (2017); and Pacemaker insertion (2017). Restrictions  Restrictions/Precautions  Restrictions/Precautions: Seizure, General Precautions  Required Braces or Orthoses?: No  Position Activity Restriction  Other position/activity restrictions:  Up with assist  Subjective   General  Patient assessed for rehabilitation services?: Yes  Family / Caregiver Present: Yes (2 daughters and son in law)  Pain Assessment  Patient Currently in Pain: Denies  Pain Intervention(s): Medication (see eMar)  Response to Pain Intervention: Patient Satisfied  Objective    ADL  Feeding: Independent  Grooming: Stand by assistance;Setup (seated at sink)  UE Bathing: Minimal assistance;Setup (w/back)  LE Bathing: Stand by assistance;Setup (pt washed tops of legs and juno area, LAYLA hose on and did not want to remove)  UE Dressing: Stand by assistance;Setup (w/bra and pull over shirt)  LE Dressing: Minimal assistance;Setup (w/underwear and pants to thread feet)  Toileting: Stand by assistance      Balance  Sitting Balance: Independent (seated at EOB)  Standing Balance: Stand by assistance (use of 4WW)  Standing Balance  Time: Pt stood for approx 10 min total for transfers, dyn mob and juno care   Sit to stand: Stand by assistance  Stand to sit: Stand by assistance  Functional Mobility  Functional - Mobility Device: 4-Wheeled Walker  Assist Level: Stand by assistance  Toilet Transfers  Toilet - Technique: Ambulating  Equipment Used: Standard toilet  Toilet Transfer: Stand by assistance     Transfers  Stand Step Transfers: Stand by assistance  Sit to stand: Stand by assistance  Stand to sit: Stand by assistance  Transfer Comments: with use of 4WW for mobility        Assessment   Performance deficits / Impairments: Decreased functional mobility ; Decreased ADL status; Decreased endurance  Prognosis: Good  Patient Education: Educ on OT POC, AE/DME, EC/WS tech, Fall Prev, Safety with func mob and adls. Issued written info and pt verbalized understanding. Discharge Recommendations: Home with assist PRN  REQUIRES OT FOLLOW UP: Yes  Activity Tolerance  Activity Tolerance: Patient Tolerated treatment well  Safety Devices  Safety Devices in place: Yes  Type of devices: Call light within reach; Left in chair;Nurse notified (daughters and RN present on exit )       Discharge Recommendations:  Home with assist PRN     Plan   Plan  Times per week: 4x/wk   Current Treatment Recommendations: Self-Care / ADL, Functional Mobility Training, Safety Education & Training, Endurance Training, Home Management Training, Patient/Caregiver Education & Training, Equipment Evaluation, Education, & procurement    Goals  Short term goals  Time Frame for Short term goals: by discharge, pt will  Short term goal 1: demo I in UE ADL activites   Short term goal 2: demo MI/ I in LE ADL activites with AD as needed  Short term goal 3: demo understanding and I use of EC/WS and fall prevention tech during functional activites   Short term goal 4: demo increased standing tolerance of 15+ min to assist with ADL/ functional activites   Short term goal 5: demo understanding of 2 simple home modifications to increase safety in functional activites/ ADLs  Patient Goals   Patient goals : to go home wiht daughter

## 2017-10-05 NOTE — PROGRESS NOTES
Renal Progress Note    Patient :  Jenny Reyes; 80 y.o. MRN# [id number]  Location:  2010/2010-01  Attending:  Simon Coello MD  Admit Date:  10/2/2017   Hospital Day: 3      Subjective:     UO good, PO intake good, feels well. No shortness of breath or orthopnea. No fever or chills. K fluctuates, creat at baseline. No more syncopal epsiodes. BP fluctautes. Marderenan Ponce  For d/c.       Outpatient Medications:     Prescriptions Prior to Admission: gabapentin (NEURONTIN) 100 MG capsule, Take 300 mg by mouth nightly   polyethylene glycol (MIRALAX) powder, Take 17 g by mouth daily  traMADol (ULTRAM) 50 MG tablet, Take 50 mg by mouth every 6 hours as needed for Pain   acetaminophen (TYLENOL) 325 MG tablet, Take 2 tablets by mouth every 4 hours as needed for Pain  repaglinide (PRANDIN) 0.5 MG tablet, Take 0.5 mg by mouth 2 times daily (before meals)  simvastatin (ZOCOR) 5 MG tablet, Take 5 mg by mouth nightly  levothyroxine (SYNTHROID) 50 MCG tablet, Take 50 mcg by mouth daily Takes 1 tablet daily except for 2 tabs on Sunday  aspirin 81 MG tablet, Take 81 mg by mouth every other day  [DISCONTINUED] cloNIDine (CATAPRES) 0.1 MG tablet, Take 1 tablet by mouth 2 times daily  [DISCONTINUED] lisinopril (PRINIVIL;ZESTRIL) 20 MG tablet, Take 1 tablet by mouth daily  [DISCONTINUED] diltiazem (CARDIZEM CD) 180 MG extended release capsule, Take 1 capsule by mouth daily  [DISCONTINUED] ciprofloxacin (CIPRO) 500 MG tablet, Take 0.5 tablets by mouth 2 times daily for 10 days  [DISCONTINUED] carvedilol (COREG) 25 MG tablet, Take 0.5 tablets by mouth 2 times daily    Current Medications:     Scheduled Meds:    fludrocortisone  0.05 mg Oral Daily    carvedilol  6.25 mg Oral BID    dextrose  25 g Intravenous Once    diltiazem  120 mg Oral Daily    aspirin  81 mg Oral Every Other Day    levothyroxine  50 mcg Oral Daily    polyethylene glycol  17 g Oral Daily    repaglinide  0.5 mg Oral BID AC    simvastatin  5 mg Oral Nightly    sodium chloride flush  10 mL Intravenous 2 times per day    enoxaparin  30 mg Subcutaneous Daily    gabapentin  300 mg Oral Nightly     Continuous Infusions:    PRN Meds:  traMADol, sodium chloride flush, acetaminophen, magnesium hydroxide, ondansetron    Input/Output:       I/O last 3 completed shifts: In: 12 [P.O.:960]  Out: 803 [Urine:802; Stool:1]. Patient Vitals for the past 96 hrs (Last 3 readings):   Weight   10/05/17 0630 163 lb 12.8 oz (74.3 kg)   10/04/17 0530 164 lb 3.2 oz (74.5 kg)   10/03/17 0424 166 lb 6.4 oz (75.5 kg)       Vital Signs:   Temperature:  Temp: 97.4 °F (36.3 °C)  TMax:   Temp (24hrs), Av.5 °F (36.4 °C), Min:97.4 °F (36.3 °C), Max:97.6 °F (36.4 °C)    Respirations:  Resp: 15  Pulse:   Pulse: 79  BP:    BP: 137/65  BP Range: Systolic (63GFK), CCL:039 , Min:137 , KGQ:135       Diastolic (03EHQ), PTW:90, Min:65, Max:65      Physical Examination:     General:  AAO x 3, speaking in full sentences, no accessory muscle use. HEENT: Atraumatic, normocephalic, no throat congestion, moist mucosa. Eyes:   Pupils equal, round and reactive to light, EOMI. Neck:   No JVD, no thyromegaly, no lymphadenopathy. Chest:  Bilateral vesicular breath sounds, no rales or wheezes. Cardiac:  S1 S2 RR, no murmurs, gallops or rubs, JVP not raised. Abdomen: Soft, non-tender, no masses or organomegaly, BS audible. :   No suprapubic or flank tenderness. Neuro:  AAO x 3, No FND. SKIN:  No rashes, good skin turgor. Extremities:  No edema, palpable peripheral pulses, no calf tenderness.     Labs:       Recent Labs      10/03/17   0634  10/04/17   0649  10/05/17   0601   WBC  6.5  6.1  6.6   RBC  3.36*  3.24*  3.25*   HGB  10.9*  10.6*  10.7*   HCT  32.9*  31.8*  31.9*   MCV  98.0  98.2  98.2   MCH  32.6  32.8  32.9   MCHC  33.2  33.4  33.5   RDW  14.5  14.6  14.0   PLT  169  179  182   MPV  8.1  8.6  8.6      BMP: Recent Labs      10/03/17   0634  10/04/17   0649  10/04/17   1815  10/05/17   0601   NA 139  137   --   136   K  5.8*  5.7*  5.6*  5.6*   CL  104  104   --   103   CO2  18*  22   --   22   BUN  55*  59*   --   59*   CREATININE  2.12*  2.42*   --   2.07*   GLUCOSE  93  93   --   86   CALCIUM  9.3  8.8   --   9.0      Phosphorus:   No results for input(s): PHOS in the last 72 hours. Magnesium:    Recent Labs      10/03/17   0634   MG  2.1     Albumin:  No results for input(s): LABALBU in the last 72 hours. BNP:    No results found for: BNP  PETRONA:    No results found for: PETRONA  SPEP:  Lab Results   Component Value Date    PROT 6.8 08/18/2017     UPEP:   No results found for: LABPE  C3:   No results found for: C3  C4:   No results found for: C4  MPO ANCA:   No results found for: MPO  PR3 ANCA:   No results found for: PR3  Anti-GBM:   No results found for: GBMABIGG  Hep BsAg:       No results found for: HEPBSAG  Hep C AB:        No results found for: HEPCAB    Urinalysis/Chemistries:      Lab Results   Component Value Date    NITRU NEGATIVE 10/02/2017    COLORU YELLOW 10/02/2017    PHUR 5.5 10/02/2017    WBCUA None 10/02/2017    RBCUA None 10/02/2017    MUCUS NOT REPORTED 10/02/2017    TRICHOMONAS NOT REPORTED 10/02/2017    YEAST PRESENCE NOTED 10/02/2017    BACTERIA NOT REPORTED 10/02/2017    SPECGRAV 1.015 10/02/2017    LEUKOCYTESUR NEGATIVE 10/02/2017    UROBILINOGEN Normal 10/02/2017    BILIRUBINUR NEGATIVE 10/02/2017    GLUCOSEU NEGATIVE 10/02/2017    KETUA NEGATIVE 10/02/2017    AMORPHOUS NOT REPORTED 10/02/2017     Urine Sodium:   No results found for: JERROD  Urine Potassium:  No results found for: KUR  Urine Chloride:  No results found for: CLUR  Urine Osmolarity: No results found for: OSMOU  Urine Protein:   No components found for: TOTALPROTEIN, URINE   Urine Creatinine:     Lab Results   Component Value Date    LABCREA 76.4 02/22/2017     Urine Eosinophils:  No components found for: UEOS    Radiology:     CXR:     Assessment:     1.  Acute Kidney Injury: liklely pre renal from volume depletion and ACEI use, creat at baseline  2. CKD stage 4 from Dm2, baseline 2-2.2  3. 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 Recurrent syncope more from hemodynamic fluctuations    Plan:   1. Keep off ACEI for ever  2. florinef 0.05 daily  3. Hold lasix  4.ok for d/c  5. Low K diet  6. Outpt follow up with Beryle Downs in 3 weks    Nutrition   Please ensure that patient is on a renal diet/TF. Avoid nephrotoxic drugs/contrast exposure. We will continue to follow along with you.

## 2017-10-06 NOTE — PROGRESS NOTES
By now pt should be aware of her abnormal EEG and if she is not should plan to follow up with neurology
Discussed discharge plans with the patient. Patient is a 80year old female here with hypertensive urgency, UTI, syncope and collapse. She is alert and oriented. Patient is a  and lives at [facility] apartments. She has a walker and a cane. Patient does her own cooking and cleaning. She manages her own medications. Family provides the transportation. Her PCP is Dr. Dilip Hawk. She has medical insurance that helps with medication costs. The discharge plan is home with no services. Patient is a re-admitted and does not want any services at home to help.     BLAKE Dick
Hospitalist Progress Note    SUBJECTIVE/INTERVAL HISTORY:    Patient seen in follow up for syncope/collapse, possible seizure, UTI, hypertensive urgency, s/p pacer. She states she feels better. Denies dizziness and chest pain. No further events. Orthostatics negative. Cr and K increased. Lisinopril discontinued. CT HEAD WO CONTRAST [[id number]] Collected: 09/28/17 0931      Updated: 09/28/17 1002      Narrative:       REPORT: CT head without contrast    TECHNIQUE: Contiguous thin axial slices through the head obtained without IV contrast.    INDICATION: Headache syncope    FINDINGS: No evidence of acute cerebral cortical infarction, hemorrhage or mass lesion. Patchy and scattered foci  of hypoattenuation in the subcortical and periventricular white matter of both cerebral hemispheres. Although nonspecific, this is   typically seen as a sequela of chronic ischemic small vessel disease in a patient of this age. Mild  diffuse cerebral and cerebellar atrophy. The ventricular system and cortical sulci are appropriate for degree of atrophy. Calvarium is intact. Paranasal   sinuses and mastoid air cells are clear. 1. No acute intracranial process  2. Mild  chronic white matter changes and atrophy    Final report electronically signed by Chris Sanchez on 9/28/2017 10:00 AM       OBJECTIVE:    Vitals:   Temp: 98.8 °F (37.1 °C)  BP: (!) 186/92  Resp: 15  Pulse: 68  SpO2: 98 %  24HR INTAKE/OUTPUT:      Intake/Output Summary (Last 24 hours) at 09/28/17 1201  Last data filed at 09/28/17 0854   Gross per 24 hour   Intake             2931 ml   Output             1400 ml   Net             1531 ml       -----------------------------------------------------------------  Review of Systems:  Constitutional:negative  for fevers, and negative for chills.   Eyes: negative for visual disturbance   ENT: negative for sore throat, negative nasal congestion, and negative for earache  Respiratory: negative for shortness of breath, negative
Patient transported to Sleep Lab at 5:15am by writer via wheelchair. They will call when testing is over.
Physical Therapy    [facility]  Initial Assessment    NAME: Meliza Cesar  : 3/7/1927  MRN: [id number]    Date of Service: 2017    Patient Diagnosis(es): The primary encounter diagnosis was Syncope, unspecified syncope type. Diagnoses of Acute cystitis with hematuria, Essential hypertension, Dehydration, and Acute renal failure superimposed on stage 4 chronic kidney disease ([address] were also pertinent to this visit. has a past medical history of Anxiety; Asthma; Cardiac pacemaker; CHF (congestive heart failure) ([address] Chronic kidney disease; Diabetes mellitus ([address] Gout; Heart disease; Hyperlipidemia; Hypertension; Hypothyroidism; Mobitz type 2 second degree heart block; and Syncope and collapse.   has a past surgical history that includes Ovary removal (Right); Breast surgery (1965); Breast biopsy (Bilateral, ); eye surgery; Cardiac pacemaker placement (2017); and Pacemaker insertion (2017). Restrictions  Restrictions/Precautions  Restrictions/Precautions: General Precautions, Fall Risk  Vision/Hearing  Vision: Within Functional Limits  Hearing: Within functional limits     Subjective  General  Chart Reviewed: Yes  Patient assessed for rehabilitation services?: Yes  Diagnosis: syncope, falls  Follows Commands: Within Functional Limits  General Comment  Comments: pt is normally independent and ambulates without a device  Subjective  Subjective: per patient, she states they are concerned that she had a seizure.  Pt reports 0/10 in L lateral ankle  Pain Screening  Patient Currently in Pain: Yes  Pain Assessment  Pain Assessment: 0-10  Pain Level: 0  Vital Signs  Patient Currently in Pain: Yes       Orientation  Orientation  Overall Orientation Status: Within Functional Limits    Social/Functional History  Social/Functional History  Lives With: Alone  Type of Home: Apartment  Bathroom Shower/Tub: Tub/Shower unit  Bathroom Toilet: Standard  Bathroom Equipment:
08/18/2017    ALT 7 08/18/2017    LABGLOM 30 (L) 09/28/2017    GFRAA 36 (L) 09/28/2017     Lab Results   Component Value Date    LABA1C 5.1 08/18/2017     Lab Results   Component Value Date     08/18/2017     Estimated Intake vs Estimated Needs: Intake Less Than Needs, Intake Improving    Nutrition Risk Level: Moderate    Improving renal indices. Elevated K+, with noted d/c of lisinopril. Appetite better this morning, after lower intakes last night. Reportedly taking adequate fluids at home per interview. Avoid sodium at home, but non \"regular\" diet currently. Well controlled diabetes per A1C, on Prandin. Expect PO intakes to meet her needs.      Nutrition Interventions:   Continue current diet  Continued Inpatient Monitoring, Education Not Indicated, Coordination of Care    Nutrition Evaluation:   · Evaluation: Goals set   · Goals: Po >75% meals    · Monitoring: Meal Intake, Pertinent Labs, Weight    Electronically signed by Max Celaya RD, LD on 9/28/17 at 8:34 AM    Contact Number: [phone]
Pulmonary HTN ([facility] 04/26/2017    CKD (chronic kidney disease) stage 4, GFR 15-29 ml/min ([facility]) 02/22/2017    Chronic diastolic HF (heart failure), NYHA class 3 ([facility] 01/18/2017    Physical deconditioning 01/18/2017    Congestive heart failure (HCC)     Hypothyroidism     Hyperlipidemia     Diabetes mellitus ([facility]     Anxiety        PLAN:    Syncope and collapse - possible seizure   F/u EEG   PT/OT   Appreciate cardiology    Acute cystitis   IV abx    Hypertensive urgency   Diltiazem   catapress    Hypothyroidism    Anxiety    Chronic diastolic HF (heart failure), NYHA class 3/ Pulmonary HTN    CKD stage 4, GFR 15-29 ml/min   Lisinopril daily   Start catapres     Hyperkalemia - improved    Mobitz type 2 second degree heart block/Cardiac pacemaker in situ    Discharge    · High risk medication: none    EMMA Walker PA-C  9/29/2017, 12:26 PM

## 2017-10-09 NOTE — PLAN OF CARE
Problem: Falls - Risk of  Goal: Absence of falls  Outcome: Ongoing  Patient remains free of falls, toilet in advance of need . Bed is low and locked, call light within reach, slipper socks are on. Room is near nurses station, door is open. Fall risk sticker is on ID band, fall star sign at doorway. Hourly rounding. Tray table, call light and walker are all within reach. Patient is alert and oriented and calls out appropriately. Problem: Infection:  Goal: Will remain free from infection  Will remain free from infection   Outcome: Ongoing  Paitient exhibits no signs or symptoms of infection. Problem: Pain:  Goal: Patients pain/discomfort is manageable  Patients pain/discomfort is manageable   Outcome: Ongoing  Patient denies all pain. Advancement Flap (Double) Text: The defect edges were debeveled with a #15 scalpel blade.  Given the location of the defect and the proximity to free margins a double advancement flap was deemed most appropriate.  Using a sterile surgical marker, the appropriate advancement flaps were drawn incorporating the defect and placing the expected incisions within the relaxed skin tension lines where possible.    The area thus outlined was incised deep to adipose tissue with a #15 scalpel blade.  The skin margins were undermined to an appropriate distance in all directions utilizing iris scissors.

## 2017-10-23 ENCOUNTER — APPOINTMENT (OUTPATIENT)
Dept: NUCLEAR MEDICINE | Age: 82
End: 2017-10-23
Payer: MEDICARE

## 2017-10-23 ENCOUNTER — APPOINTMENT (OUTPATIENT)
Dept: GENERAL RADIOLOGY | Age: 82
End: 2017-10-23
Payer: MEDICARE

## 2017-10-23 ENCOUNTER — APPOINTMENT (OUTPATIENT)
Dept: VASCULAR LAB | Age: 82
End: 2017-10-23
Payer: MEDICARE

## 2017-10-23 ENCOUNTER — HOSPITAL ENCOUNTER (EMERGENCY)
Age: 82
Discharge: HOME | End: 2017-10-23
Attending: EMERGENCY MEDICINE
Payer: MEDICARE

## 2017-10-23 VITALS
OXYGEN SATURATION: 90 % | SYSTOLIC BLOOD PRESSURE: 185 MMHG | HEART RATE: 89 BPM | RESPIRATION RATE: 15 BRPM | TEMPERATURE: 98.9 F | HEIGHT: 64 IN | WEIGHT: 164 LBS | BODY MASS INDEX: 28 KG/M2 | DIASTOLIC BLOOD PRESSURE: 98 MMHG

## 2017-10-23 DIAGNOSIS — I82.401 DEEP VEIN THROMBOSIS (DVT) OF RIGHT LOWER EXTREMITY, UNSPECIFIED CHRONICITY, UNSPECIFIED VEIN (HCC): ICD-10-CM

## 2017-10-23 DIAGNOSIS — I50.20 SYSTOLIC CONGESTIVE HEART FAILURE, UNSPECIFIED CONGESTIVE HEART FAILURE CHRONICITY: Primary | ICD-10-CM

## 2017-10-23 LAB
-: ABNORMAL
ABSOLUTE EOS #: 0.3 K/UL (ref 0–0.4)
ABSOLUTE IMMATURE GRANULOCYTE: ABNORMAL K/UL (ref 0–0.3)
ABSOLUTE LYMPH #: 2.4 K/UL (ref 1–4.8)
ABSOLUTE MONO #: 0.7 K/UL (ref 0–1)
AMORPHOUS: ABNORMAL
ANION GAP SERPL CALCULATED.3IONS-SCNC: 13 MMOL/L (ref 9–17)
BACTERIA: ABNORMAL
BASOPHILS # BLD: 1 %
BASOPHILS ABSOLUTE: 0 K/UL (ref 0–0.2)
BILIRUBIN URINE: NEGATIVE
BNP INTERPRETATION: ABNORMAL
BUN BLDV-MCNC: 35 MG/DL (ref 8–23)
BUN/CREAT BLD: 23 (ref 9–20)
CALCIUM SERPL-MCNC: 9.3 MG/DL (ref 8.6–10.4)
CASTS UA: ABNORMAL /LPF
CHLORIDE BLD-SCNC: 103 MMOL/L (ref 98–107)
CO2: 25 MMOL/L (ref 20–31)
COLOR: YELLOW
COMMENT UA: ABNORMAL
CREAT SERPL-MCNC: 1.55 MG/DL (ref 0.5–0.9)
CRYSTALS, UA: ABNORMAL /HPF
D-DIMER QUANTITATIVE: 16.23 MG/L FEU (ref 0.19–0.5)
DIFFERENTIAL TYPE: ABNORMAL
EKG ATRIAL RATE: 87 BPM
EKG P AXIS: 51 DEGREES
EKG P-R INTERVAL: 210 MS
EKG Q-T INTERVAL: 400 MS
EKG QRS DURATION: 142 MS
EKG QTC CALCULATION (BAZETT): 481 MS
EKG R AXIS: -32 DEGREES
EKG T AXIS: 96 DEGREES
EKG VENTRICULAR RATE: 87 BPM
EOSINOPHILS RELATIVE PERCENT: 3 %
EPITHELIAL CELLS UA: ABNORMAL /HPF (ref 0–25)
GFR AFRICAN AMERICAN: 38 ML/MIN
GFR NON-AFRICAN AMERICAN: 31 ML/MIN
GFR SERPL CREATININE-BSD FRML MDRD: ABNORMAL ML/MIN/{1.73_M2}
GFR SERPL CREATININE-BSD FRML MDRD: ABNORMAL ML/MIN/{1.73_M2}
GLUCOSE BLD-MCNC: 105 MG/DL (ref 70–99)
GLUCOSE URINE: NEGATIVE
HCT VFR BLD CALC: 34.6 % (ref 36–46)
HEMOGLOBIN: 11.2 G/DL (ref 12–16)
IMMATURE GRANULOCYTES: ABNORMAL %
INR BLD: 1 (ref 0.9–1.2)
KETONES, URINE: NEGATIVE
LEUKOCYTE ESTERASE, URINE: NEGATIVE
LYMPHOCYTES # BLD: 29 %
MCH RBC QN AUTO: 31.8 PG (ref 26–34)
MCHC RBC AUTO-ENTMCNC: 32.4 G/DL (ref 31–37)
MCV RBC AUTO: 98.2 FL (ref 80–100)
MONOCYTES # BLD: 9 %
MUCUS: ABNORMAL
NITRITE, URINE: NEGATIVE
OTHER OBSERVATIONS UA: ABNORMAL
PARTIAL THROMBOPLASTIN TIME: 26.4 SEC (ref 23.2–34.4)
PDW BLD-RTO: 13.8 % (ref 12.1–15.2)
PH UA: 7 (ref 5–9)
PLATELET # BLD: 242 K/UL (ref 140–450)
PLATELET ESTIMATE: ABNORMAL
PMV BLD AUTO: 7.5 FL (ref 6–12)
POTASSIUM SERPL-SCNC: 4.2 MMOL/L (ref 3.7–5.3)
PRO-BNP: 4315 PG/ML
PROTEIN UA: NEGATIVE
PROTHROMBIN TIME: 10.3 SEC (ref 9.7–12.2)
RBC # BLD: 3.52 M/UL (ref 4–5.2)
RBC # BLD: ABNORMAL 10*6/UL
RBC UA: ABNORMAL /HPF (ref 0–2)
RENAL EPITHELIAL, UA: ABNORMAL /HPF
SEG NEUTROPHILS: 58 %
SEGMENTED NEUTROPHILS ABSOLUTE COUNT: 4.8 K/UL (ref 1.8–7.7)
SODIUM BLD-SCNC: 141 MMOL/L (ref 135–144)
SPECIFIC GRAVITY UA: 1.01 (ref 1.01–1.02)
TRICHOMONAS: ABNORMAL
TROPONIN INTERP: NORMAL
TROPONIN INTERP: NORMAL
TROPONIN T: <0.03 NG/ML
TROPONIN T: <0.03 NG/ML
TSH SERPL DL<=0.05 MIU/L-ACNC: 4.75 MIU/L (ref 0.3–5)
TURBIDITY: CLEAR
URINE HGB: ABNORMAL
UROBILINOGEN, URINE: NORMAL
WBC # BLD: 8.2 K/UL (ref 3.5–11)
WBC # BLD: ABNORMAL 10*3/UL
WBC UA: ABNORMAL /HPF (ref 0–5)
YEAST: ABNORMAL

## 2017-10-23 PROCEDURE — 81001 URINALYSIS AUTO W/SCOPE: CPT

## 2017-10-23 PROCEDURE — 93970 EXTREMITY STUDY: CPT

## 2017-10-23 PROCEDURE — 6370000000 HC RX 637 (ALT 250 FOR IP): Performed by: EMERGENCY MEDICINE

## 2017-10-23 PROCEDURE — 6360000002 HC RX W HCPCS: Performed by: EMERGENCY MEDICINE

## 2017-10-23 PROCEDURE — 80048 BASIC METABOLIC PNL TOTAL CA: CPT

## 2017-10-23 PROCEDURE — 84443 ASSAY THYROID STIM HORMONE: CPT

## 2017-10-23 PROCEDURE — 96374 THER/PROPH/DIAG INJ IV PUSH: CPT

## 2017-10-23 PROCEDURE — 85610 PROTHROMBIN TIME: CPT

## 2017-10-23 PROCEDURE — 71020 XR CHEST STANDARD TWO VW: CPT

## 2017-10-23 PROCEDURE — 94664 DEMO&/EVAL PT USE INHALER: CPT

## 2017-10-23 PROCEDURE — 84484 ASSAY OF TROPONIN QUANT: CPT

## 2017-10-23 PROCEDURE — 78582 LUNG VENTILAT&PERFUS IMAGING: CPT

## 2017-10-23 PROCEDURE — 99285 EMERGENCY DEPT VISIT HI MDM: CPT

## 2017-10-23 PROCEDURE — 96375 TX/PRO/DX INJ NEW DRUG ADDON: CPT

## 2017-10-23 PROCEDURE — 83880 ASSAY OF NATRIURETIC PEPTIDE: CPT

## 2017-10-23 PROCEDURE — 96372 THER/PROPH/DIAG INJ SC/IM: CPT

## 2017-10-23 PROCEDURE — A9540 TC99M MAA: HCPCS | Performed by: EMERGENCY MEDICINE

## 2017-10-23 PROCEDURE — 85379 FIBRIN DEGRADATION QUANT: CPT

## 2017-10-23 PROCEDURE — 93005 ELECTROCARDIOGRAM TRACING: CPT

## 2017-10-23 PROCEDURE — A9539 TC99M PENTETATE: HCPCS | Performed by: EMERGENCY MEDICINE

## 2017-10-23 PROCEDURE — 2500000003 HC RX 250 WO HCPCS: Performed by: EMERGENCY MEDICINE

## 2017-10-23 PROCEDURE — 3430000000 HC RX DIAGNOSTIC RADIOPHARMACEUTICAL: Performed by: EMERGENCY MEDICINE

## 2017-10-23 PROCEDURE — 85730 THROMBOPLASTIN TIME PARTIAL: CPT

## 2017-10-23 PROCEDURE — 85025 COMPLETE CBC W/AUTO DIFF WBC: CPT

## 2017-10-23 RX ORDER — WARFARIN SODIUM 5 MG/1
5 TABLET ORAL ONCE
Status: COMPLETED | OUTPATIENT
Start: 2017-10-23 | End: 2017-10-23

## 2017-10-23 RX ORDER — FUROSEMIDE 20 MG/1
20 TABLET ORAL DAILY
Qty: 30 TABLET | Refills: 1 | Status: ON HOLD | OUTPATIENT
Start: 2017-10-23 | End: 2017-12-21

## 2017-10-23 RX ORDER — ACETAMINOPHEN 325 MG/1
650 TABLET ORAL EVERY 4 HOURS PRN
Status: DISCONTINUED | OUTPATIENT
Start: 2017-10-23 | End: 2017-10-23 | Stop reason: HOSPADM

## 2017-10-23 RX ORDER — DILTIAZEM HYDROCHLORIDE 5 MG/ML
20 INJECTION INTRAVENOUS ONCE
Status: COMPLETED | OUTPATIENT
Start: 2017-10-23 | End: 2017-10-23

## 2017-10-23 RX ORDER — FUROSEMIDE 10 MG/ML
60 INJECTION INTRAMUSCULAR; INTRAVENOUS ONCE
Status: COMPLETED | OUTPATIENT
Start: 2017-10-23 | End: 2017-10-23

## 2017-10-23 RX ORDER — MORPHINE SULFATE 2 MG/ML
2 INJECTION, SOLUTION INTRAMUSCULAR; INTRAVENOUS ONCE
Status: COMPLETED | OUTPATIENT
Start: 2017-10-23 | End: 2017-10-23

## 2017-10-23 RX ORDER — KIT FOR THE PREPARATION OF TECHNETIUM TC 99M PENTETATE 20 MG/1
38.6 INJECTION, POWDER, LYOPHILIZED, FOR SOLUTION INTRAVENOUS; RESPIRATORY (INHALATION)
Status: COMPLETED | OUTPATIENT
Start: 2017-10-23 | End: 2017-10-23

## 2017-10-23 RX ORDER — ALBUTEROL SULFATE 2.5 MG/3ML
2.5 SOLUTION RESPIRATORY (INHALATION) EVERY 6 HOURS PRN
Status: DISCONTINUED | OUTPATIENT
Start: 2017-10-23 | End: 2017-10-23 | Stop reason: HOSPADM

## 2017-10-23 RX ADMIN — ENOXAPARIN SODIUM 70 MG: 80 INJECTION SUBCUTANEOUS at 11:29

## 2017-10-23 RX ADMIN — MORPHINE SULFATE 2 MG: 2 INJECTION, SOLUTION INTRAMUSCULAR; INTRAVENOUS at 06:50

## 2017-10-23 RX ADMIN — ACETAMINOPHEN 650 MG: 325 TABLET, FILM COATED ORAL at 05:11

## 2017-10-23 RX ADMIN — FUROSEMIDE 60 MG: 10 INJECTION, SOLUTION INTRAMUSCULAR; INTRAVENOUS at 03:33

## 2017-10-23 RX ADMIN — Medication 5.3 MILLICURIE: at 08:16

## 2017-10-23 RX ADMIN — WARFARIN SODIUM 5 MG: 5 TABLET ORAL at 11:30

## 2017-10-23 RX ADMIN — DILTIAZEM HYDROCHLORIDE 20 MG: 5 INJECTION INTRAVENOUS at 07:09

## 2017-10-23 RX ADMIN — KIT FOR THE PREPARATION OF TECHNETIUM TC 99M PENTETATE 38.6 MILLICURIE: 20 INJECTION, POWDER, LYOPHILIZED, FOR SOLUTION INTRAVENOUS; RESPIRATORY (INHALATION) at 07:48

## 2017-10-23 RX ADMIN — ALBUTEROL SULFATE 2.5 MG: 2.5 SOLUTION RESPIRATORY (INHALATION) at 03:13

## 2017-10-23 ASSESSMENT — PAIN SCALES - GENERAL
PAINLEVEL_OUTOF10: 10
PAINLEVEL_OUTOF10: 8

## 2017-10-23 NOTE — ED PROVIDER NOTES
Denies headache, focal weakness or sensory changes   Endocrine:  Denies polyuria or polydypsia   Lymphatic:  Denies swollen glands   Psychiatric:  Denies depression, suicidal ideation or homicidal ideation   All systems negative except as marked. PHYSICAL EXAM    VITAL SIGNS: BP (!) 185/97   Pulse 91   Temp 98.9 °F (37.2 °C) (Tympanic)   Resp 18   SpO2 90%    Constitutional:  Well developed, Well nourished, No acute distress, Non-toxic appearance. HENT:  Normocephalic, Atraumatic, Bilateral external ears normal, Oropharynx moist, No oral exudates, Nose normal. Neck- Normal range of motion, No tenderness, Supple, No stridor. Eyes:  PERRL, EOMI, Conjunctiva normal, No discharge. Respiratory:  Fine bibasilar respiratory crackles are noted., No respiratory distress, No wheezing, No chest tenderness. Cardiovascular:  Normal heart rate, Normal rhythm, No murmurs, No rubs, No gallops. Cardiac pacer is noted. GI:  Bowel sounds normal, Soft, No tenderness, No masses, No pulsatile masses. : External genitalia appear normal, No masses or lesions. No discharge. No CVA tenderness. Musculoskeletal:  Intact distal pulses, +2/4 edema is noted of bilateral lower extremities. , No tenderness, No cyanosis, No clubbing. Good range of motion in all major joints. No tenderness to palpation or major deformities noted. Back- No tenderness. Integument:  Warm, Dry, No erythema, No rash. Patient does have ecchymosis of bilateral lower extremities. Lymphatic:  No lymphadenopathy noted. Neurologic:  Alert & oriented x 3, Normal motor function, Normal sensory function, No focal deficits noted. Psychiatric:  Affect normal, Judgment normal, Mood normal.     EKG    EKG shows paced rhythm. RADIOLOGY    Xr Chest Standard (2 Vw)    Result Date: 10/23/2017  FINAL REPORT PROCEDURE:  XR CHEST STANDARD TWO VW TECHNIQUE:  PA and lateral chest radiographs were obtained.  CPT 16650 HISTORY:  Dyspnea COMPARISON:  No prior Results were discussed with the patient and her family. The patient and family did consent to anticoagulant therapy. They understand the risks versus benefits including risk of significant bleed. The ventilation/perfusion scan is pending. Ventilation/perfusion scan shows low probability for PE. All test results were discussed with patient and her family members. Patient will be started on anticoagulant therapy. Patient be started on oral Lasix. Patient will stay with her daughter. Labs Reviewed   CBC WITH AUTO DIFFERENTIAL - Abnormal; Notable for the following:        Result Value    RBC 3.52 (*)     Hemoglobin 11.2 (*)     Hematocrit 34.6 (*)     All other components within normal limits   BASIC METABOLIC PANEL - Abnormal; Notable for the following:     Glucose 105 (*)     BUN 35 (*)     CREATININE 1.55 (*)     Bun/Cre Ratio 23 (*)     GFR Non- 31 (*)     GFR  38 (*)     All other components within normal limits   D-DIMER, QUANTITATIVE - Abnormal; Notable for the following:     D-Dimer, Quant 16.23 (*)     All other components within normal limits   BRAIN NATRIURETIC PEPTIDE - Abnormal; Notable for the following:     Pro-BNP 4,315 (*)     All other components within normal limits   TROPONIN   PROTIME-INR   APTT   URINALYSIS WITH MICROSCOPIC   TSH WITHOUT REFLEX   TROPONIN       Labs Reviewed - No data to display    FINAL IMPRESSION    No diagnosis found.            Rusty Gonzales, DO  10/23/17 546 Rodolfo Jimenezjory Janneth, DO  10/23/17 1022

## 2017-10-24 ENCOUNTER — ANTI-COAG VISIT (OUTPATIENT)
Dept: PHARMACY | Age: 82
End: 2017-10-24

## 2017-10-24 DIAGNOSIS — Z79.01 LONG-TERM (CURRENT) USE OF ANTICOAGULANTS: ICD-10-CM

## 2017-10-24 PROBLEM — I82.409 DVT (DEEP VENOUS THROMBOSIS) (HCC): Status: ACTIVE | Noted: 2017-10-24

## 2017-10-25 ENCOUNTER — HOSPITAL ENCOUNTER (OUTPATIENT)
Dept: PHARMACY | Age: 82
Setting detail: THERAPIES SERIES
Discharge: HOME | End: 2017-10-25
Payer: MEDICARE

## 2017-10-25 DIAGNOSIS — I82.401 ACUTE DEEP VEIN THROMBOSIS (DVT) OF RIGHT LOWER EXTREMITY, UNSPECIFIED VEIN (HCC): ICD-10-CM

## 2017-10-25 DIAGNOSIS — Z79.01 LONG-TERM (CURRENT) USE OF ANTICOAGULANTS: ICD-10-CM

## 2017-10-25 LAB — INR BLD: 1.7

## 2017-10-25 PROCEDURE — 99211 OFF/OP EST MAY X REQ PHY/QHP: CPT

## 2017-10-25 PROCEDURE — 85610 PROTHROMBIN TIME: CPT

## 2017-10-25 NOTE — PATIENT INSTRUCTIONS
Continue Lovenox daily. Take 2 mg today and 1 mg on Thursday. Monitor urine and stool. Return to clinic on Friday.

## 2017-10-27 ENCOUNTER — HOSPITAL ENCOUNTER (OUTPATIENT)
Dept: PHARMACY | Age: 82
Setting detail: THERAPIES SERIES
Discharge: HOME | End: 2017-10-27
Payer: MEDICARE

## 2017-10-27 VITALS — WEIGHT: 166 LBS | BODY MASS INDEX: 28.49 KG/M2

## 2017-10-27 DIAGNOSIS — I82.401 ACUTE DEEP VEIN THROMBOSIS (DVT) OF RIGHT LOWER EXTREMITY, UNSPECIFIED VEIN (HCC): ICD-10-CM

## 2017-10-27 DIAGNOSIS — Z79.01 LONG-TERM (CURRENT) USE OF ANTICOAGULANTS: ICD-10-CM

## 2017-10-27 LAB — INR BLD: 1.9

## 2017-10-27 PROCEDURE — 99211 OFF/OP EST MAY X REQ PHY/QHP: CPT

## 2017-10-27 PROCEDURE — 85610 PROTHROMBIN TIME: CPT

## 2017-10-27 NOTE — PROGRESS NOTES
Will have patient take 2 mg of warfarin today, Sat, & Sun. She will return to the clinic on Monday, 10/30, for an INR check. Patient states no visible blood in urine and no black tarry stool. No change in other medications.

## 2017-10-27 NOTE — PATIENT INSTRUCTIONS
Please take 2 mg of warfarin daily until your next appointment on Monday. Continue to monitor urine and stool. Continue to monitor for signs of bleeding.

## 2017-10-30 ENCOUNTER — HOSPITAL ENCOUNTER (OUTPATIENT)
Dept: PHARMACY | Age: 82
Setting detail: THERAPIES SERIES
Discharge: HOME | End: 2017-10-30
Payer: MEDICARE

## 2017-10-30 VITALS — SYSTOLIC BLOOD PRESSURE: 146 MMHG | HEART RATE: 79 BPM | DIASTOLIC BLOOD PRESSURE: 67 MMHG

## 2017-10-30 DIAGNOSIS — Z79.01 LONG-TERM (CURRENT) USE OF ANTICOAGULANTS: ICD-10-CM

## 2017-10-30 LAB — INR BLD: 2

## 2017-10-30 PROCEDURE — 85610 PROTHROMBIN TIME: CPT

## 2017-10-30 PROCEDURE — 99211 OFF/OP EST MAY X REQ PHY/QHP: CPT

## 2017-10-30 NOTE — PATIENT INSTRUCTIONS
Please take warfarin 2 mg daily until returning to clinic on Friday, 11/3/17 to recheck INR. Continue to monitor urine and stool for signs and symptoms of bleeding. Please notify the clinic of any medication changes. Please remember to bring all medications (both prescription and OTC) to your next visit. Kindly notify the clinic if you are unable to make to your next appointment.

## 2017-11-03 ENCOUNTER — HOSPITAL ENCOUNTER (OUTPATIENT)
Dept: PHARMACY | Age: 82
Setting detail: THERAPIES SERIES
Discharge: HOME | End: 2017-11-03
Payer: MEDICARE

## 2017-11-03 VITALS — SYSTOLIC BLOOD PRESSURE: 159 MMHG | DIASTOLIC BLOOD PRESSURE: 71 MMHG | HEART RATE: 71 BPM

## 2017-11-03 DIAGNOSIS — Z79.01 LONG-TERM (CURRENT) USE OF ANTICOAGULANTS: ICD-10-CM

## 2017-11-03 LAB — INR BLD: 1.7

## 2017-11-03 PROCEDURE — 85610 PROTHROMBIN TIME: CPT

## 2017-11-03 PROCEDURE — 99211 OFF/OP EST MAY X REQ PHY/QHP: CPT

## 2017-11-03 RX ORDER — WARFARIN SODIUM 2 MG/1
2.5 TABLET ORAL DAILY
COMMUNITY

## 2017-11-03 NOTE — PATIENT INSTRUCTIONS
Please take warfarin 4 mg today. Take warfarin 2 mg on Saturday, Sunday and Monday. Return to clinic on Tuesday, 11/7/17 at 1:15 pm.   Continue to monitor urine and stool for signs and symptoms of bleeding. Please notify the clinic of any medication changes. Please remember to bring all medications (both prescription and OTC) to your next visit. Kindly notify the clinic if you are unable to make to your next appointment.

## 2017-11-03 NOTE — PROGRESS NOTES
Fingerstick INR drawn per clinic protocol. Patient states no visible blood in urine and no black tarry stool. Denies any missed doses of warfarin. No change in other maintenance medications or in diet. INR 1.7 today. Patient will take 4 mg today, then 2 mg for the next 3 days and return to clinic on Tuesday, 11/7/17 to recheck INR.

## 2017-11-06 ENCOUNTER — HOSPITAL ENCOUNTER (OUTPATIENT)
Age: 82
Discharge: HOME | End: 2017-11-06
Payer: MEDICARE

## 2017-11-06 LAB
ABSOLUTE EOS #: 0.2 K/UL (ref 0–0.4)
ABSOLUTE IMMATURE GRANULOCYTE: ABNORMAL K/UL (ref 0–0.3)
ABSOLUTE LYMPH #: 1.3 K/UL (ref 1–4.8)
ABSOLUTE MONO #: 0.6 K/UL (ref 0–1)
ALBUMIN SERPL-MCNC: 3.8 G/DL (ref 3.5–5.2)
ALBUMIN/GLOBULIN RATIO: 1.2 (ref 1–2.5)
ALP BLD-CCNC: 51 U/L (ref 35–104)
ALT SERPL-CCNC: 9 U/L (ref 5–33)
ANION GAP SERPL CALCULATED.3IONS-SCNC: 12 MMOL/L (ref 9–17)
AST SERPL-CCNC: 13 U/L
BASOPHILS # BLD: 0 %
BASOPHILS ABSOLUTE: 0 K/UL (ref 0–0.2)
BILIRUB SERPL-MCNC: 0.41 MG/DL (ref 0.3–1.2)
BUN BLDV-MCNC: 48 MG/DL (ref 8–23)
BUN/CREAT BLD: 26 (ref 9–20)
CALCIUM SERPL-MCNC: 9.6 MG/DL (ref 8.6–10.4)
CHLORIDE BLD-SCNC: 102 MMOL/L (ref 98–107)
CO2: 28 MMOL/L (ref 20–31)
CREAT SERPL-MCNC: 1.88 MG/DL (ref 0.5–0.9)
DIFFERENTIAL TYPE: ABNORMAL
EOSINOPHILS RELATIVE PERCENT: 3 %
ESTIMATED AVERAGE GLUCOSE: 108 MG/DL
GFR AFRICAN AMERICAN: 30 ML/MIN
GFR NON-AFRICAN AMERICAN: 25 ML/MIN
GFR SERPL CREATININE-BSD FRML MDRD: ABNORMAL ML/MIN/{1.73_M2}
GFR SERPL CREATININE-BSD FRML MDRD: ABNORMAL ML/MIN/{1.73_M2}
GLUCOSE BLD-MCNC: 88 MG/DL (ref 70–99)
HBA1C MFR BLD: 5.4 % (ref 4.8–5.9)
HCT VFR BLD CALC: 36.3 % (ref 36–46)
HEMOGLOBIN: 11.8 G/DL (ref 12–16)
IMMATURE GRANULOCYTES: ABNORMAL %
LYMPHOCYTES # BLD: 18 %
MCH RBC QN AUTO: 31.8 PG (ref 26–34)
MCHC RBC AUTO-ENTMCNC: 32.5 G/DL (ref 31–37)
MCV RBC AUTO: 97.9 FL (ref 80–100)
MONOCYTES # BLD: 8 %
PDW BLD-RTO: 14.3 % (ref 12.1–15.2)
PLATELET # BLD: 265 K/UL (ref 140–450)
PLATELET ESTIMATE: ABNORMAL
PMV BLD AUTO: 8.3 FL (ref 6–12)
POTASSIUM SERPL-SCNC: 4.5 MMOL/L (ref 3.7–5.3)
RBC # BLD: 3.71 M/UL (ref 4–5.2)
RBC # BLD: ABNORMAL 10*6/UL
SEG NEUTROPHILS: 71 %
SEGMENTED NEUTROPHILS ABSOLUTE COUNT: 5 K/UL (ref 1.8–7.7)
SODIUM BLD-SCNC: 142 MMOL/L (ref 135–144)
TOTAL PROTEIN: 6.9 G/DL (ref 6.4–8.3)
WBC # BLD: 7.1 K/UL (ref 3.5–11)
WBC # BLD: ABNORMAL 10*3/UL

## 2017-11-06 PROCEDURE — 36415 COLL VENOUS BLD VENIPUNCTURE: CPT

## 2017-11-06 PROCEDURE — 85025 COMPLETE CBC W/AUTO DIFF WBC: CPT

## 2017-11-06 PROCEDURE — 80053 COMPREHEN METABOLIC PANEL: CPT

## 2017-11-06 PROCEDURE — 83036 HEMOGLOBIN GLYCOSYLATED A1C: CPT

## 2017-11-07 ENCOUNTER — HOSPITAL ENCOUNTER (OUTPATIENT)
Dept: PHARMACY | Age: 82
Setting detail: THERAPIES SERIES
Discharge: HOME | End: 2017-11-07
Payer: MEDICARE

## 2017-11-07 VITALS — SYSTOLIC BLOOD PRESSURE: 149 MMHG | DIASTOLIC BLOOD PRESSURE: 73 MMHG | HEART RATE: 85 BPM

## 2017-11-07 DIAGNOSIS — Z79.01 LONG-TERM (CURRENT) USE OF ANTICOAGULANTS: ICD-10-CM

## 2017-11-07 DIAGNOSIS — I82.4Y9 DEEP VEIN THROMBOSIS (DVT) OF PROXIMAL LOWER EXTREMITY, UNSPECIFIED CHRONICITY, UNSPECIFIED LATERALITY (HCC): ICD-10-CM

## 2017-11-07 LAB — INR BLD: 2.3

## 2017-11-07 PROCEDURE — 85610 PROTHROMBIN TIME: CPT

## 2017-11-07 PROCEDURE — 99211 OFF/OP EST MAY X REQ PHY/QHP: CPT

## 2017-11-07 NOTE — PROGRESS NOTES
Will have patient take 4 mg on Fri/ 2 mg all other days (16 mg/week). Patient states no visible blood in urine and no black tarry stool. No change in other medications. Will return to clinic in 1 week.

## 2017-11-07 NOTE — PATIENT INSTRUCTIONS
Continue to monitor urine and stool. Continue to monitor for signs of bleeding. Return to clinic in 1 week.

## 2017-11-14 ENCOUNTER — HOSPITAL ENCOUNTER (OUTPATIENT)
Dept: PHARMACY | Age: 82
Setting detail: THERAPIES SERIES
Discharge: HOME | End: 2017-11-14
Payer: MEDICARE

## 2017-11-14 VITALS
BODY MASS INDEX: 28.84 KG/M2 | HEART RATE: 77 BPM | WEIGHT: 168 LBS | DIASTOLIC BLOOD PRESSURE: 71 MMHG | SYSTOLIC BLOOD PRESSURE: 157 MMHG

## 2017-11-14 DIAGNOSIS — Z79.01 LONG-TERM (CURRENT) USE OF ANTICOAGULANTS: ICD-10-CM

## 2017-11-14 LAB — INR BLD: 3.1

## 2017-11-14 PROCEDURE — 85610 PROTHROMBIN TIME: CPT

## 2017-11-14 PROCEDURE — 99211 OFF/OP EST MAY X REQ PHY/QHP: CPT

## 2017-11-14 NOTE — PATIENT INSTRUCTIONS
Continue to monitor urine and stool. Continue to monitor for signs of bleeding. Return to clinic in 1 week. Take half tablet today (0.5 mg) then 2 mg daily except Fridays 3 mg as directed on calendar.

## 2017-11-14 NOTE — PROGRESS NOTES
Patient states no visible blood in urine and no black tarry stool. No change in other medications. Will return to clinic in 1 week. Patient will take warfarin 0.5 mg today then decrease slightly to 2 mg daily except Friday 3 mg. Called new RX into [facility] for warfarin 2 mg tablets take 1 tablet daily or as directed #90 with 3 refills per Dr Vaughn Muir.

## 2017-11-24 ENCOUNTER — HOSPITAL ENCOUNTER (OUTPATIENT)
Dept: PHARMACY | Age: 82
Setting detail: THERAPIES SERIES
Discharge: HOME | End: 2017-11-24
Payer: MEDICARE

## 2017-11-24 VITALS
SYSTOLIC BLOOD PRESSURE: 141 MMHG | HEART RATE: 80 BPM | WEIGHT: 168.8 LBS | BODY MASS INDEX: 28.97 KG/M2 | DIASTOLIC BLOOD PRESSURE: 80 MMHG

## 2017-11-24 DIAGNOSIS — Z79.01 LONG-TERM (CURRENT) USE OF ANTICOAGULANTS: ICD-10-CM

## 2017-11-24 DIAGNOSIS — I82.4Z1 ACUTE DEEP VEIN THROMBOSIS (DVT) OF DISTAL VEIN OF RIGHT LOWER EXTREMITY (HCC): ICD-10-CM

## 2017-11-24 LAB — INR BLD: 2.1

## 2017-11-24 PROCEDURE — 85610 PROTHROMBIN TIME: CPT | Performed by: FAMILY MEDICINE

## 2017-11-24 PROCEDURE — 99211 OFF/OP EST MAY X REQ PHY/QHP: CPT | Performed by: FAMILY MEDICINE

## 2017-11-24 NOTE — PROGRESS NOTES
Fingerstick INR drawn per clinic protocol. Patient states no visible blood in urine and no black tarry stool. Denies any missed doses of warfarin. No change in other maintenance medications or in diet. Will continue current warfarin regimen and recheck INR in 2 week(s). Patient has a check up with Dr Alexa Lee on 12/8.

## 2017-12-04 ENCOUNTER — HOSPITAL ENCOUNTER (OUTPATIENT)
Age: 82
Discharge: HOME | End: 2017-12-04
Payer: MEDICARE

## 2017-12-04 LAB
ABSOLUTE EOS #: 0.3 K/UL (ref 0–0.4)
ABSOLUTE IMMATURE GRANULOCYTE: ABNORMAL K/UL (ref 0–0.3)
ABSOLUTE LYMPH #: 1.6 K/UL (ref 1–4.8)
ABSOLUTE MONO #: 0.6 K/UL (ref 0–1)
ALBUMIN SERPL-MCNC: 3.7 G/DL (ref 3.5–5.2)
ALBUMIN/GLOBULIN RATIO: 1.1 (ref 1–2.5)
ALP BLD-CCNC: 56 U/L (ref 35–104)
ALT SERPL-CCNC: 8 U/L (ref 5–33)
ANION GAP SERPL CALCULATED.3IONS-SCNC: 10 MMOL/L (ref 9–17)
AST SERPL-CCNC: 13 U/L
BASOPHILS # BLD: 1 % (ref 0–2)
BASOPHILS ABSOLUTE: 0 K/UL (ref 0–0.2)
BILIRUB SERPL-MCNC: 0.42 MG/DL (ref 0.3–1.2)
BUN BLDV-MCNC: 45 MG/DL (ref 8–23)
BUN/CREAT BLD: 27 (ref 9–20)
CALCIUM SERPL-MCNC: 9.6 MG/DL (ref 8.6–10.4)
CHLORIDE BLD-SCNC: 101 MMOL/L (ref 98–107)
CO2: 29 MMOL/L (ref 20–31)
CREAT SERPL-MCNC: 1.67 MG/DL (ref 0.5–0.9)
DIFFERENTIAL TYPE: ABNORMAL
EOSINOPHILS RELATIVE PERCENT: 4 % (ref 0–8)
GFR AFRICAN AMERICAN: 35 ML/MIN
GFR NON-AFRICAN AMERICAN: 29 ML/MIN
GFR SERPL CREATININE-BSD FRML MDRD: ABNORMAL ML/MIN/{1.73_M2}
GFR SERPL CREATININE-BSD FRML MDRD: ABNORMAL ML/MIN/{1.73_M2}
GLUCOSE BLD-MCNC: 83 MG/DL (ref 70–99)
HCT VFR BLD CALC: 38.6 % (ref 36–46)
HEMOGLOBIN: 12.5 G/DL (ref 12–16)
IMMATURE GRANULOCYTES: ABNORMAL %
LYMPHOCYTES # BLD: 22 % (ref 24–44)
MCH RBC QN AUTO: 31.6 PG (ref 26–34)
MCHC RBC AUTO-ENTMCNC: 32.3 G/DL (ref 31–37)
MCV RBC AUTO: 97.7 FL (ref 80–100)
MONOCYTES # BLD: 8 % (ref 0–12)
PDW BLD-RTO: 14.5 % (ref 12.1–15.2)
PLATELET # BLD: 272 K/UL (ref 140–450)
PLATELET ESTIMATE: ABNORMAL
PMV BLD AUTO: 8.6 FL (ref 6–12)
POTASSIUM SERPL-SCNC: 4.4 MMOL/L (ref 3.7–5.3)
RBC # BLD: 3.95 M/UL (ref 4–5.2)
RBC # BLD: ABNORMAL 10*6/UL
SEG NEUTROPHILS: 65 % (ref 36–66)
SEGMENTED NEUTROPHILS ABSOLUTE COUNT: 5 K/UL (ref 1.8–7.7)
SODIUM BLD-SCNC: 140 MMOL/L (ref 135–144)
TOTAL PROTEIN: 7.1 G/DL (ref 6.4–8.3)
WBC # BLD: 7.6 K/UL (ref 3.5–11)
WBC # BLD: ABNORMAL 10*3/UL

## 2017-12-04 PROCEDURE — 36415 COLL VENOUS BLD VENIPUNCTURE: CPT

## 2017-12-04 PROCEDURE — 85025 COMPLETE CBC W/AUTO DIFF WBC: CPT

## 2017-12-04 PROCEDURE — 80053 COMPREHEN METABOLIC PANEL: CPT

## 2017-12-12 ENCOUNTER — HOSPITAL ENCOUNTER (OUTPATIENT)
Dept: PHARMACY | Age: 82
Setting detail: THERAPIES SERIES
Discharge: HOME | End: 2017-12-12
Payer: MEDICARE

## 2017-12-12 VITALS
HEART RATE: 90 BPM | WEIGHT: 173 LBS | DIASTOLIC BLOOD PRESSURE: 78 MMHG | SYSTOLIC BLOOD PRESSURE: 151 MMHG | BODY MASS INDEX: 29.7 KG/M2

## 2017-12-12 DIAGNOSIS — Z79.01 LONG-TERM (CURRENT) USE OF ANTICOAGULANTS: ICD-10-CM

## 2017-12-12 LAB — INR BLD: 2.9

## 2017-12-12 PROCEDURE — 85610 PROTHROMBIN TIME: CPT

## 2017-12-12 PROCEDURE — 99211 OFF/OP EST MAY X REQ PHY/QHP: CPT

## 2017-12-12 NOTE — PATIENT INSTRUCTIONS
Continue to monitor urine and stool. Continue to monitor for signs of bleeding. Return to clinic in 3 weeks. Take warfarin 1 mg today.

## 2017-12-17 ENCOUNTER — HOSPITAL ENCOUNTER (INPATIENT)
Age: 82
LOS: 4 days | Discharge: NURSING, REHAB OR LONG-TERM CARE | DRG: 280 | End: 2017-12-21
Attending: FAMILY MEDICINE | Admitting: INTERNAL MEDICINE
Payer: MEDICARE

## 2017-12-17 ENCOUNTER — APPOINTMENT (OUTPATIENT)
Dept: GENERAL RADIOLOGY | Age: 82
DRG: 280 | End: 2017-12-17
Payer: MEDICARE

## 2017-12-17 DIAGNOSIS — I50.43 ACUTE ON CHRONIC COMBINED SYSTOLIC AND DIASTOLIC CONGESTIVE HEART FAILURE (HCC): Primary | ICD-10-CM

## 2017-12-17 DIAGNOSIS — R09.02 HYPOXIA: ICD-10-CM

## 2017-12-17 DIAGNOSIS — N18.4 CKD (CHRONIC KIDNEY DISEASE) STAGE 4, GFR 15-29 ML/MIN (HCC): Chronic | ICD-10-CM

## 2017-12-17 LAB
ABSOLUTE EOS #: 0.2 K/UL (ref 0–0.4)
ABSOLUTE IMMATURE GRANULOCYTE: ABNORMAL K/UL (ref 0–0.3)
ABSOLUTE LYMPH #: 1.2 K/UL (ref 1–4.8)
ABSOLUTE MONO #: 0.8 K/UL (ref 0–1)
ANION GAP SERPL CALCULATED.3IONS-SCNC: 10 MMOL/L (ref 9–17)
BASOPHILS # BLD: 1 % (ref 0–2)
BASOPHILS ABSOLUTE: 0.1 K/UL (ref 0–0.2)
BNP INTERPRETATION: ABNORMAL
BUN BLDV-MCNC: 34 MG/DL (ref 8–23)
BUN/CREAT BLD: 19 (ref 9–20)
CALCIUM SERPL-MCNC: 8.9 MG/DL (ref 8.6–10.4)
CHLORIDE BLD-SCNC: 99 MMOL/L (ref 98–107)
CO2: 29 MMOL/L (ref 20–31)
CREAT SERPL-MCNC: 1.76 MG/DL (ref 0.5–0.9)
DIFFERENTIAL TYPE: ABNORMAL
EKG ATRIAL RATE: 94 BPM
EKG P AXIS: 52 DEGREES
EKG P-R INTERVAL: 214 MS
EKG Q-T INTERVAL: 384 MS
EKG QRS DURATION: 144 MS
EKG QTC CALCULATION (BAZETT): 480 MS
EKG R AXIS: -26 DEGREES
EKG T AXIS: 136 DEGREES
EKG VENTRICULAR RATE: 94 BPM
EOSINOPHILS RELATIVE PERCENT: 3 % (ref 0–8)
GFR AFRICAN AMERICAN: 33 ML/MIN
GFR NON-AFRICAN AMERICAN: 27 ML/MIN
GFR SERPL CREATININE-BSD FRML MDRD: ABNORMAL ML/MIN/{1.73_M2}
GFR SERPL CREATININE-BSD FRML MDRD: ABNORMAL ML/MIN/{1.73_M2}
GLUCOSE BLD-MCNC: 129 MG/DL (ref 70–99)
HCT VFR BLD CALC: 37.7 % (ref 36–46)
HEMOGLOBIN: 12.2 G/DL (ref 12–16)
IMMATURE GRANULOCYTES: ABNORMAL %
INR BLD: 2.8 (ref 0.9–1.2)
LYMPHOCYTES # BLD: 14 % (ref 24–44)
MCH RBC QN AUTO: 31.5 PG (ref 26–34)
MCHC RBC AUTO-ENTMCNC: 32.4 G/DL (ref 31–37)
MCV RBC AUTO: 97.3 FL (ref 80–100)
MONOCYTES # BLD: 9 % (ref 0–12)
PDW BLD-RTO: 14.9 % (ref 12.1–15.2)
PLATELET # BLD: 217 K/UL (ref 140–450)
PLATELET ESTIMATE: ABNORMAL
PMV BLD AUTO: 8.1 FL (ref 6–12)
POTASSIUM SERPL-SCNC: 4.5 MMOL/L (ref 3.7–5.3)
PRO-BNP: 8318 PG/ML
PROTHROMBIN TIME: 30.9 SEC (ref 9.7–12.2)
RBC # BLD: 3.87 M/UL (ref 4–5.2)
RBC # BLD: ABNORMAL 10*6/UL
SEG NEUTROPHILS: 73 % (ref 36–66)
SEGMENTED NEUTROPHILS ABSOLUTE COUNT: 6.3 K/UL (ref 1.8–7.7)
SODIUM BLD-SCNC: 138 MMOL/L (ref 135–144)
TROPONIN INTERP: NORMAL
TROPONIN T: <0.03 NG/ML
WBC # BLD: 8.6 K/UL (ref 3.5–11)
WBC # BLD: ABNORMAL 10*3/UL

## 2017-12-17 PROCEDURE — 99285 EMERGENCY DEPT VISIT HI MDM: CPT

## 2017-12-17 PROCEDURE — 84484 ASSAY OF TROPONIN QUANT: CPT

## 2017-12-17 PROCEDURE — 6360000002 HC RX W HCPCS: Performed by: INTERNAL MEDICINE

## 2017-12-17 PROCEDURE — 83880 ASSAY OF NATRIURETIC PEPTIDE: CPT

## 2017-12-17 PROCEDURE — 6370000000 HC RX 637 (ALT 250 FOR IP): Performed by: INTERNAL MEDICINE

## 2017-12-17 PROCEDURE — 1200000000 HC SEMI PRIVATE

## 2017-12-17 PROCEDURE — 80048 BASIC METABOLIC PNL TOTAL CA: CPT

## 2017-12-17 PROCEDURE — 85025 COMPLETE CBC W/AUTO DIFF WBC: CPT

## 2017-12-17 PROCEDURE — 96374 THER/PROPH/DIAG INJ IV PUSH: CPT

## 2017-12-17 PROCEDURE — 93005 ELECTROCARDIOGRAM TRACING: CPT

## 2017-12-17 PROCEDURE — 2580000003 HC RX 258: Performed by: INTERNAL MEDICINE

## 2017-12-17 PROCEDURE — 85610 PROTHROMBIN TIME: CPT

## 2017-12-17 PROCEDURE — 6360000002 HC RX W HCPCS: Performed by: FAMILY MEDICINE

## 2017-12-17 PROCEDURE — 71010 XR CHEST LIMITED ONE VIEW: CPT

## 2017-12-17 RX ORDER — POTASSIUM CHLORIDE 20MEQ/15ML
40 LIQUID (ML) ORAL PRN
Status: DISCONTINUED | OUTPATIENT
Start: 2017-12-17 | End: 2017-12-21 | Stop reason: HOSPADM

## 2017-12-17 RX ORDER — SODIUM CHLORIDE 0.9 % (FLUSH) 0.9 %
10 SYRINGE (ML) INJECTION PRN
Status: DISCONTINUED | OUTPATIENT
Start: 2017-12-17 | End: 2017-12-21 | Stop reason: HOSPADM

## 2017-12-17 RX ORDER — TRAMADOL HYDROCHLORIDE 50 MG/1
50 TABLET ORAL EVERY 6 HOURS PRN
Status: DISCONTINUED | OUTPATIENT
Start: 2017-12-17 | End: 2017-12-21 | Stop reason: HOSPADM

## 2017-12-17 RX ORDER — FUROSEMIDE 10 MG/ML
40 INJECTION INTRAMUSCULAR; INTRAVENOUS 2 TIMES DAILY
Status: DISCONTINUED | OUTPATIENT
Start: 2017-12-17 | End: 2017-12-21

## 2017-12-17 RX ORDER — ACETAMINOPHEN 325 MG/1
650 TABLET ORAL EVERY 4 HOURS PRN
Status: DISCONTINUED | OUTPATIENT
Start: 2017-12-17 | End: 2017-12-21 | Stop reason: HOSPADM

## 2017-12-17 RX ORDER — POTASSIUM CHLORIDE 20 MEQ/1
40 TABLET, EXTENDED RELEASE ORAL PRN
Status: DISCONTINUED | OUTPATIENT
Start: 2017-12-17 | End: 2017-12-21 | Stop reason: HOSPADM

## 2017-12-17 RX ORDER — ASPIRIN 81 MG/1
81 TABLET, CHEWABLE ORAL EVERY OTHER DAY
Status: DISCONTINUED | OUTPATIENT
Start: 2017-12-17 | End: 2017-12-21 | Stop reason: HOSPADM

## 2017-12-17 RX ORDER — SIMVASTATIN 10 MG
5 TABLET ORAL NIGHTLY
Status: DISCONTINUED | OUTPATIENT
Start: 2017-12-17 | End: 2017-12-21 | Stop reason: HOSPADM

## 2017-12-17 RX ORDER — ONDANSETRON 2 MG/ML
4 INJECTION INTRAMUSCULAR; INTRAVENOUS EVERY 6 HOURS PRN
Status: DISCONTINUED | OUTPATIENT
Start: 2017-12-17 | End: 2017-12-21 | Stop reason: HOSPADM

## 2017-12-17 RX ORDER — DILTIAZEM HYDROCHLORIDE 120 MG/1
120 CAPSULE, COATED, EXTENDED RELEASE ORAL DAILY
Status: DISCONTINUED | OUTPATIENT
Start: 2017-12-17 | End: 2017-12-21 | Stop reason: HOSPADM

## 2017-12-17 RX ORDER — LEVOTHYROXINE SODIUM 0.05 MG/1
50 TABLET ORAL DAILY
Status: DISCONTINUED | OUTPATIENT
Start: 2017-12-17 | End: 2017-12-21 | Stop reason: HOSPADM

## 2017-12-17 RX ORDER — CARVEDILOL 12.5 MG/1
12.5 TABLET ORAL 2 TIMES DAILY
Status: DISCONTINUED | OUTPATIENT
Start: 2017-12-17 | End: 2017-12-21 | Stop reason: HOSPADM

## 2017-12-17 RX ORDER — MAGNESIUM SULFATE 1 G/100ML
1 INJECTION INTRAVENOUS PRN
Status: DISCONTINUED | OUTPATIENT
Start: 2017-12-17 | End: 2017-12-19

## 2017-12-17 RX ORDER — FLUDROCORTISONE ACETATE 0.1 MG/1
0.1 TABLET ORAL DAILY
Status: ON HOLD | COMMUNITY
End: 2017-12-21 | Stop reason: HOSPADM

## 2017-12-17 RX ORDER — DIPHENHYDRAMINE HCL 25 MG
25 CAPSULE ORAL EVERY 6 HOURS PRN
COMMUNITY

## 2017-12-17 RX ORDER — WARFARIN SODIUM 2 MG/1
2 TABLET ORAL DAILY
Status: DISCONTINUED | OUTPATIENT
Start: 2017-12-17 | End: 2017-12-17

## 2017-12-17 RX ORDER — SODIUM CHLORIDE 0.9 % (FLUSH) 0.9 %
10 SYRINGE (ML) INJECTION EVERY 12 HOURS SCHEDULED
Status: DISCONTINUED | OUTPATIENT
Start: 2017-12-17 | End: 2017-12-21 | Stop reason: HOSPADM

## 2017-12-17 RX ORDER — REPAGLINIDE 0.5 MG/1
0.5 TABLET ORAL
Status: DISCONTINUED | OUTPATIENT
Start: 2017-12-17 | End: 2017-12-21 | Stop reason: HOSPADM

## 2017-12-17 RX ORDER — GABAPENTIN 300 MG/1
300 CAPSULE ORAL NIGHTLY
Status: DISCONTINUED | OUTPATIENT
Start: 2017-12-17 | End: 2017-12-21 | Stop reason: HOSPADM

## 2017-12-17 RX ORDER — FAMOTIDINE 20 MG/1
20 TABLET, FILM COATED ORAL DAILY
Status: DISCONTINUED | OUTPATIENT
Start: 2017-12-17 | End: 2017-12-21 | Stop reason: HOSPADM

## 2017-12-17 RX ORDER — WARFARIN SODIUM 2 MG/1
2 TABLET ORAL
Status: COMPLETED | OUTPATIENT
Start: 2017-12-17 | End: 2017-12-17

## 2017-12-17 RX ORDER — POLYETHYLENE GLYCOL 3350 17 G/17G
17 POWDER, FOR SOLUTION ORAL DAILY
COMMUNITY

## 2017-12-17 RX ORDER — FUROSEMIDE 10 MG/ML
40 INJECTION INTRAMUSCULAR; INTRAVENOUS ONCE
Status: COMPLETED | OUTPATIENT
Start: 2017-12-17 | End: 2017-12-17

## 2017-12-17 RX ORDER — FAMOTIDINE 20 MG/1
20 TABLET, FILM COATED ORAL 2 TIMES DAILY
Status: DISCONTINUED | OUTPATIENT
Start: 2017-12-17 | End: 2017-12-17

## 2017-12-17 RX ORDER — GUAIFENESIN/DEXTROMETHORPHAN 100-10MG/5
10 SYRUP ORAL EVERY 4 HOURS PRN
Status: DISCONTINUED | OUTPATIENT
Start: 2017-12-17 | End: 2017-12-21 | Stop reason: HOSPADM

## 2017-12-17 RX ORDER — POTASSIUM CHLORIDE 7.45 MG/ML
10 INJECTION INTRAVENOUS PRN
Status: DISCONTINUED | OUTPATIENT
Start: 2017-12-17 | End: 2017-12-21 | Stop reason: HOSPADM

## 2017-12-17 RX ADMIN — REPAGLINIDE 0.5 MG: 0.5 TABLET ORAL at 17:00

## 2017-12-17 RX ADMIN — DILTIAZEM HYDROCHLORIDE 120 MG: 120 CAPSULE, EXTENDED RELEASE ORAL at 13:56

## 2017-12-17 RX ADMIN — FAMOTIDINE 20 MG: 20 TABLET ORAL at 13:00

## 2017-12-17 RX ADMIN — GABAPENTIN 300 MG: 300 CAPSULE ORAL at 20:51

## 2017-12-17 RX ADMIN — CARVEDILOL 12.5 MG: 12.5 TABLET, FILM COATED ORAL at 20:51

## 2017-12-17 RX ADMIN — LEVOTHYROXINE SODIUM 50 MCG: 0.05 TABLET ORAL at 13:00

## 2017-12-17 RX ADMIN — GUAIFENESIN AND DEXTROMETHORPHAN 10 ML: 100; 10 SYRUP ORAL at 21:06

## 2017-12-17 RX ADMIN — FUROSEMIDE 40 MG: 10 INJECTION, SOLUTION INTRAMUSCULAR; INTRAVENOUS at 20:51

## 2017-12-17 RX ADMIN — WARFARIN SODIUM 2 MG: 2 TABLET ORAL at 17:59

## 2017-12-17 RX ADMIN — CARVEDILOL 12.5 MG: 12.5 TABLET, FILM COATED ORAL at 13:00

## 2017-12-17 RX ADMIN — FUROSEMIDE 40 MG: 10 INJECTION, SOLUTION INTRAMUSCULAR; INTRAVENOUS at 13:00

## 2017-12-17 RX ADMIN — ASPIRIN 81 MG 81 MG: 81 TABLET ORAL at 13:00

## 2017-12-17 RX ADMIN — SIMVASTATIN 5 MG: 10 TABLET, FILM COATED ORAL at 20:51

## 2017-12-17 RX ADMIN — FUROSEMIDE 40 MG: 10 INJECTION, SOLUTION INTRAMUSCULAR; INTRAVENOUS at 10:59

## 2017-12-17 RX ADMIN — Medication 10 ML: at 20:51

## 2017-12-17 ASSESSMENT — PAIN SCALES - GENERAL
PAINLEVEL_OUTOF10: 0

## 2017-12-17 NOTE — PROGRESS NOTES
Pt's daughter spoke with writer and indicated pt \"may not completely understand don not resuscitate. \" Stiven Graves spoke with pt at this time, explaining [facility] and DNRCC-A and actions that will be implemented if/when pt would arrest. Pt states \"I think I better not have this (DNRCC-A band) right now, it's not what my daughters want and I want to things done if I can make it through it. \" Pt is ask if she is choosing to resend [facility] and be and full code and she answers \"Yes, that's what I want right now. I'll talk with my daughters and if I decide to change it, I'll let you know. \" DRNCC-A band removed. Code status at this time is FULL CODE.

## 2017-12-17 NOTE — ED PROVIDER NOTES
[facility]  eMERGENCY dEPARTMENT eNCOUnter          [address] [facility]       Chief Complaint   Patient presents with    Shortness of Breath       Nurses Notes reviewed and I agree except as noted in the HPI. HISTORY OF PRESENT ILLNESS    Bert Finch is a 80 y.o. female who presents To the emergency room with complaints of shortness of breath, patient states that she thought she was having some sinus issues, but became increasing short of breath this morning. Patient denies any associated chest pain denies any nausea vomiting. Patient states she did have difficulty putting on her compression hose on her lower extremities today Was from her bending forward. Denies any increase in abdominal girth or difficulty putting on her pants. Denies pain. Nothing has helped her symptoms. REVIEW OF SYSTEMS     Review of Systems   All other systems reviewed and are negative. PAST MEDICAL HISTORY    has a past medical history of Anxiety; Asthma; Cardiac pacemaker; CHF (congestive heart failure) ([address] CHF (congestive heart failure) ([address] Chronic kidney disease; Diabetes mellitus ([facility] DVT (deep venous thrombosis) ([facility] [address] Gout; Heart disease; Hyperlipidemia; Hypertension; Hypothyroidism; Mobitz type 2 second degree heart block; and Syncope and collapse. SURGICAL HISTORY      has a past surgical history that includes Ovary removal (Right); Breast surgery (1965); Breast biopsy (Bilateral, 1980); eye surgery; Cardiac pacemaker placement (06/29/2017); and Pacemaker insertion (06/29/2017).     CURRENT MEDICATIONS       Previous Medications    ACETAMINOPHEN (TYLENOL) 325 MG TABLET    Take 2 tablets by mouth every 4 hours as needed for Pain    ASPIRIN 81 MG TABLET    Take 81 mg by mouth every other day    CARVEDILOL (COREG) 6.25 MG TABLET    Take 2 tablets by mouth 2 times daily    DILTIAZEM (CARDIZEM CD) 120 MG EXTENDED RELEASE CAPSULE    Take 1 capsule by mouth daily DIPHENHYDRAMINE (BENADRYL) 25 MG CAPSULE    Take 25 mg by mouth every 6 hours as needed for Itching    FUROSEMIDE (LASIX) 20 MG TABLET    Take 1 tablet by mouth daily    GABAPENTIN (NEURONTIN) 100 MG CAPSULE    Take 300 mg by mouth nightly     LEVOTHYROXINE (SYNTHROID) 50 MCG TABLET    Take 50 mcg by mouth daily Takes 1 tablet daily except for 2 tabs on     REPAGLINIDE (PRANDIN) 0.5 MG TABLET    Take 0.5 mg by mouth 2 times daily (before meals)    SIMVASTATIN (ZOCOR) 5 MG TABLET    Take 5 mg by mouth nightly    TRAMADOL (ULTRAM) 50 MG TABLET    Take 50 mg by mouth every 6 hours as needed for Pain     WARFARIN (COUMADIN) 2 MG TABLET    Take 2 mg by mouth daily       ALLERGIES     is allergic to norco [hydrocodone-acetaminophen]. FAMILY HISTORY     indicated that her mother is . She indicated that her father is . family history includes Heart Attack in her father; Heart Disease in her father and mother. SOCIAL HISTORY      reports that she has never smoked. She has never used smokeless tobacco. She reports that she drinks about 0.6 oz of alcohol per week . She reports that she does not use drugs. PHYSICAL EXAM     INITIAL VITALS:  height is 5' 4\" (1.626 m) and weight is 166 lb (75.3 kg). Her tympanic temperature is 99.3 °F (37.4 °C). Her blood pressure is 181/101 (abnormal) and her pulse is 90. Her respiration is 22 and oxygen saturation is 95%. Physical Exam   Constitutional: Patient is oriented to person, place, and time. Patient appears well-developed and well-nourished. Patient is active and cooperative. Non-toxic appearance. Patient does not have a sickly appearance. HENT:   Head: Normocephalic and atraumatic. Head is without contusion. Right Ear: Hearing and external ear normal. No drainage. Left Ear: Hearing and external ear normal. No drainage. Nose: Nose normal. No nasal deformity. No epistaxis. Mouth/Throat: Mucous membranes are slight dry. Eyes: EOMI. Electronically Signed By: Vikki Weller   on  12/17/2017  10:13          LABS:   Labs Reviewed   CBC WITH AUTO DIFFERENTIAL - Abnormal; Notable for the following:        Result Value    RBC 3.87 (*)     Seg Neutrophils 73 (*)     Lymphocytes 14 (*)     All other components within normal limits   BASIC METABOLIC PANEL - Abnormal; Notable for the following:     Glucose 129 (*)     BUN 34 (*)     CREATININE 1.76 (*)     GFR Non- 27 (*)     GFR  33 (*)     All other components within normal limits   BRAIN NATRIURETIC PEPTIDE - Abnormal; Notable for the following:     Pro-BNP 8,318 (*)     All other components within normal limits   PROTIME-INR - Abnormal; Notable for the following:     Protime 30.9 (*)     INR 2.8 (*)     All other components within normal limits   TROPONIN       EMERGENCY DEPARTMENT COURSE:   Vitals:    Vitals:    12/17/17 1102 12/17/17 1116 12/17/17 1117 12/17/17 1132   BP: (!) 179/106  (!) 175/112 (!) 181/101   Pulse: 93  92 90   Resp: 14 24  22   Temp:       TempSrc:       SpO2: 95%  96% 95%   Weight:       Height:         Patient history physical exam taken at bedside, upon patient presented to room, was noted to be hypoxic on room air despite having been wheeled back to room, although with good waveform pulse oximetry showed 79%. Patient was placed on O2 via nasal cannula with significant improvement to the 90s. Initial orders placed including EKG chest x-ray and blood work, patient placed on cardiac monitor.   Patient resting high semi-Fowlers in bed with family at bedside    EKG as above    Chest x-ray reviewed    Lab workup reviewed    After noting patient's serum creatinine, Lasix 40 mg IV ordered    Discussed with patient and patient's family concern for exacerbation of CHF, at this time no gross findings for pneumonia, did express concern over patient's low pulse oximetry on presentation noted patient does not wear home O2, discussed need for admission at this time, acknowledged    Case was discussed with Dr. Stephanie Ledbetter, hospitalist, regarding patient's presentation and current workup, accepted for admission    FINAL IMPRESSION      1. Acute on chronic combined systolic and diastolic congestive heart failure ([facility] [address]    2. Hypoxia          DISPOSITION/PLAN   Admit    PATIENT REFERRED TO:  Agapito Bautista  [address] [facility] [address]  [phone]            DISCHARGE MEDICATIONS:  New Prescriptions    No medications on file           Summation      Patient Course:  Admit    ED Medications administered this visit:    Medications   furosemide (LASIX) injection 40 mg (40 mg Intravenous Given 12/17/17 1059)       New Prescriptions from this visit:    New Prescriptions    No medications on file       Follow-up:  Agapito Bautista  [address]  [phone]              Final Impression:   1. Acute on chronic combined systolic and diastolic congestive heart failure ([facility] [address]    2.  Hypoxia               (Please note that portions of this note were completed with a voice recognition program.  Efforts were made to edit the dictations but occasionally words are mis-transcribed.)    MD Osiris Cunningham MD  12/17/17 4070

## 2017-12-18 ENCOUNTER — APPOINTMENT (OUTPATIENT)
Dept: NON INVASIVE DIAGNOSTICS | Age: 82
DRG: 280 | End: 2017-12-18
Payer: MEDICARE

## 2017-12-18 PROBLEM — I42.9 IDIOPATHIC CARDIOMYOPATHY (HCC): Status: ACTIVE | Noted: 2017-12-18

## 2017-12-18 PROBLEM — R09.02 HYPOXIA: Status: ACTIVE | Noted: 2017-12-18

## 2017-12-18 PROBLEM — I21.9 MYOCARDIAL INFARCTION LESS THAN 4 WEEKS AGO (HCC): Status: ACTIVE | Noted: 2017-12-18

## 2017-12-18 LAB
ANION GAP SERPL CALCULATED.3IONS-SCNC: 13 MMOL/L (ref 9–17)
BUN BLDV-MCNC: 35 MG/DL (ref 8–23)
BUN/CREAT BLD: 18 (ref 9–20)
CALCIUM SERPL-MCNC: 8.8 MG/DL (ref 8.6–10.4)
CHLORIDE BLD-SCNC: 97 MMOL/L (ref 98–107)
CO2: 31 MMOL/L (ref 20–31)
CREAT SERPL-MCNC: 1.97 MG/DL (ref 0.5–0.9)
GFR AFRICAN AMERICAN: 29 ML/MIN
GFR NON-AFRICAN AMERICAN: 24 ML/MIN
GFR SERPL CREATININE-BSD FRML MDRD: ABNORMAL ML/MIN/{1.73_M2}
GFR SERPL CREATININE-BSD FRML MDRD: ABNORMAL ML/MIN/{1.73_M2}
GLUCOSE BLD-MCNC: 90 MG/DL (ref 70–99)
HCT VFR BLD CALC: 35.1 % (ref 36–46)
HEMOGLOBIN: 11.2 G/DL (ref 12–16)
INR BLD: 2.7 (ref 0.9–1.2)
LV EF: 40 %
LVEF MODALITY: NORMAL
MCH RBC QN AUTO: 31.2 PG (ref 26–34)
MCHC RBC AUTO-ENTMCNC: 31.9 G/DL (ref 31–37)
MCV RBC AUTO: 97.8 FL (ref 80–100)
PDW BLD-RTO: 15.2 % (ref 12.1–15.2)
PLATELET # BLD: 186 K/UL (ref 140–450)
PMV BLD AUTO: 8.9 FL (ref 6–12)
POTASSIUM SERPL-SCNC: 4.3 MMOL/L (ref 3.7–5.3)
PROTHROMBIN TIME: 30.4 SEC (ref 9.7–12.2)
RBC # BLD: 3.59 M/UL (ref 4–5.2)
SODIUM BLD-SCNC: 141 MMOL/L (ref 135–144)
WBC # BLD: 6.3 K/UL (ref 3.5–11)

## 2017-12-18 PROCEDURE — G8979 MOBILITY GOAL STATUS: HCPCS

## 2017-12-18 PROCEDURE — 6360000002 HC RX W HCPCS: Performed by: INTERNAL MEDICINE

## 2017-12-18 PROCEDURE — 97110 THERAPEUTIC EXERCISES: CPT

## 2017-12-18 PROCEDURE — 6370000000 HC RX 637 (ALT 250 FOR IP): Performed by: INTERNAL MEDICINE

## 2017-12-18 PROCEDURE — 97116 GAIT TRAINING THERAPY: CPT

## 2017-12-18 PROCEDURE — 36415 COLL VENOUS BLD VENIPUNCTURE: CPT

## 2017-12-18 PROCEDURE — 85610 PROTHROMBIN TIME: CPT

## 2017-12-18 PROCEDURE — 97162 PT EVAL MOD COMPLEX 30 MIN: CPT

## 2017-12-18 PROCEDURE — 80048 BASIC METABOLIC PNL TOTAL CA: CPT

## 2017-12-18 PROCEDURE — 97530 THERAPEUTIC ACTIVITIES: CPT

## 2017-12-18 PROCEDURE — 85027 COMPLETE CBC AUTOMATED: CPT

## 2017-12-18 PROCEDURE — 93306 TTE W/DOPPLER COMPLETE: CPT

## 2017-12-18 PROCEDURE — 1200000000 HC SEMI PRIVATE

## 2017-12-18 PROCEDURE — 99223 1ST HOSP IP/OBS HIGH 75: CPT | Performed by: FAMILY MEDICINE

## 2017-12-18 PROCEDURE — G8978 MOBILITY CURRENT STATUS: HCPCS

## 2017-12-18 PROCEDURE — 2580000003 HC RX 258: Performed by: INTERNAL MEDICINE

## 2017-12-18 RX ORDER — WARFARIN SODIUM 2 MG/1
2 TABLET ORAL
Status: COMPLETED | OUTPATIENT
Start: 2017-12-18 | End: 2017-12-18

## 2017-12-18 RX ADMIN — FUROSEMIDE 40 MG: 10 INJECTION, SOLUTION INTRAMUSCULAR; INTRAVENOUS at 20:21

## 2017-12-18 RX ADMIN — GABAPENTIN 300 MG: 300 CAPSULE ORAL at 20:20

## 2017-12-18 RX ADMIN — SIMVASTATIN 5 MG: 10 TABLET, FILM COATED ORAL at 20:21

## 2017-12-18 RX ADMIN — WARFARIN SODIUM 2 MG: 2 TABLET ORAL at 17:41

## 2017-12-18 RX ADMIN — FAMOTIDINE 20 MG: 20 TABLET ORAL at 09:26

## 2017-12-18 RX ADMIN — REPAGLINIDE 0.5 MG: 0.5 TABLET ORAL at 16:51

## 2017-12-18 RX ADMIN — LEVOTHYROXINE SODIUM 50 MCG: 0.05 TABLET ORAL at 09:26

## 2017-12-18 RX ADMIN — REPAGLINIDE 0.5 MG: 0.5 TABLET ORAL at 06:58

## 2017-12-18 RX ADMIN — CARVEDILOL 12.5 MG: 12.5 TABLET, FILM COATED ORAL at 09:26

## 2017-12-18 RX ADMIN — FUROSEMIDE 40 MG: 10 INJECTION, SOLUTION INTRAMUSCULAR; INTRAVENOUS at 09:26

## 2017-12-18 RX ADMIN — DILTIAZEM HYDROCHLORIDE 120 MG: 120 CAPSULE, EXTENDED RELEASE ORAL at 09:26

## 2017-12-18 RX ADMIN — Medication 10 ML: at 20:21

## 2017-12-18 RX ADMIN — CARVEDILOL 12.5 MG: 12.5 TABLET, FILM COATED ORAL at 20:20

## 2017-12-18 RX ADMIN — MAGNESIUM HYDROXIDE 30 ML: 400 SUSPENSION ORAL at 20:29

## 2017-12-18 RX ADMIN — Medication 10 ML: at 09:29

## 2017-12-18 ASSESSMENT — PAIN SCALES - GENERAL
PAINLEVEL_OUTOF10: 0

## 2017-12-18 NOTE — CONSULTS
[facility] Pharm[facility] Department    Clinical Pharmacy Note    Warfarin consult follow-up    Recent Labs      12/18/17   5131   INR  2.7*     Recent Labs      12/17/17   1005   HGB  12.2   HCT  37.7   PLT  217       Target INR Range: 2-3    Significant Drug-Drug Interactions:  New warfarin drug-drug interactions: none  Discontinued drug-drug interactions: none      Notes:  Please give warfarin 2 mg po today for 1 dose. Daily PT/INR until stable within therapeutic range. Thank you. Ritu Driscoll.  Bennie Zhao

## 2017-12-18 NOTE — PROGRESS NOTES
Discussed discharge plans with the patient. Patient is a 80year old female here with CHF exacerbation, hypoxia. She is alert and oriented. Patient is a  and lives at [facility]. She uses a walker and cane. Patient does her cooking and cleaning. She manages her own medication. Family provides the transportation. Her PCP is Dr. Briana Montes. She has medical insurance that helps with medication costs. The discharge plan is home with no services. Patient does weigh herself daily as part of keeping her CHF under control.     Drema Jeans, LSW

## 2017-12-18 NOTE — PROGRESS NOTES
Physical Therapy  Facility/Department: [facility] MED SURG  Daily Treatment Note  NAME: So Jeffery  : 3/7/1927  MRN: [id number]    Date of Service: 2017    Patient Diagnosis(es):   Patient Active Problem List    Diagnosis Date Noted    Syncope and collapse - secondary to 2nd degree AV block 2017     Priority: High    Acute on chronic combined systolic and diastolic CHF (congestive heart failure) ([address] 2016     Priority: High     Class: Acute    CHF (congestive heart failure), NYHA class I, acute on chronic, combined ([address] 2017    Long-term (current) use of anticoagulants 10/24/2017    DVT (deep venous thrombosis) ([address] 10/24/2017    Orthostatic hypotension     GRAYSON (acute kidney injury) ([address] 10/02/2017    Abnormal EEG 10/02/2017    Hyperkalemia 2017    Syncope 2017    Acute cystitis 2017    Hypertensive urgency 2017    Hypertensive emergency 2017    Flash pulmonary edema ([address] 2017    Cardiac pacemaker in situ 2017    Chronic midline low back pain with left-sided sciatica 07/10/2017    Essential hypertension 07/10/2017    AV block, 2nd degree 2017    Acute renal failure superimposed on stage 4 chronic kidney disease ([address] 2017    Mobitz type 2 second degree heart block 2017    Acute on chronic renal insufficiency 2017    Dehydration, moderate 2017    Left hip pain     Non-rheumatic tricuspid valve insufficiency 2017    Pulmonary HTN 2017    CKD (chronic kidney disease) stage 4, GFR 15-29 ml/min ([facility]) 2017    Chronic diastolic HF (heart failure), NYHA class 3 ([address] 2017    Physical deconditioning 2017    Congestive heart failure ([address]     Acquired hypothyroidism     Hyperlipidemia     Diabetes mellitus ([address]     Anxiety        Past Medical History:   Diagnosis Date    Anxiety     Asthma     Cardiac pacemaker 2017    Biotronik Pacemaker implant    CHF (congestive heart failure) ([facility] 2016    CHF (congestive heart failure) ([facility] 10/23/2017    Chronic kidney disease     Diabetes mellitus ([facility]     DVT (deep venous thrombosis) ([facility] 10/23/2017    Gout     Heart disease     Hyperlipidemia     Hypertension     Hypothyroidism     Mobitz type 2 second degree heart block     Syncope and collapse 06/25/2017     Past Surgical History:   Procedure Laterality Date    BREAST BIOPSY Bilateral 1980    BREAST SURGERY  1965    cyst removed    CARDIAC PACEMAKER PLACEMENT  06/29/2017    DR Natty Siddiqui    EYE SURGERY      Bilat cataract    OVARY REMOVAL Right     PACEMAKER INSERTION  06/29/2017       Restrictions  Restrictions/Precautions  Restrictions/Precautions: General Precautions, Fall Risk  Subjective   General  Chart Reviewed: Yes  Subjective  Subjective: Pt reported no pain. Pain Screening  Patient Currently in Pain: Denies  Pain Assessment  Pain Assessment: 0-10  Pain Level: 0  Vital Signs  Patient Currently in Pain: Denies       Orientation  Orientation  Overall Orientation Status: Within Normal Limits  Objective      Transfers  Sit to Stand: Contact guard assistance  Stand to sit: Contact guard assistance  Ambulation  Ambulation?: Yes  WB Status: unrestricted   Ambulation 1  Surface: level tile  Device: Rolling Walker  Other Apparatus: O2 (3L)  Assistance: Contact guard assistance  Distance: 200 feet x 1  Comments: Pt needed 1-2 short standing rest breaks d/t BURKS. SPO2 remained 94% or higher. Stairs/Curb  Stairs?: No     Balance  Posture: Good  Sitting - Static: Good  Sitting - Dynamic: Good  Standing - Static: Good  Standing - Dynamic: Fair  Exercises  Straight Leg Raise: 15x  Quad Sets: 15x  Heelslides: 15x  Hip Flexion: 15x  Hip Abduction: 15x  Knee Short Arc Quad: 15x  Ankle Pumps: 15x  Comments: Above exercises completed in supine. Assessment      Patient Education: Educated pt on safety with transfers. Pt with good understanding.    REQUIRES

## 2017-12-18 NOTE — H&P
List    Diagnosis Date Noted    Syncope and collapse - secondary to 2nd degree AV block 06/25/2017     Priority: High    Acute on chronic combined systolic and diastolic CHF (congestive heart failure) ([facility] 12/31/2016     Priority: High     Class: Acute    Hypoxia 12/18/2017    CHF (congestive heart failure), NYHA class I, acute on chronic, combined ([address] 12/17/2017    Long-term (current) use of anticoagulants 10/24/2017    DVT (deep venous thrombosis) ([facility] [facility] 10/24/2017    Orthostatic hypotension     GRAYSON (acute kidney injury) ([facility] 10/02/2017    Abnormal EEG 10/02/2017    Hyperkalemia 09/28/2017    Syncope 09/27/2017    Acute cystitis 09/27/2017    Hypertensive urgency 09/27/2017    Hypertensive emergency 08/31/2017    Flash pulmonary edema ([facility] [facility] 08/31/2017    Cardiac pacemaker in situ 07/12/2017    Chronic midline low back pain with left-sided sciatica 07/10/2017    Essential hypertension 07/10/2017    AV block, 2nd degree 06/28/2017    Acute renal failure superimposed on stage 4 chronic kidney disease ([facility] 06/28/2017    Mobitz type 2 second degree heart block 06/28/2017    Acute on chronic renal insufficiency 06/26/2017    Dehydration, moderate 06/26/2017    Left hip pain     Non-rheumatic tricuspid valve insufficiency 04/26/2017    Pulmonary HTN 04/26/2017    CKD (chronic kidney disease) stage 4, GFR 15-29 ml/min ([facility]) 02/22/2017    Chronic diastolic HF (heart failure), NYHA class 3 ([facility] [facility] 01/18/2017    Physical deconditioning 01/18/2017    Congestive heart failure (HCC)     Acquired hypothyroidism     Hyperlipidemia     Diabetes mellitus ([facility]     Anxiety        Plan:     · This patient requires inpatient admission because of CHF exacerbation, hypoxia  · Factors affecting the medical complexity of this patient include anxiety, DM, CKD, pacer, HTN  · Estimated length of stay is 3 days  · Diuresis  · Cardiology consult   · Daily weights  · PT/OT  · ECHO  · Daily labs  · High risk medication monitoring: coumadin    CORE MEASURES  DVT prophylaxis: already on chronic anticoagulation  Decubitus ulcer present on admission: No  CODE STATUS: FULL CODE  Nutrition Status: fair   Physical therapy: Yes   Old Charts reviewed: Yes  EKG Reviewed: Yes  Advance Directive Addressed:  Yes    Moreno Negrete PA-C  12/18/2017, 11:51 AM

## 2017-12-18 NOTE — PROGRESS NOTES
Nutrition Assessment    Type and Reason for Visit: Initial    Nutrition Recommendations:  Encourage abstinence from salt     Malnutrition Assessment:  · Malnutrition Status: At risk for malnutrition  · Context: Acute illness or injury  · Findings of the 6 clinical characteristics of malnutrition (Minimum of 2 out of 6 clinical characteristics is required to make the diagnosis of moderate or severe Protein Calorie Malnutrition based on AND/ASPEN Guidelines):  1. Energy Intake-Greater than 75%,      2. Weight Loss-No significant weight loss,    3. Fat Loss-No significant subcutaneous fat loss,    4. Muscle Loss-No significant muscle mass loss,    5. Fluid Accumulation-Mild fluid accumulation, Extremities  6.  Strength-Not measured    Nutrition Diagnosis:   · Problem: No nutrition diagnosis at this time    Nutrition Assessment:  · Subjective Assessment: Avoids leafy greens r/t coumadin. Avoids desserts r/t Diabetes. Denies any salt use, or high sodium foods in home diet.    · Wound Type: None  · Current Nutrition Therapies:  · Oral Diet Orders: Cardiac   · Oral Diet intake: %  · Anthropometric Measures:  · Ht: 5' 4\" (162.6 cm)   · Current Body Wt: 169 lb 9.6 oz (76.9 kg)  · Admission Body Wt: 166 lb (75.3 kg)  · Usual Body Wt:  (163-165#)  · % Weight Change: 2.7% increase,     · BMI Classification: BMI 25.0 - 29.9 Overweight    Lab Results   Component Value Date     12/18/2017    K 4.3 12/18/2017    CL 97 (L) 12/18/2017    CO2 31 12/18/2017    BUN 35 (H) 12/18/2017    CREATININE 1.97 (H) 12/18/2017    GLUCOSE 90 12/18/2017    CALCIUM 8.8 12/18/2017    PROT 7.1 12/04/2017    LABALBU 3.7 12/04/2017    BILITOT 0.42 12/04/2017    ALKPHOS 56 12/04/2017    AST 13 12/04/2017    ALT 8 12/04/2017    LABGLOM 24 (L) 12/18/2017    GFRAA 29 (L) 12/18/2017     Lab Results   Component Value Date    LABA1C 5.4 11/06/2017     Lab Results   Component Value Date     11/06/2017     Estimated Intake vs Estimated Needs: Intake Meets Needs    Nutrition Risk Level: Moderate, Low    Mild weight elevations from historical lab data. Taking meals well and denying educational needs. Well controlled diabetes, on Prandin at home.      Nutrition Interventions:   Continue current diet  Coordination of Care, Education declined, Continued Inpatient Monitoring    Nutrition Evaluation:   · Evaluation: Goals set   · Goals: PO> 75% meals     · Monitoring: Meal Intake, Pertinent Labs, Weight, Ascites/Edema    Electronically signed by Rayna Herzog RD, LD on 12/18/17 at 2:15 PM    Contact Number: [id number]

## 2017-12-19 LAB
ANION GAP SERPL CALCULATED.3IONS-SCNC: 9 MMOL/L (ref 9–17)
BUN BLDV-MCNC: 40 MG/DL (ref 8–23)
BUN/CREAT BLD: 23 (ref 9–20)
CALCIUM SERPL-MCNC: 9 MG/DL (ref 8.6–10.4)
CHLORIDE BLD-SCNC: 97 MMOL/L (ref 98–107)
CO2: 35 MMOL/L (ref 20–31)
CREAT SERPL-MCNC: 1.76 MG/DL (ref 0.5–0.9)
GFR AFRICAN AMERICAN: 33 ML/MIN
GFR NON-AFRICAN AMERICAN: 27 ML/MIN
GFR SERPL CREATININE-BSD FRML MDRD: ABNORMAL ML/MIN/{1.73_M2}
GFR SERPL CREATININE-BSD FRML MDRD: ABNORMAL ML/MIN/{1.73_M2}
GLUCOSE BLD-MCNC: 93 MG/DL (ref 70–99)
HCT VFR BLD CALC: 35.9 % (ref 36–46)
HEMOGLOBIN: 11.7 G/DL (ref 12–16)
INR BLD: 2.6 (ref 0.9–1.2)
MCH RBC QN AUTO: 31.6 PG (ref 26–34)
MCHC RBC AUTO-ENTMCNC: 32.6 G/DL (ref 31–37)
MCV RBC AUTO: 97 FL (ref 80–100)
PDW BLD-RTO: 14.5 % (ref 12.1–15.2)
PLATELET # BLD: 207 K/UL (ref 140–450)
PMV BLD AUTO: 7.9 FL (ref 6–12)
POTASSIUM SERPL-SCNC: 4.4 MMOL/L (ref 3.7–5.3)
PROTHROMBIN TIME: 28.8 SEC (ref 9.7–12.2)
RBC # BLD: 3.7 M/UL (ref 4–5.2)
SODIUM BLD-SCNC: 141 MMOL/L (ref 135–144)
WBC # BLD: 6.5 K/UL (ref 3.5–11)

## 2017-12-19 PROCEDURE — 97116 GAIT TRAINING THERAPY: CPT

## 2017-12-19 PROCEDURE — 6360000002 HC RX W HCPCS: Performed by: INTERNAL MEDICINE

## 2017-12-19 PROCEDURE — 97530 THERAPEUTIC ACTIVITIES: CPT

## 2017-12-19 PROCEDURE — 6370000000 HC RX 637 (ALT 250 FOR IP): Performed by: PHYSICIAN ASSISTANT

## 2017-12-19 PROCEDURE — 36415 COLL VENOUS BLD VENIPUNCTURE: CPT

## 2017-12-19 PROCEDURE — 85610 PROTHROMBIN TIME: CPT

## 2017-12-19 PROCEDURE — 85027 COMPLETE CBC AUTOMATED: CPT

## 2017-12-19 PROCEDURE — 80048 BASIC METABOLIC PNL TOTAL CA: CPT

## 2017-12-19 PROCEDURE — 97110 THERAPEUTIC EXERCISES: CPT

## 2017-12-19 PROCEDURE — 6370000000 HC RX 637 (ALT 250 FOR IP): Performed by: INTERNAL MEDICINE

## 2017-12-19 PROCEDURE — 2580000003 HC RX 258: Performed by: INTERNAL MEDICINE

## 2017-12-19 PROCEDURE — 1200000000 HC SEMI PRIVATE

## 2017-12-19 RX ORDER — WARFARIN SODIUM 2 MG/1
2 TABLET ORAL
Status: COMPLETED | OUTPATIENT
Start: 2017-12-19 | End: 2017-12-19

## 2017-12-19 RX ORDER — POLYETHYLENE GLYCOL 3350 17 G/17G
17 POWDER, FOR SOLUTION ORAL 2 TIMES DAILY
Status: DISCONTINUED | OUTPATIENT
Start: 2017-12-19 | End: 2017-12-21 | Stop reason: HOSPADM

## 2017-12-19 RX ADMIN — POLYETHYLENE GLYCOL 3350 17 G: 17 POWDER, FOR SOLUTION ORAL at 11:19

## 2017-12-19 RX ADMIN — FUROSEMIDE 40 MG: 10 INJECTION, SOLUTION INTRAMUSCULAR; INTRAVENOUS at 08:57

## 2017-12-19 RX ADMIN — WARFARIN SODIUM 2 MG: 2 TABLET ORAL at 17:31

## 2017-12-19 RX ADMIN — LEVOTHYROXINE SODIUM 50 MCG: 0.05 TABLET ORAL at 08:57

## 2017-12-19 RX ADMIN — REPAGLINIDE 0.5 MG: 0.5 TABLET ORAL at 07:00

## 2017-12-19 RX ADMIN — CARVEDILOL 12.5 MG: 12.5 TABLET, FILM COATED ORAL at 08:57

## 2017-12-19 RX ADMIN — Medication 10 ML: at 08:58

## 2017-12-19 RX ADMIN — FUROSEMIDE 40 MG: 10 INJECTION, SOLUTION INTRAMUSCULAR; INTRAVENOUS at 21:45

## 2017-12-19 RX ADMIN — FAMOTIDINE 20 MG: 20 TABLET ORAL at 08:57

## 2017-12-19 RX ADMIN — Medication 10 ML: at 20:39

## 2017-12-19 RX ADMIN — GABAPENTIN 300 MG: 300 CAPSULE ORAL at 20:35

## 2017-12-19 RX ADMIN — CARVEDILOL 12.5 MG: 12.5 TABLET, FILM COATED ORAL at 20:35

## 2017-12-19 RX ADMIN — ASPIRIN 81 MG 81 MG: 81 TABLET ORAL at 08:57

## 2017-12-19 RX ADMIN — REPAGLINIDE 0.5 MG: 0.5 TABLET ORAL at 16:11

## 2017-12-19 RX ADMIN — POLYETHYLENE GLYCOL 3350 17 G: 17 POWDER, FOR SOLUTION ORAL at 21:46

## 2017-12-19 RX ADMIN — SIMVASTATIN 5 MG: 10 TABLET, FILM COATED ORAL at 20:35

## 2017-12-19 RX ADMIN — DILTIAZEM HYDROCHLORIDE 120 MG: 120 CAPSULE, EXTENDED RELEASE ORAL at 09:01

## 2017-12-19 ASSESSMENT — PAIN SCALES - GENERAL
PAINLEVEL_OUTOF10: 0

## 2017-12-19 NOTE — PROGRESS NOTES
10/24/2017    D[facility]deep venous thrombosis) ([facility]) 10/24/2017    Orthostatic hypotension     GRAYSON (acute kidney injury) ([facility] 10/02/2017    Abnormal EEG 10/02/2017    Hyperkalemia 09/28/2017    Syncope 09/27/2017    Acute cystitis 09/27/2017    Hypertensive urgency 09/27/2017    Hypertensive emergency 08/31/2017    Flash pulmonary edema ([facility] 08/31/2017    Cardiac pacemaker in situ 07/12/2017    Chronic midline low back pain with left-sided sciatica 07/10/2017    Essential hypertension 07/10/2017    AV block, 2nd degree 06/28/2017    Acute renal failure superimposed on stage 4 chronic kidney disease ([facility] 06/28/2017    Mobitz type 2 second degree heart block 06/28/2017    Acute on chronic renal insufficiency 06/26/2017    Dehydration, moderate 06/26/2017    Left hip pain     Non-rheumatic tricuspid valve insufficiency 04/26/2017    Pulmonary HTN 04/26/2017    CKD (chronic kidney disease) stage 4, GFR 15-29 ml/min ([facility]) 02/22/2017    Chronic diastolic HF (heart failure), NYHA class 3 ([facility] 01/18/2017    Physical deconditioning 01/18/2017    Congestive heart failure (HCC)     Acquired hypothyroidism     Hyperlipidemia     Diabetes mellitus ([facility]     Anxiety        PLAN:  · diuretics  · Critical Care Time: ja Fairbanks M.D.

## 2017-12-19 NOTE — PROGRESS NOTES
Past Medical History:   Diagnosis Date    Anxiety     Asthma     Cardiac pacemaker 06/29/2017    Biotronik Pacemaker implant    CHF (congestive heart failure) ([facility] 2016    CHF (congestive heart failure) ([facility] 10/23/2017    Chronic kidney disease     Diabetes mellitus ([facility]     [facility] 10/23/2017    Gout     Heart disease     Hyperlipidemia     Hypertension     Hypothyroidism     Mobitz type 2 second degree heart block     Syncope and collapse 06/25/2017     Past Surgical History:   Procedure Laterality Date    BREAST BIOPSY Bilateral 1980    BREAST SURGERY  1965    cyst removed    CARDIAC PACEMAKER PLACEMENT  06/29/2017    DR Nelsy Abdi    EYE SURGERY      Bilat cataract    OVARY REMOVAL Right     PACEMAKER INSERTION  06/29/2017       Restrictions  Restrictions/Precautions  Restrictions/Precautions: General Precautions, Fall Risk  Subjective   General  Chart Reviewed: Yes  Response To Previous Treatment: Patient with no complaints from previous session. Family / Caregiver Present: Yes  Referring Practitioner: Dr. Suzi Ma: pt reported she gets intermittent pain in L hip/buttock that she has dealt with for awhile. Pain Screening  Patient Currently in Pain: Denies  Vital Signs  Patient Currently in Pain: Denies       Orientation  Orientation  Overall Orientation Status: Within Functional Limits  Objective   Bed mobility  Supine to Sit: Contact guard assistance  Scooting: Contact guard assistance  Transfers  Sit to Stand: Contact guard assistance  Stand to sit: Contact guard assistance  Ambulation  Ambulation?: Yes  Ambulation 1  Surface: level tile  Device: Rolling Walker  Other Apparatus: O2 (1L)  Assistance: Contact guard assistance  Quality of Gait: Reciprocal gait pattern, steady tuyet  Distance: 150ft  Comments: slight SOB with gait training, sp02 remained 91% and higher.       Balance  Standing - Static: Good  Standing - Dynamic: Fair;+  Exercises  Straight Leg Raise: 20x  Quad Sets: 20x  Heelslides: 20x  Hip Flexion: 20x2  Hip Abduction: 20x2  Knee Long Arc Quad: 20x  Knee Short Arc Quad: 20x  Knee Active Range of Motion: 20x2  Ankle Pumps: 20x2  Comments: B LE therex completed seated and supine. Assessment   Body structures, Functions, Activity limitations: Decreased functional mobility ; Decreased endurance;Decreased ROM; Decreased high-level IADLs;Decreased strength  Assessment: pt tolerated tx well with minimal RB's. slight SOB with activity, sp02 remained 91% and higher. Treatment Diagnosis: general weakness   Prognosis: Good  REQUIRES PT FOLLOW UP: Yes       Discharge Recommendations:  Continue to assess pending progress    Goals  Short term goals  Time Frame for Short term goals: 10 days  Short term goal 1: Pt will be educated on her POC and HEP  Short term goal 2: Pt will perform all transfers Mod I  Short term goal 3: Pt will ambulate 150 feet with FWW with no increase in SOB  Short term goal 4: Pt will increase dynamic standing balance to Good in order to reduce fall risk  Patient Goals   Patient goals : \"to feel better and go home\"    Plan    Plan  Times per week: 7x/wk   Times per day: Twice a day  Plan weeks: 2x/daily except weekends 1x/daily   Current Treatment Recommendations: Strengthening, Neuromuscular Re-education, Home Exercise Program, Safety Education & Training, Patient/Caregiver Education & Training, Balance Training, Endurance Training, Functional Mobility Training, Transfer Training, Gait Training, Stair training  Safety Devices  Type of devices: All fall risk precautions in place, Call light within reach, Gait belt, Left in chair, Nurse notified (Family in room during and at completion of therapy.  Chair alarm not necessary when family in room per nursing consult. )     Therapy Time   Individual Concurrent Group Co-treatment   Time In 0727         Time Out 0805         Minutes 38 June Marli

## 2017-12-19 NOTE — PROGRESS NOTES
Physical Therapy  Facility/Department: [facility] AT THE [facility] MED SURG  Daily Treatment Note  NAME: Galilea Espinoza  : 3/7/1927  MRN: [id number]    Date of Service: 2017    Patient Diagnosis(es):   Patient Active Problem List    Diagnosis Date Noted    Syncope and collapse - secondary to 2nd degree AV block 2017     Priority: High    Acute on chronic combined systolic and diastolic CHF (congestive heart failure) ([address] 2016     Priority: High     Class: Acute    Hypoxia 2017    Idiopathic cardiomyopathy ([address] 2017    Myocardial infarction less than 4 weeks ago 2017    Acute on chronic combined systolic and diastolic congestive heart failure ([address] 2017    Long-term (current) use of anticoagulants 10/24/2017    DVT (deep venous thrombosis) ([address] 10/24/2017    Orthostatic hypotension     GRAYSON (acute kidney injury) ([address] 10/02/2017    Abnormal EEG 10/02/2017    Hyperkalemia 2017    Syncope 2017    Acute cystitis 2017    Hypertensive urgency 2017    Hypertensive emergency 2017    Flash pulmonary edema ([address] 2017    Cardiac pacemaker in situ 2017    Chronic midline low back pain with left-sided sciatica 07/10/2017    Essential hypertension 07/10/2017    AV block, 2nd degree 2017    Acute renal failure superimposed on stage 4 chronic kidney disease ([address] 2017    Mobitz type 2 second degree heart block 2017    Acute on chronic renal insufficiency 2017    Dehydration, moderate 2017    Left hip pain     Non-rheumatic tricuspid valve insufficiency 2017    Pulmonary HTN 2017    CKD (chronic kidney disease) stage 4, GFR 15-29 ml/min ([facility]) 2017    Chronic diastolic HF (heart failure), NYHA class 3 ([address] 2017    Physical deconditioning 2017    Congestive heart failure (HCC)     Acquired hypothyroidism     Hyperlipidemia     Diabetes mellitus ([address]     Anxiety Balance  Standing - Static: Good  Standing - Dynamic: Fair;+  Exercises  Straight Leg Raise: 20x  Quad Sets: 20x  Heelslides: 20x  Hip Flexion: 20x2  Hip Abduction: 20x2  Knee Long Arc Quad: 20x  Knee Short Arc Quad: 20x  Knee Active Range of Motion: 20x2  Ankle Pumps: 20x2  Comments: B LE therex completed seated and supine. Assessment   Body structures, Functions, Activity limitations: Decreased functional mobility ; Decreased endurance;Decreased ROM; Decreased high-level IADLs;Decreased strength  Assessment: sp02 dropped to 84% with gait training without oxygen. ~5min RB before rising to 89%. Treatment Diagnosis: general weakness   Prognosis: Good  Patient Education: Instructed pt on pursed lip breathing. REQUIRES PT FOLLOW UP: Yes       Discharge Recommendations:  Continue to assess pending progress    Goals  Short term goals  Time Frame for Short term goals: 10 days  Short term goal 1: Pt will be educated on her POC and HEP  Short term goal 2: Pt will perform all transfers Mod I  Short term goal 3: Pt will ambulate 150 feet with FWW with no increase in SOB  Short term goal 4: Pt will increase dynamic standing balance to Good in order to reduce fall risk  Patient Goals   Patient goals : \"to feel better and go home\"    Plan    Plan  Times per week: 7x/wk   Times per day: Twice a day  Plan weeks: 2x/daily except weekends 1x/daily   Current Treatment Recommendations: Strengthening, Neuromuscular Re-education, Home Exercise Program, Safety Education & Training, Patient/Caregiver Education & Training, Balance Training, Endurance Training, Functional Mobility Training, Transfer Training, Gait Training, Stair training  Safety Devices  Type of devices: All fall risk precautions in place, Call light within reach, Gait belt, Left in bed, Nurse notified (family in room during and at completion of therapy.  Will resume alarm when needed. )     Therapy Time   Individual Concurrent Group Co-treatment   Time In 1232

## 2017-12-20 LAB
ANION GAP SERPL CALCULATED.3IONS-SCNC: 10 MMOL/L (ref 9–17)
BUN BLDV-MCNC: 48 MG/DL (ref 8–23)
BUN/CREAT BLD: 25 (ref 9–20)
CALCIUM SERPL-MCNC: 9.1 MG/DL (ref 8.6–10.4)
CHLORIDE BLD-SCNC: 94 MMOL/L (ref 98–107)
CO2: 34 MMOL/L (ref 20–31)
CREAT SERPL-MCNC: 1.92 MG/DL (ref 0.5–0.9)
GFR AFRICAN AMERICAN: 30 ML/MIN
GFR NON-AFRICAN AMERICAN: 25 ML/MIN
GFR SERPL CREATININE-BSD FRML MDRD: ABNORMAL ML/MIN/{1.73_M2}
GFR SERPL CREATININE-BSD FRML MDRD: ABNORMAL ML/MIN/{1.73_M2}
GLUCOSE BLD-MCNC: 88 MG/DL (ref 70–99)
HCT VFR BLD CALC: 36.3 % (ref 36–46)
HEMOGLOBIN: 11.7 G/DL (ref 12–16)
INR BLD: 2.4 (ref 0.9–1.2)
MCH RBC QN AUTO: 31.2 PG (ref 26–34)
MCHC RBC AUTO-ENTMCNC: 32.2 G/DL (ref 31–37)
MCV RBC AUTO: 96.9 FL (ref 80–100)
PDW BLD-RTO: 14.7 % (ref 12.1–15.2)
PLATELET # BLD: 213 K/UL (ref 140–450)
PMV BLD AUTO: 8.2 FL (ref 6–12)
POTASSIUM SERPL-SCNC: 4.4 MMOL/L (ref 3.7–5.3)
PROTHROMBIN TIME: 26.7 SEC (ref 9.7–12.2)
RBC # BLD: 3.75 M/UL (ref 4–5.2)
SODIUM BLD-SCNC: 138 MMOL/L (ref 135–144)
WBC # BLD: 5.9 K/UL (ref 3.5–11)

## 2017-12-20 PROCEDURE — 6370000000 HC RX 637 (ALT 250 FOR IP): Performed by: INTERNAL MEDICINE

## 2017-12-20 PROCEDURE — 2580000003 HC RX 258: Performed by: INTERNAL MEDICINE

## 2017-12-20 PROCEDURE — 6360000002 HC RX W HCPCS: Performed by: INTERNAL MEDICINE

## 2017-12-20 PROCEDURE — 97110 THERAPEUTIC EXERCISES: CPT

## 2017-12-20 PROCEDURE — 97116 GAIT TRAINING THERAPY: CPT

## 2017-12-20 PROCEDURE — 85610 PROTHROMBIN TIME: CPT

## 2017-12-20 PROCEDURE — 36415 COLL VENOUS BLD VENIPUNCTURE: CPT

## 2017-12-20 PROCEDURE — 6370000000 HC RX 637 (ALT 250 FOR IP): Performed by: PHYSICIAN ASSISTANT

## 2017-12-20 PROCEDURE — 80048 BASIC METABOLIC PNL TOTAL CA: CPT

## 2017-12-20 PROCEDURE — 85027 COMPLETE CBC AUTOMATED: CPT

## 2017-12-20 PROCEDURE — 1200000000 HC SEMI PRIVATE

## 2017-12-20 RX ORDER — WARFARIN SODIUM 2 MG/1
2 TABLET ORAL
Status: COMPLETED | OUTPATIENT
Start: 2017-12-20 | End: 2017-12-20

## 2017-12-20 RX ADMIN — ASPIRIN 81 MG 81 MG: 81 TABLET ORAL at 09:06

## 2017-12-20 RX ADMIN — DILTIAZEM HYDROCHLORIDE 120 MG: 120 CAPSULE, EXTENDED RELEASE ORAL at 09:05

## 2017-12-20 RX ADMIN — FUROSEMIDE 40 MG: 10 INJECTION, SOLUTION INTRAMUSCULAR; INTRAVENOUS at 22:04

## 2017-12-20 RX ADMIN — POLYETHYLENE GLYCOL 3350 17 G: 17 POWDER, FOR SOLUTION ORAL at 09:05

## 2017-12-20 RX ADMIN — SIMVASTATIN 5 MG: 10 TABLET, FILM COATED ORAL at 22:03

## 2017-12-20 RX ADMIN — LEVOTHYROXINE SODIUM 50 MCG: 0.05 TABLET ORAL at 09:05

## 2017-12-20 RX ADMIN — WARFARIN SODIUM 2 MG: 2 TABLET ORAL at 17:15

## 2017-12-20 RX ADMIN — CARVEDILOL 12.5 MG: 12.5 TABLET, FILM COATED ORAL at 22:03

## 2017-12-20 RX ADMIN — Medication 10 ML: at 09:15

## 2017-12-20 RX ADMIN — POLYETHYLENE GLYCOL 3350 17 G: 17 POWDER, FOR SOLUTION ORAL at 22:04

## 2017-12-20 RX ADMIN — FUROSEMIDE 40 MG: 10 INJECTION, SOLUTION INTRAMUSCULAR; INTRAVENOUS at 09:05

## 2017-12-20 RX ADMIN — GLYCERIN 2.1 G: 2.1 SUPPOSITORY RECTAL at 09:05

## 2017-12-20 RX ADMIN — FAMOTIDINE 20 MG: 20 TABLET ORAL at 09:06

## 2017-12-20 RX ADMIN — REPAGLINIDE 0.5 MG: 0.5 TABLET ORAL at 08:14

## 2017-12-20 RX ADMIN — CARVEDILOL 12.5 MG: 12.5 TABLET, FILM COATED ORAL at 09:06

## 2017-12-20 RX ADMIN — REPAGLINIDE 0.5 MG: 0.5 TABLET ORAL at 16:36

## 2017-12-20 RX ADMIN — Medication 10 ML: at 22:03

## 2017-12-20 RX ADMIN — GABAPENTIN 300 MG: 300 CAPSULE ORAL at 22:03

## 2017-12-20 ASSESSMENT — PAIN SCALES - GENERAL: PAINLEVEL_OUTOF10: 0

## 2017-12-20 NOTE — PROGRESS NOTES
Physical Therapy  Facility/Department: [facility] AT THE [facility] MED SURG  Daily Treatment Note  NAME: Robby Haddad  : 3/7/1927  MRN: [id number]    Date of Service: 2017    Patient Diagnosis(es):   Patient Active Problem List    Diagnosis Date Noted    Syncope and collapse - secondary to 2nd degree AV block 2017     Priority: High    Acute on chronic combined systolic and diastolic CHF (congestive heart failure) ([address] 2016     Priority: High     Class: Acute    Hypoxia 2017    Idiopathic cardiomyopathy ([address] 2017    Myocardial infarction less than 4 weeks ago 2017    Acute on chronic combined systolic and diastolic congestive heart failure ([address] 2017    Long-term (current) use of anticoagulants 10/24/2017    DVT (deep venous thrombosis) ([address] 10/24/2017    Orthostatic hypotension     GRAYSON (acute kidney injury) ([address] 10/02/2017    Abnormal EEG 10/02/2017    Hyperkalemia 2017    Syncope 2017    Acute cystitis 2017    Hypertensive urgency 2017    Hypertensive emergency 2017    Flash pulmonary edema ([address] 2017    Cardiac pacemaker in situ 2017    Chronic midline low back pain with left-sided sciatica 07/10/2017    Essential hypertension 07/10/2017    AV block, 2nd degree 2017    Acute renal failure superimposed on stage 4 chronic kidney disease ([address] 2017    Mobitz type 2 second degree heart block 2017    Acute on chronic renal insufficiency 2017    Dehydration, moderate 2017    Left hip pain     Non-rheumatic tricuspid valve insufficiency 2017    Pulmonary HTN 2017    CKD (chronic kidney disease) stage 4, GFR 15-29 ml/min ([facility] 2017    Chronic diastolic HF (heart failure), NYHA class 3 ([address] 2017    Physical deconditioning 2017    Congestive heart failure (HCC)     Acquired hypothyroidism     Hyperlipidemia     Diabetes mellitus ([address]     Anxiety

## 2017-12-20 NOTE — PROGRESS NOTES
Palliative Care Notes    Reason for Consult:  goals of care    Patient Active Problem List   Diagnosis    Acute on chronic combined systolic and diastolic CH[facility] (congestive heart failure) [facility]    Acquired hypothyroidism    Hyperlipidemia    Diabetes mellitus ([facility]    Anxiety    Congestive heart failure (HCC)    Chronic diastolic HF (heart failure), NYHA class 3 (HCC)    Physical deconditioning    CKD (chronic kidney disease) stage 4, GFR 15-29 ml/min (HCC)    Non-rheumatic tricuspid valve insufficiency    Pulmonary HTN    Syncope and collapse - secondary to 2nd degree AV block    Acute on chronic renal insufficiency    Dehydration, moderate    Left hip pain    AV block, 2nd degree    Acute renal failure superimposed on stage 4 chronic kidney disease (HCC)    Mobitz type 2 second degree heart block    Chronic midline low back pain with left-sided sciatica    Essential hypertension    Cardiac pacemaker in situ    Hypertensive emergency    Flash pulmonary edema (HCC)    Syncope    Acute cystitis    Hypertensive urgency    Hyperkalemia    GRAYSON (acute kidney injury) (HCC)    Abnormal EEG    Orthostatic hypotension    Long-term (current) use of anticoagulants    DVT (deep venous thrombosis) (HCC)    Acute on chronic combined systolic and diastolic congestive heart failure (HCC)    Hypoxia    Idiopathic cardiomyopathy (HCC)    Myocardial infarction less than 4 weeks ago       Advance Directives:  Code status: Full Code  Patient has capacity for medical decisions: yes  Health Care Power of : yes  Living Will: yes        Pain Management:  The patient is not having any pain. Symptom Management:  Are there any other symptoms that are distressing to the patient or family that needs addressed?                                            Anxiety:  situational                          Dyspnea:  denies at present time                          Fatigue:  generalized weakness

## 2017-12-20 NOTE — PROGRESS NOTES
PALLIATIVE CARE  Family requests to discuss wishes for care. At this time patient and her daughters agree that she will remain a full code. Nazanin Garcia states she is agreeable to a ventilator if needed and that she desires CPR. She asks if she needs to sign a paper and is told it is not needed as it is standard medical care. Reminded that in the future if she changes her mind she would need a physician order. Support given. Yi Ferrera RN, [facility] and Bryan Cuevas Nurse Coordinator  12/20/2017 12:07 PM

## 2017-12-20 NOTE — PROGRESS NOTES
Physical Therapy  Facility/Department: [facility] MED SURG  Daily Treatment Note  NAME: Deirdre Landis  : 3/7/1927  MRN: [id number]    Date of Service: 2017    Patient Diagnosis(es):   Patient Active Problem List    Diagnosis Date Noted    Syncope and collapse - secondary to 2nd degree AV block 2017     Priority: High    Acute on chronic combined systolic and diastolic CHF (congestive heart failure) ([facility] 2016     Priority: High     Class: Acute    Hypoxia 2017    Idiopathic cardiomyopathy ([address] 2017    Myocardial infarction less than 4 weeks ago 2017    Acute on chronic combined systolic and diastolic congestive heart failure ([address] 2017    Long-term (current) use of anticoagulants 10/24/2017    DVT (deep venous thrombosis) ([address] 10/24/2017    Orthostatic hypotension     GRAYSON (acute kidney injury) ([facility] 10/02/2017    Abnormal EEG 10/02/2017    Hyperkalemia 2017    Syncope 2017    Acute cystitis 2017    Hypertensive urgency 2017    Hypertensive emergency 2017    Flash pulmonary edema ([address] 2017    Cardiac pacemaker in situ 2017    Chronic midline low back pain with left-sided sciatica 07/10/2017    Essential hypertension 07/10/2017    AV block, 2nd degree 2017    Acute renal failure superimposed on stage 4 chronic kidney disease ([facility] 2017    Mobitz type 2 second degree heart block 2017    Acute on chronic renal insufficiency 2017    Dehydration, moderate 2017    Left hip pain     Non-rheumatic tricuspid valve insufficiency 2017    Pulmonary HTN 2017    CKD (chronic kidney disease) stage 4, GFR 15-29 ml/min ([facility] 2017    Chronic diastolic HF (heart failure), NYHA class 3 ([address] 2017    Physical deconditioning 2017    Congestive heart failure (HCC)     Acquired hypothyroidism     Hyperlipidemia     Diabetes mellitus ([address]     Anxiety Quad: x20  Knee Short Arc Quad: x20  Ankle Pumps: 20x2  Comments: marching x20, hip add with pillow x20, ther ex completed seated EOB and supine                        Assessment   Assessment: Pt tolerated tx well wilth increased ambulation distance  Treatment Diagnosis: general weakness   Prognosis: Good  Patient Education: Educated pton importance of ambulating every hour when returns home  REQUIRES PT FOLLOW UP: Yes       Discharge Recommendations:  Continue to assess pending progress    G-Code     OutComes Score                                                    AM-PAC Score             Goals  Short term goals  Time Frame for Short term goals: 10 days  Short term goal 1: Pt will be educated on her POC and HEP  Short term goal 2: Pt will perform all transfers Mod I  Short term goal 3: Pt will ambulate 150 feet with FWW with no increase in SOB  Short term goal 4: Pt will increase dynamic standing balance to Good in order to reduce fall risk  Patient Goals   Patient goals : \"to feel better and go home\"    Plan    Plan  Times per week: 7x/wk   Times per day: Twice a day  Plan weeks: 2x/daily except weekends 1x/daily   Current Treatment Recommendations: Strengthening, Neuromuscular Re-education, Home Exercise Program, Safety Education & Training, Patient/Caregiver Education & Training, Balance Training, Endurance Training, Functional Mobility Training, Transfer Training, Gait Training, Stair training  Safety Devices  Type of devices:  All fall risk precautions in place, Call light within reach, Gait belt, Left in chair     Therapy Time   Individual Concurrent Group Co-treatment   Time In [address] [place] [address]         Time Out 1409         Minutes Andrew [address] Carlito Vanegas

## 2017-12-20 NOTE — PLAN OF CARE
Problem: Falls - Risk of  Goal: Absence of falls  Outcome: Ongoing  Fall risk assessment done and patient is a medium risk for falls. Alarms on as needed for patient safety. Patient being monitored on a regular basis. No falls noted at this time. Problem: HEMODYNAMIC STATUS  Goal: Patient has stable vital signs and fluid balance  Outcome: Ongoing  Patients vital signs have remained WNL and fluid balance being monitored, will continue to monitor     Problem: ACTIVITY INTOLERANCE/IMPAIRED MOBILITY  Goal: Mobility/activity is maintained at optimum level for patient  Outcome: Ongoing  Patient ambulating in room and hallway with cane without difficulty. Denies SOB upon exertion. Will continue to encourage ambulation and monitor patient. Problem: Daily Care:  Goal: Daily care needs are met  Daily care needs are met   Outcome: Ongoing  Daily care needs are met with assistance with maximum encouragement being given       Problem: Pain:  Goal: Patient's pain/discomfort is manageable  Patient's pain/discomfort is manageable   Outcome: Ongoing  Patient able to verbalize when in pain and patient denies pain at this time. Pain assessed during assessments and as needed. Will continue to assess. Problem: Skin Integrity:  Goal: Skin integrity will stabilize  Skin integrity will stabilize   Outcome: Ongoing  Patient is able to turn self while in the bed and can self regulate the bed as well. There are no new open areas or redness noted upon assessments.  Will continue to assess
Problem: Nutrition  Goal: Optimal nutrition therapy  Outcome: Ongoing  Nutrition Problem: No nutrition diagnosis at this time  Intervention: Food and/or Nutrient Delivery: Continue current diet  Nutritional Goals: PO> 75% meals   Discourage salt and convenience foods
Problem: OXYGENATION/RESPIRATORY FUNCTION  Goal: Patient will maintain patent airway  Outcome: Ongoing  PT has been weaned down to 1lpnc with sates in the low to mid 90's. States she feels much less SOB and has more energy.
Will continue to monitor. Problem: Skin Integrity:  Goal: Skin integrity will stabilize  Skin integrity will stabilize   Outcome: Ongoing  Patient independent in repositioning. Skin kept clean and dry and is intact at this time. Monitoring for skin changes every shift and as needed. No open areas on skin or in mucous membranes noted at this time, will continue to monitor.

## 2017-12-20 NOTE — PROGRESS NOTES
Non-rheumatic tricuspid valve insufficiency    Pulmonary HTN    Essential hypertension    Cardiac pacemaker in situ    Hypoxia - resolved    Idiopathic cardiomyopathy     PT/OT    Discharge likely tomorrow    · High risk medication: none    EMMA Andres PA-C  12/20/2017, 2:41 PM

## 2017-12-21 VITALS
BODY MASS INDEX: 28.68 KG/M2 | OXYGEN SATURATION: 94 % | TEMPERATURE: 97.5 F | HEIGHT: 64 IN | WEIGHT: 168 LBS | SYSTOLIC BLOOD PRESSURE: 117 MMHG | DIASTOLIC BLOOD PRESSURE: 64 MMHG | HEART RATE: 84 BPM | RESPIRATION RATE: 18 BRPM

## 2017-12-21 LAB
ANION GAP SERPL CALCULATED.3IONS-SCNC: 11 MMOL/L (ref 9–17)
BUN BLDV-MCNC: 51 MG/DL (ref 8–23)
BUN/CREAT BLD: 23 (ref 9–20)
CALCIUM SERPL-MCNC: 8.9 MG/DL (ref 8.6–10.4)
CHLORIDE BLD-SCNC: 94 MMOL/L (ref 98–107)
CO2: 34 MMOL/L (ref 20–31)
CREAT SERPL-MCNC: 2.26 MG/DL (ref 0.5–0.9)
GFR AFRICAN AMERICAN: 25 ML/MIN
GFR NON-AFRICAN AMERICAN: 20 ML/MIN
GFR SERPL CREATININE-BSD FRML MDRD: ABNORMAL ML/MIN/{1.73_M2}
GFR SERPL CREATININE-BSD FRML MDRD: ABNORMAL ML/MIN/{1.73_M2}
GLUCOSE BLD-MCNC: 82 MG/DL (ref 70–99)
HCT VFR BLD CALC: 35.3 % (ref 36–46)
HEMOGLOBIN: 11.4 G/DL (ref 12–16)
INR BLD: 2.2 (ref 0.9–1.2)
MCH RBC QN AUTO: 31.4 PG (ref 26–34)
MCHC RBC AUTO-ENTMCNC: 32.4 G/DL (ref 31–37)
MCV RBC AUTO: 96.7 FL (ref 80–100)
PDW BLD-RTO: 15.1 % (ref 12.1–15.2)
PLATELET # BLD: 221 K/UL (ref 140–450)
PMV BLD AUTO: 7.7 FL (ref 6–12)
POTASSIUM SERPL-SCNC: 4.3 MMOL/L (ref 3.7–5.3)
PROTHROMBIN TIME: 24.2 SEC (ref 9.7–12.2)
RBC # BLD: 3.65 M/UL (ref 4–5.2)
SODIUM BLD-SCNC: 139 MMOL/L (ref 135–144)
WBC # BLD: 6.3 K/UL (ref 3.5–11)

## 2017-12-21 PROCEDURE — 97110 THERAPEUTIC EXERCISES: CPT

## 2017-12-21 PROCEDURE — G8988 SELF CARE GOAL STATUS: HCPCS

## 2017-12-21 PROCEDURE — 80048 BASIC METABOLIC PNL TOTAL CA: CPT

## 2017-12-21 PROCEDURE — 2580000003 HC RX 258: Performed by: INTERNAL MEDICINE

## 2017-12-21 PROCEDURE — G8987 SELF CARE CURRENT STATUS: HCPCS

## 2017-12-21 PROCEDURE — 6370000000 HC RX 637 (ALT 250 FOR IP): Performed by: INTERNAL MEDICINE

## 2017-12-21 PROCEDURE — 6370000000 HC RX 637 (ALT 250 FOR IP): Performed by: PHYSICIAN ASSISTANT

## 2017-12-21 PROCEDURE — 6360000002 HC RX W HCPCS: Performed by: INTERNAL MEDICINE

## 2017-12-21 PROCEDURE — 36415 COLL VENOUS BLD VENIPUNCTURE: CPT

## 2017-12-21 PROCEDURE — 97530 THERAPEUTIC ACTIVITIES: CPT

## 2017-12-21 PROCEDURE — 85610 PROTHROMBIN TIME: CPT

## 2017-12-21 PROCEDURE — 97535 SELF CARE MNGMENT TRAINING: CPT

## 2017-12-21 PROCEDURE — 97166 OT EVAL MOD COMPLEX 45 MIN: CPT

## 2017-12-21 PROCEDURE — 97116 GAIT TRAINING THERAPY: CPT

## 2017-12-21 PROCEDURE — 85027 COMPLETE CBC AUTOMATED: CPT

## 2017-12-21 RX ORDER — FUROSEMIDE 40 MG/1
40 TABLET ORAL DAILY
Status: ON HOLD | DISCHARGE
Start: 2017-12-21 | End: 2018-01-31 | Stop reason: HOSPADM

## 2017-12-21 RX ORDER — FUROSEMIDE 40 MG/1
40 TABLET ORAL DAILY
Status: DISCONTINUED | OUTPATIENT
Start: 2017-12-22 | End: 2017-12-21 | Stop reason: HOSPADM

## 2017-12-21 RX ORDER — CARVEDILOL 6.25 MG/1
12.5 TABLET ORAL 2 TIMES DAILY
Qty: 60 TABLET | Refills: 3 | Status: ON HOLD | DISCHARGE
Start: 2017-12-21 | End: 2018-01-31 | Stop reason: HOSPADM

## 2017-12-21 RX ORDER — WARFARIN SODIUM 2.5 MG/1
2.5 TABLET ORAL
Status: DISCONTINUED | OUTPATIENT
Start: 2017-12-21 | End: 2017-12-21 | Stop reason: HOSPADM

## 2017-12-21 RX ADMIN — POLYETHYLENE GLYCOL 3350 17 G: 17 POWDER, FOR SOLUTION ORAL at 08:24

## 2017-12-21 RX ADMIN — DILTIAZEM HYDROCHLORIDE 120 MG: 120 CAPSULE, EXTENDED RELEASE ORAL at 08:24

## 2017-12-21 RX ADMIN — FUROSEMIDE 40 MG: 10 INJECTION, SOLUTION INTRAMUSCULAR; INTRAVENOUS at 08:24

## 2017-12-21 RX ADMIN — Medication 10 ML: at 08:25

## 2017-12-21 RX ADMIN — LEVOTHYROXINE SODIUM 50 MCG: 0.05 TABLET ORAL at 08:24

## 2017-12-21 RX ADMIN — ASPIRIN 81 MG 81 MG: 81 TABLET ORAL at 08:24

## 2017-12-21 RX ADMIN — FAMOTIDINE 20 MG: 20 TABLET ORAL at 08:24

## 2017-12-21 RX ADMIN — CARVEDILOL 12.5 MG: 12.5 TABLET, FILM COATED ORAL at 08:24

## 2017-12-21 RX ADMIN — REPAGLINIDE 0.5 MG: 0.5 TABLET ORAL at 07:13

## 2017-12-21 ASSESSMENT — PAIN SCALES - GENERAL: PAINLEVEL_OUTOF10: 0

## 2017-12-21 NOTE — CONSULTS
[facility] Pharmacy Department    Clinical Pharmacy Note    Warfarin consult follow-up    Recent Labs      12/21/17   0618   INR  2.2*     Recent Labs      12/19/17   0616  12/20/17   0550  12/21/17   0618   HGB  11.7*  11.7*  11.4*   HCT  35.9*  36.3  35.3*   PLT  207  213  221       Target INR Range: 2-3    Significant Drug-Drug Interactions:  New warfarin drug-drug interactions: none  Discontinued drug-drug interactions: none      Notes:                   Coumadin 2.5 mg po today. Daily PT/INR until stable within therapeutic range.    Roseanna Linares [facility] 12/21/2017, 1:40 PM

## 2017-12-21 NOTE — PROGRESS NOTES
Physical Therapy  Facility/Department: [facility] AT THE [facility] MED SURG  Daily Treatment Note  NAME: Galilea Espinoza  : 3/7/1927  MRN: [id number]    Date of Service: 2017    Patient Diagnosis(es):   Patient Active Problem List    Diagnosis Date Noted    Syncope and collapse - secondary to 2nd degree AV block 2017     Priority: High    Acute on chronic combined systolic and diastolic CHF (congestive heart failure) ([address] 2016     Priority: High     Class: Acute    Hypoxia 2017    Idiopathic cardiomyopathy ([address] 2017    Myocardial infarction less than 4 weeks ago 2017    Acute on chronic combined systolic and diastolic congestive heart failure ([address] 2017    Long-term (current) use of anticoagulants 10/24/2017    DVT (deep venous thrombosis) ([address] 10/24/2017    Orthostatic hypotension     GRAYSON (acute kidney injury) ([address] 10/02/2017    Abnormal EEG 10/02/2017    Hyperkalemia 2017    Syncope 2017    Acute cystitis 2017    Hypertensive urgency 2017    Hypertensive emergency 2017    Flash pulmonary edema ([address] 2017    Cardiac pacemaker in situ 2017    Chronic midline low back pain with left-sided sciatica 07/10/2017    Essential hypertension 07/10/2017    AV block, 2nd degree 2017    Acute renal failure superimposed on stage 4 chronic kidney disease ([address] 2017    Mobitz type 2 second degree heart block 2017    Acute on chronic renal insufficiency 2017    Dehydration, moderate 2017    Left hip pain     Non-rheumatic tricuspid valve insufficiency 2017    Pulmonary HTN 2017    CKD (chronic kidney disease) stage 4, GFR 15-29 ml/min ([facility]) 2017    Chronic diastolic HF (heart failure), NYHA class 3 ([address] 2017    Physical deconditioning 2017    Congestive heart failure (HCC)     Acquired hypothyroidism     Hyperlipidemia     Diabetes mellitus ([address]     Anxiety Past Medical History:   Diagnosis Date    Anxiety     Asthma     Cardiac pacemaker 06/29/2017    [employer] Pacemaker implant    [facility]est[facility] failure) [facility] 2016    CHF (congestive heart failure) ([facility] 10/23/2017    Chronic kidney disease     Diabetes mellitus ([facility]     D[facility] 10/23/2017    Gout     Heart disease     Hyperlipidemia     Hypertension     Hypothyroidism     Mobitz type 2 second degree heart block     Syncope and collapse 06/25/2017     Past Surgical History:   Procedure Laterality Date    BREAST BIOPSY Bilateral 1980    BREAST SURGERY  1965    cyst removed    CARDIAC PACEMAKER PLACEMENT  06/29/2017    DR Tomi Cruz    EYE SURGERY      Bilat cataract    OVARY REMOVAL Right     PACEMAKER INSERTION  06/29/2017       Restrictions  Restrictions/Precautions  Restrictions/Precautions: General Precautions, Fall Risk  Subjective   General  Chart Reviewed: Yes  Response To Previous Treatment: Patient reporting fatigue but able to participate. Family / Caregiver Present: No  Referring Practitioner: Dr. Josselin Fisher: pt denied any pain but reported feeling more tired than usual.   Pain Screening  Patient Currently in Pain: Denies  Vital Signs  Patient Currently in Pain: Denies       Orientation  Orientation  Overall Orientation Status: Within Functional Limits  Objective      Transfers  Sit to Stand: Contact guard assistance  Stand to sit: Contact guard assistance  Ambulation  Ambulation?: Yes  Ambulation 1  Surface: level tile  Device: Rolling Walker  Assistance: Contact guard assistance  Quality of Gait: slow steady tuyet with reciprocal gait pattern  Distance: 150ft  Comments: pt demonstrated increased SOB this date during tx--sp02 remained 92% and higher during ambulation. Prolonged RB required before respirations unlabored post gait, approximately 6-7mins.    Stairs/Curb  Stairs?: No     Balance  Standing - Static: Good  Standing -

## 2017-12-21 NOTE — DISCHARGE SUMMARY
Discharge Summary    Eric West  :  3/7/1927  MRN:  [id number]    Admit date:  2017      Discharge date: 2017     Admitting Physician:  Filippo Ramirez MD    Consultants:  Dr. Kiara Alarcon, cardiology    Procedures: none    Complications: none    Discharge Condition: stable    Hospital Course:   Eric West is a 80 y.o. female admitted with SOB awaking her from sleep. She states she woke up SOB and noticed that she could not lay flat. She felt it was her CHF and decided to see if putting on TEDs would help. She states that when she tried to put TEDs on her SOB increased and she came to the ER. She states she has also had a couple days of sinus congestion. States that she takes all of her meds. Denies fever, chills, dizziness, chest pain, wheezing. Had intermittent nonproductive cough. BMP 8318. She doesn't wear O2 at home. She denies any beaulieu gain or increased edema. H/o syncope, pacer, CHF. CXR showed: Pulmonary edema, small left pleural effusion and cardiomegaly compatible with mild to moderate CHF. Admitted for CHF exacerbation. She was diuresed with Lasix 40mg BID. Dr Kiara Alarcon felt patients issues with CHF during her admission were more due to uncontrolled HTN than actual fluid overload. Her BP is under much better control. Weaned off O2. She felt very weak despite improving clinically and will benefit from skilled rehab. She will need BMP next week. She will need f/u with Dr. Kiara Alarcon 1 week. Discharge to SNF.     Exam:  GEN:  alert and oriented to person, place and time, in no acute distress  EYES: PERRL  NECK: normal, supple,  no carotid bruits  PULM: diminished bilaterally at bases - no wheezes or rhonchi, no crackles, no respiratory distress  COR: regular rate & rhythm and systolic murmur, pacer  ABD: Obese, soft, non-tender, non-distended, normal bowel sounds, no masses or organomegaly  EXT:edema: trace BLE  NEURO: follows commands, GARCIA, no deficits  SKIN: no rashes or significant lesions    Significant Diagnostic Studies:   Lab Results   Component Value Date    WBC 6.3 12/21/2017    HGB 11.4 (L) 12/21/2017     12/21/2017       Lab Results   Component Value Date    BUN 51 (H) 12/21/2017    CREATININE 2.26 (H) 12/21/2017     12/21/2017    K 4.3 12/21/2017    CALCIUM 8.9 12/21/2017    CL 94 (L) 12/21/2017    CO2 34 (H) 12/21/2017    LABGLOM 20 (L) 12/21/2017       Lab Results   Component Value Date    WBCUA None 10/23/2017    RBCUA 2 TO 5 10/23/2017    EPITHUA 0 TO 2 10/23/2017    LEUKOCYTESUR NEGATIVE 10/23/2017    SPECGRAV 1.010 10/23/2017    GLUCOSEU NEGATIVE 10/23/2017    KETUA NEGATIVE 10/23/2017    PROTEINU NEGATIVE 10/23/2017    HGBUR 1+ (A) 10/23/2017    CASTUA NOT REPORTED 10/23/2017    CRYSTUA NOT REPORTED 10/23/2017    BACTERIA 1+ (A) 10/23/2017    YEAST NOT REPORTED 10/23/2017       Xr Chest 1 Vw    Result Date: 12/17/2017  FINAL REPORT EXAM:  XR CHEST LIMITED ONE VIEW HISTORY:  SOB TECHNIQUE:  Single AP portable radiograph of the chest was obtained. PRIORS:  23 October 2017 FINDINGS:  Mild-to-moderate increased pulmonary vascularity. Here are no focal consolidations to suggest pneumonia. Small left-sided pleural effusion. Clarine Chen No pneumothorax. Moderate cardiac silhouette enlargement. Atherosclerotic vascular disease. Dual lead pacemaker device. Cardiac silhouette and mediastinal structures are otherwise unremarkable. No acute osseous abnormality identified. Moderate degenerative changes of the shoulders and spine. Impression:  Pulmonary edema, small left pleural effusion and cardiomegaly compatible with mild to moderate CHF.  Electronically Signed By: Marianna Ospina   on  12/17/2017  10:13      Discharge Diagnoses:    Principal Problem:    Acute on chronic combined systolic and diastolic congestive heart failure (HCC)  Active Problems:    Diabetes mellitus (HCC)    CKD (chronic kidney disease) stage 4, GFR 15-29 ml/min (HCC)    Non-rheumatic tricuspid valve insufficiency    Pulmonary HTN    Essential hypertension    Cardiac pacemaker in situ    Hypoxia    Idiopathic cardiomyopathy (HCC)    Myocardial infarction less than 4 weeks ago      Active Hospital Problems    Diagnosis Date Noted    Hypoxia [R09.02] 12/18/2017    Idiopathic cardiomyopathy ([facility] [I42.8] 12/18/2017    Myocardial infarction less than 4 weeks ago [I21.9] 12/18/2017    Acute on chronic combined systolic and diastolic congestive heart failure ([facility] [I50.43] 12/17/2017    Cardiac pacemaker in situ [Z95.0] 07/12/2017    Essential hypertension [I10] 07/10/2017    Non-rheumatic tricuspid valve insufficiency [I36.1] 04/26/2017    Pulmonary HTN [I27.20] 04/26/2017    CKD (chronic kidney disease) stage 4, GFR 15-29 ml/min ([facility] [N18.4] 02/22/2017    Diabetes mellitus ([facility] [E11.9]        Discharge Medications:       [facility] Medication Instructions PPS:[phone]    Printed on:12/21/17 1232   Medication Information                      acetaminophen (TYLENOL) 325 MG tablet  Take 2 tablets by mouth every 4 hours as needed for Pain             aspirin 81 MG tablet  Take 81 mg by mouth every other day             carvedilol (COREG) 6.25 MG tablet  Take 2 tablets by mouth 2 times daily             diltiazem (CARDIZEM CD) 120 MG extended release capsule  Take 1 capsule by mouth daily             diphenhydrAMINE (BENADRYL) 25 MG capsule  Take 25 mg by mouth every 6 hours as needed for Itching             furosemide (LASIX) 40 MG tablet  Take 1 tablet by mouth daily             gabapentin (NEURONTIN) 100 MG capsule  Take 300 mg by mouth nightly              levothyroxine (SYNTHROID) 50 MCG tablet  Take 50 mcg by mouth daily Takes 1 tablet daily except for 2 tabs on Sunday             polyethylene glycol (GLYCOLAX) packet  Take 17 g by mouth daily             repaglinide (PRANDIN) 0.5 MG tablet  Take 0.5 mg by mouth 2 times daily (before meals)             simvastatin (ZOCOR) 5 MG

## 2017-12-21 NOTE — PROGRESS NOTES
commands, GARCIA, no deficits  SKIN: no rashes or significant lesions    -----------------------------------------------------------------  Diagnostic Data:  Lab Results   Component Value Date    WBC 6.3 12/21/2017    HGB 11.4 (L) 12/21/2017    HCT 35.3 (L) 12/21/2017     12/21/2017    CHOL 145 08/30/2017    TRIG 131 08/30/2017    HDL 41 08/30/2017    ALT 8 12/04/2017    AST 13 12/04/2017     12/21/2017    K 4.3 12/21/2017    CL 94 (L) 12/21/2017    CREATININE 2.26 (H) 12/21/2017    BUN 51 (H) 12/21/2017    CO2 34 (H) 12/21/2017    TSH 4.75 10/23/2017    INR 2.2 (H) 12/21/2017    LABA1C 5.4 11/06/2017       ASSESSMENT:    Principal Problem:    Acute on chronic combined systolic and diastolic congestive heart failure (HCC)  Active Problems:    Diabetes mellitus (HCC)    CKD (chronic kidney disease) stage 4, GFR 15-29 ml/min ([facility])    Non-rheumatic tricuspid valve insufficiency    Pulmonary HTN    Essential hypertension    Cardiac pacemaker in situ    Hypoxia    Idiopathic [facility]    Myocardial infarction less than 4 weeks ago      Patient Active Problem List    Diagnosis Date Noted    Syncope and collapse - secondary to 2nd degree AV block 06/25/2017     Priority: High    Acute on chronic combined systolic and diastolic CHF (congestive heart failure) ([facility] 12/31/2016     Priority: High     Class: Acute    Hypoxia 12/18/2017    Idiopathic cardiomyopathy ([address] 12/18/2017    Myocardial infarction less than 4 weeks ago 12/18/2017    Acute on chronic combined systolic and diastolic congestive heart failure ([address] 12/17/2017    Long-term (current) use of anticoagulants 10/24/2017    DVT (deep venous thrombosis) ([address] 10/24/2017    Orthostatic hypotension     GRAYSON (acute kidney injury) ([facility] 10/02/2017    Abnormal EEG 10/02/2017    Hyperkalemia 09/28/2017    Syncope 09/27/2017    Acute cystitis 09/27/2017    Hypertensive urgency 09/27/2017    Hypertensive emergency 08/31/2017    Flash

## 2017-12-21 NOTE — PROGRESS NOTES
Occupational Therapy   Occupational Therapy Initial Assessment  Date: 2017   Patient Name: Chandler Schirmer  MRN: [id number]     : 3/7/1927    Patient Diagnosis(es): The primary encounter diagnosis was Acute on chronic combined systolic and diastolic congestive heart failure ([facility] Diagnoses of Hypoxia and CKD (chronic kidney disease) stage 4, GFR 15-29 ml/min ([facility]) were also pertinent to this visit. has a past medical history of Anxiety; Asthma; Cardiac pacemaker; CHF (congestive heart failure) ([facility] CHF (congestive heart failure) ([address] Chronic kidney disease; Diabetes mellitus ([facility] DVT (deep venous thrombosis) ([facility] Gout; Heart disease; Hyperlipidemia; Hypertension; Hypothyroidism; Mobitz type 2 second degree heart block; and Syncope and collapse.   has a past surgical history that includes Ovary removal (Right); Breast surgery (1965); Breast biopsy (Bilateral, ); eye surgery; Cardiac pacemaker placement (2017); and Pacemaker insertion (2017).            Restrictions  Restrictions/Precautions  Restrictions/Precautions: General Precautions, Fall Risk    Subjective   General  Chart Reviewed: Yes  Patient assessed for rehabilitation services?: Yes  Pain Assessment  Patient Currently in Pain: Denies  Pain Assessment: 0-10  Pain Level: 0     Social/Functional History  Social/Functional History  Lives With: Alone  Type of Home: Apartment  Bathroom Shower/Tub: Tub/Shower unit  Bathroom Equipment: Grab bars in shower  ADL Assistance: Independent  Homemaking Assistance: Independent  Ambulation Assistance: Independent  Transfer Assistance: Independent       Objective   Vision: Within Functional Limits  Hearing: Within functional limits          Balance  Sitting Balance: Independent  Standing Balance: Contact guard assistance  Standing Balance  Sit to stand: Contact guard assistance  Stand to sit: Contact guard assistance  Functional Mobility  Assist Level: Contact guard assistance  Functional Mobility Comments: x1 LOB with mobility  Toilet Transfers  Toilet - Technique: Ambulating  Toilet Transfer: Contact guard assistance  ADL  Grooming: Contact guard assistance  UE Bathing: Stand by assistance  LE Bathing: Contact guard assistance  UE Dressing: Stand by assistance  LE Dressing: Contact guard assistance  Toileting: Contact guard assistance  Additional Comments: Patient demonstrates increased SOB with ADL care. Patient declined education with AE at this time. Stated that she does not like using them. Transfers  Sit to stand: Contact guard assistance  Stand to sit: Contact guard assistance                                             Assessment   Performance deficits / Impairments: Decreased functional mobility ; Decreased ADL status; Decreased strength;Decreased endurance;Decreased balance;Decreased high-level IADLs  Prognosis: Good  Decision Making: Medium Complexity  Patient Education: OT services, transfer techniques, attempted AE education, breathing techniques  Discharge Recommendations: Subacute/Skilled Nursing Facility;Continue to assess pending progress  REQUIRES OT FOLLOW UP: Yes  Safety Devices  Safety Devices in place: Yes  Type of devices: Call light within reach; Left in chair (family present)        Discharge Recommendations:  Subacute/Skilled Nursing Facility, Continue to assess pending progress     Plan   Plan  Times per day: Daily  Current Treatment Recommendations: Strengthening, Balance Training, Endurance Training, Safety Education & Training, Self-Care / ADL, Patient/Caregiver Education & Training, Equipment Evaluation, Education, & procurement, Home Management Training    G-Code  OT G-codes  Functional Limitation: Self care  Self Care Current Status ():  At least 1 percent but less than 20 percent impaired, limited or restricted  Self Care Goal Status (): 0 percent impaired, limited or restricted  OutComes Score                                           AM-PAC Score Goals  Short term goals  Time Frame for Short term goals: 2-4 days  Short term goal 1: Patient to engage in 8 minutes of dyanmic standing during activity of choice s s/s SOB to improve activity tolerance, standing balance and safety during I/ADL's. Short term goal 2: Patient to demonstrate UB/LB bathing/dressing with SUP without s/s SOB with use of AE/DME as needed. Short term goal 3: Patient to complete all aspects of toileting with SUP to imrpove independence and safety during ADL's. Short term goal 4: Patient to state 5 EC/WS techniques to implement throughout daily tasks.         Therapy Time   Individual Concurrent Group Co-treatment   Time In 3097         Time Out 1332         Minutes [address], OTR/L

## 2017-12-21 NOTE — PROGRESS NOTES
Discussed case with Dr. Laurent Delarosa and Dr. Eva Alanis. [facility] for discharge to SNF. Continue 40 lasix daily and current BP meds. F/u Dr. Eva Alanis next week.     Praneeth Tilley PA-C  12/21/2017, 12:18 PM

## 2017-12-28 ENCOUNTER — HOSPITAL ENCOUNTER (OUTPATIENT)
Age: 82
Setting detail: SPECIMEN
Discharge: HOME | End: 2017-12-28
Payer: MEDICARE

## 2017-12-28 LAB
ANION GAP SERPL CALCULATED.3IONS-SCNC: 10 MMOL/L (ref 9–17)
BUN BLDV-MCNC: 45 MG/DL (ref 8–23)
BUN/CREAT BLD: 26 (ref 9–20)
CALCIUM SERPL-MCNC: 9.1 MG/DL (ref 8.6–10.4)
CHLORIDE BLD-SCNC: 100 MMOL/L (ref 98–107)
CO2: 30 MMOL/L (ref 20–31)
CREAT SERPL-MCNC: 1.75 MG/DL (ref 0.5–0.9)
GFR AFRICAN AMERICAN: 33 ML/MIN
GFR NON-AFRICAN AMERICAN: 27 ML/MIN
GFR SERPL CREATININE-BSD FRML MDRD: ABNORMAL ML/MIN/{1.73_M2}
GFR SERPL CREATININE-BSD FRML MDRD: ABNORMAL ML/MIN/{1.73_M2}
GLUCOSE BLD-MCNC: 90 MG/DL (ref 70–99)
POTASSIUM SERPL-SCNC: 4.4 MMOL/L (ref 3.7–5.3)
SODIUM BLD-SCNC: 140 MMOL/L (ref 135–144)

## 2017-12-28 PROCEDURE — 80048 BASIC METABOLIC PNL TOTAL CA: CPT

## 2017-12-28 PROCEDURE — 36415 COLL VENOUS BLD VENIPUNCTURE: CPT

## 2017-12-28 PROCEDURE — P9604 ONE-WAY ALLOW PRORATED TRIP: HCPCS

## 2017-12-29 ENCOUNTER — OFFICE VISIT (OUTPATIENT)
Dept: CARDIOLOGY | Age: 82
End: 2017-12-29
Payer: MEDICARE

## 2017-12-29 VITALS
HEART RATE: 84 BPM | SYSTOLIC BLOOD PRESSURE: 115 MMHG | HEIGHT: 64 IN | WEIGHT: 172.4 LBS | OXYGEN SATURATION: 94 % | DIASTOLIC BLOOD PRESSURE: 68 MMHG | RESPIRATION RATE: 16 BRPM | BODY MASS INDEX: 29.43 KG/M2

## 2017-12-29 DIAGNOSIS — R09.89 LABILE BLOOD PRESSURE: Primary | ICD-10-CM

## 2017-12-29 DIAGNOSIS — I42.9 IDIOPATHIC CARDIOMYOPATHY (HCC): ICD-10-CM

## 2017-12-29 DIAGNOSIS — I50.42 CHRONIC COMBINED SYSTOLIC AND DIASTOLIC CHF, NYHA CLASS 3 (HCC): Chronic | ICD-10-CM

## 2017-12-29 PROBLEM — I21.9 MYOCARDIAL INFARCTION LESS THAN 4 WEEKS AGO (HCC): Status: RESOLVED | Noted: 2017-12-18 | Resolved: 2017-12-29

## 2017-12-29 PROCEDURE — G8427 DOCREV CUR MEDS BY ELIG CLIN: HCPCS | Performed by: FAMILY MEDICINE

## 2017-12-29 PROCEDURE — 4040F PNEUMOC VAC/ADMIN/RCVD: CPT | Performed by: FAMILY MEDICINE

## 2017-12-29 PROCEDURE — G8484 FLU IMMUNIZE NO ADMIN: HCPCS | Performed by: FAMILY MEDICINE

## 2017-12-29 PROCEDURE — 1123F ACP DISCUSS/DSCN MKR DOCD: CPT | Performed by: FAMILY MEDICINE

## 2017-12-29 PROCEDURE — G8417 CALC BMI ABV UP PARAM F/U: HCPCS | Performed by: FAMILY MEDICINE

## 2017-12-29 PROCEDURE — 99214 OFFICE O/P EST MOD 30 MIN: CPT | Performed by: FAMILY MEDICINE

## 2017-12-29 PROCEDURE — 1111F DSCHRG MED/CURRENT MED MERGE: CPT | Performed by: FAMILY MEDICINE

## 2017-12-29 PROCEDURE — 1090F PRES/ABSN URINE INCON ASSESS: CPT | Performed by: FAMILY MEDICINE

## 2017-12-29 PROCEDURE — 1036F TOBACCO NON-USER: CPT | Performed by: FAMILY MEDICINE

## 2017-12-29 PROCEDURE — 93291 INTERROG DEV EVAL SCRMS IP: CPT | Performed by: FAMILY MEDICINE

## 2017-12-29 RX ORDER — CALCIUM CARBONATE 200(500)MG
1 TABLET,CHEWABLE ORAL EVERY 6 HOURS PRN
COMMUNITY

## 2017-12-29 NOTE — PROGRESS NOTES
thrombosis) ([facility] 10/23/2017    Gout     H/O echocardiogram 12/18/2017    Global LV systolic function appears moderately reduced w/EF: 40%. the septal & inferosepotal walls are dyskinetic in their motion. Mildly increased LV wall thickness w/ normal LV cavity size. Sigmoid interventricular septum. LA severely dilated (>40) w/left atrial volume index 40 m1/m2. Aortic leaflet calcification w/mild aortic stenosis. Mitral annular calcification seen w/mild mitral regurg    Heart disease     Hyperlipidemia     Hypertension     Hypothyroidism     Mobitz type 2 second degree heart block     Syncope and collapse 06/25/2017       CURRENT ALLERGIES: Lisinopril; Milk of magnesia [magnesium hydroxide]; and Norco [hydrocodone-acetaminophen] REVIEW OF SYSTEMS: 10 systems were reviewed. Pertinent positives and negatives as above, all else negative.      Past Surgical History:   Procedure Laterality Date    BREAST BIOPSY Bilateral 1980    BREAST SURGERY  1965    cyst removed    CARDIAC PACEMAKER PLACEMENT  06/29/2017    DR Cano Liolivia    EYE SURGERY      Bilat cataract    OVARY REMOVAL Right     PACEMAKER INSERTION  06/29/2017    Social History:  Social History   Substance Use Topics    Smoking status: Never Smoker    Smokeless tobacco: Never Used    Alcohol use 0.6 oz/week     1 Cans of beer per week      Comment: weekly        CURRENT MEDICATIONS:  Outpatient Prescriptions Marked as Taking for the 12/29/17 encounter (Office Visit) with Milana Majano MD   Medication Sig Dispense Refill    calcium carbonate (TUMS) 500 MG chewable tablet Take 1 tablet by mouth every 6 hours as needed for Heartburn      Loperamide HCl (LOPERAMIDE A-D PO) Take by mouth      carvedilol (COREG) 6.25 MG tablet Take 2 tablets by mouth 2 times daily 60 tablet 3    furosemide (LASIX) 40 MG tablet Take 1 tablet by mouth daily      diphenhydrAMINE (BENADRYL) 25 MG capsule Take 25 mg by mouth every 6 hours as needed for Itching      renal function, I think we should hold off on a heart catheterization and simply treat this medically. Finally, I encouraged Ms. Boris Nunez to continue her other medications as well as follow up with you as previously scheduled. Based on the patients current medical conditions, I believe the risk of significant morbidity and mortality is: Intermediate      FOLLOW UP:   I told Ms. Boris Nunez  To call my office if she had a problems but otherwise I told her to Return in about 3 months (around 3/29/2018). However as always, I would be happy to see her sooner should the need arise. Once again, thank you for allowing me to participate in this patients care. Please do not hesitate to contact me could I be of further assistance. Sincerely,  Sheldon Rodriguez. Mora WARD, MS, F.A.C.C.   [facility] Cardiology Specialist  [facility] [address] [facility]  Phone: [phone], Fax: [phone]

## 2018-01-04 DIAGNOSIS — R55 SYNCOPE, UNSPECIFIED SYNCOPE TYPE: Primary | ICD-10-CM

## 2018-01-04 DIAGNOSIS — Z95.0 CARDIAC PACEMAKER IN SITU: ICD-10-CM

## 2018-01-04 DIAGNOSIS — I50.42 CHRONIC COMBINED SYSTOLIC AND DIASTOLIC CHF, NYHA CLASS 3 (HCC): Chronic | ICD-10-CM

## 2018-01-15 ENCOUNTER — ANTI-COAG VISIT (OUTPATIENT)
Dept: PHARMACY | Age: 83
End: 2018-01-15

## 2018-01-15 DIAGNOSIS — Z79.01 LONG-TERM (CURRENT) USE OF ANTICOAGULANTS: ICD-10-CM

## 2018-01-15 NOTE — PROGRESS NOTES
Becca FELIZ at [facility] contacted clinic with notification that discharge is planned for Wednesday, 1/17/18. Patient had INR draw today - 1/15/18 and result was 2.2. Per Becca RN patient is currently not receiving any antibiotics - antibiotic tx has been completed. Patient's dose of warfarin at [facility] is warfarin 3 mg on Friday; 2.5 mg all other days. Patient scheduled in clinic to recheck INR on 1/24/18.

## 2018-01-19 ENCOUNTER — HOSPITAL ENCOUNTER (INPATIENT)
Age: 83
LOS: 12 days | Discharge: NURSING, REHAB OR LONG-TERM CARE | DRG: 286 | End: 2018-01-31
Attending: FAMILY MEDICINE | Admitting: FAMILY MEDICINE
Payer: MEDICARE

## 2018-01-19 ENCOUNTER — APPOINTMENT (OUTPATIENT)
Dept: GENERAL RADIOLOGY | Age: 83
DRG: 286 | End: 2018-01-19
Payer: MEDICARE

## 2018-01-19 DIAGNOSIS — Z79.01 LONG-TERM (CURRENT) USE OF ANTICOAGULANTS: ICD-10-CM

## 2018-01-19 DIAGNOSIS — F41.9 ANXIETY: ICD-10-CM

## 2018-01-19 DIAGNOSIS — I50.9 ACUTE ON CHRONIC CONGESTIVE HEART FAILURE, UNSPECIFIED CONGESTIVE HEART FAILURE TYPE: Primary | ICD-10-CM

## 2018-01-19 DIAGNOSIS — N17.9 ACUTE RENAL FAILURE SUPERIMPOSED ON STAGE 4 CHRONIC KIDNEY DISEASE, UNSPECIFIED ACUTE RENAL FAILURE TYPE (HCC): ICD-10-CM

## 2018-01-19 DIAGNOSIS — N18.4 ACUTE RENAL FAILURE SUPERIMPOSED ON STAGE 4 CHRONIC KIDNEY DISEASE, UNSPECIFIED ACUTE RENAL FAILURE TYPE (HCC): ICD-10-CM

## 2018-01-19 PROBLEM — I50.21 ACUTE SYSTOLIC CHF (CONGESTIVE HEART FAILURE) (HCC): Status: ACTIVE | Noted: 2018-01-19

## 2018-01-19 LAB
ABSOLUTE EOS #: 0.3 K/UL (ref 0–0.4)
ABSOLUTE IMMATURE GRANULOCYTE: ABNORMAL K/UL (ref 0–0.3)
ABSOLUTE LYMPH #: 2 K/UL (ref 1–4.8)
ABSOLUTE MONO #: 0.7 K/UL (ref 0–1)
ANION GAP SERPL CALCULATED.3IONS-SCNC: 11 MMOL/L (ref 9–17)
BASOPHILS # BLD: 1 % (ref 0–2)
BASOPHILS ABSOLUTE: 0.1 K/UL (ref 0–0.2)
BNP INTERPRETATION: ABNORMAL
BUN BLDV-MCNC: 40 MG/DL (ref 8–23)
BUN/CREAT BLD: 24 (ref 9–20)
CALCIUM SERPL-MCNC: 9.1 MG/DL (ref 8.6–10.4)
CHLORIDE BLD-SCNC: 98 MMOL/L (ref 98–107)
CO2: 30 MMOL/L (ref 20–31)
CREAT SERPL-MCNC: 1.67 MG/DL (ref 0.5–0.9)
DIFFERENTIAL TYPE: ABNORMAL
DIRECT EXAM: NORMAL
EOSINOPHILS RELATIVE PERCENT: 4 % (ref 0–8)
GFR AFRICAN AMERICAN: 35 ML/MIN
GFR NON-AFRICAN AMERICAN: 29 ML/MIN
GFR SERPL CREATININE-BSD FRML MDRD: ABNORMAL ML/MIN/{1.73_M2}
GFR SERPL CREATININE-BSD FRML MDRD: ABNORMAL ML/MIN/{1.73_M2}
GLUCOSE BLD-MCNC: 116 MG/DL (ref 70–99)
GLUCOSE BLD-MCNC: 177 MG/DL (ref 74–100)
HCT VFR BLD CALC: 37.6 % (ref 36–46)
HEMOGLOBIN: 12.2 G/DL (ref 12–16)
IMMATURE GRANULOCYTES: ABNORMAL %
LYMPHOCYTES # BLD: 27 % (ref 24–44)
Lab: NORMAL
MCH RBC QN AUTO: 31 PG (ref 26–34)
MCHC RBC AUTO-ENTMCNC: 32.5 G/DL (ref 31–37)
MCV RBC AUTO: 95.5 FL (ref 80–100)
MONOCYTES # BLD: 9 % (ref 0–12)
NRBC AUTOMATED: ABNORMAL PER 100 WBC
PDW BLD-RTO: 14.8 % (ref 12.1–15.2)
PLATELET # BLD: 253 K/UL (ref 140–450)
PLATELET ESTIMATE: ABNORMAL
PMV BLD AUTO: 7.8 FL (ref 6–12)
POTASSIUM SERPL-SCNC: 5 MMOL/L (ref 3.7–5.3)
PRO-BNP: 9724 PG/ML
RBC # BLD: 3.94 M/UL (ref 4–5.2)
RBC # BLD: ABNORMAL 10*6/UL
SEG NEUTROPHILS: 59 % (ref 36–66)
SEGMENTED NEUTROPHILS ABSOLUTE COUNT: 4.5 K/UL (ref 1.8–7.7)
SODIUM BLD-SCNC: 139 MMOL/L (ref 135–144)
SPECIMEN DESCRIPTION: NORMAL
STATUS: NORMAL
TROPONIN INTERP: NORMAL
TROPONIN T: <0.03 NG/ML
TSH SERPL DL<=0.05 MIU/L-ACNC: 2.88 MIU/L (ref 0.3–5)
WBC # BLD: 7.6 K/UL (ref 3.5–11)
WBC # BLD: ABNORMAL 10*3/UL

## 2018-01-19 PROCEDURE — 93005 ELECTROCARDIOGRAM TRACING: CPT

## 2018-01-19 PROCEDURE — 2580000003 HC RX 258: Performed by: FAMILY MEDICINE

## 2018-01-19 PROCEDURE — 82947 ASSAY GLUCOSE BLOOD QUANT: CPT

## 2018-01-19 PROCEDURE — 6360000002 HC RX W HCPCS: Performed by: FAMILY MEDICINE

## 2018-01-19 PROCEDURE — 87804 INFLUENZA ASSAY W/OPTIC: CPT

## 2018-01-19 PROCEDURE — 83880 ASSAY OF NATRIURETIC PEPTIDE: CPT

## 2018-01-19 PROCEDURE — 6370000000 HC RX 637 (ALT 250 FOR IP): Performed by: FAMILY MEDICINE

## 2018-01-19 PROCEDURE — 85025 COMPLETE CBC W/AUTO DIFF WBC: CPT

## 2018-01-19 PROCEDURE — 80048 BASIC METABOLIC PNL TOTAL CA: CPT

## 2018-01-19 PROCEDURE — 71045 X-RAY EXAM CHEST 1 VIEW: CPT

## 2018-01-19 PROCEDURE — 84484 ASSAY OF TROPONIN QUANT: CPT

## 2018-01-19 PROCEDURE — 99285 EMERGENCY DEPT VISIT HI MDM: CPT

## 2018-01-19 PROCEDURE — 2000000000 HC ICU R&B

## 2018-01-19 PROCEDURE — 84443 ASSAY THYROID STIM HORMONE: CPT

## 2018-01-19 PROCEDURE — 96374 THER/PROPH/DIAG INJ IV PUSH: CPT

## 2018-01-19 PROCEDURE — 94760 N-INVAS EAR/PLS OXIMETRY 1: CPT

## 2018-01-19 PROCEDURE — 36415 COLL VENOUS BLD VENIPUNCTURE: CPT

## 2018-01-19 RX ORDER — REPAGLINIDE 0.5 MG/1
0.5 TABLET ORAL
Status: DISCONTINUED | OUTPATIENT
Start: 2018-01-20 | End: 2018-01-25

## 2018-01-19 RX ORDER — ONDANSETRON 2 MG/ML
4 INJECTION INTRAMUSCULAR; INTRAVENOUS EVERY 6 HOURS PRN
Status: DISCONTINUED | OUTPATIENT
Start: 2018-01-19 | End: 2018-01-31 | Stop reason: HOSPADM

## 2018-01-19 RX ORDER — CARVEDILOL 12.5 MG/1
12.5 TABLET ORAL 2 TIMES DAILY
Status: DISCONTINUED | OUTPATIENT
Start: 2018-01-19 | End: 2018-01-23

## 2018-01-19 RX ORDER — POLYETHYLENE GLYCOL 3350 17 G/17G
17 POWDER, FOR SOLUTION ORAL DAILY
Status: DISCONTINUED | OUTPATIENT
Start: 2018-01-19 | End: 2018-01-24

## 2018-01-19 RX ORDER — CALCIUM CARBONATE 200(500)MG
1 TABLET,CHEWABLE ORAL EVERY 6 HOURS PRN
Status: DISCONTINUED | OUTPATIENT
Start: 2018-01-19 | End: 2018-01-31 | Stop reason: HOSPADM

## 2018-01-19 RX ORDER — ACETAMINOPHEN 325 MG/1
650 TABLET ORAL EVERY 4 HOURS PRN
Status: DISCONTINUED | OUTPATIENT
Start: 2018-01-19 | End: 2018-01-23

## 2018-01-19 RX ORDER — SODIUM CHLORIDE 0.9 % (FLUSH) 0.9 %
10 SYRINGE (ML) INJECTION EVERY 12 HOURS SCHEDULED
Status: DISCONTINUED | OUTPATIENT
Start: 2018-01-19 | End: 2018-01-23

## 2018-01-19 RX ORDER — SIMVASTATIN 10 MG
5 TABLET ORAL NIGHTLY
Status: DISCONTINUED | OUTPATIENT
Start: 2018-01-19 | End: 2018-01-20

## 2018-01-19 RX ORDER — NITROGLYCERIN 0.4 MG/1
0.4 TABLET SUBLINGUAL EVERY 5 MIN PRN
Status: DISCONTINUED | OUTPATIENT
Start: 2018-01-19 | End: 2018-01-23

## 2018-01-19 RX ORDER — WARFARIN SODIUM 2 MG/1
2 TABLET ORAL DAILY
Status: DISCONTINUED | OUTPATIENT
Start: 2018-01-19 | End: 2018-01-20

## 2018-01-19 RX ORDER — LEVOTHYROXINE SODIUM 0.05 MG/1
50 TABLET ORAL DAILY
Status: DISCONTINUED | OUTPATIENT
Start: 2018-01-19 | End: 2018-01-25

## 2018-01-19 RX ORDER — LOSARTAN POTASSIUM 25 MG/1
25 TABLET ORAL DAILY
Status: DISCONTINUED | OUTPATIENT
Start: 2018-01-19 | End: 2018-01-25

## 2018-01-19 RX ORDER — GABAPENTIN 300 MG/1
300 CAPSULE ORAL NIGHTLY
Status: DISCONTINUED | OUTPATIENT
Start: 2018-01-19 | End: 2018-01-24

## 2018-01-19 RX ORDER — DILTIAZEM HYDROCHLORIDE 120 MG/1
120 CAPSULE, COATED, EXTENDED RELEASE ORAL DAILY
Status: DISCONTINUED | OUTPATIENT
Start: 2018-01-19 | End: 2018-01-24

## 2018-01-19 RX ORDER — FUROSEMIDE 10 MG/ML
40 INJECTION INTRAMUSCULAR; INTRAVENOUS 2 TIMES DAILY
Status: DISCONTINUED | OUTPATIENT
Start: 2018-01-19 | End: 2018-01-21

## 2018-01-19 RX ORDER — FUROSEMIDE 10 MG/ML
40 INJECTION INTRAMUSCULAR; INTRAVENOUS ONCE
Status: COMPLETED | OUTPATIENT
Start: 2018-01-19 | End: 2018-01-19

## 2018-01-19 RX ORDER — ASPIRIN 81 MG/1
81 TABLET, CHEWABLE ORAL EVERY OTHER DAY
Status: DISCONTINUED | OUTPATIENT
Start: 2018-01-19 | End: 2018-01-25

## 2018-01-19 RX ORDER — ISOSORBIDE MONONITRATE 30 MG/1
30 TABLET, EXTENDED RELEASE ORAL DAILY
Status: DISCONTINUED | OUTPATIENT
Start: 2018-01-19 | End: 2018-01-25

## 2018-01-19 RX ORDER — DIPHENHYDRAMINE HCL 25 MG
25 CAPSULE ORAL EVERY 6 HOURS PRN
Status: DISCONTINUED | OUTPATIENT
Start: 2018-01-19 | End: 2018-01-31 | Stop reason: HOSPADM

## 2018-01-19 RX ORDER — SODIUM CHLORIDE 0.9 % (FLUSH) 0.9 %
10 SYRINGE (ML) INJECTION PRN
Status: DISCONTINUED | OUTPATIENT
Start: 2018-01-19 | End: 2018-01-23

## 2018-01-19 RX ADMIN — LOSARTAN POTASSIUM 25 MG: 25 TABLET ORAL at 21:02

## 2018-01-19 RX ADMIN — GABAPENTIN 300 MG: 300 CAPSULE ORAL at 21:02

## 2018-01-19 RX ADMIN — Medication 10 ML: at 21:06

## 2018-01-19 RX ADMIN — CARVEDILOL 12.5 MG: 12.5 TABLET, FILM COATED ORAL at 21:02

## 2018-01-19 RX ADMIN — ISOSORBIDE MONONITRATE 30 MG: 30 TABLET, EXTENDED RELEASE ORAL at 21:02

## 2018-01-19 RX ADMIN — SIMVASTATIN 5 MG: 10 TABLET, FILM COATED ORAL at 21:02

## 2018-01-19 RX ADMIN — FUROSEMIDE 40 MG: 10 INJECTION, SOLUTION INTRAMUSCULAR; INTRAVENOUS at 18:22

## 2018-01-19 ASSESSMENT — PAIN SCALES - GENERAL
PAINLEVEL_OUTOF10: 0
PAINLEVEL_OUTOF10: 7

## 2018-01-19 ASSESSMENT — PAIN DESCRIPTION - PAIN TYPE: TYPE: ACUTE PAIN

## 2018-01-19 ASSESSMENT — PAIN DESCRIPTION - DESCRIPTORS: DESCRIPTORS: TIGHTNESS

## 2018-01-19 ASSESSMENT — PAIN DESCRIPTION - LOCATION: LOCATION: CHEST

## 2018-01-19 ASSESSMENT — PAIN DESCRIPTION - ORIENTATION: ORIENTATION: ANTERIOR;MID

## 2018-01-19 NOTE — ED NOTES
Paged Dr. Omkar Timmons as hospitalist per Dr. Marissa Mclaughlin request.     [facility]  01/19/18 [phone]

## 2018-01-19 NOTE — ED NOTES
[facility] Nurse Srinivas Dominique called for [employer] aware they are in middle of shift change--request she call when she is done with shift change so that pt can be taken upstairs     Garland Mendoza RN  01/19/18 1399

## 2018-01-19 NOTE — ED PROVIDER NOTES
[facility]  eMERGENCY dEPARTMENT eNCOUnter          [address] [facility]       Chief Complaint   Patient presents with    Shortness of Breath     onset this am    Chest Pain     pt c/o MSCP - tightness onset this am       Nurses Notes reviewed and I agree except as noted in the HPI. HISTORY OF PRESENT ILLNESS    Asael Mills is a 80 y.o. female who presents To emergency room via family, concerns or shortness of breath, onset upon awakening this morning, patient states she feels she's been worsening throughout the day, has an occasional nonproductive cough, feels an occasional squeezing sensation in her chest.  Patient was recently discharged from [facility] for conditioning secondary to her congestive heart failure, 2 days prior. Patient denies fever chills denies nausea vomiting. Patient's daughter brought her to her PCPs office for follow-up today, who advised him to the emergency room for evaluation. Review of initial nursing/triage note, patient pain descriptors tightness, patient states she does not feel her pain is tight, describes more as a pressure sensation with cough    REVIEW OF SYSTEMS     Review of Systems   All other systems reviewed and are negative. PAST MEDICAL HISTORY    has a past medical history of Anxiety; Asthma; CAD (coronary artery disease); Cardiac pacemaker; CHF (congestive heart failure) ([address] CHF (congestive heart failure) ([address] Chronic kidney disease; Diabetes mellitus ([address] DVT (deep venous thrombosis) ([address] Gout; H/O echocardiogram; Heart disease; Hyperlipidemia; Hypertension; Hypothyroidism; Mobitz type 2 second degree heart block; and Syncope and collapse. SURGICAL HISTORY      has a past surgical history that includes Ovary removal (Right); Breast surgery (1965); Breast biopsy (Bilateral, 1980); eye surgery; Cardiac pacemaker placement (06/29/2017); and Pacemaker insertion (06/29/2017).     CURRENT MEDICATIONS       Current administered)   isosorbide mononitrate (IMDUR) extended release tablet 30 mg (not administered)   warfarin (COUMADIN) daily dosing (placeholder) (not administered)   furosemide (LASIX) injection 40 mg (40 mg Intravenous Given 1/19/18 1822)       New Prescriptions from this visit:    Current Discharge Medication List          Follow-up:  Rebekah Casillasar  [address]  [phone]              Final Impression:   1.  Acute on chronic congestive heart failure, unspecified congestive heart failure type ([facility]               (Please note that portions of this note were completed with a voice recognition program.  Efforts were made to edit the dictations but occasionally words are mis-transcribed.)    MD Rufino Chavez MD  01/19/18 6482

## 2018-01-19 NOTE — ED NOTES
Dr. Fran Potter returned call. Currently speaking with Dr. Michael Gonzalez.      Adelina BOCANEGRA Reser  01/19/18 1049

## 2018-01-19 NOTE — ED NOTES
Pt placed on O2 via NC at 2lpm by JUSTINA Callahan to bring O2 saturations up     Priya Walker RN  01/19/18 7943

## 2018-01-20 LAB
ANION GAP SERPL CALCULATED.3IONS-SCNC: 10 MMOL/L (ref 9–17)
BNP INTERPRETATION: ABNORMAL
BUN BLDV-MCNC: 40 MG/DL (ref 8–23)
BUN/CREAT BLD: 21 (ref 9–20)
CALCIUM SERPL-MCNC: 8.9 MG/DL (ref 8.6–10.4)
CHLORIDE BLD-SCNC: 99 MMOL/L (ref 98–107)
CHOLESTEROL/HDL RATIO: 3.6
CHOLESTEROL: 145 MG/DL
CO2: 31 MMOL/L (ref 20–31)
CREAT SERPL-MCNC: 1.92 MG/DL (ref 0.5–0.9)
EKG ATRIAL RATE: 47 BPM
EKG ATRIAL RATE: 83 BPM
EKG ATRIAL RATE: 83 BPM
EKG ATRIAL RATE: 85 BPM
EKG P AXIS: 39 DEGREES
EKG P AXIS: 56 DEGREES
EKG P AXIS: 60 DEGREES
EKG P-R INTERVAL: 278 MS
EKG Q-T INTERVAL: 384 MS
EKG Q-T INTERVAL: 390 MS
EKG Q-T INTERVAL: 390 MS
EKG Q-T INTERVAL: 398 MS
EKG QRS DURATION: 142 MS
EKG QRS DURATION: 148 MS
EKG QRS DURATION: 148 MS
EKG QRS DURATION: 150 MS
EKG QTC CALCULATION (BAZETT): 451 MS
EKG QTC CALCULATION (BAZETT): 460 MS
EKG QTC CALCULATION (BAZETT): 464 MS
EKG QTC CALCULATION (BAZETT): 464 MS
EKG R AXIS: -13 DEGREES
EKG R AXIS: -13 DEGREES
EKG R AXIS: -15 DEGREES
EKG R AXIS: -16 DEGREES
EKG T AXIS: -105 DEGREES
EKG T AXIS: -109 DEGREES
EKG T AXIS: -151 DEGREES
EKG T AXIS: -95 DEGREES
EKG VENTRICULAR RATE: 82 BPM
EKG VENTRICULAR RATE: 83 BPM
EKG VENTRICULAR RATE: 84 BPM
EKG VENTRICULAR RATE: 85 BPM
GFR AFRICAN AMERICAN: 30 ML/MIN
GFR NON-AFRICAN AMERICAN: 25 ML/MIN
GFR SERPL CREATININE-BSD FRML MDRD: ABNORMAL ML/MIN/{1.73_M2}
GFR SERPL CREATININE-BSD FRML MDRD: ABNORMAL ML/MIN/{1.73_M2}
GLUCOSE BLD-MCNC: 100 MG/DL (ref 74–100)
GLUCOSE BLD-MCNC: 106 MG/DL (ref 74–100)
GLUCOSE BLD-MCNC: 115 MG/DL (ref 70–99)
GLUCOSE BLD-MCNC: 77 MG/DL (ref 74–100)
GLUCOSE BLD-MCNC: 84 MG/DL (ref 74–100)
GLUCOSE BLD-MCNC: 94 MG/DL (ref 74–100)
HCT VFR BLD CALC: 33.5 % (ref 36–46)
HDLC SERPL-MCNC: 40 MG/DL
HEMOGLOBIN: 10.6 G/DL (ref 12–16)
HIGH SENSITIVE C-REACTIVE PROTEIN: 15.2 MG/L
HIGH SENSITIVE C-REACTIVE PROTEIN: 36.7 MG/L
INR BLD: 2 (ref 0.9–1.2)
LDL CHOLESTEROL: 80 MG/DL (ref 0–130)
MAGNESIUM: 2.2 MG/DL (ref 1.6–2.6)
MCH RBC QN AUTO: 30.3 PG (ref 26–34)
MCHC RBC AUTO-ENTMCNC: 31.5 G/DL (ref 31–37)
MCV RBC AUTO: 96 FL (ref 80–100)
NRBC AUTOMATED: ABNORMAL PER 100 WBC
PDW BLD-RTO: 14.7 % (ref 12.1–15.2)
PLATELET # BLD: 239 K/UL (ref 140–450)
PMV BLD AUTO: 8.1 FL (ref 6–12)
POTASSIUM SERPL-SCNC: 5.1 MMOL/L (ref 3.7–5.3)
PRO-BNP: ABNORMAL PG/ML
PROTHROMBIN TIME: 22.2 SEC (ref 9.7–12.2)
RBC # BLD: 3.49 M/UL (ref 4–5.2)
SEDIMENTATION RATE, ERYTHROCYTE: 43 MM (ref 0–20)
SODIUM BLD-SCNC: 140 MMOL/L (ref 135–144)
TRIGL SERPL-MCNC: 123 MG/DL
TROPONIN INTERP: NORMAL
TROPONIN T: <0.03 NG/ML
VLDLC SERPL CALC-MCNC: ABNORMAL MG/DL (ref 1–30)
WBC # BLD: 6.4 K/UL (ref 3.5–11)

## 2018-01-20 PROCEDURE — 2580000003 HC RX 258: Performed by: FAMILY MEDICINE

## 2018-01-20 PROCEDURE — 85651 RBC SED RATE NONAUTOMATED: CPT

## 2018-01-20 PROCEDURE — 6370000000 HC RX 637 (ALT 250 FOR IP): Performed by: FAMILY MEDICINE

## 2018-01-20 PROCEDURE — 85027 COMPLETE CBC AUTOMATED: CPT

## 2018-01-20 PROCEDURE — 83880 ASSAY OF NATRIURETIC PEPTIDE: CPT

## 2018-01-20 PROCEDURE — 85610 PROTHROMBIN TIME: CPT

## 2018-01-20 PROCEDURE — 6360000002 HC RX W HCPCS: Performed by: FAMILY MEDICINE

## 2018-01-20 PROCEDURE — 80061 LIPID PANEL: CPT

## 2018-01-20 PROCEDURE — 36415 COLL VENOUS BLD VENIPUNCTURE: CPT

## 2018-01-20 PROCEDURE — 86141 C-REACTIVE PROTEIN HS: CPT

## 2018-01-20 PROCEDURE — 82947 ASSAY GLUCOSE BLOOD QUANT: CPT

## 2018-01-20 PROCEDURE — 80048 BASIC METABOLIC PNL TOTAL CA: CPT

## 2018-01-20 PROCEDURE — 83735 ASSAY OF MAGNESIUM: CPT

## 2018-01-20 PROCEDURE — 1200000000 HC SEMI PRIVATE

## 2018-01-20 PROCEDURE — 84484 ASSAY OF TROPONIN QUANT: CPT

## 2018-01-20 RX ORDER — ATORVASTATIN CALCIUM 40 MG/1
40 TABLET, FILM COATED ORAL NIGHTLY
Status: DISCONTINUED | OUTPATIENT
Start: 2018-01-20 | End: 2018-01-25

## 2018-01-20 RX ORDER — WARFARIN SODIUM 2 MG/1
2 TABLET ORAL
Status: COMPLETED | OUTPATIENT
Start: 2018-01-20 | End: 2018-01-20

## 2018-01-20 RX ADMIN — DILTIAZEM HYDROCHLORIDE 120 MG: 120 CAPSULE, COATED, EXTENDED RELEASE ORAL at 09:48

## 2018-01-20 RX ADMIN — POLYETHYLENE GLYCOL 3350 17 G: 17 POWDER, FOR SOLUTION ORAL at 09:46

## 2018-01-20 RX ADMIN — ATORVASTATIN CALCIUM 40 MG: 40 TABLET, FILM COATED ORAL at 20:07

## 2018-01-20 RX ADMIN — FUROSEMIDE 40 MG: 10 INJECTION, SOLUTION INTRAMUSCULAR; INTRAVENOUS at 09:51

## 2018-01-20 RX ADMIN — REPAGLINIDE 0.5 MG: 0.5 TABLET ORAL at 08:21

## 2018-01-20 RX ADMIN — ASPIRIN 81 MG 81 MG: 81 TABLET ORAL at 09:49

## 2018-01-20 RX ADMIN — Medication 10 ML: at 20:08

## 2018-01-20 RX ADMIN — REPAGLINIDE 0.5 MG: 0.5 TABLET ORAL at 16:44

## 2018-01-20 RX ADMIN — WARFARIN SODIUM 2 MG: 2 TABLET ORAL at 09:49

## 2018-01-20 RX ADMIN — ISOSORBIDE MONONITRATE 30 MG: 30 TABLET, EXTENDED RELEASE ORAL at 09:48

## 2018-01-20 RX ADMIN — CARVEDILOL 12.5 MG: 12.5 TABLET, FILM COATED ORAL at 09:48

## 2018-01-20 RX ADMIN — Medication 10 ML: at 09:51

## 2018-01-20 RX ADMIN — LOSARTAN POTASSIUM 25 MG: 25 TABLET ORAL at 09:48

## 2018-01-20 RX ADMIN — WARFARIN SODIUM 2 MG: 2 TABLET ORAL at 16:44

## 2018-01-20 RX ADMIN — CARVEDILOL 12.5 MG: 12.5 TABLET, FILM COATED ORAL at 20:07

## 2018-01-20 RX ADMIN — ACETAMINOPHEN 650 MG: 325 TABLET ORAL at 10:02

## 2018-01-20 RX ADMIN — FUROSEMIDE 40 MG: 10 INJECTION, SOLUTION INTRAMUSCULAR; INTRAVENOUS at 20:07

## 2018-01-20 RX ADMIN — GABAPENTIN 300 MG: 300 CAPSULE ORAL at 20:07

## 2018-01-20 RX ADMIN — LEVOTHYROXINE SODIUM 50 MCG: 0.05 TABLET ORAL at 08:21

## 2018-01-20 ASSESSMENT — PAIN SCALES - GENERAL
PAINLEVEL_OUTOF10: 0
PAINLEVEL_OUTOF10: 5
PAINLEVEL_OUTOF10: 0
PAINLEVEL_OUTOF10: 5

## 2018-01-20 ASSESSMENT — PAIN DESCRIPTION - PROGRESSION
CLINICAL_PROGRESSION: GRADUALLY WORSENING

## 2018-01-20 ASSESSMENT — PAIN DESCRIPTION - ORIENTATION: ORIENTATION: LEFT

## 2018-01-20 ASSESSMENT — PAIN DESCRIPTION - ONSET: ONSET: ON-GOING

## 2018-01-20 ASSESSMENT — PAIN DESCRIPTION - DESCRIPTORS: DESCRIPTORS: SHARP

## 2018-01-20 ASSESSMENT — PAIN DESCRIPTION - PAIN TYPE: TYPE: CHRONIC PAIN

## 2018-01-20 ASSESSMENT — PAIN DESCRIPTION - LOCATION: LOCATION: HIP

## 2018-01-20 ASSESSMENT — PAIN DESCRIPTION - FREQUENCY: FREQUENCY: CONTINUOUS

## 2018-01-20 NOTE — H&P
needed for Heartburn    Historical Provider, MD   Loperamide HCl (LOPERAMIDE A-D PO) Take by mouth    Historical Provider, MD   polyethylene glycol (GLYCOLAX) packet Take 17 g by mouth daily    Historical Provider, MD       Allergies:  Lisinopril; Milk of magnesia [magnesium hydroxide]; and Norco [hydrocodone-acetaminophen]    Social History:   TOBACCO:   reports that she has never smoked. She has never used smokeless tobacco.  ETOH:   reports that she drinks about 0.6 oz of alcohol per week . Family History:       Problem Relation Age of Onset    Heart Disease Mother      chf    Heart Disease Father     Heart Attack Father        Review of Systems:    Constitutional: positive for fatigue, negative for chills and fevers  Eyes: negative  Ears, nose, mouth, throat, and face: negative for sore mouth and sore throat  Respiratory: positive for dyspnea on exertion, shortness of breath and wheezing, negative for hemoptysis and sputum  Cardiovascular: positive for chest pressure/discomfort, dyspnea, fatigue, irregular heart beat and orthopnea, negative for lower extremity edema and near-syncope  Gastrointestinal: negative for abdominal pain and melena  Genitourinary:negative for dysuria and hematuria  Integument/breast: negative  Hematologic/lymphatic: negative  Musculoskeletal:negative  Neurological: negative  Behavioral/Psych: negative  Endocrine: negative  Allergic/Immunologic: negative        Objective:    Vitals:   Vitals:    01/19/18 1900   BP: (!) 180/79   Pulse:    Resp:    Temp:    SpO2:      Weight: 173 lb (78.5 kg)       -----------------------------------------------------------------    Exam:  GEN:    Awake, alert and oriented x 3. mild acute distress. Well developed / well nourished. EYES:  EOMI, pupils equal   ENT:  Neck supple. No lymphadenopathy.   No carotid bruit  CVS:    ireegi irreg rhythm  2/6 systolic m at sternal border  PULM: rales at bases bilat  ABD:    Bowels sounds normal.  Abdomen is soft.  No distention. No tenderness. EXT:   Trace and no edema bilaterally . No calf tenderness. NEURO: Motor and sensory are intact  SKIN:  No rashes. No skin lesions.     PSYCH:  Normal mood and affect  -----------------------------------------------------------------  Diagnostic Data:   · All diagnostic data was reviewed    Assessment:         Principal Problem:    Acute systolic CHF (congestive heart failure) (HCC)  Active Problems:    Acquired hypothyroidism    Diabetes mellitus ([facility])    Chronic combined systolic and diastolic CHF, NYHA class 3 ([facility])    CKD (chronic kidney disease) stage 4, GFR 15-29 ml/min ([facility])    Pulmonary HTN    Cardiac pacemaker in situ    Idiopathic cardiomyopathy ([facility]      Plan:     · This patient requires inpatient admission because of acute chf  I suspect she may have an ischemic component to her systollic chf    · Factors affecting the medical complexity of this patient include advanced age  She may not be able to care for herself  We discussed need for longer term ecf due to recurrent nature of her CHF admission and medicare penalties for such  · Estimated length of stay is 3 days  · We will start nitrates and increase afterload with ARB  Cardiology eval when they return next week    Jj Dubose MD

## 2018-01-20 NOTE — PROGRESS NOTES
Progress Note    SUBJECTIVE:  Pt resting better  Less dyspnea   No further chest tightness  EKG tracings noted    Pacer is capturing   pts BP has been high or normal thruouight    OBJECTIVE:    Vitals:   Vitals:    01/20/18 1200   BP: 134/68   Pulse: 81   Resp: 18   Temp: 97.2 °F (36.2 °C)   SpO2: 95%     Weight: 173 lb (78.5 kg)       -----------------------------------------------------------------  Exam:    CONSTITUTIONAL:  awake, alert and no apparent distress  EYES:  Lids and lashes normal, pupils equal, round and reactive to light, extra ocular muscles intact, sclera clear, conjunctiva normal  ENT:  normocepalic, without obvious abnormality  NECK:  supple, symmetrical, trachea midline  HEMATOLOGIC/LYMPHATICS:  no cervical lymphadenopathy  BACK:  symmetric  LUNGS:  no increased work of breathing and diminished breath sounds right base and left base  CARDIOVASCULAR:  normal apical pulses, irregularly irregular rhythm and normal S1 and S2  ABDOMEN:  No scars, normal bowel sounds, soft, non-distended, non-tender, no masses palpated, no hepatosplenomegally    MUSCULOSKELETAL:  there is no redness, warmth, or swelling of the joints  NEUROLOGIC:  nonfocal  SKIN:  no bruising or bleeding  EXT:     edema: 1+ affecting bilateral foot, bilateral ankle  -----------------------------------------------------------------  Diagnostic Data: Reviewed    ASSESSMENT:   Principal Problem:    Acute systolic CHF (congestive heart failure) (HCC)  Active Problems:    Acquired hypothyroidism    Diabetes mellitus (HCC)    Chronic combined systolic and diastolic CHF, NYHA class 3 (HCC)    CKD (chronic kidney disease) stage 4, GFR 15-29 ml/min (HCC)    Pulmonary HTN    Cardiac pacemaker in situ    Idiopathic cardiomyopathy (HCC)        PLAN:  · We will contniue  IV laisix and beta blocker nitrates and increase her statin   Long discussion today about presumptive ischemic burden and the resultant acute on chronic CHF result      Priya Matter Lino Manzanares M.D.

## 2018-01-20 NOTE — PROGRESS NOTES
Resting quietly in bed with eyes closed, respirations easy. Heart rate paced 1:1, SaO2 97% with supplemental oxygen at 4 l/m per n/c. Will monitor.   Verna Wilkerson 7:20 AM 1/20/2018

## 2018-01-21 PROBLEM — I20.0 UNSTABLE ANGINA (HCC): Status: ACTIVE | Noted: 2018-01-21

## 2018-01-21 LAB
ANION GAP SERPL CALCULATED.3IONS-SCNC: 11 MMOL/L (ref 9–17)
BUN BLDV-MCNC: 46 MG/DL (ref 8–23)
BUN/CREAT BLD: 20 (ref 9–20)
CALCIUM SERPL-MCNC: 8.8 MG/DL (ref 8.6–10.4)
CHLORIDE BLD-SCNC: 98 MMOL/L (ref 98–107)
CO2: 32 MMOL/L (ref 20–31)
CREAT SERPL-MCNC: 2.28 MG/DL (ref 0.5–0.9)
GFR AFRICAN AMERICAN: 24 ML/MIN
GFR NON-AFRICAN AMERICAN: 20 ML/MIN
GFR SERPL CREATININE-BSD FRML MDRD: ABNORMAL ML/MIN/{1.73_M2}
GFR SERPL CREATININE-BSD FRML MDRD: ABNORMAL ML/MIN/{1.73_M2}
GLUCOSE BLD-MCNC: 116 MG/DL (ref 74–100)
GLUCOSE BLD-MCNC: 123 MG/DL (ref 74–100)
GLUCOSE BLD-MCNC: 89 MG/DL (ref 74–100)
GLUCOSE BLD-MCNC: 96 MG/DL (ref 74–100)
GLUCOSE BLD-MCNC: 97 MG/DL (ref 70–99)
INR BLD: 2.3 (ref 0.9–1.2)
POTASSIUM SERPL-SCNC: 4.9 MMOL/L (ref 3.7–5.3)
PROTHROMBIN TIME: 25.1 SEC (ref 9.7–12.2)
SODIUM BLD-SCNC: 141 MMOL/L (ref 135–144)

## 2018-01-21 PROCEDURE — G8979 MOBILITY GOAL STATUS: HCPCS

## 2018-01-21 PROCEDURE — 36415 COLL VENOUS BLD VENIPUNCTURE: CPT

## 2018-01-21 PROCEDURE — 6360000002 HC RX W HCPCS: Performed by: FAMILY MEDICINE

## 2018-01-21 PROCEDURE — 85610 PROTHROMBIN TIME: CPT

## 2018-01-21 PROCEDURE — 82947 ASSAY GLUCOSE BLOOD QUANT: CPT

## 2018-01-21 PROCEDURE — 2580000003 HC RX 258: Performed by: FAMILY MEDICINE

## 2018-01-21 PROCEDURE — 97530 THERAPEUTIC ACTIVITIES: CPT

## 2018-01-21 PROCEDURE — 1200000000 HC SEMI PRIVATE

## 2018-01-21 PROCEDURE — 97162 PT EVAL MOD COMPLEX 30 MIN: CPT

## 2018-01-21 PROCEDURE — G8978 MOBILITY CURRENT STATUS: HCPCS

## 2018-01-21 PROCEDURE — 80048 BASIC METABOLIC PNL TOTAL CA: CPT

## 2018-01-21 PROCEDURE — 6370000000 HC RX 637 (ALT 250 FOR IP): Performed by: FAMILY MEDICINE

## 2018-01-21 RX ORDER — WARFARIN SODIUM 2 MG/1
2 TABLET ORAL
Status: COMPLETED | OUTPATIENT
Start: 2018-01-21 | End: 2018-01-21

## 2018-01-21 RX ORDER — FUROSEMIDE 40 MG/1
40 TABLET ORAL DAILY
Status: DISCONTINUED | OUTPATIENT
Start: 2018-01-22 | End: 2018-01-22

## 2018-01-21 RX ADMIN — ASPIRIN 81 MG 81 MG: 81 TABLET ORAL at 08:55

## 2018-01-21 RX ADMIN — REPAGLINIDE 0.5 MG: 0.5 TABLET ORAL at 08:00

## 2018-01-21 RX ADMIN — CARVEDILOL 12.5 MG: 12.5 TABLET, FILM COATED ORAL at 20:06

## 2018-01-21 RX ADMIN — POLYETHYLENE GLYCOL 3350 17 G: 17 POWDER, FOR SOLUTION ORAL at 08:54

## 2018-01-21 RX ADMIN — DILTIAZEM HYDROCHLORIDE 120 MG: 120 CAPSULE, COATED, EXTENDED RELEASE ORAL at 08:55

## 2018-01-21 RX ADMIN — LOSARTAN POTASSIUM 25 MG: 25 TABLET ORAL at 08:55

## 2018-01-21 RX ADMIN — Medication 10 ML: at 08:55

## 2018-01-21 RX ADMIN — GABAPENTIN 300 MG: 300 CAPSULE ORAL at 20:06

## 2018-01-21 RX ADMIN — WARFARIN SODIUM 2 MG: 2 TABLET ORAL at 16:46

## 2018-01-21 RX ADMIN — CARVEDILOL 12.5 MG: 12.5 TABLET, FILM COATED ORAL at 08:55

## 2018-01-21 RX ADMIN — Medication 10 ML: at 20:06

## 2018-01-21 RX ADMIN — ATORVASTATIN CALCIUM 40 MG: 40 TABLET, FILM COATED ORAL at 20:06

## 2018-01-21 RX ADMIN — LEVOTHYROXINE SODIUM 50 MCG: 0.05 TABLET ORAL at 08:55

## 2018-01-21 RX ADMIN — REPAGLINIDE 0.5 MG: 0.5 TABLET ORAL at 16:47

## 2018-01-21 RX ADMIN — ISOSORBIDE MONONITRATE 30 MG: 30 TABLET, EXTENDED RELEASE ORAL at 08:54

## 2018-01-21 RX ADMIN — FUROSEMIDE 40 MG: 10 INJECTION, SOLUTION INTRAMUSCULAR; INTRAVENOUS at 08:54

## 2018-01-21 ASSESSMENT — PAIN SCALES - GENERAL
PAINLEVEL_OUTOF10: 0

## 2018-01-21 NOTE — CONSULTS
[facility] Pharmacy Department    Clinical Pharmacy Note    Warfarin consult follow-up    Recent Labs      01/21/18   0540   INR  2.3*     Recent Labs      01/19/18   1610  01/20/18   0520   HGB  12.2  10.6*   HCT  37.6  33.5*   PLT  253  239       Target INR Range: 2-3    Significant Drug-Drug Interactions:  New warfarin drug-drug interactions: atorvastatin  Discontinued drug-drug interactions: simvastatin     HMG-CoA Reductase Inhibitors (Statins) Interacting Members: Fluvastatin, Lovastatin, Pitavastatin, ravastatin; Red Yeast Rice; Rosuvastatin; Simvastatin may increase anticoagulation effect of warfarin  Exceptions AtorvaSTATin  AtorvaSTATin does not appear to alter the pharmacodynamic effect(s) of Vitamin K Antagonists      Notes:    Please give warfarin 2 mg po today for 1 dose (home dose). Will monitor for potential decreased anticoagulation effect secondary to simvastatin replaced with atorvastatin. Daily PT/INR until stable within therapeutic range. Thank you. Brandy Macdonald.  Monique Leonardo

## 2018-01-21 NOTE — PROGRESS NOTES
Physical Therapy    Facility/Department: [facility] AT THE [facility] MED SURG  Initial Assessment    NAME: Aida Alexander  : 3/7/1927  MRN: [id number]    Date of Service: 2018    Patient Diagnosis(es): The encounter diagnosis was Acute on chronic congestive heart failure, unspecified congestive heart failure type ([address] has a past medical history of Anxiety; Asthma; CAD (coronary artery disease); Cardiac pacemaker; CHF (congestive heart failure) ([facility] CHF (congestive heart failure) ([address] Chronic kidney disease; Diabetes mellitus ([facility] DVT (deep venous thrombosis) ([facility] Gout; H/O echocardiogram; Heart disease; Hyperlipidemia; Hypertension; Hypothyroidism; Mobitz type 2 second degree heart block; and Syncope and collapse.   has a past surgical history that includes Ovary removal (Right); Breast surgery (1965); Breast biopsy (Bilateral, ); eye surgery; Cardiac pacemaker placement (2017); and Pacemaker insertion (2017).     Restrictions  Restrictions/Precautions  Restrictions/Precautions: General Precautions, Fall Risk  Vision/Hearing  Vision: Within Functional Limits  Hearing: Within functional limits     Subjective  General  Chart Reviewed: Yes  Patient assessed for rehabilitation services?: Yes  Response To Previous Treatment: Not applicable  Family / Caregiver Present: No  Referring Practitioner: Dr. Shantel Lieberman   Referral Date : 18  Diagnosis: CHF   Subjective  Subjective: no complaints   Pain Screening  Patient Currently in Pain: Denies  Vital Signs  Patient Currently in Pain: Denies       Orientation  Orientation  Overall Orientation Status: Within Normal Limits    Social/Functional History  Social/Functional History  Lives With: Alone  Type of Home: Apartment  ADL Assistance: Independent  Homemaking Assistance: Independent  Ambulation Assistance: Independent  Transfer Assistance: Independent  Objective          AROM RLE (degrees)  RLE AROM: WFL  AROM LLE (degrees)  LLE AROM : WFL  Strength RLE  Comment: grossly 4-/5  Strength LLE  Comment: grossly 4-/5           Transfers  Sit to Stand: Contact guard assistance  Stand to sit: Contact guard assistance  Ambulation  Ambulation?: Yes  More Ambulation?: Yes  Ambulation 1  Surface: level tile  Device: Rolling Walker  Assistance: Contact guard assistance  Distance: 5 steps      Balance  Posture: Fair  Sitting - Static: Good  Sitting - Dynamic: Good  Standing - Static: Good  Standing - Dynamic: Fair        Assessment   Body structures, Functions, Activity limitations: Decreased functional mobility ; Decreased endurance;Decreased strength;Decreased high-level IADLs;Decreased balance  Assessment: Pt is a 80year old female admitted for CHF> Pt presents with general weakness and reduce activity tolernance. PT will benefit from skilled Pt in order to address these deficits. Treatment Diagnosis: general weakness   Prognosis: Good  Decision Making: Medium Complexity  Patient Education: pt educated on her POC   REQUIRES PT FOLLOW UP: Yes  Activity Tolerance  Activity Tolerance: Patient Tolerated treatment well     Discharge Recommendations:  Continue to assess pending progress      Plan   Plan  Times per week: 7x/wk   Times per day: Twice a day  Plan weeks: 2x/daily except weekends 1x/daily   Current Treatment Recommendations: Strengthening, Neuromuscular Re-education, Home Exercise Program, Safety Education & Training, Balance Training, Endurance Training, Functional Mobility Training, Transfer Training, Gait Training  Safety Devices  Type of devices: Left in chair, Chair alarm in place, Call light within reach    G-Code  PT G-Codes  Functional Limitation: Mobility: Walking and moving around  Mobility: Walking and Moving Around Current Status (): At least 20 percent but less than 40 percent impaired, limited or restricted  Mobility: Walking and Moving Around Goal Status ():  At least 1 percent but less than 20 percent impaired, limited or

## 2018-01-21 NOTE — PROGRESS NOTES
Progress Note    SUBJECTIVE:  Feels better but stillsome substernal chest discomfort  Not dyspneic at this time      OBJECTIVE:    Vitals:   Vitals:    01/21/18 0802   BP:    Pulse:    Resp:    Temp:    SpO2: 93%     Weight: 175 lb 6.4 oz (79.6 kg)       -----------------------------------------------------------------  Exam:    CONSTITUTIONAL:  awake, alert and mild distress  EYES:  Lids and lashes normal, pupils equal, round and reactive to light, extra ocular muscles intact, sclera clear, conjunctiva normal  ENT:  normocepalic, without obvious abnormality  NECK:  supple, symmetrical, trachea midline  HEMATOLOGIC/LYMPHATICS:  no cervical lymphadenopathy  BACK:  symmetric  LUNGS:  tachypneic, Mild respiratory distress and diminished breath sounds left base  CARDIOVASCULAR:  normal apical pulses, regular rate and rhythm and normal S1 and S2  ABDOMEN:  No scars, normal bowel sounds, soft, non-distended, non-tender, no masses palpated, no hepatosplenomegally    MUSCULOSKELETAL:  there is no redness, warmth, or swelling of the joints  NEUROLOGIC:  nonfocal  SKIN:  no bruising or bleeding  EXT:     edema: 1+ affecting bilateral foot, bilateral ankle  -----------------------------------------------------------------  Diagnostic Data: Reviewed    ASSESSMENT:   Principal Problem:    Unstable angina (HCC)  Active Problems:    Acquired hypothyroidism    Diabetes mellitus (HCC)    Chronic combined systolic and diastolic CHF, NYHA class 3 (HCC)    CKD (chronic kidney disease) stage 4, GFR 15-29 ml/min (HCC)    Pulmonary HTN    Cardiac pacemaker in situ    Idiopathic cardiomyopathy (HCC)    Acute systolic CHF (congestive heart failure) ([facility])        PLAN:  · We will continue same meds  I suspect her presenting chf resulted from unstable angina and ischemic cardiomyopathy  We will reduce her lasix due to apparent azotemia likely prerenal from diuresis      Glady Schwab , M.D.

## 2018-01-22 LAB
ABSOLUTE EOS #: 0.2 K/UL (ref 0–0.4)
ABSOLUTE IMMATURE GRANULOCYTE: ABNORMAL K/UL (ref 0–0.3)
ABSOLUTE LYMPH #: 1.7 K/UL (ref 1–4.8)
ABSOLUTE MONO #: 0.7 K/UL (ref 0–1)
ANION GAP SERPL CALCULATED.3IONS-SCNC: 9 MMOL/L (ref 9–17)
BASOPHILS # BLD: 1 % (ref 0–2)
BASOPHILS ABSOLUTE: 0 K/UL (ref 0–0.2)
BNP INTERPRETATION: ABNORMAL
BUN BLDV-MCNC: 50 MG/DL (ref 8–23)
BUN/CREAT BLD: 24 (ref 9–20)
CALCIUM SERPL-MCNC: 9.2 MG/DL (ref 8.6–10.4)
CHLORIDE BLD-SCNC: 98 MMOL/L (ref 98–107)
CO2: 35 MMOL/L (ref 20–31)
CREAT SERPL-MCNC: 2.08 MG/DL (ref 0.5–0.9)
DIFFERENTIAL TYPE: ABNORMAL
EOSINOPHILS RELATIVE PERCENT: 3 % (ref 0–8)
GFR AFRICAN AMERICAN: 27 ML/MIN
GFR NON-AFRICAN AMERICAN: 22 ML/MIN
GFR SERPL CREATININE-BSD FRML MDRD: ABNORMAL ML/MIN/{1.73_M2}
GFR SERPL CREATININE-BSD FRML MDRD: ABNORMAL ML/MIN/{1.73_M2}
GLUCOSE BLD-MCNC: 125 MG/DL (ref 74–100)
GLUCOSE BLD-MCNC: 94 MG/DL (ref 70–99)
HCT VFR BLD CALC: 32.7 % (ref 36–46)
HEMOGLOBIN: 10.5 G/DL (ref 12–16)
IMMATURE GRANULOCYTES: ABNORMAL %
INR BLD: 2.3 (ref 0.9–1.2)
LYMPHOCYTES # BLD: 26 % (ref 24–44)
MCH RBC QN AUTO: 31 PG (ref 26–34)
MCHC RBC AUTO-ENTMCNC: 32.2 G/DL (ref 31–37)
MCV RBC AUTO: 96.4 FL (ref 80–100)
MONOCYTES # BLD: 11 % (ref 0–12)
NRBC AUTOMATED: ABNORMAL PER 100 WBC
PDW BLD-RTO: 15 % (ref 12.1–15.2)
PLATELET # BLD: 235 K/UL (ref 140–450)
PLATELET ESTIMATE: ABNORMAL
PMV BLD AUTO: 7.8 FL (ref 6–12)
POTASSIUM SERPL-SCNC: 4.5 MMOL/L (ref 3.7–5.3)
PRO-BNP: 5604 PG/ML
PROTHROMBIN TIME: 24.7 SEC (ref 9.7–12.2)
RBC # BLD: 3.39 M/UL (ref 4–5.2)
RBC # BLD: ABNORMAL 10*6/UL
SEG NEUTROPHILS: 59 % (ref 36–66)
SEGMENTED NEUTROPHILS ABSOLUTE COUNT: 3.9 K/UL (ref 1.8–7.7)
SODIUM BLD-SCNC: 142 MMOL/L (ref 135–144)
TROPONIN INTERP: NORMAL
TROPONIN T: <0.03 NG/ML
WBC # BLD: 6.6 K/UL (ref 3.5–11)
WBC # BLD: ABNORMAL 10*3/UL

## 2018-01-22 PROCEDURE — 2580000003 HC RX 258: Performed by: FAMILY MEDICINE

## 2018-01-22 PROCEDURE — 6370000000 HC RX 637 (ALT 250 FOR IP): Performed by: FAMILY MEDICINE

## 2018-01-22 PROCEDURE — 83880 ASSAY OF NATRIURETIC PEPTIDE: CPT

## 2018-01-22 PROCEDURE — 82947 ASSAY GLUCOSE BLOOD QUANT: CPT

## 2018-01-22 PROCEDURE — 85610 PROTHROMBIN TIME: CPT

## 2018-01-22 PROCEDURE — 97110 THERAPEUTIC EXERCISES: CPT

## 2018-01-22 PROCEDURE — 97116 GAIT TRAINING THERAPY: CPT

## 2018-01-22 PROCEDURE — 84484 ASSAY OF TROPONIN QUANT: CPT

## 2018-01-22 PROCEDURE — 80048 BASIC METABOLIC PNL TOTAL CA: CPT

## 2018-01-22 PROCEDURE — 85025 COMPLETE CBC W/AUTO DIFF WBC: CPT

## 2018-01-22 PROCEDURE — 36415 COLL VENOUS BLD VENIPUNCTURE: CPT

## 2018-01-22 PROCEDURE — 1200000000 HC SEMI PRIVATE

## 2018-01-22 PROCEDURE — 99223 1ST HOSP IP/OBS HIGH 75: CPT | Performed by: FAMILY MEDICINE

## 2018-01-22 RX ORDER — WARFARIN SODIUM 2 MG/1
2 TABLET ORAL
Status: COMPLETED | OUTPATIENT
Start: 2018-01-22 | End: 2018-01-22

## 2018-01-22 RX ORDER — NICOTINE POLACRILEX 4 MG
15 LOZENGE BUCCAL PRN
Status: DISCONTINUED | OUTPATIENT
Start: 2018-01-22 | End: 2018-01-31 | Stop reason: HOSPADM

## 2018-01-22 RX ORDER — DEXTROSE MONOHYDRATE 25 G/50ML
12.5 INJECTION, SOLUTION INTRAVENOUS PRN
Status: DISCONTINUED | OUTPATIENT
Start: 2018-01-22 | End: 2018-01-31 | Stop reason: HOSPADM

## 2018-01-22 RX ORDER — ALPRAZOLAM 0.25 MG/1
0.25 TABLET ORAL NIGHTLY PRN
Status: DISCONTINUED | OUTPATIENT
Start: 2018-01-22 | End: 2018-01-31 | Stop reason: HOSPADM

## 2018-01-22 RX ORDER — DEXTROSE MONOHYDRATE 50 MG/ML
100 INJECTION, SOLUTION INTRAVENOUS PRN
Status: DISCONTINUED | OUTPATIENT
Start: 2018-01-22 | End: 2018-01-31 | Stop reason: HOSPADM

## 2018-01-22 RX ADMIN — POLYETHYLENE GLYCOL 3350 17 G: 17 POWDER, FOR SOLUTION ORAL at 08:43

## 2018-01-22 RX ADMIN — GABAPENTIN 300 MG: 300 CAPSULE ORAL at 20:27

## 2018-01-22 RX ADMIN — DILTIAZEM HYDROCHLORIDE 120 MG: 120 CAPSULE, COATED, EXTENDED RELEASE ORAL at 08:42

## 2018-01-22 RX ADMIN — REPAGLINIDE 0.5 MG: 0.5 TABLET ORAL at 07:26

## 2018-01-22 RX ADMIN — ISOSORBIDE MONONITRATE 30 MG: 30 TABLET, EXTENDED RELEASE ORAL at 08:42

## 2018-01-22 RX ADMIN — LOSARTAN POTASSIUM 25 MG: 25 TABLET ORAL at 08:42

## 2018-01-22 RX ADMIN — CARVEDILOL 12.5 MG: 12.5 TABLET, FILM COATED ORAL at 08:42

## 2018-01-22 RX ADMIN — REPAGLINIDE 0.5 MG: 0.5 TABLET ORAL at 15:57

## 2018-01-22 RX ADMIN — WARFARIN SODIUM 2 MG: 2 TABLET ORAL at 17:25

## 2018-01-22 RX ADMIN — ASPIRIN 81 MG 81 MG: 81 TABLET ORAL at 08:43

## 2018-01-22 RX ADMIN — Medication 10 ML: at 10:49

## 2018-01-22 RX ADMIN — FUROSEMIDE 40 MG: 40 TABLET ORAL at 08:42

## 2018-01-22 RX ADMIN — CARVEDILOL 12.5 MG: 12.5 TABLET, FILM COATED ORAL at 20:28

## 2018-01-22 RX ADMIN — ATORVASTATIN CALCIUM 40 MG: 40 TABLET, FILM COATED ORAL at 20:27

## 2018-01-22 RX ADMIN — ALPRAZOLAM 0.25 MG: 0.25 TABLET ORAL at 20:27

## 2018-01-22 RX ADMIN — Medication 10 ML: at 20:28

## 2018-01-22 RX ADMIN — LEVOTHYROXINE SODIUM 50 MCG: 0.05 TABLET ORAL at 08:42

## 2018-01-22 ASSESSMENT — PAIN SCALES - GENERAL
PAINLEVEL_OUTOF10: 0

## 2018-01-22 NOTE — PROGRESS NOTES
Mobility Training, Transfer Training, Gait Training  Safety Devices  Type of devices: Left in chair, Nurse notified, Call light within reach, All fall risk precautions in place     Therapy Time   Individual Concurrent Group Co-treatment   Time In 0937         Time Out 1004         Minutes [address] [facility]

## 2018-01-22 NOTE — PROGRESS NOTES
constipation, and negative for hematochezia or melena  Genitourinary: negative for dysuria, negative for urinary urgency, negative for urinary frequency, and negative for hematuria  Skin: negative for skin rash, and negative for skin lesions  Neurological: negative for unilateral weakness, numbness or tingling.     Exam:  GEN:  alert and oriented to person, place and time, well-developed and well-nourished, in no acute distress  EYES:  PERRL  NECK: normal, supple,  no carotid bruits  PULM: clear to auscultation bilaterally- no wheezes, rales or rhonchi, normal air movement, no respiratory distress  COR:   regular rate & rhythm, systolic murmur 3/6  ABD:    soft, non-tender, non-distended, normal bowel sounds, no masses or organomegaly  EXT:    no cyanosis, clubbing or edema present    NEURO: follows commands, GARCIA, no deficits  SKIN:   no rashes or significant lesions      -----------------------------------------------------------------  Diagnostic Data:  Lab Results   Component Value Date    WBC 6.6 01/22/2018    HGB 10.5 (L) 01/22/2018    HCT 32.7 (L) 01/22/2018     01/22/2018    CHOL 145 01/20/2018    TRIG 123 01/20/2018    HDL 40 (L) 01/20/2018    ALT 8 12/04/2017    AST 13 12/04/2017     01/22/2018    K 4.5 01/22/2018    CL 98 01/22/2018    CREATININE 2.08 (H) 01/22/2018    BUN 50 (H) 01/22/2018    CO2 35 (H) 01/22/2018    TSH 2.88 01/19/2018    INR 2.3 (H) 01/22/2018    LABA1C 5.4 11/06/2017       ASSESSMENT:    Principal Problem:    Unstable angina (HCC)  Active Problems:    Acquired hypothyroidism    Diabetes mellitus (HCC)    Chronic combined systolic and diastolic CHF, NYHA class 3 (HCC)    CKD (chronic kidney disease) stage 4, GFR 15-29 ml/min (HCC)    Pulmonary HTN    Cardiac pacemaker in situ    Idiopathic cardiomyopathy [facility])      Patient Active Problem List    Diagnosis Date Noted    Syncope and collapse - secondary to 2nd degree AV block 06/25/2017     Priority: High    Acute on chronic than CHF    Acquired hypothyroidism    Diabetes mellitus   Finger sticks to bid    CKD       · High risk medication: coumadin    EMMA Meeks PA-C  1/22/2018, 1:23 PM

## 2018-01-22 NOTE — PROGRESS NOTES
Acquired hypothyroidism     Hyperlipidemia     Diabetes mellitus [facility]     Anxiety        Past Medical History:   Diagnosis Date    Anxiety     Asthma     CAD (coronary artery disease)     Cardiac pacemaker 06/29/2017    Biotronik Pacemaker implant    CHF (congestive heart failure) ([facility] 2016    CHF (congestive [facility] failure) ([facility] 10/23/2017    Chronic kidney disease     [facility] mellitus ([facility]     DVT (deep venous thrombosis) ([facility] 10/23/2017    Gout     H/O echocardiogram 12/18/2017    Global LV systolic function appears moderately reduced w/EF: 40%. the septal & inferosepotal walls are dyskinetic in their motion. Mildly increased LV wall thickness w/ normal LV cavity size. Sigmoid interventricular septum. LA severely dilated (>40) w/left atrial volume index 40 m1/m2. Aortic leaflet calcification w/mild aortic stenosis. Mitral annular calcification seen w/mild mitral regurg    Heart disease     Hyperlipidemia     Hypertension     Hypothyroidism     Mobitz type 2 second degree heart block     Syncope and collapse 06/25/2017     Past Surgical History:   Procedure Laterality Date    BREAST BIOPSY Bilateral 1980    BREAST SURGERY  1965    cyst removed    CARDIAC PACEMAKER PLACEMENT  06/29/2017    DR Mira Montero    EYE SURGERY      Bilat cataract    OVARY REMOVAL Right     PACEMAKER INSERTION  06/29/2017       Restrictions  Restrictions/Precautions  Restrictions/Precautions: General Precautions, Fall Risk  Subjective   General  Chart Reviewed: Yes  Response To Previous Treatment: Patient with no complaints from previous session. Family / Caregiver Present: Yes  Referring Practitioner: Dr. Deyanira Garcia: Pt reports feeling tired this tx.   Pain Screening  Patient Currently in Pain: Denies  Vital Signs  Patient Currently in Pain: Denies       Orientation  Orientation  Overall Orientation Status: Within Normal Limits  Objective      Transfers  Sit to Stand: Contact guard assistance  Stand to sit: Contact guard assistance  Ambulation  Ambulation?: Yes  More Ambulation?: No  Ambulation 1  Surface: level tile  Device: Rolling Walker  Assistance: Contact guard assistance  Quality of Gait: Slow steady tuyet   Distance: 60ftx1 with FWW     Balance  Posture: Fair  Sitting - Static: Good  Sitting - Dynamic: Good  Standing - Static: Good  Standing - Dynamic: Fair  Exercises  Straight Leg Raise: x20  Quad Sets: x20  Heelslides: x20  Gluteal Sets: x20  Hip Flexion: x20  Hip Abduction: 2x20  Knee Long Arc Quad: x20  Knee Short Arc Quad: x20  Ankle Pumps: 20x2  Comments: Hip adduction x20 with pillow between knees, B LE ther ex completed in seated and reclined position     Assessment   Body structures, Functions, Activity limitations: Decreased functional mobility ; Decreased endurance;Decreased strength;Decreased high-level IADLs;Decreased balance  Assessment: Pt c/o feeling tired and required  more RBs this afternoon.  No chair alarm in place upon arrival   Treatment Diagnosis: general weakness   Prognosis: Good  Patient Education: educated pt on discharge folder  REQUIRES PT FOLLOW UP: Yes  Activity Tolerance  Activity Tolerance: Patient Tolerated treatment well;Patient limited by fatigue       Discharge Recommendations:  Continue to assess pending progress    G-Code     OutComes Score     AM-PAC Score     Goals  Short term goals  Time Frame for Short term goals: 10 days   Short term goal 1: Pt will be educated on her POC  Short term goal 2: Pt will perform all transfers SBA  Short term goal 3: Pt will ambulate 100 feet with FWW SBA  Short term goal 4: Pt will increase dynamic standing balance to Good in order to reduce fall risk     Plan    Plan  Times per week: 7x/wk   Times per day: Twice a day  Plan weeks: 2x/daily except weekends 1x/daily   Current Treatment Recommendations: Strengthening, Neuromuscular Re-education, Home Exercise Program, Safety Education & Training, Balance Training, Endurance Training, Functional Mobility Training, Transfer Training, Gait Training  Safety Devices  Type of devices: Left in chair, Nurse notified, Call light within reach, All fall risk precautions in place     Therapy Time   Individual Concurrent Group Co-treatment   Time In 1359         Time Out 1426         Minutes [address] [facility]

## 2018-01-23 ENCOUNTER — APPOINTMENT (OUTPATIENT)
Dept: CT IMAGING | Age: 83
DRG: 286 | End: 2018-01-23
Payer: MEDICARE

## 2018-01-23 ENCOUNTER — APPOINTMENT (OUTPATIENT)
Dept: MRI IMAGING | Age: 83
DRG: 286 | End: 2018-01-23
Payer: MEDICARE

## 2018-01-23 PROBLEM — I63.89 INFARCTION OF PARIETAL LOBE (HCC): Status: ACTIVE | Noted: 2018-01-23

## 2018-01-23 LAB
ANION GAP SERPL CALCULATED.3IONS-SCNC: 7 MMOL/L (ref 9–17)
BUN BLDV-MCNC: 49 MG/DL (ref 8–23)
BUN/CREAT BLD: 23 (ref 9–20)
CALCIUM SERPL-MCNC: 9.5 MG/DL (ref 8.6–10.4)
CHLORIDE BLD-SCNC: 97 MMOL/L (ref 98–107)
CO2: 35 MMOL/L (ref 20–31)
CREAT SERPL-MCNC: 2.1 MG/DL (ref 0.5–0.9)
GFR AFRICAN AMERICAN: 27 ML/MIN
GFR NON-AFRICAN AMERICAN: 22 ML/MIN
GFR SERPL CREATININE-BSD FRML MDRD: ABNORMAL ML/MIN/{1.73_M2}
GFR SERPL CREATININE-BSD FRML MDRD: ABNORMAL ML/MIN/{1.73_M2}
GLUCOSE BLD-MCNC: 80 MG/DL (ref 74–100)
GLUCOSE BLD-MCNC: 84 MG/DL (ref 70–99)
INR BLD: 2.2 (ref 0.9–1.2)
POTASSIUM SERPL-SCNC: 4.4 MMOL/L (ref 3.7–5.3)
PROTHROMBIN TIME: 23.6 SEC (ref 9.7–12.2)
SODIUM BLD-SCNC: 139 MMOL/L (ref 135–144)

## 2018-01-23 PROCEDURE — 6370000000 HC RX 637 (ALT 250 FOR IP): Performed by: FAMILY MEDICINE

## 2018-01-23 PROCEDURE — 82947 ASSAY GLUCOSE BLOOD QUANT: CPT

## 2018-01-23 PROCEDURE — 80048 BASIC METABOLIC PNL TOTAL CA: CPT

## 2018-01-23 PROCEDURE — 93458 L HRT ARTERY/VENTRICLE ANGIO: CPT | Performed by: FAMILY MEDICINE

## 2018-01-23 PROCEDURE — 4A023N7 MEASUREMENT OF CARDIAC SAMPLING AND PRESSURE, LEFT HEART, PERCUTANEOUS APPROACH: ICD-10-PCS | Performed by: FAMILY MEDICINE

## 2018-01-23 PROCEDURE — 99291 CRITICAL CARE FIRST HOUR: CPT | Performed by: FAMILY MEDICINE

## 2018-01-23 PROCEDURE — 36415 COLL VENOUS BLD VENIPUNCTURE: CPT

## 2018-01-23 PROCEDURE — 70450 CT HEAD/BRAIN W/O DYE: CPT

## 2018-01-23 PROCEDURE — B2111ZZ FLUOROSCOPY OF MULTIPLE CORONARY ARTERIES USING LOW OSMOLAR CONTRAST: ICD-10-PCS | Performed by: FAMILY MEDICINE

## 2018-01-23 PROCEDURE — 1200000000 HC SEMI PRIVATE

## 2018-01-23 PROCEDURE — 85610 PROTHROMBIN TIME: CPT

## 2018-01-23 PROCEDURE — C1769 GUIDE WIRE: HCPCS

## 2018-01-23 PROCEDURE — 2580000003 HC RX 258: Performed by: FAMILY MEDICINE

## 2018-01-23 PROCEDURE — 70551 MRI BRAIN STEM W/O DYE: CPT

## 2018-01-23 PROCEDURE — C1887 CATHETER, GUIDING: HCPCS

## 2018-01-23 PROCEDURE — C1725 CATH, TRANSLUMIN NON-LASER: HCPCS

## 2018-01-23 PROCEDURE — C1894 INTRO/SHEATH, NON-LASER: HCPCS

## 2018-01-23 PROCEDURE — B2151ZZ FLUOROSCOPY OF LEFT HEART USING LOW OSMOLAR CONTRAST: ICD-10-PCS | Performed by: FAMILY MEDICINE

## 2018-01-23 RX ORDER — SODIUM CHLORIDE 0.9 % (FLUSH) 0.9 %
10 SYRINGE (ML) INJECTION EVERY 12 HOURS SCHEDULED
Status: DISCONTINUED | OUTPATIENT
Start: 2018-01-23 | End: 2018-01-31 | Stop reason: HOSPADM

## 2018-01-23 RX ORDER — SODIUM CHLORIDE 9 MG/ML
INJECTION, SOLUTION INTRAVENOUS CONTINUOUS
Status: ACTIVE | OUTPATIENT
Start: 2018-01-23 | End: 2018-01-23

## 2018-01-23 RX ORDER — DIPHENHYDRAMINE HCL 25 MG
50 CAPSULE ORAL ONCE
Status: DISCONTINUED | OUTPATIENT
Start: 2018-01-23 | End: 2018-01-25

## 2018-01-23 RX ORDER — WARFARIN SODIUM 2 MG/1
2 TABLET ORAL
Status: ACTIVE | OUTPATIENT
Start: 2018-01-23 | End: 2018-01-24

## 2018-01-23 RX ORDER — SODIUM CHLORIDE 0.9 % (FLUSH) 0.9 %
10 SYRINGE (ML) INJECTION PRN
Status: DISCONTINUED | OUTPATIENT
Start: 2018-01-23 | End: 2018-01-31 | Stop reason: HOSPADM

## 2018-01-23 RX ORDER — SODIUM CHLORIDE 9 MG/ML
INJECTION, SOLUTION INTRAVENOUS CONTINUOUS
Status: DISCONTINUED | OUTPATIENT
Start: 2018-01-23 | End: 2018-01-26

## 2018-01-23 RX ORDER — ACETAMINOPHEN 325 MG/1
650 TABLET ORAL EVERY 4 HOURS PRN
Status: DISCONTINUED | OUTPATIENT
Start: 2018-01-23 | End: 2018-01-31 | Stop reason: HOSPADM

## 2018-01-23 RX ORDER — NITROGLYCERIN 0.4 MG/1
0.4 TABLET SUBLINGUAL EVERY 5 MIN PRN
Status: DISCONTINUED | OUTPATIENT
Start: 2018-01-23 | End: 2018-01-31 | Stop reason: HOSPADM

## 2018-01-23 RX ORDER — CARVEDILOL 6.25 MG/1
6.25 TABLET ORAL 2 TIMES DAILY
Status: DISCONTINUED | OUTPATIENT
Start: 2018-01-23 | End: 2018-01-25

## 2018-01-23 RX ADMIN — CARVEDILOL 6.25 MG: 6.25 TABLET, FILM COATED ORAL at 20:24

## 2018-01-23 RX ADMIN — LOSARTAN POTASSIUM 25 MG: 25 TABLET ORAL at 08:25

## 2018-01-23 RX ADMIN — ATORVASTATIN CALCIUM 40 MG: 40 TABLET, FILM COATED ORAL at 20:24

## 2018-01-23 RX ADMIN — CARVEDILOL 12.5 MG: 12.5 TABLET, FILM COATED ORAL at 08:25

## 2018-01-23 RX ADMIN — ISOSORBIDE MONONITRATE 30 MG: 30 TABLET, EXTENDED RELEASE ORAL at 08:25

## 2018-01-23 RX ADMIN — REPAGLINIDE 0.5 MG: 0.5 TABLET ORAL at 08:28

## 2018-01-23 RX ADMIN — SODIUM CHLORIDE: 9 INJECTION, SOLUTION INTRAVENOUS at 09:06

## 2018-01-23 RX ADMIN — DILTIAZEM HYDROCHLORIDE 120 MG: 120 CAPSULE, COATED, EXTENDED RELEASE ORAL at 08:25

## 2018-01-23 RX ADMIN — LEVOTHYROXINE SODIUM 50 MCG: 0.05 TABLET ORAL at 08:25

## 2018-01-23 ASSESSMENT — PAIN SCALES - GENERAL
PAINLEVEL_OUTOF10: 0

## 2018-01-23 NOTE — PROGRESS NOTES
Hospitalist Progress Note    SUBJECTIVE/INTERVAL HISTORY:    Patient seen in follow up for unstable angina, CHF, CKD, DM. Heart cath today showing  60% mid LAD stenosis. Normal LVEDP. Although Dr. Parminder Hayes considered possible stenting of her RCA, shortly following the procedure she had difficulty speaking, angioplasty and/or stenting were no longer indicated. CT head negative. MRI brain: Punctate acute infarct right parietal lobe near the central sulcus. She has had some improvement since event.      OBJECTIVE:    Vitals:   Temp: 97.4 °F (36.3 °C)  BP: 138/82  Resp: 16  Pulse: 71  SpO2: 94 %  24HR INTAKE/OUTPUT:      Intake/Output Summary (Last 24 hours) at 01/23/18 1440  Last data filed at 01/22/18 2216   Gross per 24 hour   Intake              510 ml   Output                0 ml   Net              510 ml       -----------------------------------------------------------------  Review of Systems:  Unable to access due to new CVA    Exam:  GEN:  unable to speak, grunts, does shake head yes and no appropriately, in no acute distress  EYES:  PERRL  NECK: normal, supple,  no carotid bruits  PULM: clear to auscultation bilaterally- no wheezes, rales or rhonchi, normal air movement, no respiratory distress  COR:   regular rate & rhythm, systolic murmur 3/6  ABD:    soft, non-tender, non-distended, normal bowel sounds, no masses or organomegaly  EXT:    no cyanosis, clubbing or edema present    NEURO: follows commands, GARCIA, tongue deviated to right, aphasia, no weakness  SKIN:  wrist bandage    -----------------------------------------------------------------  Diagnostic Data:  Lab Results   Component Value Date    WBC 6.6 01/22/2018    HGB 10.5 (L) 01/22/2018    HCT 32.7 (L) 01/22/2018     01/22/2018    CHOL 145 01/20/2018    TRIG 123 01/20/2018    HDL 40 (L) 01/20/2018    ALT 8 12/04/2017    AST 13 12/04/2017     01/23/2018    K 4.4 01/23/2018    CL 97 (L) 01/23/2018    CREATININE 2.10 (H) 01/23/2018    BUN 49

## 2018-01-23 NOTE — CONSULTS
Heart Disease in her father and mother. PHYSICAL EXAM:   /74   Pulse 81   Temp 98.1 °F (36.7 °C) (Temporal)   Resp 18   Ht 5' 2\" (1.575 m) Comment: 's license  Wt 727 lb 9.6 oz (77.8 kg) Comment: re-entered for BMI calculation  SpO2 93%   BMI 31.39 kg/m²  Body mass index is 31.39 kg/m². Constitutional: She is oriented to person, place, and time. She appears well-developed and well-nourished. In no acute distress. HEENT: Normocephalic and atraumatic. JVD present. Carotid bruit is not present. No mass and no thyromegaly present. No lymphadenopathy present. Cardiovascular: Normal rate, regular rhythm, normal heart sounds. Exam reveals no gallop and no friction rubs. A I/VI systolic murmur was heard maximally at the 2nd right intercostal space. Pulmonary/Chest: Effort normal and breath sounds normal. No respiratory distress. She has no wheezes, rhonchi or rales. Abdominal: Soft, non-tender. Bowel sounds and aorta are normal. She exhibits no organomegaly, mass or bruit. Extremities: 1+ lower extremity pitting pedal edema. 1/2 way up to the knees bilaterally No cyanosis and no clubbing. Pulses are 2+ radial and carotid pulses. 2+ dorsalis pedis and posterior tibial pulses bilaterally. Neurological: She is alert and oriented to person, place, and time. No evidence of gross cranial nerve deficit. Coordination appeared normal.   Skin: Skin is warm and dry. There is no rash or diaphoresis. Psychiatric: She has a normal mood and affect.  Her speech is normal and behavior is normal.      MOST RECENT LABS ON RECORD:   Lab Results   Component Value Date    WBC 6.6 01/22/2018    HGB 10.5 (L) 01/22/2018    HCT 32.7 (L) 01/22/2018     01/22/2018    CHOL 145 01/20/2018    TRIG 123 01/20/2018    HDL 40 (L) 01/20/2018    ALT 8 12/04/2017    AST 13 12/04/2017     01/22/2018    K 4.5 01/22/2018    CL 98 01/22/2018    CREATININE 2.08 (H) 01/22/2018    BUN 50 (H) 01/22/2018    CO2 35 (H) 01/22/2018    TSH 2.88 01/19/2018    INR 2.3 (H) 01/22/2018    LABA1C 5.4 11/06/2017       ASSESSMENT:  Patient Active Problem List    Diagnosis Date Noted    Syncope and collapse - secondary to 2nd degree AV block 06/25/2017     Priority: High    Acute on chronic combined systolic and diastolic CHF (congestive heart failure) ([facility] 12/31/2016     Priority: High     Class: Acute    Unstable ang[facility] ([facility] 98/62/9431    Acute systolic CHF (congestive heart failure) ([facility] 01/19/2018    Labile blood pressure 12/29/2017    Hypoxia 12/18/2017    Idiopathic cardiomyopathy ([facility] 12/18/2017    Acute on chronic combined systolic and diastolic congestive heart failure ([facility] 12/17/2017    Long-term (current) use of anticoagulants 10/24/2017    DVT (deep venous thrombosis) ([facility] 10/24/2017    Orthostatic hypotension     GRAYSON (acute kidney injury) ([facility] 10/02/2017    Abnormal EEG 10/02/2017    Hyperkalemia 09/28/2017    Syncope 09/27/2017    Acute cystitis 09/27/2017    Hypertensive urgency 09/27/2017    Hypertensive emergency 08/31/2017    Flash pulmonary edema ([address] 08/31/2017    Cardiac pacemaker in situ 07/12/2017    Chronic midline low back pain with left-sided sciatica 07/10/2017    Essential hypertension 07/10/2017    AV block, 2nd degree 06/28/2017    Acute renal failure superimposed on stage 4 chronic kidney disease ([facility] 06/28/2017    Mobitz type 2 second degree heart block 06/28/2017    Acute on chronic renal insufficiency 06/26/2017    Dehydration, moderate 06/26/2017    Left hip pain     Non-rheumatic tricuspid valve insufficiency 04/26/2017    Pulmonary HTN 04/26/2017    CKD (chronic kidney disease) stage 4, GFR 15-29 ml/min (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 Lutz's signs, symptoms and risk factors, I recommended that Ms. Azeb Bales consider undergoing a cardiac catheterization with coronary angiography to assess her coronary anatomy and facilitate better treatment recommendations. I discussed the risks, benefits, and alternatives to the procedure including the risk of bleeding, contrast induced allergy and/or kidney damage, arrythmia, stroke, heart attack, death, or the need for further procedures. I also discussed the fact that although treatment with simple medical management is a potential treatment option in place of cardiac catheterization, I expressed my opinion that cardiac catheterization in order to define her coronary anatomy and rule out severe 3 vessel or left main coronary artery disease would significant help guide the most appropriate treatment strategy ranging from no treatment to medications, to stents, to even bypass surgery. Ms. Azeb Bales verbalized understanding of the risks benefits and alternatives and stated that she would like to proceed with heart catheterization. I also discussed the advantages and disadvantages of having @HIS procedure performed here at [facility] vs. a larger hospital such as [facility]. Beyond the obvious advantage of convenience, I also explained potential disadvantages including the inability to have immediate cardiac stenting performed or the presence of on site CT surgical backup. Ms. Azeb Bales said that she would like to proceed with a heart catheterization and would prefer to have the procedure performed at [address] [place] [address]. Therefore we have schedule the procedure to be performed on 1/23/18 at around 10 am..    · Medical Management for suspected Coronary Artery Disease:  Antiplatelet Agent: Continue Aspirin 81 mg daily.    ·  Beta Blocker Therapy: Continue Carvedilol 12.5 mg twice daily I discussed the

## 2018-01-23 NOTE — PROGRESS NOTES
Neuro check completed by Dr. Rebeca Mar in radiology department. Dr. Rebeca Mar also updated family and patient returned to cath lab recovery area.

## 2018-01-23 NOTE — PROGRESS NOTES
[facility]  Inpatient/Observation/Outpatient Rehabilitation    Date: 2018  Patient Name: Blanquita Jiang       [x] Inpatient Acute/Observation       []  Outpatient  : 3/7/1927         [x] Pt refused/declined therapy at this time due to:           Patient educated on benefits of therapy, but she declined at this time due to not feeling well. Will attempt evaluation at our earliest opportunity.        Jorge Soto PT, DPT Date: 2018

## 2018-01-23 NOTE — PROGRESS NOTES
Patient returned from radiology via cart after having brain CT. Patient able to follow commands. Speech slurred. Unable to answer all questions. Shakes head \"no\" when asked if she is experiencing pain. Daughters at bedside and updated by Dr. Vida Merino. Non-rebreather mask remains on. Vitals stable. Current plan is to do MRI of brain.

## 2018-01-23 NOTE — PROGRESS NOTES
Patient currently answering questions appropriately and following commands. Speech remains slightly slurred. Family at bedside.

## 2018-01-24 ENCOUNTER — HOSPITAL ENCOUNTER (OUTPATIENT)
Dept: PHARMACY | Age: 83
Setting detail: THERAPIES SERIES
End: 2018-01-24
Payer: MEDICARE

## 2018-01-24 LAB
ANION GAP SERPL CALCULATED.3IONS-SCNC: 10 MMOL/L (ref 9–17)
BNP INTERPRETATION: ABNORMAL
BUN BLDV-MCNC: 38 MG/DL (ref 8–23)
BUN/CREAT BLD: 22 (ref 9–20)
CALCIUM SERPL-MCNC: 9.1 MG/DL (ref 8.6–10.4)
CHLORIDE BLD-SCNC: 101 MMOL/L (ref 98–107)
CO2: 31 MMOL/L (ref 20–31)
CREAT SERPL-MCNC: 1.76 MG/DL (ref 0.5–0.9)
GFR AFRICAN AMERICAN: 33 ML/MIN
GFR NON-AFRICAN AMERICAN: 27 ML/MIN
GFR SERPL CREATININE-BSD FRML MDRD: ABNORMAL ML/MIN/{1.73_M2}
GFR SERPL CREATININE-BSD FRML MDRD: ABNORMAL ML/MIN/{1.73_M2}
GLUCOSE BLD-MCNC: 78 MG/DL (ref 74–100)
GLUCOSE BLD-MCNC: 82 MG/DL (ref 70–99)
GLUCOSE BLD-MCNC: 89 MG/DL (ref 74–100)
INR BLD: 1.9 (ref 0.9–1.2)
POTASSIUM SERPL-SCNC: 4.4 MMOL/L (ref 3.7–5.3)
PRO-BNP: 7044 PG/ML
PROTHROMBIN TIME: 20.6 SEC (ref 9.7–12.2)
SODIUM BLD-SCNC: 142 MMOL/L (ref 135–144)

## 2018-01-24 PROCEDURE — 2580000003 HC RX 258

## 2018-01-24 PROCEDURE — 97110 THERAPEUTIC EXERCISES: CPT

## 2018-01-24 PROCEDURE — 2580000003 HC RX 258: Performed by: PHYSICIAN ASSISTANT

## 2018-01-24 PROCEDURE — 6360000002 HC RX W HCPCS

## 2018-01-24 PROCEDURE — 1200000000 HC SEMI PRIVATE

## 2018-01-24 PROCEDURE — G9160 LANG COMP GOAL STATUS: HCPCS

## 2018-01-24 PROCEDURE — 97167 OT EVAL HIGH COMPLEX 60 MIN: CPT

## 2018-01-24 PROCEDURE — G8988 SELF CARE GOAL STATUS: HCPCS

## 2018-01-24 PROCEDURE — 85610 PROTHROMBIN TIME: CPT

## 2018-01-24 PROCEDURE — 36415 COLL VENOUS BLD VENIPUNCTURE: CPT

## 2018-01-24 PROCEDURE — 92523 SPEECH SOUND LANG COMPREHEN: CPT

## 2018-01-24 PROCEDURE — 83880 ASSAY OF NATRIURETIC PEPTIDE: CPT

## 2018-01-24 PROCEDURE — 92610 EVALUATE SWALLOWING FUNCTION: CPT

## 2018-01-24 PROCEDURE — 2500000003 HC RX 250 WO HCPCS

## 2018-01-24 PROCEDURE — G8987 SELF CARE CURRENT STATUS: HCPCS

## 2018-01-24 PROCEDURE — 80048 BASIC METABOLIC PNL TOTAL CA: CPT

## 2018-01-24 PROCEDURE — 6370000000 HC RX 637 (ALT 250 FOR IP): Performed by: FAMILY MEDICINE

## 2018-01-24 PROCEDURE — 82947 ASSAY GLUCOSE BLOOD QUANT: CPT

## 2018-01-24 PROCEDURE — G9159 LANG COMP CURRENT STATUS: HCPCS

## 2018-01-24 RX ORDER — HYDRALAZINE HYDROCHLORIDE 20 MG/ML
10 INJECTION INTRAMUSCULAR; INTRAVENOUS EVERY 6 HOURS PRN
Status: DISCONTINUED | OUTPATIENT
Start: 2018-01-24 | End: 2018-01-31 | Stop reason: HOSPADM

## 2018-01-24 RX ORDER — WARFARIN SODIUM 3 MG/1
3 TABLET ORAL
Status: ACTIVE | OUTPATIENT
Start: 2018-01-24 | End: 2018-01-25

## 2018-01-24 RX ORDER — DILTIAZEM HYDROCHLORIDE 60 MG/1
60 TABLET, FILM COATED ORAL 2 TIMES DAILY
Status: DISCONTINUED | OUTPATIENT
Start: 2018-01-24 | End: 2018-01-25

## 2018-01-24 RX ADMIN — LOSARTAN POTASSIUM 25 MG: 25 TABLET ORAL at 10:05

## 2018-01-24 RX ADMIN — REPAGLINIDE 0.5 MG: 0.5 TABLET ORAL at 07:53

## 2018-01-24 RX ADMIN — ISOSORBIDE MONONITRATE 30 MG: 30 TABLET, EXTENDED RELEASE ORAL at 10:05

## 2018-01-24 RX ADMIN — SODIUM CHLORIDE: 9 INJECTION, SOLUTION INTRAVENOUS at 02:55

## 2018-01-24 RX ADMIN — SODIUM CHLORIDE: 9 INJECTION, SOLUTION INTRAVENOUS at 22:06

## 2018-01-24 RX ADMIN — CARVEDILOL 6.25 MG: 6.25 TABLET, FILM COATED ORAL at 10:05

## 2018-01-24 RX ADMIN — DILTIAZEM HYDROCHLORIDE 60 MG: 60 TABLET, FILM COATED ORAL at 10:05

## 2018-01-24 RX ADMIN — LEVOTHYROXINE SODIUM 50 MCG: 0.05 TABLET ORAL at 10:05

## 2018-01-24 ASSESSMENT — PAIN SCALES - GENERAL
PAINLEVEL_OUTOF10: 0

## 2018-01-24 NOTE — PROGRESS NOTES
Spoke with patient and daughter about going to Rehab now since she is going to need speech therapy. Patient was just dc from [facility]. She and family would like her to go back there. Referral made and paper work fax.     BLAKE Guzman

## 2018-01-24 NOTE — PROGRESS NOTES
I checked back on Ms. Olman Burks following her MRI after talking with Dr. Keith Mckeon who reported a very small cerebral defect of uncertain significance. At that time, around 10:30, Ms. Olman Burks appeared to be talking completely normally without any obvious deficients. I did a neuro exam which was negative other than perhaps the slightest of slurring of her speech. I discussed transfering her to  [address] [place] [address] for more aggressive care but all were in agreement with continuing at least for now here with supportive care. I checked back on her at around 815 PM and again she was having difficulty speaking and apparently this has been on and off throughout the day. I spoke with Snehal Cardona MD, the neuro radiologist regarding her case and he reviewed her images and really expressed some degree of doubt that this was an embolic phenomenon at all but rather suggested that this may be more of a reaction to IV contrast or perhaps related to her mildly low glucose at the time of the study and suggested continuing with the exact same care including a target blood pressure at around 124 systolic and holding her Neurontin. I discussed this with Dr. Huang English who was also in agreement with the plan. Critical care time: I personally spent >40 minutes at the patients bedside in direct patient do to the high potential for life-threatening deterioration.

## 2018-01-24 NOTE — PROGRESS NOTES
Physical Therapy  Facility/Department: [facility] AT THE [facility] MED SURG  Daily Treatment Note  NAME: Kristian Pierce  : 3/7/1927  MRN: [id number]    Date of Service: 2018    Patient Diagnosis(es):   Patient Active Problem List    Diagnosis Date Noted    Syncope and collapse - secondary to 2nd degree AV block 2017     Priority: High    Acute on chronic combined systolic and diastolic CHF (congestive heart failure) ([address] 2016     Priority: High     Class: Acute    Infarction of parietal lobe ([address] 2018    Unstable angina ([address]     Acute systolic CHF (congestive heart failure) ([address] 2018    Labile blood pressure 2017    Hypoxia 2017    Idiopathic cardiomyopathy ([address] 2017    Acute on chronic combined systolic and diastolic congestive heart failure ([address] 2017    Long-term (current) use of anticoagulants 10/24/2017    DVT (deep venous thrombosis) ([address] 10/24/2017    Orthostatic hypotension     GRAYSON (acute kidney injury) ([address] 10/02/2017    Abnormal EEG 10/02/2017    Hyperkalemia 2017    Syncope 2017    Acute cystitis 2017    Hypertensive urgency 2017    Hypertensive emergency 2017    Flash pulmonary edema ([address] 2017    Cardiac pacemaker in situ 2017    Chronic midline low back pain with left-sided sciatica 07/10/2017    Essential hypertension 07/10/2017    AV block, 2nd degree 2017    Acute renal failure superimposed on stage 4 chronic kidney disease ([address] 2017    Mobitz type 2 second degree heart block 2017    Acute on chronic renal insufficiency 2017    Dehydration, moderate 2017    Left hip pain     Non-rheumatic tricuspid valve insufficiency 2017    Pulmonary HTN 2017    CKD (chronic kidney disease) stage 4, GFR 15-29 ml/min ([facility]) 2017    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), NYHA class 4 ([address] 2017    Physical Currently in Pain: Denies  Pain Assessment  Pain Level: 0  Vital Signs  Patient Currently in Pain: Denies       Orientation  Orientation  Overall Orientation Status: Within Functional Limits  Objective                  Exercises  Straight Leg Raise: x15  Quad Sets: x15  Heelslides: x15  Hip Abduction: x15  Knee Long Arc Quad: x15  Knee Short Arc Quad: x15  Ankle Pumps: 15x2  Comments: B LE thre ex completed reclined and seated                        Assessment   Body structures, Functions, Activity limitations: Decreased functional mobility ; Decreased endurance;Decreased strength;Decreased high-level IADLs;Decreased balance  Assessment: Pt not speaking but tolerated tx well  Treatment Diagnosis: general weakness   Prognosis: Good  Patient Education: reviewed importance of exercise and ambulation with pt and family  REQUIRES PT FOLLOW UP: Yes       Discharge Recommendations:  Continue to assess pending progress    G-Code     OutComes Score                                                    AM-PAC Score             Goals  Short term goals  Time Frame for Short term goals: 10 days   Short term goal 1: Pt will be educated on her POC  Short term goal 2: Pt will perform all transfers SBA  Short term goal 3: Pt will ambulate 100 feet with FWW SBA  Short term goal 4: Pt will increase dynamic standing balance to Good in order to reduce fall risk     Plan    Plan  Times per week: 7x/wk   Times per day: Twice a day  Plan weeks: 2x/daily except weekends 1x/daily   Current Treatment Recommendations: Strengthening, Neuromuscular Re-education, Home Exercise Program, Safety Education & Training, Balance Training, Endurance Training, Functional Mobility Training, Transfer Training, Gait Training  Safety Devices  Type of devices:  All fall risk precautions in place, Call light within reach, Chair alarm in place, Left in chair, Telesitter in use, Nurse notified (family in room)     Therapy Time   Individual Concurrent Group

## 2018-01-24 NOTE — PROGRESS NOTES
assistance  ADL  Feeding: NPO  Grooming: Minimal assistance  UE Bathing: Minimal assistance  LE Bathing: Minimal assistance  UE Dressing: Minimal assistance  LE Dressing: Minimal assistance  Toileting: Minimal assistance           Transfers  Stand Step Transfers: Contact guard assistance  Sit to stand: Contact guard assistance  Stand to sit: Contact guard assistance      LUE AROM (degrees)  LUE AROM : WFL  Left Hand AROM (degrees)  Left Hand AROM: WFL  RUE AROM (degrees)  RUE AROM : WFL  Right Hand AROM (degrees)  Right Hand AROM: WFL    Assessment   Performance deficits / Impairments: Decreased functional mobility ; Decreased ADL status; Decreased strength;Decreased safe awareness;Decreased high-level IADLs;Decreased balance;Decreased endurance  Assessment: Patient was originally admitted for cardiac issues. Patient underwent cardiac cath and ended up sustaining CVA. Patient presents ADL wise with generalized weakness and deconditioning, limiting her independence during ADL. Prognosis: Good  Decision Making: High Complexity  Patient Education: OT servivces, POC  Discharge Recommendations: Continue to assess pending progress;Subacute/Skilled Nursing Facility  REQUIRES OT FOLLOW UP: Yes  Safety Devices  Safety Devices in place: Yes  Type of devices: Chair alarm in place; Left in chair;Nurse notified;Call light within reach        Discharge Recommendations:  Continue to assess pending progress, [facility]  Times per day: Daily  Current Treatment Recommendations: Strengthening, Balance Training, Endurance Training, Self-Care / ADL, Safety Education & Training, Patient/Caregiver Education & Training, Equipment Evaluation, Education, & procurement, Neuromuscular Re-education    G-Code  OT G-codes  Functional Limitation: Self care  Self Care Current Status (): At least 20 percent but less than 40 percent impaired, limited or restricted  Self Care Goal Status ():  At least 1

## 2018-01-24 NOTE — PROGRESS NOTES
Hyperkalemia 09/28/2017    Syncope 09/27/2017    Acute cystitis 09/27/2017    Hypertensive urgency 09/27/2017    Hypertensive emergency 08/31/2017    Flash pulmonary edema ([facility] 08/31/2017    Cardiac pacemaker in situ 07/12/2017    Chronic midline low back pain with left-sided sciatica 07/10/2017    Essential hypertension 07/10/2017    AV block, 2nd degree 06/28/2017    Acute renal failure superimposed on stage 4 chronic kidney disease ([facility] 06/28/2017    Mobitz type 2 second degree heart block 06/28/2017    Acute on chronic renal insufficiency 06/26/2017    Dehydration, moderate 06/26/2017    Left hip pain     Non-rheumatic tricuspid valve insufficiency 04/26/2017    Pulmonary HTN 04/26/2017    CKD (chronic kidney disease) stage 4, GFR 15-29 ml/min ([facility]) 02/22/2017    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), NYHA class 4 ([facility] 01/18/2017    Physical deconditioning 01/18/2017    Acute on chronic congestive heart failure ([facility]     Acquired hypothyroidism     Hyperlipidemia     Diabetes mellitus ([facility]     Anxiety        PLAN:    Infarction of parietal lobe    NV checks   SLP eval   PT/OT    Unstable angina   Appreciate ardiology consult   imdur    HTN   Home meds  Chronic combined systolic and diastolic CHF, NYHA class 3/Idiopathic cardiomyopathy/Cardiac pacemaker in situ/Pulmonary HTN    Acquired hypothyroidism    Diabetes mellitus   Finger sticks     CKD     Possible discharge tomorrow      · High risk medication: coumadin    EMMA Little PA-C  1/24/2018, 9:48 AM

## 2018-01-24 NOTE — PROGRESS NOTES
Vision  Vision: Impaired  Vision Exceptions: Wears glasses for reading  Hearing  Hearing: Exceptions to [facility]  Hearing Exceptions: Hard of hearing/hearing concerns           Objective:     Oral/Motor  Oral Motor: Exceptions to [facility]  Labial ROM: Reduced left  Labial Symmetry: Abnormal symmetry left  Labial Strength: Reduced  Labial Coordination: Reduced  Lingual ROM: Reduced left, deviates to right upon protrusion  Lingual Symmetry: Abnormal symmetry right  Lingual Strength: Reduced  Lingual Sensation: Reduced  Lingual Coordination: Reduced  Velum: Flaccid  Mandible: Impaired  Gag: Unable to assess    Auditory Comprehension  Comprehension: Exceptions  Yes/No Questions: Severe  Basic Questions: Severe  One Step Basic Commands: Severe  Common Objects: Severe  Conversation: Severe    Reading Comprehension  Reading Status: Unable to assess    Expression  Primary Mode of Expression: Nonverbal - pointing    Motor Speech  Motor Speech: Unable to assess    Pragmatics/Social Functioning  Pragmatics: Unable to assess    Cognition:      Orientation  Overall Orientation Status: Impaired  Orientation Level: Oriented to person  Attention  Attention: Exceptions to [facility]  Sustained Attention: Mild  Memory  Memory: Unable to assess  Problem Solving  Problem Solving: Unable to assess  Numeric Reasoning  Numeric Reasoning: Unable to assess  Abstract Reasoning  Abstract Reasoning: Unable to assess  Safety/Judgement  Safety/Judgement: Unable to assess    Swallowing:  Oral Motor Deficits  Oral/Motor  Oral Motor: Exceptions to [facility]  Labial ROM: Reduced left  Labial Symmetry: Abnormal symmetry left  Labial Strength: Reduced  Labial Coordination: Reduced  Lingual ROM: Reduced left  Lingual Symmetry: Abnormal symmetry right  Lingual Strength: Reduced  Lingual Sensation: Reduced  Lingual Coordination: Reduced  Velum: Flaccid  Mandible: Impaired  Gag: Unable to assess    Oral Phase Dysfunction  Oral Phase  Oral Phase: Exceptions  Oral Phase

## 2018-01-25 LAB
ANION GAP SERPL CALCULATED.3IONS-SCNC: 13 MMOL/L (ref 9–17)
BUN BLDV-MCNC: 33 MG/DL (ref 8–23)
BUN/CREAT BLD: 20 (ref 9–20)
CALCIUM SERPL-MCNC: 9.1 MG/DL (ref 8.6–10.4)
CHLORIDE BLD-SCNC: 104 MMOL/L (ref 98–107)
CO2: 26 MMOL/L (ref 20–31)
CREAT SERPL-MCNC: 1.66 MG/DL (ref 0.5–0.9)
GFR AFRICAN AMERICAN: 35 ML/MIN
GFR NON-AFRICAN AMERICAN: 29 ML/MIN
GFR SERPL CREATININE-BSD FRML MDRD: ABNORMAL ML/MIN/{1.73_M2}
GFR SERPL CREATININE-BSD FRML MDRD: ABNORMAL ML/MIN/{1.73_M2}
GLUCOSE BLD-MCNC: 67 MG/DL (ref 74–100)
GLUCOSE BLD-MCNC: 72 MG/DL (ref 70–99)
HCT VFR BLD CALC: 34.7 % (ref 36–46)
HEMOGLOBIN: 11.2 G/DL (ref 12–16)
INR BLD: 2.1 (ref 0.9–1.2)
MCH RBC QN AUTO: 31.2 PG (ref 26–34)
MCHC RBC AUTO-ENTMCNC: 32.4 G/DL (ref 31–37)
MCV RBC AUTO: 96.5 FL (ref 80–100)
NRBC AUTOMATED: ABNORMAL PER 100 WBC
PDW BLD-RTO: 14.7 % (ref 12.1–15.2)
PLATELET # BLD: 255 K/UL (ref 140–450)
PMV BLD AUTO: 7.8 FL (ref 6–12)
POTASSIUM SERPL-SCNC: 4.5 MMOL/L (ref 3.7–5.3)
PROTHROMBIN TIME: 22.4 SEC (ref 9.7–12.2)
RBC # BLD: 3.6 M/UL (ref 4–5.2)
SODIUM BLD-SCNC: 143 MMOL/L (ref 135–144)
WBC # BLD: 7.2 K/UL (ref 3.5–11)

## 2018-01-25 PROCEDURE — 6370000000 HC RX 637 (ALT 250 FOR IP): Performed by: FAMILY MEDICINE

## 2018-01-25 PROCEDURE — 2580000003 HC RX 258: Performed by: FAMILY MEDICINE

## 2018-01-25 PROCEDURE — 97110 THERAPEUTIC EXERCISES: CPT

## 2018-01-25 PROCEDURE — 82947 ASSAY GLUCOSE BLOOD QUANT: CPT

## 2018-01-25 PROCEDURE — 2500000003 HC RX 250 WO HCPCS: Performed by: FAMILY MEDICINE

## 2018-01-25 PROCEDURE — 85027 COMPLETE CBC AUTOMATED: CPT

## 2018-01-25 PROCEDURE — 85610 PROTHROMBIN TIME: CPT

## 2018-01-25 PROCEDURE — 99222 1ST HOSP IP/OBS MODERATE 55: CPT | Performed by: SURGERY

## 2018-01-25 PROCEDURE — 97164 PT RE-EVAL EST PLAN CARE: CPT

## 2018-01-25 PROCEDURE — 6360000002 HC RX W HCPCS: Performed by: PHYSICIAN ASSISTANT

## 2018-01-25 PROCEDURE — 1200000000 HC SEMI PRIVATE

## 2018-01-25 PROCEDURE — 97535 SELF CARE MNGMENT TRAINING: CPT

## 2018-01-25 PROCEDURE — 80048 BASIC METABOLIC PNL TOTAL CA: CPT

## 2018-01-25 PROCEDURE — 36415 COLL VENOUS BLD VENIPUNCTURE: CPT

## 2018-01-25 PROCEDURE — 92507 TX SP LANG VOICE COMM INDIV: CPT

## 2018-01-25 RX ORDER — METOPROLOL TARTRATE 5 MG/5ML
5 INJECTION INTRAVENOUS EVERY 6 HOURS
Status: DISCONTINUED | OUTPATIENT
Start: 2018-01-25 | End: 2018-01-29

## 2018-01-25 RX ORDER — NITROGLYCERIN 40 MG/1
1 PATCH TRANSDERMAL DAILY
Status: DISCONTINUED | OUTPATIENT
Start: 2018-01-25 | End: 2018-01-29

## 2018-01-25 RX ORDER — PHYTONADIONE 10 MG/ML
5 INJECTION, EMULSION INTRAMUSCULAR; INTRAVENOUS; SUBCUTANEOUS ONCE
Status: COMPLETED | OUTPATIENT
Start: 2018-01-25 | End: 2018-01-25

## 2018-01-25 RX ADMIN — METOPROLOL TARTRATE 5 MG: 5 INJECTION, SOLUTION INTRAVENOUS at 09:35

## 2018-01-25 RX ADMIN — METOPROLOL TARTRATE 5 MG: 5 INJECTION, SOLUTION INTRAVENOUS at 14:59

## 2018-01-25 RX ADMIN — METOPROLOL TARTRATE 5 MG: 5 INJECTION, SOLUTION INTRAVENOUS at 20:06

## 2018-01-25 RX ADMIN — PHYTONADIONE 5 MG: 10 INJECTION, EMULSION INTRAMUSCULAR; INTRAVENOUS; SUBCUTANEOUS at 13:08

## 2018-01-25 RX ADMIN — Medication 10 ML: at 20:01

## 2018-01-25 ASSESSMENT — PAIN SCALES - GENERAL
PAINLEVEL_OUTOF10: 0

## 2018-01-25 ASSESSMENT — PAIN SCALES - WONG BAKER: WONGBAKER_NUMERICALRESPONSE: 0

## 2018-01-25 NOTE — PROGRESS NOTES
Spoke with Dr. Anila Briceño in regards to cardiology clearance, as Dr. Carlito Sherwood is out of town. Patient is well known to Dr. Carlito Sherwood. Dr. Anila Briceño did not feel comfortable giving clearance as patient is not known to him. I spoke with Dr. Carlito Sherwood who recommended NGT placement for 2-3 days, followed by a week of TPN and if patient did not improve, and is still unable to swallow then place g-tube. Dr. Carlito Sherwood did not offer cardiac clearance and deferred to Dr. Luca Rowley expertise in this manner. Spoke with Dr. Lino Manzanares, he feels that it is appropriate to proceed with g-tube and he gives medical clearance for the procedure. Patient and her family are in agreement.      Jostin Mendez PA-C  1/25/2018, 2:53 PM

## 2018-01-25 NOTE — PROGRESS NOTES
Speech Language Pathology  Facility/Department: [facility] AT THE [facility] MED SURG  Speech/Swallowing Treatment    NAME: Maldonado Gaxiola  : 3/7/1927   MRN: [id number]  ADMISSION DATE: 2018  ADMITTING DIAGNOSIS: has Acute on chronic combined systolic and diastolic CHF (congestive heart failure) ([address] Acquired hypothyroidism; Hyperlipidemia; Diabetes mellitus ([address] Anxiety; Acute on chronic congestive heart failure ([address] 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), NYHA class 4 ([address] Physical deconditioning; CKD (chronic kidney disease) stage 4, GFR 15-29 ml/min ([address] Non-rheumatic tricuspid valve insufficiency; Pulmonary HTN; Syncope and collapse - secondary to 2nd degree AV block; Acute on chronic renal insufficiency; Dehydration, moderate; Left hip pain; AV block, 2nd degree; Acute renal failure superimposed on stage 4 chronic kidney disease ([address] Mobitz type 2 second degree heart block; Chronic midline low back pain with left-sided sciatica; Essential hypertension; Cardiac pacemaker in situ; Hypertensive emergency; Flash pulmonary edema ([address] Syncope; Acute cystitis; Hypertensive urgency; Hyperkalemia; GRAYSON (acute kidney injury) ([address] Abnormal EEG; Orthostatic hypotension; Long-term (current) use of anticoagulants; DVT (deep venous thrombosis) ([address] Acute on chronic combined systolic and diastolic congestive heart failure ([address] Hypoxia; Idiopathic cardiomyopathy ([address] Labile blood pressure; Acute systolic CHF (congestive heart failure) ([address] Unstable angina ([address] and Infarction of parietal lobe ([address] on her problem list.  DATE ONSET: 2018    Date of Eval: 2018   Evaluating Therapist: DARIUS Rudd    RECENT RESULTS  CT OF HEAD/MRI:RECENT RESULTS  CT OF HEAD/MRI:   1. Punctate acute infarct right parietal lobe near the central sulcus   2.  Moderate underlying age related white matter changes and moderate atrophy      Recent Chest x-ray 18:   Impression   CHF slightly worse from prior     Primary Complaint: unable to express    Pain:  Pain Assessment  Patient Currently in Pain: Unable to Assess    Assessment:  Diagnosis: aphasia I69.82 oropharyngeal dysphagia R13.12  Dysphagia Diagnosis: Moderate to severe oral stage dysphagia; Moderate to severe pharyngeal stage dysphagia  Dysphagia Outcome Severity Scale: Level 1: Severe dysphagia- NPO. Unable to tolerate any PO safely     Recommendations:  Requires SLP Intervention: Yes  Duration/Frequency of Treatment: Daily during inpatient stay   D/C Recommendations: SNF     Recommended Diet and Intervention  Diet Solids Recommendation: NPO  Liquid Consistency Recommendation: NPO    Plan:   Goals:  Short-term Goals  Timeframe for Short-term Goals: 7 days   Goal 1: Pt will answer simple yes/no in any modality (pointing, nodding, gestures, yes/no board) with greater than 50% accuracy with max cues  Goal 2: Pt will follow simple 1-step gross motor commands with 70% accuracy and max cues   Goal 3: Pt will verbalize speech sounds on 5/10 opportunities with max cues   Goal 4: Pt will identify common objects from F:2 with 60% accuracy and max cues      Treatment/Goals  Short-term Goals  Timeframe for Short-term Goals: 7 days  Goal 1: Patient will tolerate ice chips/tsp sips of water without overt s/sx pen/asp x10  Goal 2: Patient will complete lingual exercises (lingual ROM and protrusion) with  max cues x5    Patient/family involved in developing goals and treatment plan: yes    Subjective:   Previous level of function and limitations:  General  Chart Reviewed: Yes  Family / Caregiver Present: Yes (Patient's daughter)  Subjective  Subjective: Patient tired this date - closed eyes between questions.  Daughter reports that Patient is more alert in the evening      Objective:     Oral/Motor  Oral Motor: Exceptions to [facility]  Labial Coordination: Reduced  Lingual ROM: Reduced left  Lingual Symmetry: Abnormal symmetry right  Lingual Strength: Reduced  Lingual Sensation: Reduced  Lingual Coordination: Reduced    Auditory Comprehension  Comprehension: Exceptions  Yes/No Questions: Moderate  Basic Questions: Severe  One Step Basic Commands: Moderate  Common Objects: Severe    Reading Comprehension  Reading Status: Unable to assess    Expression  Primary Mode of Expression: Nonverbal - pointing    Motor Speech  Motor Speech: Unable to assess    Pragmatics/Social Functioning  Pragmatics: Unable to assess    Cognition:      Orientation  Overall Orientation Status: Impaired  Orientation Level: Oriented to person;Oriented to place  Attention  Attention: Exceptions to [facility]  Sustained Attention: Moderate (decreased alertness)  Memory  Memory: Unable to assess  Problem Solving  Problem Solving: Unable to assess  Numeric Reasoning  Numeric Reasoning: Unable to assess  Abstract Reasoning  Abstract Reasoning: Unable to assess  Safety/Judgement  Safety/Judgement: Unable to assess    Prognosis:  Speech Therapy Prognosis  Prognosis: Fair  Prognosis Considerations: Severity of Impairments  Individuals consulted  Consulted and agree with results and recommendations: Family member;Patient    Education:  Patient Education: Patient education completed ST POT and techniques to use to assess accuracy of yes/no answers  Patient Education Response: Verbalizes understanding         Therapy Time:   Individual   Time In 1500   Time Out 1520   Minutes 20       Patient was laying in bed this date - daughter present at bedside. Daughter reported that Patient is usually more alert in the evening (6-8PM). Patient was awake during ST treatment; however, d/t inconsistent alertness (Patient closing eyes often) no ice chip trials were administered. Patient able to shake & nod head to indicate no/yes - discussed with daughter of using the gestures as a reliable means to answer yes/no questions v. Yes/no communication board (which daughter indicated Patient was having trouble with).   Patient

## 2018-01-25 NOTE — PROGRESS NOTES
Hospitalist Progress Note    SUBJECTIVE/INTERVAL HISTORY:    Patient seen in follow up for Infarction of parietal lobe, unstable angina, CHF, CKD, DM. Alert this morning. Daughter states she has been sleeping more than normal for her. Walked in room. Shakes head yes and no appropriately. Dr. Shantel Lieberman discussed options with her today. Hospice vs g-tube and therapy. Patient wishes to pursue g-tube and therapy. She cannot speak. Seen by SLP who recommended NPO status. Patient's tongue moves a little, bu not coordinated. OBJECTIVE:    Vitals:   Temp: 98.4 °F (36.9 °C)  BP: (!) 148/60  Resp: 16  Pulse: (!) 44  SpO2: 93 %  24HR INTAKE/OUTPUT:      Intake/Output Summary (Last 24 hours) at 01/25/18 1110  Last data filed at 01/25/18 0533   Gross per 24 hour   Intake             1294 ml   Output                0 ml   Net             1294 ml       -----------------------------------------------------------------  Review of Systems:  Unable to access due to current neuro deficits. Denies pain.     Exam:  GEN:  unable to speak, does shake head yes and no appropriately, appears to understand questions, in no acute distress  TONGUE: moves, but unable to control  EYES:  PERRL  NECK: normal, supple,  no carotid bruits  PULM: clear to auscultation bilaterally- no wheezes, rales or rhonchi, normal air movement, no respiratory distress  COR:   regular rate & rhythm, systolic murmur 3/6  ABD:    soft, non-tender, non-distended, normal bowel sounds, no masses or organomegaly  EXT:    no cyanosis, clubbing or edema present    NEURO: follows commands, GARCIA, aphasia, no weakness, no facial drop  SKIN:  no rash    -----------------------------------------------------------------  Diagnostic Data:  Lab Results   Component Value Date    WBC 7.2 01/25/2018    HGB 11.2 (L) 01/25/2018    HCT 34.7 (L) 01/25/2018     01/25/2018    CHOL 145 01/20/2018    TRIG 123 01/20/2018    HDL 40 (L) 01/20/2018    ALT 8 12/04/2017    AST 13 12/04/2017    NA 143 01/25/2018    K 4.5 01/25/2018     01/25/2018    CREATININE 1.66 (H) 01/25/2018    BUN 33 (H) 01/25/2018    CO2 26 01/25/2018    TSH 2.88 01/19/2018    INR 2.1 (H) 01/25/2018    LABA1C 5.4 11/06/2017       ASSESSMENT:    Principal Problem:    Unstable angina (HCC)  Active Problems:    Acquired hypothyroidism    Diabetes mellitus (HCC)    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), NYHA class 4 (HCC)    CKD (chronic kidney disease) stage 4, GFR 15-29 ml/min (HCC)    Pulmonary HTN    Cardiac pacemaker in situ    Idiopathic cardiomyopathy (HCC)    Infarction of parietal lobe [facility])      Patient Active Problem List    Diagnosis Date Noted    Syncope and collapse - secondary to 2nd degree AV block 06/25/2017     Priority: High    Acute on chronic combined systolic and diastolic CHF (congestive heart failure) ([address] 12/31/2016     Priority: High     Class: Acute    Infarction of parietal lobe ([address] 01/23/2018    Unstable angina ([address] 28/45/8252    Acute systolic CHF (congestive heart failure) ([address] 01/19/2018    Labile blood pressure 12/29/2017    Hypoxia 12/18/2017    Idiopathic cardiomyopathy ([address] 12/18/2017    Acute on chronic combined systolic and diastolic congestive heart failure ([address] 12/17/2017    Long-term (current) use of anticoagulants 10/24/2017    DVT (deep venous thrombosis) ([address] 10/24/2017    Orthostatic hypotension     GRAYSON (acute kidney injury) ([address] 10/02/2017    Abnormal EEG 10/02/2017    Hyperkalemia 09/28/2017    Syncope 09/27/2017    Acute cystitis 09/27/2017    Hypertensive urgency 09/27/2017    Hypertensive emergency 08/31/2017    Flash pulmonary edema ([address] 08/31/2017    Cardiac pacemaker in situ 07/12/2017    Chronic midline low back pain with left-sided sciatica 07/10/2017    Essential hypertension 07/10/2017    AV block, 2nd degree 06/28/2017    Acute renal failure superimposed on stage 4 chronic kidney disease ([address] 06/28/2017    Mobitz type 2 second degree heart block 06/28/2017    Acute on chronic renal insufficiency 06/26/2017    Dehydration, moderate 06/26/2017    Left hip pain     Non-rheumatic tricuspid valve insufficiency 04/26/2017    Pulmonary HTN 04/26/2017    CKD (chronic kidney disease) stage 4, GFR 15-29 ml/min ([facility]) 02/22/2017    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), NYHA class 4 ([address] 01/18/2017    Physical deconditioning 01/18/2017    Acute on chronic congestive heart failure (HCC)     Acquired hypothyroidism     Hyperlipidemia     Diabetes mellitus ([facility])     Anxiety        PLAN:    Infarction of parietal lobe    NV checks   Appreciate SLP eval   PT/OT   General surgery consult for g-tube   Vit K 5mg SQ X 1 for goal INR 1.5 for g-tube placement    Unstable angina   Appreciate ardiology consult   NTG patch    HTN   IV metoprolol  Chronic combined systolic and diastolic CHF, NYHA class 3/Idiopathic cardiomyopathy/Cardiac pacemaker in situ/Pulmonary HTN    Acquired hypothyroidism    Diabetes mellitus   Finger sticks     CKD     Possible discharge tomorrow      · High risk medication: coumadin - held as she is NPO    Anson Ferris PA-C  1/25/2018, 11:10 AM

## 2018-01-25 NOTE — PROGRESS NOTES
Physical Therapy  Facility/Department: [facility] AT THE [facility] MED SURG  Re-assessment  NAME: Xochitl Muse  : 3/7/1927  MRN: [id number]    Date of Service: 2018    Patient Diagnosis(es):   Patient Active Problem List    Diagnosis Date Noted    Syncope and collapse - secondary to 2nd degree AV block 2017     Priority: High    Acute on chronic combined systolic and diastolic CHF (congestive heart failure) ([address] 2016     Priority: High     Class: Acute    Infarction of parietal lobe ([address] 2018    Unstable angina ([address]     Acute systolic CHF (congestive heart failure) ([address] 2018    Labile blood pressure 2017    Hypoxia 2017    Idiopathic cardiomyopathy ([address] 2017    Acute on chronic combined systolic and diastolic congestive heart failure ([address] 2017    Long-term (current) use of anticoagulants 10/24/2017    DVT (deep venous thrombosis) ([address] 10/24/2017    Orthostatic hypotension     GRAYSON (acute kidney injury) ([address] 10/02/2017    Abnormal EEG 10/02/2017    Hyperkalemia 2017    Syncope 2017    Acute cystitis 2017    Hypertensive urgency 2017    Hypertensive emergency 2017    Flash pulmonary edema ([address] 2017    Cardiac pacemaker in situ 2017    Chronic midline low back pain with left-sided sciatica 07/10/2017    Essential hypertension 07/10/2017    AV block, 2nd degree 2017    Acute renal failure superimposed on stage 4 chronic kidney disease ([address] 2017    Mobitz type 2 second degree heart block 2017    Acute on chronic renal insufficiency 2017    Dehydration, moderate 2017    Left hip pain     Non-rheumatic tricuspid valve insufficiency 2017    Pulmonary HTN 2017    CKD (chronic kidney disease) stage 4, GFR 15-29 ml/min ([facility]) 2017    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), NYHA class 4 ([address] 2017    Physical deconditioning 01/18/2017    Acute on chronic congestive heart failure (HCC)     Acquired hypothyroidism     Hyperlipidemia     Diabetes mellitus [facility]     Anxiety        Past Medical History:   Diagnosis Date    Anxiety     Asthma     CAD (coronary artery disease)     Cardiac pacemaker 06/29/2017    Biotronik Pacemaker implant    CHF (congestive heart failure) ([facility] 2016    CHF (congestive heart failure) ([facility] 10/23/2017    Chronic kidney disease     [facility]     DVT (deep venous thrombosis) ([facility] 10/23/2017    Gout     H/O echocardiogram 12/18/2017    Global LV systolic function appears moderately reduced w/EF: 40%. the septal & inferosepotal walls are dyskinetic in their motion. Mildly increased LV wall thickness w/ normal LV cavity size. Sigmoid interventricular septum. LA severely dilated (>40) w/left atrial volume index 40 m1/m2. Aortic leaflet calcification w/mild aortic stenosis. Mitral annular calcification seen w/mild mitral regurg    Heart disease     Hyperlipidemia     Hypertension     Hypothyroidism     Mobitz type 2 second degree heart block     Syncope and collapse 06/25/2017     Past Surgical History:   Procedure Laterality Date    BREAST BIOPSY Bilateral 1980    BREAST SURGERY  1965    cyst removed    CARDIAC CATHETERIZATION Left 01/23/2018    via right radial approach/ Inge Waller/ Dr. Jean Alba  06/29/2017    DR Rex Duong    EYE SURGERY      Bilat cataract    OVARY REMOVAL Right     PACEMAKER INSERTION  06/29/2017       Restrictions  Restrictions/Precautions  Restrictions/Precautions: General Precautions, Fall Risk (NPO)     Subjective   General  Chart Reviewed: Yes  Referring Practitioner: Dr. Huang English   Subjective  Subjective: Unable to speak but nods yes when asked if agreeable to PT.         Orientation  Orientation  Overall Orientation Status:  (unable to assess but appears unchanged)     Objective   Bed mobility  Comment: sitting in Time In 0734         Time Out 0800         Minutes [address] Fidencio Ortiz, PT, DPT

## 2018-01-25 NOTE — CONSULTS
500 mg, 1 tablet, Oral, Q6H PRN, Jean Paul Harris MD    diphenhydrAMINE (BENADRYL) capsule 25 mg, 25 mg, Oral, Q6H PRN, Jean Paul Harris MD    ondansetron [facility]) injection 4 mg, 4 mg, Intravenous, Q6H PRN, Jean Paul Harris MD    Physical Exam:  Vital signs and Nurse's note reviewed  Gen: alert and demonstrates nonverbal cues of understanding discussion despite current inability to talk  HEENT: NCAT, PERRLA, EOMI, no scleral icterus  CVS: Regular rate and rhythm  Resp: Good bilateral air entry, clear B/L, no active wheezing, no labored breathing  Abd: soft, mildly obese, non-tender, non-distended, bowel sounds present. Ext: No clubbing, cyanosis, edema  Skin: No erythema or ulcerations     Labs:   Lab Results   Component Value Date    WBC 7.2 01/25/2018    HGB 11.2 01/25/2018    HCT 34.7 01/25/2018    MCV 96.5 01/25/2018     01/25/2018     Lab Results   Component Value Date     01/25/2018    K 4.5 01/25/2018     01/25/2018    CO2 26 01/25/2018    BUN 33 01/25/2018    CREATININE 1.66 01/25/2018    GLUCOSE 72 01/25/2018    CALCIUM 9.1 01/25/2018     Lab Results   Component Value Date    ALKPHOS 56 12/04/2017    ALT 8 12/04/2017    AST 13 12/04/2017    PROT 7.1 12/04/2017    BILITOT 0.42 12/04/2017    LABALBU 3.7 12/04/2017       Lab Results   Component Value Date    INR 2.1 01/25/2018       Radiologic Studies:    REPORT: CT head without contrast       TECHNIQUE: Contiguous thin axial slices through the head obtained without IV contrast.       INDICATION: Decreased alertness status post cardiac catheter       FINDINGS: Compared to 10/2/2017. Some contrast is seen due to IV contrast given during cardiac catheter. No evidence of acute cerebral cortical infarction, hemorrhage or mass lesion. Patchy and scattered foci  of hypoattenuation in the subcortical and    periventricular white matter of both cerebral hemispheres.  Although nonspecific, this is typically seen as a sequela of chronic ischemic small ml   IVSd:1.32 cm(0.6 - 1.1 cm)      Aortic Root:3.08 cm(2.0 - 3.7 cm)   LVPWd:1.02 cm(0.6 - 1.1 cm)     LA Dimension: 4.24 cm(1.9 - 4.0 cm)   Fractional Shortenin.67 %    LA volume/Index: 72.6 ml /40m^2   Calculated LVEF (%): 39.2 %     AV Cusp Separation: 0.96 cm                                   LVOT:1.99 cm      Mitral:                                 Aortic      Valve Area (P1/2-Time): 4.58 cm^2       Peak Velocity: 2.18 m/s   Peak E-Wave: 1.47 m/s                   Mean Velocity: 1.56 m/s                                           Peak Gradient: 19.01 mmHg   Peak Gradient: 8.6 mmHg                 Mean Gradient: 12.1 mmHg      P1/2t: 48.07 msec                       Acceleration Time: 82.88 msec                                           Area (continuity): 1.33 cm^2                                           AV VTI: 38.67 cm      Tricuspid:                              Pulmonic:      Estimated RVSP: 34.52 mmHg   Peak TR Velocity: 2.57 m/s   Peak TR Gradient: 26.28644 mmHg   Estimated RA Pressure: 8 mmHg                                           Estimated PASP: 34.52 mmHg     Diastology / Tissue Doppler    Lateral Wall E' velocity:0.11 m/s  Lateral Wall E/E':13.68    ------------------------------------------    Narrative     Cardiac Diagnostic Report     Demographics      Patient    BENDER         Date of Study           2018   Name       AWILDA BATISTA      Date of    1927    Gender                  Female   Birth      Age        89 year(s)    Race                          Room       0303          Height:                 62 inch, 157.48 cm   Number      Corporate  [id number]    Weight:                 172 pounds, 77.8 kg   ID #      Patient    [id number]     BSA:        1.79 m^2    BMI:       31.37 kg/m^2   Acct #      MR #       [id number]        Performing Physician   Qi Perezamento      Accession  [id number]     Referring Physician   #      Case ID #  [id number]          Assisting Physician     Additional Comments  H&P reviewed and patient examined by performing physician prior to the  procedure on 01/23/2018 at 0855  No changes noted. If changes, see note below. Mallampati Classification 2 / ASA Classification II : per Physician . Patient medications reviewed by Physician prior to procedure. Procedure  Procedure Type:     Diagnostic procedure: Lt Heart, Coronary Angio, LV pressure     Complications:  Comments:  Within a minute following the procedure, the patient developed difficulty  speaking and appeared to become somewhat apnic. O2 never dropped below 88%  and O2 by NC was started. A stroke protocol was initiated including a stat  CT and MRI. Within about 30 minutes, the patient was speaking again with  little to no clear deficiencies. See chart not for further details.     Conclusions      Procedure Summary      Severe single vessel disease involving the right coronary artery.   Moderate disease in the mid LAD coronary artery.   Normal left ventricular end diastolic pressure. (LVEDP)      Recommendations      Proceed with guideline directed maximal medical management including ASA   81 mg and maximal tolerated doses of a beta blocker, calcium channel   blocker, statin, and/or long acting nitrate. However if the patients   symptoms do not improve or if they worsen, re-consider referral for   stenting of the patients severe stenosis.      Signature      ----------------------------------------------------------------   Electronically signed by Mauricio Velazco(Performing Physician) on   01/23/2018 19:44   ----------------------------------------------------------------      Angiographic Findings      Cardiac Arteries and Lesion Findings     LMCA:       Lesion on LMCA: Distal subsection. 30% stenosis.     LAD:       Lesion on Mid LAD: Mid subsection. 60% stenosis.     LCx: Mild irregularities 30-40% and Diffuse irregualrities 10-20%.     RCA:       Lesion on Prox RCA: Ostial.70% stenosis.      Coronary Tree      Dominance: Right     LV function assessed as:Abnormal.  Ejection Fraction  +-------------------------------------------+------------------------------+  ! Method                                     !EF%                           !  +-------------------------------------------+------------------------------+  ! Echocardiography                           !40                            !  +-------------------------------------------+------------------------------+    Ventriculography Findings:  Normal left ventricular end diastolic pressure (LVEDP) with no significant  aortic valve gradient seen on pull-back across the aortic valve. Procedure Data  Procedure Start Time: 01/23/2018 09:13. The procedure was explained in detail to the patient. Risks, complications  and alternative treatments were reviewed. Written consent was obtained. Diagnostic Cath Status: Urgent    Entry Locations    - Retrograde Percutaneous access was performed through the Right Radial      artery. A 6 Fr sheath was inserted. Hemostasis was successfully obtained      using Pressure Dressing. Procedure Medications:    - Oxygen NC 2 l/min.      - Lidocaine HCl 1% 10mg/ml S.Q. 3 ml.      - Nitroglycerin S.Q. 100 mcg.      - Nitroglycerin S.L. 0.4 mcg. Catheters and Wires:    - 6 Fr. Radial TIG 4.0 was used for Left coronary angiography.      - 6 Fr. Radial TIG 4.0 was used for Right coronary angiography.      - 6F Catheter Straight Pigtail was used for LV Pressure.      - 5F Catheter JR 4 was used for LV Pressure.      - 5F Catheter JR 4 was used for Right coronary angiography. Unable to      cannulate the vessel.      - 5F Catheter 3DRC was used for Right coronary angiography. Contrast Material:    - Isovue 34677 ml    Fluoroscopy Time: Diagnostic: 9:09 minutes. Total: 9:09 minutes.     Estimated Blood Loss: 5 ml.     Medical History     Performed Procedures and Imaging Results    - Echocardiographywas Eliseo anesthesiologist, we may obtain another assessment from Dr. Arron Greene, the patient's cardiogist. Fortunately she is of more than adequate BMI/not cachectic- and this will therefore not do harm in the overall clinical scenario. It is unsafe to proceed with procedure at this particular time after review-and the proposed above plan is reasonable and safe from a surgical opinion. Fortunately the patient is quite coherent despite being unable to speak, she nodded understanding and agreement of proposed plan, as did daughter, with RN present. I communicated this information to the night shift RN as well.     Marii Funez DO, MPH, [address] [place] [address] Surgery  [address] [phone]

## 2018-01-26 ENCOUNTER — APPOINTMENT (OUTPATIENT)
Dept: GENERAL RADIOLOGY | Age: 83
DRG: 286 | End: 2018-01-26
Payer: MEDICARE

## 2018-01-26 LAB
ANION GAP SERPL CALCULATED.3IONS-SCNC: 14 MMOL/L (ref 9–17)
BUN BLDV-MCNC: 33 MG/DL (ref 8–23)
BUN/CREAT BLD: 23 (ref 9–20)
CALCIUM SERPL-MCNC: 9 MG/DL (ref 8.6–10.4)
CHLORIDE BLD-SCNC: 107 MMOL/L (ref 98–107)
CO2: 25 MMOL/L (ref 20–31)
CREAT SERPL-MCNC: 1.44 MG/DL (ref 0.5–0.9)
GFR AFRICAN AMERICAN: 41 ML/MIN
GFR NON-AFRICAN AMERICAN: 34 ML/MIN
GFR SERPL CREATININE-BSD FRML MDRD: ABNORMAL ML/MIN/{1.73_M2}
GFR SERPL CREATININE-BSD FRML MDRD: ABNORMAL ML/MIN/{1.73_M2}
GLUCOSE BLD-MCNC: 116 MG/DL (ref 74–100)
GLUCOSE BLD-MCNC: 73 MG/DL (ref 70–99)
HCT VFR BLD CALC: 32.4 % (ref 36–46)
HEMOGLOBIN: 10.7 G/DL (ref 12–16)
INR BLD: 1.9 (ref 0.9–1.2)
MCH RBC QN AUTO: 31.5 PG (ref 26–34)
MCHC RBC AUTO-ENTMCNC: 33 G/DL (ref 31–37)
MCV RBC AUTO: 95.6 FL (ref 80–100)
NRBC AUTOMATED: ABNORMAL PER 100 WBC
PDW BLD-RTO: 14.5 % (ref 12.1–15.2)
PLATELET # BLD: 231 K/UL (ref 140–450)
PMV BLD AUTO: 7.9 FL (ref 6–12)
POTASSIUM SERPL-SCNC: 4.5 MMOL/L (ref 3.7–5.3)
PROTHROMBIN TIME: 20.7 SEC (ref 9.7–12.2)
RBC # BLD: 3.39 M/UL (ref 4–5.2)
SODIUM BLD-SCNC: 146 MMOL/L (ref 135–144)
WBC # BLD: 6 K/UL (ref 3.5–11)

## 2018-01-26 PROCEDURE — 2580000003 HC RX 258: Performed by: PHYSICIAN ASSISTANT

## 2018-01-26 PROCEDURE — 36569 INSJ PICC 5 YR+ W/O IMAGING: CPT | Performed by: RADIOLOGY

## 2018-01-26 PROCEDURE — 82947 ASSAY GLUCOSE BLOOD QUANT: CPT

## 2018-01-26 PROCEDURE — 6370000000 HC RX 637 (ALT 250 FOR IP): Performed by: FAMILY MEDICINE

## 2018-01-26 PROCEDURE — 02HV33Z INSERTION OF INFUSION DEVICE INTO SUPERIOR VENA CAVA, PERCUTANEOUS APPROACH: ICD-10-PCS | Performed by: FAMILY MEDICINE

## 2018-01-26 PROCEDURE — C1751 CATH, INF, PER/CENT/MIDLINE: HCPCS

## 2018-01-26 PROCEDURE — 85610 PROTHROMBIN TIME: CPT

## 2018-01-26 PROCEDURE — 97116 GAIT TRAINING THERAPY: CPT

## 2018-01-26 PROCEDURE — 2580000003 HC RX 258: Performed by: FAMILY MEDICINE

## 2018-01-26 PROCEDURE — 97535 SELF CARE MNGMENT TRAINING: CPT

## 2018-01-26 PROCEDURE — 2500000003 HC RX 250 WO HCPCS: Performed by: FAMILY MEDICINE

## 2018-01-26 PROCEDURE — G9161 LANG COMP D/C STATUS: HCPCS

## 2018-01-26 PROCEDURE — 77002 NEEDLE LOCALIZATION BY XRAY: CPT

## 2018-01-26 PROCEDURE — 97110 THERAPEUTIC EXERCISES: CPT

## 2018-01-26 PROCEDURE — 1200000000 HC SEMI PRIVATE

## 2018-01-26 PROCEDURE — 80048 BASIC METABOLIC PNL TOTAL CA: CPT

## 2018-01-26 PROCEDURE — G9160 LANG COMP GOAL STATUS: HCPCS

## 2018-01-26 PROCEDURE — G9159 LANG COMP CURRENT STATUS: HCPCS

## 2018-01-26 PROCEDURE — 85027 COMPLETE CBC AUTOMATED: CPT

## 2018-01-26 PROCEDURE — 36415 COLL VENOUS BLD VENIPUNCTURE: CPT

## 2018-01-26 PROCEDURE — 77001 FLUOROGUIDE FOR VEIN DEVICE: CPT | Performed by: RADIOLOGY

## 2018-01-26 RX ORDER — SODIUM CHLORIDE 0.9 % (FLUSH) 0.9 %
10 SYRINGE (ML) INJECTION PRN
Status: DISCONTINUED | OUTPATIENT
Start: 2018-01-26 | End: 2018-01-31 | Stop reason: HOSPADM

## 2018-01-26 RX ORDER — LIDOCAINE HYDROCHLORIDE 10 MG/ML
5 INJECTION, SOLUTION INFILTRATION; PERINEURAL ONCE
Status: DISCONTINUED | OUTPATIENT
Start: 2018-01-26 | End: 2018-01-31 | Stop reason: HOSPADM

## 2018-01-26 RX ORDER — SODIUM CHLORIDE 0.9 % (FLUSH) 0.9 %
10 SYRINGE (ML) INJECTION EVERY 12 HOURS SCHEDULED
Status: DISCONTINUED | OUTPATIENT
Start: 2018-01-26 | End: 2018-01-31 | Stop reason: HOSPADM

## 2018-01-26 RX ADMIN — METOPROLOL TARTRATE 5 MG: 5 INJECTION, SOLUTION INTRAVENOUS at 01:44

## 2018-01-26 RX ADMIN — ASCORBIC ACID, VITAMIN A PALMITATE, CHOLECALCIFEROL, THIAMINE HYDROCHLORIDE, RIBOFLAVIN-5 PHOSPHATE SODIUM, PYRIDOXINE HYDROCHLORIDE, NIACINAMIDE, DEXPANTHENOL, ALPHA-TOCOPHEROL ACETATE, VITAMIN K1, FOLIC ACID, BIOTIN, CYANOCOBALAMIN: 200; 3300; 200; 6; 3.6; 6; 40; 15; 10; 150; 600; 60; 5 INJECTION, SOLUTION INTRAVENOUS at 17:31

## 2018-01-26 RX ADMIN — I.V. FAT EMULSION 250 ML: 20 EMULSION INTRAVENOUS at 17:28

## 2018-01-26 RX ADMIN — Medication 10 ML: at 20:54

## 2018-01-26 RX ADMIN — METOPROLOL TARTRATE 5 MG: 5 INJECTION, SOLUTION INTRAVENOUS at 20:01

## 2018-01-26 RX ADMIN — Medication 10 ML: at 08:28

## 2018-01-26 RX ADMIN — METOPROLOL TARTRATE 5 MG: 5 INJECTION, SOLUTION INTRAVENOUS at 08:27

## 2018-01-26 RX ADMIN — METOPROLOL TARTRATE 5 MG: 5 INJECTION, SOLUTION INTRAVENOUS at 15:05

## 2018-01-26 RX ADMIN — Medication 10 ML: at 20:01

## 2018-01-26 RX ADMIN — SODIUM CHLORIDE: 9 INJECTION, SOLUTION INTRAVENOUS at 03:36

## 2018-01-26 ASSESSMENT — PAIN SCALES - GENERAL: PAINLEVEL_OUTOF10: 0

## 2018-01-26 NOTE — PROGRESS NOTES
Physical Therapy  Facility/Department: [facility] MED SURG  Daily Treatment Note  NAME: Maldonado Gaxiola  : 3/7/1927  MRN: [id number]    Date of Service: 2018    Patient Diagnosis(es):   Patient Active Problem List    Diagnosis Date Noted    Syncope and collapse - secondary to 2nd degree AV block 2017     Priority: High    Acute on chronic combined systolic and diastolic CHF (congestive heart failure) ([address] 2016     Priority: High     Class: Acute    Infarction of parietal lobe ([address] 2018    Unstable angina ([address]     Acute systolic CHF (congestive heart failure) ([address] 2018    Labile blood pressure 2017    Hypoxia 2017    Idiopathic cardiomyopathy ([address] 2017    Acute on chronic combined systolic and diastolic congestive heart failure ([address] 2017    Long-term (current) use of anticoagulants 10/24/2017    DVT (deep venous thrombosis) ([address] 10/24/2017    Orthostatic hypotension     GRAYSON (acute kidney injury) ([address] 10/02/2017    Abnormal EEG 10/02/2017    Hyperkalemia 2017    Syncope 2017    Acute cystitis 2017    Hypertensive urgency 2017    Hypertensive emergency 2017    Flash pulmonary edema ([address] 2017    Cardiac pacemaker in situ 2017    Chronic midline low back pain with left-sided sciatica 07/10/2017    Essential hypertension 07/10/2017    AV block, 2nd degree 2017    Acute renal failure superimposed on stage 4 chronic kidney disease ([address] 2017    Mobitz type 2 second degree heart block 2017    Acute on chronic renal insufficiency 2017    Dehydration, moderate 2017    Left hip pain     Non-rheumatic tricuspid valve insufficiency 2017    Pulmonary HTN 2017    CKD (chronic kidney disease) stage 4, GFR 15-29 ml/min ([facility]) 2017    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), NYHA class 4 ([address] 2017    Physical Currently in Pain: Denies  Vital Signs  Patient Currently in Pain: Denies       Orientation  Orientation  Overall Orientation Status: Within Normal Limits  Objective      Transfers  Sit to Stand: Contact guard assistance  Stand to sit: Contact guard assistance  Ambulation  Ambulation?: Yes  Ambulation 1  Surface: level tile  Device: Rolling Walker  Assistance: Contact guard assistance  Quality of Gait: Slow steady tuyet   Distance: 50ft        Exercises  Hip Flexion: x20  Hip Abduction: x15  Knee Long Arc Quad: x15  Ankle Pumps: 15x2  Comments: Pt completed exercises in seated position       Strength RLE  Comment: grossly 4-/5  Strength LLE  Comment: grossly 4-/5                 Assessment   Assessment: Pt ambulated 40 feet with FWW CGA, Pt also completed seated exercises 15-20 reps of each with good tolerance   Treatment Diagnosis: general weakness   Prognosis: Good  Patient Education: pt educated on seated ex  Activity Tolerance  Activity Tolerance: Patient Tolerated treatment well       Discharge Recommendations:  Continue to assess pending progress, ECF with PT    G-Code     OutComes Score                                                    AM-PAC Score             Goals  Short term goals  Time Frame for Short term goals: 10 days   Short term goal 1: Pt will be educated on her POC  Short term goal 2: Pt will perform all transfers SBA  Short term goal 3: Pt will ambulate 100 feet with FWW SBA  Short term goal 4: Pt will increase dynamic standing balance to Good in order to reduce fall risk     Plan    Plan  Times per week: 7x/wk   Times per day: Twice a day  Plan weeks: 2x/daily except weekends 1x/daily   Current Treatment Recommendations: Strengthening, Neuromuscular Re-education, Home Exercise Program, Safety Education & Training, Balance Training, Endurance Training, Functional Mobility Training, Transfer Training, Gait Training  Safety Devices  Type of devices:  All fall risk precautions in place, Call light within reach, Left in chair     Therapy Time   Individual Concurrent Group Co-treatment   Time In 0923         Time Out 0947         Minutes [address] [facility], PT

## 2018-01-26 NOTE — PROGRESS NOTES
Patient moved down to radiology to PICC insertion to be compeleted by Dr Shantel Brice. Bp 190/102. Dr Shantel Brice aware and has no concerns about moving forward with insertion. Patient and family are aware of procedure and have verbalized thid id what they want.

## 2018-01-26 NOTE — PROGRESS NOTES
into brief to jorge, attempted to R foot using reacher but got frustrated. Therpist provided assist for threading. Pt able to pull up using sink for balance. Pt attempted to doff socks but unable to do so. Edu pt on use of sock aid to jorge socks with Fair return.)  Toileting:  (Demo'd G safety by using grab bars during sit<>stand to 2WW. )  Additional Comments: Completed ADLs sinkside requiring Min A for most tasks. Balance  Sitting Balance: Independent  Standing Balance: Stand by assistance  Standing Balance  Time: 3 min  Activity: pericare standing sinkside  Sit to stand: Contact guard assistance  Stand to sit: Contact guard assistance  Functional Mobility  Functional - Mobility Device: Rolling Walker  Activity: To/from bathroom  Assist Level: Contact guard assistance     Transfers  Sit to stand: Contact guard assistance  Stand to sit: Contact guard assistance                                                                 Assessment      Activity Tolerance  Activity Tolerance: Patient Tolerated treatment well  Safety Devices  Safety Devices in place: Yes  Type of devices: Left in chair;Chair alarm in place;Call light within reach       Discharge Recommendations:  Continue to assess pending progress, Subacute/Skilled Nursing Facility     Plan   Plan  Times per day: Daily  Current Treatment Recommendations: Strengthening, Balance Training, Endurance Training, Self-Care / ADL, Safety Education & Training, Patient/Caregiver Education & Training, Equipment Evaluation, Education, & procurement, Neuromuscular Re-education  G-Code     OutComes Score                                           AM-PAC Score             Goals  Short term goals  Time Frame for Short term goals: 3-5 days  Short term goal 1: Patient to tolerate 15 minutes of ther ex/ther act and/or hand strengthening tasks to improve UE/UB strength for ADL, transfer and mobility.   Short term goal 2: Patient to complete toileting with SBA to improve independence during ADL. Short term goal 3: Patient to complete UB/LB bathing/dressing with SBA to improve independence during ADL. Short term goal 4: Patient to tolerate standing >7' while engaging in functional activity of choice to improve standing balance, tolerance and safety during ADL.        Therapy Time   Individual Concurrent Group Co-treatment   Time In  7:20         Time Out  [address] [facility], ANTONIO/L

## 2018-01-26 NOTE — PROGRESS NOTES
Signs  Patient Currently in Pain: Denies       Orientation  Orientation  Overall Orientation Status: Within Normal Limits  Objective      Transfers  Sit to Stand: Contact guard assistance  Stand to sit: Contact guard assistance  Ambulation  Ambulation?: Yes  Ambulation 1  Surface: level tile  Device: Rolling Walker  Assistance: Contact guard assistance  Quality of Gait: Slow steady tuyet   Distance: 40 feet        Exercises  Hip Flexion: x5 in standing   Hip Abduction: x5 in standing   Knee Long Arc Quad: x15  Ankle Pumps: 15x2  Comments: pt completed 5 reps of standing exercises at her walker       Strength RLE  Comment: grossly 4-/5  Strength LLE  Comment: grossly 4-/5                 Assessment   Assessment: Pt ambulated 40 feet with CGA. Pt also attmepted standing exercises 5 reps but got fatigued and stated she was done with therapy at that point   Treatment Diagnosis: general weakness   Prognosis: Good  Patient Education: pt educated on standing ex  Activity Tolerance  Activity Tolerance: Patient Tolerated treatment well       Discharge Recommendations:  Continue to assess pending progress, ECF with PT    G-Code     OutComes Score                                                    AM-PAC Score             Goals  Short term goals  Time Frame for Short term goals: 10 days   Short term goal 1: Pt will be educated on her POC  Short term goal 2: Pt will perform all transfers SBA  Short term goal 3: Pt will ambulate 100 feet with FWW SBA  Short term goal 4: Pt will increase dynamic standing balance to Good in order to reduce fall risk     Plan    Plan  Times per week: 7x/wk   Times per day: Twice a day  Plan weeks: 2x/daily except weekends 1x/daily   Current Treatment Recommendations: Strengthening, Neuromuscular Re-education, Home Exercise Program, Safety Education & Training, Balance Training, Endurance Training, Functional Mobility Training, Transfer Training, Gait Training  Safety Devices  Type of devices:  All

## 2018-01-26 NOTE — PROGRESS NOTES
Class I  · Comparative Standards (Estimated Nutrition Needs):  · Estimated Daily Total Kcal: 0964-9274  · Estimated Daily Protein (g): 68-74g  Wt Readings from Last 3 Encounters:   01/26/18 167 lb 14.4 oz (76.2 kg)   12/29/17 172 lb 6.4 oz (78.2 kg)   12/20/17 168 lb (76.2 kg)     Recent Labs      01/24/18   0600  01/25/18   0533  01/26/18   0541   NA  142  143  146*   K  4.4  4.5  4.5   CL  101  104  107   CO2  31  26  25   BUN  38*  33*  33*   CREATININE  1.76*  1.66*  1.44*   GLUCOSE  82  72  73   GFR                                          Lab Results   Component Value Date    LABALBU 3.7 12/04/2017      Estimated Intake vs Estimated Needs: Intake Less Than Needs    Nutrition Risk Level: High  Pt is NPO due to recent stroke and SLP recommendation for NPO due to dysphagia. Plan was for PEG tube, but received consult for TPN/PPN recommendations today. I would recommend PEG placement for long-term nutritional needs. PICC line to be placed later today and if it cannot be placed, PPN recommendations desired as well. Central line 5/15 Clinimix with 20% lipids recommendations:  1. Initiate TPN at 45.8 ml/hour 5/15 Clinimix x 24 hours (55 g AA/1055 calories)with 11.4 ml/hour 20% lipids x 12 hours. 2. Progress as tolerated to Goal rate: 62.5 ml/hour 5/15 Clinimix x 24 hours (75 g AA/1439 calories) with 15.6 ml/hour 20% lipids x 12 hours as per RDN recommendations. PPN Recommendations 4.25/5 Clinimix:  1. Initiate 66.7 ml/hour 4.25/5 Clinimix x 24 hours (68 g AA/702 calories) with 6.6 ml/hour x 12 hours 20% lipids. 2. Progress as tolerated Goal rate: 75 ml/hour 4.25/5 Clinimix x 24 h ours (76.5 g AA/794 calories) with 7.6 ml/hour 20% lipids as per RDN recommendations.       Nutrition Interventions:   Continue NPO  Continued Inpatient Monitoring, Education not appropriate at this time, Coordination of Care    Nutrition Evaluation:   · Evaluation: No progress toward goals   · Goals: Nutritional source for

## 2018-01-27 LAB
ALBUMIN SERPL-MCNC: 3.2 G/DL (ref 3.5–5.2)
ALBUMIN/GLOBULIN RATIO: 1.1 (ref 1–2.5)
ALP BLD-CCNC: 46 U/L (ref 35–104)
ALT SERPL-CCNC: <5 U/L (ref 5–33)
ANION GAP SERPL CALCULATED.3IONS-SCNC: 13 MMOL/L (ref 9–17)
AST SERPL-CCNC: 12 U/L
BILIRUB SERPL-MCNC: 0.54 MG/DL (ref 0.3–1.2)
BUN BLDV-MCNC: 38 MG/DL (ref 8–23)
BUN/CREAT BLD: 29 (ref 9–20)
CALCIUM SERPL-MCNC: 8.7 MG/DL (ref 8.6–10.4)
CHLORIDE BLD-SCNC: 107 MMOL/L (ref 98–107)
CO2: 25 MMOL/L (ref 20–31)
CREAT SERPL-MCNC: 1.29 MG/DL (ref 0.5–0.9)
GFR AFRICAN AMERICAN: 47 ML/MIN
GFR NON-AFRICAN AMERICAN: 39 ML/MIN
GFR SERPL CREATININE-BSD FRML MDRD: ABNORMAL ML/MIN/{1.73_M2}
GFR SERPL CREATININE-BSD FRML MDRD: ABNORMAL ML/MIN/{1.73_M2}
GLUCOSE BLD-MCNC: 144 MG/DL (ref 74–100)
GLUCOSE BLD-MCNC: 153 MG/DL (ref 74–100)
GLUCOSE BLD-MCNC: 160 MG/DL (ref 74–100)
GLUCOSE BLD-MCNC: 160 MG/DL (ref 74–100)
GLUCOSE BLD-MCNC: 174 MG/DL (ref 70–99)
GLUCOSE BLD-MCNC: 175 MG/DL (ref 74–100)
HCT VFR BLD CALC: 33.8 % (ref 36–46)
HEMOGLOBIN: 11.2 G/DL (ref 12–16)
MAGNESIUM: 2 MG/DL (ref 1.6–2.6)
MCH RBC QN AUTO: 31.5 PG (ref 26–34)
MCHC RBC AUTO-ENTMCNC: 33.1 G/DL (ref 31–37)
MCV RBC AUTO: 95 FL (ref 80–100)
NRBC AUTOMATED: ABNORMAL PER 100 WBC
PDW BLD-RTO: 14.8 % (ref 12.1–15.2)
PHOSPHORUS: 2.3 MG/DL (ref 2.6–4.5)
PLATELET # BLD: 232 K/UL (ref 140–450)
PMV BLD AUTO: 8.1 FL (ref 6–12)
POTASSIUM SERPL-SCNC: 4.2 MMOL/L (ref 3.7–5.3)
RBC # BLD: 3.55 M/UL (ref 4–5.2)
SODIUM BLD-SCNC: 145 MMOL/L (ref 135–144)
TOTAL PROTEIN: 6.1 G/DL (ref 6.4–8.3)
TRIGL SERPL-MCNC: 145 MG/DL
WBC # BLD: 7 K/UL (ref 3.5–11)

## 2018-01-27 PROCEDURE — G9159 LANG COMP CURRENT STATUS: HCPCS

## 2018-01-27 PROCEDURE — 84100 ASSAY OF PHOSPHORUS: CPT

## 2018-01-27 PROCEDURE — 6370000000 HC RX 637 (ALT 250 FOR IP): Performed by: FAMILY MEDICINE

## 2018-01-27 PROCEDURE — 2500000003 HC RX 250 WO HCPCS: Performed by: FAMILY MEDICINE

## 2018-01-27 PROCEDURE — 2580000003 HC RX 258: Performed by: FAMILY MEDICINE

## 2018-01-27 PROCEDURE — 82947 ASSAY GLUCOSE BLOOD QUANT: CPT

## 2018-01-27 PROCEDURE — 2580000003 HC RX 258: Performed by: PHYSICIAN ASSISTANT

## 2018-01-27 PROCEDURE — G9160 LANG COMP GOAL STATUS: HCPCS

## 2018-01-27 PROCEDURE — 80053 COMPREHEN METABOLIC PANEL: CPT

## 2018-01-27 PROCEDURE — 92526 ORAL FUNCTION THERAPY: CPT

## 2018-01-27 PROCEDURE — 2500000003 HC RX 250 WO HCPCS

## 2018-01-27 PROCEDURE — 36592 COLLECT BLOOD FROM PICC: CPT

## 2018-01-27 PROCEDURE — 92507 TX SP LANG VOICE COMM INDIV: CPT

## 2018-01-27 PROCEDURE — 85027 COMPLETE CBC AUTOMATED: CPT

## 2018-01-27 PROCEDURE — 36415 COLL VENOUS BLD VENIPUNCTURE: CPT

## 2018-01-27 PROCEDURE — 97530 THERAPEUTIC ACTIVITIES: CPT

## 2018-01-27 PROCEDURE — 84478 ASSAY OF TRIGLYCERIDES: CPT

## 2018-01-27 PROCEDURE — 97116 GAIT TRAINING THERAPY: CPT

## 2018-01-27 PROCEDURE — 97110 THERAPEUTIC EXERCISES: CPT

## 2018-01-27 PROCEDURE — 83735 ASSAY OF MAGNESIUM: CPT

## 2018-01-27 PROCEDURE — 6360000002 HC RX W HCPCS: Performed by: PHYSICIAN ASSISTANT

## 2018-01-27 PROCEDURE — 1200000000 HC SEMI PRIVATE

## 2018-01-27 RX ADMIN — Medication 10 ML: at 13:49

## 2018-01-27 RX ADMIN — Medication 10 ML: at 10:39

## 2018-01-27 RX ADMIN — METOPROLOL TARTRATE 5 MG: 5 INJECTION, SOLUTION INTRAVENOUS at 07:47

## 2018-01-27 RX ADMIN — HYDRALAZINE HYDROCHLORIDE 10 MG: 20 INJECTION INTRAMUSCULAR; INTRAVENOUS at 10:39

## 2018-01-27 RX ADMIN — METOPROLOL TARTRATE 5 MG: 5 INJECTION, SOLUTION INTRAVENOUS at 13:49

## 2018-01-27 RX ADMIN — METOPROLOL TARTRATE 5 MG: 5 INJECTION, SOLUTION INTRAVENOUS at 01:16

## 2018-01-27 RX ADMIN — METOPROLOL TARTRATE 5 MG: 5 INJECTION, SOLUTION INTRAVENOUS at 19:29

## 2018-01-27 RX ADMIN — ENOXAPARIN SODIUM 80 MG: 80 INJECTION SUBCUTANEOUS at 08:52

## 2018-01-27 RX ADMIN — I.V. FAT EMULSION 250 ML: 20 EMULSION INTRAVENOUS at 17:40

## 2018-01-27 RX ADMIN — ASCORBIC ACID, VITAMIN A PALMITATE, CHOLECALCIFEROL, THIAMINE HYDROCHLORIDE, RIBOFLAVIN-5 PHOSPHATE SODIUM, PYRIDOXINE HYDROCHLORIDE, NIACINAMIDE, DEXPANTHENOL, ALPHA-TOCOPHEROL ACETATE, VITAMIN K1, FOLIC ACID, BIOTIN, CYANOCOBALAMIN: 200; 3300; 200; 6; 3.6; 6; 40; 15; 10; 150; 600; 60; 5 INJECTION, SOLUTION INTRAVENOUS at 17:41

## 2018-01-27 RX ADMIN — Medication 10 ML: at 07:47

## 2018-01-27 ASSESSMENT — PAIN SCALES - GENERAL
PAINLEVEL_OUTOF10: 0
PAINLEVEL_OUTOF10: 0

## 2018-01-27 NOTE — PROGRESS NOTES
Labs        Electronically signed by Diane Armas RD, JEET on 1/27/18 at 8:19 AM    Contact Number: [phone]

## 2018-01-27 NOTE — PROGRESS NOTES
Training, Endurance Training, Functional Mobility Training, Transfer Training, Gait Training  Safety Devices  Type of devices:  All fall risk precautions in place, Call light within reach, Left in chair, Chair alarm in place, Telesitter in use, Nurse notified     Therapy Time   Individual Concurrent Group Co-treatment   Time In 0722         Time Out 0745         Minutes  STACEY Cota [place], [place]

## 2018-01-27 NOTE — PROGRESS NOTES
Progress Note    SUBJECTIVE: Patient is seen for Principal Problem:    Unstable angina ([facility] [address]  Active Problems:    Acquired hypothyroidism    Diabetes mellitus (HCC)    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), NYHA class 4 (HCC)    CKD (chronic kidney disease) stage 4, GFR 15-29 ml/min (HCC)    Pulmonary HTN    Cardiac pacemaker in situ    Idiopathic cardiomyopathy (HCC)    Infarction of parietal lobe ([facility] [address]     pt confused, no in any distress    OBJECTIVE:    Vitals:   Vitals:    01/27/18 0654   BP: (!) 177/99   Pulse: 85   Resp: 16   Temp: 98.7 °F (37.1 °C)   SpO2: 93%     Weight: 172 lb 6.4 oz (78.2 kg)   Height: 5' 2\" (070.2 cm) ('s license)   -----------------------------------------------------------------  Exam:    CONSTITUTIONAL:  Comfortable at rest  EYES:  Lids and lashes normal, pupils equal, round and reactive to light, extra ocular muscles intact, sclera clear, conjunctiva normal  ENT:  Normocephalic, without obvious abnormality, atraumatic, sinuses nontender on palpation, external ears without lesions, oral pharynx with moist mucus membranes, tonsils without erythema or exudates, gums normal and good dentition.   NECK:  Supple, symmetrical, trachea midline, no adenopathy, thyroid symmetric, not enlarged and no tenderness, skin normal  HEMATOLOGIC/LYMPHATICS:  no cervical lymphadenopathy  LUNGS:  No increased work of breathing, good air exchange, clear to auscultation bilaterally, no crackles or wheezing  CARDIOVASCULAR:  Normal apical impulse, regular rate and rhythm, normal S1 and S2, no S3 or S4, and 2/6 murmur noted  ABDOMEN:  No scars, normal bowel sounds, soft, non-distended, non-tender, no masses palpated, no hepatosplenomegally    MUSCULOSKELETAL:  there is no redness, warmth, or swelling of the joints  NEUROLOGIC:  No motor deficit  SKIN:  no bruising or bleeding  EXT:     no cyanosis, clubbing or edema present Granulocytes NOT REPORTED 0 %    Segs Absolute 4.50 1.8 - 7.7 k/uL    Absolute Lymph # 2.00 1.0 - 4.8 k/uL    Absolute Mono # 0.70 0.0 - 1.0 k/uL    Absolute Eos # 0.30 0.0 - 0.4 k/uL    Basophils # 0.10 0.0 - 0.2 k/uL    Absolute Immature Granulocyte NOT REPORTED 0.00 - 0.30 k/uL    WBC Morphology NOT REPORTED     RBC Morphology NOT REPORTED     Platelet Estimate NOT REPORTED    Troponin   Result Value Ref Range    Troponin T <0.03 <0.03 ng/mL    Troponin Inter         Basic Metabolic Panel   Result Value Ref Range    Glucose 115 (H) 70 - 99 mg/dL    BUN 40 (H) 8 - 23 mg/dL    CREATININE 1.92 (H) 0.50 - 0.90 mg/dL    Bun/Cre Ratio 21 (H) 9 - 20    Calcium 8.9 8.6 - 10.4 mg/dL    Sodium 140 135 - 144 mmol/L    Potassium 5.1 3.7 - 5.3 mmol/L    Chloride 99 98 - 107 mmol/L    CO2 31 20 - 31 mmol/L    Anion Gap 10 9 - 17 mmol/L    GFR Non-African American 25 (L) >60 mL/min    GFR African American 30 (L) >60 mL/min    GFR Comment          GFR Staging         Magnesium   Result Value Ref Range    Magnesium 2.2 1.6 - 2.6 mg/dL   TSH without Reflex   Result Value Ref Range    TSH 2.88 0.30 - 5.00 mIU/L   Brain Natriuretic Peptide   Result Value Ref Range    Pro-BNP 16,429 (H) <300 pg/mL    BNP Interpretation         Lipid panel - fasting   Result Value Ref Range    Cholesterol 145 <200 mg/dL    HDL 40 (L) >40 mg/dL    LDL Cholesterol 80 0 - 130 mg/dL    Chol/HDL Ratio 3.6 <5    Triglycerides 123 <150 mg/dL    VLDL NOT REPORTED 1 - 30 mg/dL   CBC   Result Value Ref Range    WBC 6.4 3.5 - 11.0 k/uL    RBC 3.49 (L) 4.0 - 5.2 m/uL    Hemoglobin 10.6 (L) 12.0 - 16.0 g/dL    Hematocrit 33.5 (L) 36 - 46 %    MCV 96.0 80 - 100 fL    MCH 30.3 26 - 34 pg    MCHC 31.5 31 - 37 g/dL    RDW 14.7 12.1 - 15.2 %    Platelets 230 597 - 633 k/uL    MPV 8.1 6.0 - 12.0 fL    NRBC Automated NOT REPORTED per 100 WBC   Protime-INR   Result Value Ref Range    Protime 22.2 (H) 9.7 - 12.2 sec    INR 2.0 (H) 0.9 - 1.2   Troponin   Result Value Ref 98 - 107 mmol/L    CO2 35 (H) 20 - 31 mmol/L    Anion Gap 9 9 - 17 mmol/L    GFR Non-African American 22 (L) >60 mL/min    GFR  27 (L) >60 mL/min    GFR Comment          GFR Staging         Brain Natriuretic Peptide   Result Value Ref Range    Pro-BNP 5,604 (H) <300 pg/mL    BNP Interpretation         Protime-INR   Result Value Ref Range    Protime 24.7 (H) 9.7 - 12.2 sec    INR 2.3 (H) 0.9 - 1.2   CBC auto differential   Result Value Ref Range    WBC 6.6 3.5 - 11.0 k/uL    RBC 3.39 (L) 4.0 - 5.2 m/uL    Hemoglobin 10.5 (L) 12.0 - 16.0 g/dL    Hematocrit 32.7 (L) 36 - 46 %    MCV 96.4 80 - 100 fL    MCH 31.0 26 - 34 pg    MCHC 32.2 31 - 37 g/dL    RDW 15.0 12.1 - 15.2 %    Platelets 624 798 - 322 k/uL    MPV 7.8 6.0 - 12.0 fL    NRBC Automated NOT REPORTED per 100 WBC    Differential Type NOT REPORTED     Seg Neutrophils 59 36 - 66 %    Lymphocytes 26 24 - 44 %    Monocytes 11 0 - 12 %    Eosinophils % 3 0 - 8 %    Basophils 1 0 - 2 %    Immature Granulocytes NOT REPORTED 0 %    Segs Absolute 3.90 1.8 - 7.7 k/uL    Absolute Lymph # 1.70 1.0 - 4.8 k/uL    Absolute Mono # 0.70 0.0 - 1.0 k/uL    Absolute Eos # 0.20 0.0 - 0.4 k/uL    Basophils # 0.00 0.0 - 0.2 k/uL    Absolute Immature Granulocyte NOT REPORTED 0.00 - 0.30 k/uL    WBC Morphology NOT REPORTED     RBC Morphology NOT REPORTED     Platelet Estimate NOT REPORTED    Glucose, Whole Blood   Result Value Ref Range    POC Glucose 96 74 - 100 mg/dL   Troponin   Result Value Ref Range    Troponin T <0.03 <0.03 ng/mL    Troponin Interp         Glucose, Whole Blood   Result Value Ref Range    POC Glucose 125 (H) 74 - 100 mg/dL   Basic Metabolic Panel   Result Value Ref Range    Glucose 84 70 - 99 mg/dL    BUN 49 (H) 8 - 23 mg/dL    CREATININE 2.10 (H) 0.50 - 0.90 mg/dL    Bun/Cre Ratio 23 (H) 9 - 20    Calcium 9.5 8.6 - 10.4 mg/dL    Sodium 139 135 - 144 mmol/L    Potassium 4.4 3.7 - 5.3 mmol/L    Chloride 97 (L) 98 - 107 mmol/L    CO2 35 (H) 20 - 31 mmol/L    CO2 25 20 - 31 mmol/L    Anion Gap 13 9 - 17 mmol/L    Alkaline Phosphatase 46 35 - 104 U/L    ALT <5 (L) 5 - 33 U/L    AST 12 <32 U/L    Total Bilirubin 0.54 0.3 - 1.2 mg/dL    Total Protein 6.1 (L) 6.4 - 8.3 g/dL    Alb 3.2 (L) 3.5 - 5.2 g/dL    Albumin/Globulin Ratio 1.1 1.0 - 2.5    GFR Non- 39 (L) >60 mL/min    GFR  47 (L) >60 mL/min    GFR Comment          GFR Staging         Magnesium   Result Value Ref Range    Magnesium 2.0 1.6 - 2.6 mg/dL   Phosphorus   Result Value Ref Range    Phosphorus 2.3 (L) 2.6 - 4.5 mg/dL   Triglycerides   Result Value Ref Range    Triglycerides 145 <150 mg/dL   Glucose, Whole Blood   Result Value Ref Range    POC Glucose 116 (H) 74 - 100 mg/dL   Glucose, Whole Blood   Result Value Ref Range    POC Glucose 160 (H) 74 - 100 mg/dL   Glucose, Whole Blood   Result Value Ref Range    POC Glucose 175 (H) 74 - 100 mg/dL   EKG 12 Lead   Result Value Ref Range    Ventricular Rate 85 BPM    Atrial Rate 85 BPM    P-R Interval 278 ms    QRS Duration 150 ms    Q-T Interval 390 ms    QTc Calculation (Bazett) 464 ms    P Axis 39 degrees    R Axis -13 degrees    T Axis -151 degrees   EKG 12 Lead   Result Value Ref Range    Ventricular Rate 84 BPM    Atrial Rate 83 BPM    QRS Duration 148 ms    Q-T Interval 390 ms    QTc Calculation (Bazett) 460 ms    P Axis 60 degrees    R Axis -13 degrees    T Axis -109 degrees   EKG 12 Lead   Result Value Ref Range    Ventricular Rate 83 BPM    Atrial Rate 83 BPM    QRS Duration 142 ms    Q-T Interval 384 ms    QTc Calculation (Bazett) 451 ms    R Axis -16 degrees    T Axis -95 degrees   EKG 12 Lead   Result Value Ref Range    Ventricular Rate 82 BPM    Atrial Rate 47 BPM    QRS Duration 148 ms    Q-T Interval 398 ms    QTc Calculation (Bazett) 464 ms    P Axis 56 degrees    R Axis -15 degrees    T Axis -105 degrees       ASSESSMENT:   Patient Active Problem List    Diagnosis Date Noted    Syncope and collapse - secondary to 2nd degree AV block 06/25/2017     Priority: High    Acute on chronic combined systolic and diastolic CHF (congestive heart failure) ([facility] [address] 12/31/2016     Priority: High     Class: Acute    Infarction of parietal lobe ([address] 01/23/2018    Unstable angina ([address] 08/60/0261    Acute systolic CHF (congestive heart failure) ([address] 01/19/2018    Labile blood pressure 12/29/2017    Hypoxia 12/18/2017    Idiopathic cardiomyopathy ([address] 12/18/2017    Acute on chronic combined systolic and diastolic congestive heart failure ([address] 12/17/2017    Long-term (current) use of anticoagulants 10/24/2017    DVT (deep venous thrombosis) ([address] 10/24/2017    Orthostatic hypotension     GRAYSON (acute kidney injury) ([address] 10/02/2017    Abnormal EEG 10/02/2017    Hyperkalemia 09/28/2017    Syncope 09/27/2017    Acute cystitis 09/27/2017    Hypertensive urgency 09/27/2017    Hypertensive emergency 08/31/2017    Flash pulmonary edema ([address] 08/31/2017    Cardiac pacemaker in situ 07/12/2017    Chronic midline low back pain with left-sided sciatica 07/10/2017    Essential hypertension 07/10/2017    AV block, 2nd degree 06/28/2017    Acute renal failure superimposed on stage 4 chronic kidney disease ([address] 06/28/2017    Mobitz type 2 second degree heart block 06/28/2017    Acute on chronic renal insufficiency 06/26/2017    Dehydration, moderate 06/26/2017    Left hip pain     Non-rheumatic tricuspid valve insufficiency 04/26/2017    Pulmonary HTN 04/26/2017    CKD (chronic kidney disease) stage 4, GFR 15-29 ml/min ([facility]) 02/22/2017    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), NYHA class 4 ([address] 01/18/2017    Physical deconditioning 01/18/2017    Acute on chronic congestive heart failure (HCC)     Acquired hypothyroidism     Hyperlipidemia     Diabetes mellitus (HCC)     Anxiety          PLAN:  · TPN  · Monitor chf and angina      Michelle Willis M.D.

## 2018-01-27 NOTE — PROGRESS NOTES
improve expressive language  Patient Education Response: Verbalizes understanding    G-Code:  SLP G-Codes  Functional Assessment Tool Used: ARIANNA FUNCTIONAL COMMUNICATION MEASURE  Functional Limitations: Spoken language expressive  Spoken Language Comprehension Current Status (): At least 80 percent but less than 100 percent impaired, limited or restricted  Spoken Language Comprehension Goal Status (): At least 40 percent but less than 60 percent impaired, limited or restricted         Therapy Time:   Individual Concurrent Group Co-treatment   Time In 1502         Time Out 1547         Minutes 45            Total Treatment Time: 39     ST completed skilled speech and dysphagia therapy on this date with daughters present at pt's chairside. Pt able to greet therapist verbally. Pt able to spontaneously state her name when therapist asked for her preferred name. Pt's daughters indicated that pt has been using the communication board and responding to yes/no questions. Upon assessment, pt able to point to objects on the communication board (field of 4) with 100% accuracy. Pt was able to answer yes/no questions with 70% accuracy, using a combination of verbalizations and head nodding/shaking. Melodic intonation utilized in order to elicit automatic speech. Sang \"Happy Birthday\" independent, [employer]" with 1 phonemic cue, \"Twinkle Twinkle Little Star\" with 3 phonemic cues, \"Melody Had a Little Valles\" with 2 phonemic cues, and \"Rock-a-by Baby with 3 phonemic cues. When therapist names one opposite pair, pt was able to name corresponding pair 80% of the time given minimal assistance. Reading was assessed. Pt able to read single words and short phrases independently given additional time, and sentence level material with mod A. Family educated on purpose of speech therapy and treatment plan.  Family also educated on how to assist pt with communication and types of cues that will be most beneficial (asking

## 2018-01-27 NOTE — PROGRESS NOTES
Occupational Therapy  Facility/Department: [facility] AT THE [facility] MED SURG  Daily Treatment Note  NAME: Mary Isabel  : 3/7/1927  MRN: [id number]    Date of Service: 2018    Patient Diagnosis(es): The encounter diagnosis was Acute on chronic congestive heart failure, unspecified congestive heart failure type ([address] has a past medical history of Anxiety; Asthma; CAD (coronary artery disease); Cardiac pacemaker; CHF (congestive heart failure) ([facility] [address] CHF (congestive heart failure) ([address] Chronic kidney disease; Diabetes mellitus ([facility] [address] DVT (deep venous thrombosis) ([facility] [address] Gout; H/O echocardiogram; Heart disease; Hyperlipidemia; Hypertension; Hypothyroidism; Mobitz type 2 second degree heart block; and Syncope and collapse.   has a past surgical history that includes Ovary removal (Right); Breast surgery (); Breast biopsy (Bilateral, ); eye surgery; Cardiac pacemaker placement (2017); Pacemaker insertion (2017); and Cardiac catheterization (Left, 2018). Restrictions  Restrictions/Precautions  Restrictions/Precautions: General Precautions, Fall Risk (NPO.)  Subjective   General  Chart Reviewed: Yes  Patient assessed for rehabilitation services?: Yes  Response to previous treatment: Patient with no complaints from previous session  Family / Caregiver Present: No  General Comment  Comments: Pt replies to simple questions but otherwise not verbal with therapist.   Pain Assessment  Patient Currently in Pain: Denies  Pain Assessment: 0-10  Pain Level: 0  Vital Signs  Patient Currently in Pain: Denies   Orientation     Objective                                                          Additional Activities Comment  Additional Activities: Other  Additional Activities: Ed given on d/c folder with poor understanding secondary to confusion.       Type of ROM/Therapeutic Exercise  Type of ROM/Therapeutic Exercise: Free weights  Comment: (B)UE ther ex, 2# dumbells x10 reps and x4 planes for increased UE

## 2018-01-28 LAB
ANION GAP SERPL CALCULATED.3IONS-SCNC: 10 MMOL/L (ref 9–17)
BUN BLDV-MCNC: 40 MG/DL (ref 8–23)
BUN/CREAT BLD: 31 (ref 9–20)
CALCIUM SERPL-MCNC: 8.9 MG/DL (ref 8.6–10.4)
CHLORIDE BLD-SCNC: 107 MMOL/L (ref 98–107)
CO2: 25 MMOL/L (ref 20–31)
CREAT SERPL-MCNC: 1.27 MG/DL (ref 0.5–0.9)
GFR AFRICAN AMERICAN: 48 ML/MIN
GFR NON-AFRICAN AMERICAN: 40 ML/MIN
GFR SERPL CREATININE-BSD FRML MDRD: ABNORMAL ML/MIN/{1.73_M2}
GFR SERPL CREATININE-BSD FRML MDRD: ABNORMAL ML/MIN/{1.73_M2}
GLUCOSE BLD-MCNC: 129 MG/DL (ref 74–100)
GLUCOSE BLD-MCNC: 143 MG/DL (ref 74–100)
GLUCOSE BLD-MCNC: 148 MG/DL (ref 70–99)
GLUCOSE BLD-MCNC: 173 MG/DL (ref 74–100)
HCT VFR BLD CALC: 35.5 % (ref 36–46)
HEMOGLOBIN: 11.6 G/DL (ref 12–16)
MAGNESIUM: 2 MG/DL (ref 1.6–2.6)
MCH RBC QN AUTO: 31.5 PG (ref 26–34)
MCHC RBC AUTO-ENTMCNC: 32.8 G/DL (ref 31–37)
MCV RBC AUTO: 96 FL (ref 80–100)
NRBC AUTOMATED: ABNORMAL PER 100 WBC
PDW BLD-RTO: 14.6 % (ref 12.1–15.2)
PHOSPHORUS: 2.4 MG/DL (ref 2.6–4.5)
PLATELET # BLD: 217 K/UL (ref 140–450)
PMV BLD AUTO: 8.4 FL (ref 6–12)
POTASSIUM SERPL-SCNC: 3.6 MMOL/L (ref 3.7–5.3)
RBC # BLD: 3.7 M/UL (ref 4–5.2)
SODIUM BLD-SCNC: 142 MMOL/L (ref 135–144)
WBC # BLD: 6.8 K/UL (ref 3.5–11)

## 2018-01-28 PROCEDURE — G9160 LANG COMP GOAL STATUS: HCPCS

## 2018-01-28 PROCEDURE — 2580000003 HC RX 258

## 2018-01-28 PROCEDURE — 97116 GAIT TRAINING THERAPY: CPT

## 2018-01-28 PROCEDURE — 83735 ASSAY OF MAGNESIUM: CPT

## 2018-01-28 PROCEDURE — 2580000003 HC RX 258: Performed by: PHYSICIAN ASSISTANT

## 2018-01-28 PROCEDURE — 97110 THERAPEUTIC EXERCISES: CPT

## 2018-01-28 PROCEDURE — 2500000003 HC RX 250 WO HCPCS: Performed by: FAMILY MEDICINE

## 2018-01-28 PROCEDURE — G9159 LANG COMP CURRENT STATUS: HCPCS

## 2018-01-28 PROCEDURE — 36415 COLL VENOUS BLD VENIPUNCTURE: CPT

## 2018-01-28 PROCEDURE — 84100 ASSAY OF PHOSPHORUS: CPT

## 2018-01-28 PROCEDURE — 92507 TX SP LANG VOICE COMM INDIV: CPT

## 2018-01-28 PROCEDURE — 6360000002 HC RX W HCPCS: Performed by: PHYSICIAN ASSISTANT

## 2018-01-28 PROCEDURE — 82947 ASSAY GLUCOSE BLOOD QUANT: CPT

## 2018-01-28 PROCEDURE — 85027 COMPLETE CBC AUTOMATED: CPT

## 2018-01-28 PROCEDURE — 80048 BASIC METABOLIC PNL TOTAL CA: CPT

## 2018-01-28 PROCEDURE — 6370000000 HC RX 637 (ALT 250 FOR IP): Performed by: FAMILY MEDICINE

## 2018-01-28 PROCEDURE — 92526 ORAL FUNCTION THERAPY: CPT

## 2018-01-28 PROCEDURE — 2500000003 HC RX 250 WO HCPCS

## 2018-01-28 PROCEDURE — 1200000000 HC SEMI PRIVATE

## 2018-01-28 RX ADMIN — Medication 10 ML: at 13:47

## 2018-01-28 RX ADMIN — ASCORBIC ACID, VITAMIN A PALMITATE, CHOLECALCIFEROL, THIAMINE HYDROCHLORIDE, RIBOFLAVIN-5 PHOSPHATE SODIUM, PYRIDOXINE HYDROCHLORIDE, NIACINAMIDE, DEXPANTHENOL, ALPHA-TOCOPHEROL ACETATE, VITAMIN K1, FOLIC ACID, BIOTIN, CYANOCOBALAMIN: 200; 3300; 200; 6; 3.6; 6; 40; 15; 10; 150; 600; 60; 5 INJECTION, SOLUTION INTRAVENOUS at 17:16

## 2018-01-28 RX ADMIN — ENOXAPARIN SODIUM 80 MG: 80 INJECTION SUBCUTANEOUS at 08:12

## 2018-01-28 RX ADMIN — METOPROLOL TARTRATE 5 MG: 5 INJECTION, SOLUTION INTRAVENOUS at 20:04

## 2018-01-28 RX ADMIN — Medication 10 ML: at 10:02

## 2018-01-28 RX ADMIN — METOPROLOL TARTRATE 5 MG: 5 INJECTION, SOLUTION INTRAVENOUS at 08:11

## 2018-01-28 RX ADMIN — Medication 10 ML: at 08:12

## 2018-01-28 RX ADMIN — I.V. FAT EMULSION 250 ML: 20 EMULSION INTRAVENOUS at 17:16

## 2018-01-28 RX ADMIN — METOPROLOL TARTRATE 5 MG: 5 INJECTION, SOLUTION INTRAVENOUS at 01:22

## 2018-01-28 RX ADMIN — POTASSIUM PHOSPHATE, MONOBASIC AND POTASSIUM PHOSPHATE, DIBASIC 15 MMOL: 224; 236 INJECTION, SOLUTION INTRAVENOUS at 10:02

## 2018-01-28 RX ADMIN — Medication 10 ML: at 20:02

## 2018-01-28 RX ADMIN — METOPROLOL TARTRATE 5 MG: 5 INJECTION, SOLUTION INTRAVENOUS at 13:47

## 2018-01-28 ASSESSMENT — PAIN SCALES - GENERAL
PAINLEVEL_OUTOF10: 0

## 2018-01-28 NOTE — PROGRESS NOTES
Absolute Immature Granulocyte NOT REPORTED 0.00 - 0.30 k/uL    WBC Morphology NOT REPORTED     RBC Morphology NOT REPORTED     Platelet Estimate NOT REPORTED    Troponin   Result Value Ref Range    Troponin T <0.03 <0.03 ng/mL    Troponin Interp         Basic Metabolic Panel   Result Value Ref Range    Glucose 115 (H) 70 - 99 mg/dL    BUN 40 (H) 8 - 23 mg/dL    CREATININE 1.92 (H) 0.50 - 0.90 mg/dL    Bun/Cre Ratio 21 (H) 9 - 20    Calcium 8.9 8.6 - 10.4 mg/dL    Sodium 140 135 - 144 mmol/L    Potassium 5.1 3.7 - 5.3 mmol/L    Chloride 99 98 - 107 mmol/L    CO2 31 20 - 31 mmol/L    Anion Gap 10 9 - 17 mmol/L    GFR Non-African American 25 (L) >60 mL/min    GFR African American 30 (L) >60 mL/min    GFR Comment          GFR Staging         Magnesium   Result Value Ref Range    Magnesium 2.2 1.6 - 2.6 mg/dL   TSH without Reflex   Result Value Ref Range    TSH 2.88 0.30 - 5.00 mIU/L   Brain Natriuretic Peptide   Result Value Ref Range    Pro-BNP 16,429 (H) <300 pg/mL    BNP Interpretation         Lipid panel - fasting   Result Value Ref Range    Cholesterol 145 <200 mg/dL    HDL 40 (L) >40 mg/dL    LDL Cholesterol 80 0 - 130 mg/dL    Chol/HDL Ratio 3.6 <5    Triglycerides 123 <150 mg/dL    VLDL NOT REPORTED 1 - 30 mg/dL   CBC   Result Value Ref Range    WBC 6.4 3.5 - 11.0 k/uL    RBC 3.49 (L) 4.0 - 5.2 m/uL    Hemoglobin 10.6 (L) 12.0 - 16.0 g/dL    Hematocrit 33.5 (L) 36 - 46 %    MCV 96.0 80 - 100 fL    MCH 30.3 26 - 34 pg    MCHC 31.5 31 - 37 g/dL    RDW 14.7 12.1 - 15.2 %    Platelets 222 333 - 013 k/uL    MPV 8.1 6.0 - 12.0 fL    NRBC Automated NOT REPORTED per 100 WBC   Protime-INR   Result Value Ref Range    Protime 22.2 (H) 9.7 - 12.2 sec    INR 2.0 (H) 0.9 - 1.2   Troponin   Result Value Ref Range    Troponin T <0.03 <0.03 ng/mL    Troponin Interp         CRP,High Sensitivity   Result Value Ref Range    CRP, High Sensitivity 15.2 (H) <3.1 mg/L   Glucose, Whole Blood   Result Value Ref Range    POC Glucose

## 2018-01-29 LAB
ANION GAP SERPL CALCULATED.3IONS-SCNC: 10 MMOL/L (ref 9–17)
BUN BLDV-MCNC: 34 MG/DL (ref 8–23)
BUN/CREAT BLD: 30 (ref 9–20)
CALCIUM SERPL-MCNC: 9 MG/DL (ref 8.6–10.4)
CHLORIDE BLD-SCNC: 107 MMOL/L (ref 98–107)
CO2: 26 MMOL/L (ref 20–31)
CREAT SERPL-MCNC: 1.14 MG/DL (ref 0.5–0.9)
GFR AFRICAN AMERICAN: 54 ML/MIN
GFR NON-AFRICAN AMERICAN: 45 ML/MIN
GFR SERPL CREATININE-BSD FRML MDRD: ABNORMAL ML/MIN/{1.73_M2}
GFR SERPL CREATININE-BSD FRML MDRD: ABNORMAL ML/MIN/{1.73_M2}
GLUCOSE BLD-MCNC: 120 MG/DL (ref 74–100)
GLUCOSE BLD-MCNC: 122 MG/DL (ref 70–99)
GLUCOSE BLD-MCNC: 146 MG/DL (ref 74–100)
GLUCOSE BLD-MCNC: 165 MG/DL (ref 74–100)
HCT VFR BLD CALC: 33.1 % (ref 36–46)
HEMOGLOBIN: 10.9 G/DL (ref 12–16)
MAGNESIUM: 1.8 MG/DL (ref 1.6–2.6)
MCH RBC QN AUTO: 30.7 PG (ref 26–34)
MCHC RBC AUTO-ENTMCNC: 32.9 G/DL (ref 31–37)
MCV RBC AUTO: 93.6 FL (ref 80–100)
NRBC AUTOMATED: ABNORMAL PER 100 WBC
PDW BLD-RTO: 13.4 % (ref 12.1–15.2)
PHOSPHORUS: 3 MG/DL (ref 2.6–4.5)
PLATELET # BLD: 180 K/UL (ref 140–450)
PMV BLD AUTO: 8 FL (ref 6–12)
POTASSIUM SERPL-SCNC: 3.7 MMOL/L (ref 3.7–5.3)
RBC # BLD: 3.54 M/UL (ref 4–5.2)
SODIUM BLD-SCNC: 143 MMOL/L (ref 135–144)
WBC # BLD: 6.6 K/UL (ref 3.5–11)

## 2018-01-29 PROCEDURE — 1200000000 HC SEMI PRIVATE

## 2018-01-29 PROCEDURE — 2580000003 HC RX 258: Performed by: FAMILY MEDICINE

## 2018-01-29 PROCEDURE — 2580000003 HC RX 258: Performed by: PHYSICIAN ASSISTANT

## 2018-01-29 PROCEDURE — 83735 ASSAY OF MAGNESIUM: CPT

## 2018-01-29 PROCEDURE — 2500000003 HC RX 250 WO HCPCS: Performed by: FAMILY MEDICINE

## 2018-01-29 PROCEDURE — 6370000000 HC RX 637 (ALT 250 FOR IP): Performed by: PHYSICIAN ASSISTANT

## 2018-01-29 PROCEDURE — 80048 BASIC METABOLIC PNL TOTAL CA: CPT

## 2018-01-29 PROCEDURE — 6360000002 HC RX W HCPCS: Performed by: FAMILY MEDICINE

## 2018-01-29 PROCEDURE — 36415 COLL VENOUS BLD VENIPUNCTURE: CPT

## 2018-01-29 PROCEDURE — 97530 THERAPEUTIC ACTIVITIES: CPT

## 2018-01-29 PROCEDURE — 82947 ASSAY GLUCOSE BLOOD QUANT: CPT

## 2018-01-29 PROCEDURE — 6370000000 HC RX 637 (ALT 250 FOR IP): Performed by: FAMILY MEDICINE

## 2018-01-29 PROCEDURE — G8996 SWALLOW CURRENT STATUS: HCPCS

## 2018-01-29 PROCEDURE — G8997 SWALLOW GOAL STATUS: HCPCS

## 2018-01-29 PROCEDURE — 97116 GAIT TRAINING THERAPY: CPT

## 2018-01-29 PROCEDURE — 97110 THERAPEUTIC EXERCISES: CPT

## 2018-01-29 PROCEDURE — 84100 ASSAY OF PHOSPHORUS: CPT

## 2018-01-29 PROCEDURE — 6360000002 HC RX W HCPCS: Performed by: PHYSICIAN ASSISTANT

## 2018-01-29 PROCEDURE — G8998 SWALLOW D/C STATUS: HCPCS

## 2018-01-29 PROCEDURE — 85027 COMPLETE CBC AUTOMATED: CPT

## 2018-01-29 RX ORDER — LEVOTHYROXINE SODIUM 0.05 MG/1
50 TABLET ORAL DAILY
Status: DISCONTINUED | OUTPATIENT
Start: 2018-01-29 | End: 2018-01-31 | Stop reason: HOSPADM

## 2018-01-29 RX ORDER — HYDRALAZINE HYDROCHLORIDE 20 MG/ML
5 INJECTION INTRAMUSCULAR; INTRAVENOUS EVERY 6 HOURS
Status: DISCONTINUED | OUTPATIENT
Start: 2018-01-29 | End: 2018-01-29

## 2018-01-29 RX ORDER — SIMVASTATIN 20 MG
20 TABLET ORAL NIGHTLY
Status: DISCONTINUED | OUTPATIENT
Start: 2018-01-29 | End: 2018-01-30

## 2018-01-29 RX ORDER — DILTIAZEM HYDROCHLORIDE 120 MG/1
120 CAPSULE, COATED, EXTENDED RELEASE ORAL DAILY
Status: DISCONTINUED | OUTPATIENT
Start: 2018-01-29 | End: 2018-01-31 | Stop reason: HOSPADM

## 2018-01-29 RX ORDER — WARFARIN SODIUM 3 MG/1
3 TABLET ORAL
Status: COMPLETED | OUTPATIENT
Start: 2018-01-29 | End: 2018-01-29

## 2018-01-29 RX ORDER — ISOSORBIDE MONONITRATE 30 MG/1
30 TABLET, EXTENDED RELEASE ORAL DAILY
Status: DISCONTINUED | OUTPATIENT
Start: 2018-01-29 | End: 2018-01-31 | Stop reason: HOSPADM

## 2018-01-29 RX ORDER — CARVEDILOL 6.25 MG/1
6.25 TABLET ORAL 2 TIMES DAILY
Status: DISCONTINUED | OUTPATIENT
Start: 2018-01-29 | End: 2018-01-31 | Stop reason: HOSPADM

## 2018-01-29 RX ADMIN — CARVEDILOL 6.25 MG: 6.25 TABLET, FILM COATED ORAL at 15:03

## 2018-01-29 RX ADMIN — Medication 10 ML: at 20:58

## 2018-01-29 RX ADMIN — METOPROLOL TARTRATE 5 MG: 5 INJECTION, SOLUTION INTRAVENOUS at 08:34

## 2018-01-29 RX ADMIN — SIMVASTATIN 20 MG: 20 TABLET, FILM COATED ORAL at 20:55

## 2018-01-29 RX ADMIN — LEVOTHYROXINE SODIUM 50 MCG: 0.05 TABLET ORAL at 15:03

## 2018-01-29 RX ADMIN — HYDRALAZINE HYDROCHLORIDE 5 MG: 20 INJECTION INTRAMUSCULAR; INTRAVENOUS at 08:35

## 2018-01-29 RX ADMIN — CARVEDILOL 6.25 MG: 6.25 TABLET, FILM COATED ORAL at 20:55

## 2018-01-29 RX ADMIN — DILTIAZEM HYDROCHLORIDE 120 MG: 120 CAPSULE, COATED, EXTENDED RELEASE ORAL at 15:03

## 2018-01-29 RX ADMIN — I.V. FAT EMULSION 250 ML: 20 EMULSION INTRAVENOUS at 17:20

## 2018-01-29 RX ADMIN — ENOXAPARIN SODIUM 80 MG: 80 INJECTION SUBCUTANEOUS at 09:36

## 2018-01-29 RX ADMIN — Medication 10 ML: at 08:36

## 2018-01-29 RX ADMIN — Medication 10 ML: at 09:39

## 2018-01-29 RX ADMIN — I.V. FAT EMULSION 250 ML: 20 EMULSION INTRAVENOUS at 18:59

## 2018-01-29 RX ADMIN — ISOSORBIDE MONONITRATE 30 MG: 30 TABLET, EXTENDED RELEASE ORAL at 15:03

## 2018-01-29 RX ADMIN — METOPROLOL TARTRATE 5 MG: 5 INJECTION, SOLUTION INTRAVENOUS at 01:28

## 2018-01-29 RX ADMIN — ASCORBIC ACID, VITAMIN A PALMITATE, CHOLECALCIFEROL, THIAMINE HYDROCHLORIDE, RIBOFLAVIN-5 PHOSPHATE SODIUM, PYRIDOXINE HYDROCHLORIDE, NIACINAMIDE, DEXPANTHENOL, ALPHA-TOCOPHEROL ACETATE, VITAMIN K1, FOLIC ACID, BIOTIN, CYANOCOBALAMIN: 200; 3300; 200; 6; 3.6; 6; 40; 15; 10; 150; 600; 60; 5 INJECTION, SOLUTION INTRAVENOUS at 17:20

## 2018-01-29 RX ADMIN — WARFARIN SODIUM 3 MG: 3 TABLET ORAL at 18:00

## 2018-01-29 ASSESSMENT — PAIN SCALES - GENERAL
PAINLEVEL_OUTOF10: 0
PAINLEVEL_OUTOF10: 0

## 2018-01-29 NOTE — PROGRESS NOTES
[facility]  Physical Therapy    Date: 2018  Patient Name: Vincent Dawkins        : 3/7/1927       [x] Pt Refusal      Pt refused this AM d/t fatigue.  Will attempt to see pt this PM.      [] Pt Unavailable due to:          VINAY COLLINS, ELIZABETH Date: 2018

## 2018-01-29 NOTE — PROGRESS NOTES
225 g Dextrose/1439 calories  · Additional Calories: TPN  · Anthropometric Measures:  · Ht: 5' 2\" (561.7 cm) ('s license)   · Current Body Wt: 170 lb 12.8 oz (77.5 kg)  · Admission Body Wt: 173 lb (78.5 kg)  · Usual Body Wt: 167 lb (75.8 kg) (164-170#)  · % Weight Change: 1.2% weight loss,  acute  · Ideal Body Wt: 110 lb (49.9 kg), % Ideal Body 155%  · Adjusted Body Wt: 126 lb (57.2 kg), body weight adjusted for Obesity  · BMI Classification: BMI 30.0 - 34.9 Obese Class I  · Comparative Standards (Estimated Nutrition Needs):  · Estimated Daily Total Kcal: 2549-2816  · Estimated Daily Protein (g): 68-74g  · Estimated Daily Total Fluid (ml/day): 6263-0206 ml  Wt Readings from Last 3 Encounters:   01/28/18 170 lb 12.8 oz (77.5 kg)   12/29/17 172 lb 6.4 oz (78.2 kg)   12/20/17 168 lb (76.2 kg)     Recent Labs      01/27/18   0400  01/28/18   0415  01/29/18   0500   NA  145*  142  143   K  4.2  3.6*  3.7   CL  107  107  107   CO2  25  25  26   BUN  38*  40*  34*   CREATININE  1.29*  1.27*  1.14*   GLUCOSE  174*  148*  122*   ALT  <5*   --    --    ALKPHOS  46   --    --    GFR                                          Lab Results   Component Value Date    LABALBU 3.2 01/27/2018      Recent Labs      01/27/18   0400  01/28/18   0415  01/29/18   0500   NA  145*  142  143   K  4.2  3.6*  3.7   CL  107  107  107   CO2  25  25  26   BUN  38*  40*  34*   CREATININE  1.29*  1.27*  1.14*   GLUCOSE  174*  148*  122*   ALT  <5*   --    --    ALKPHOS  46   --    --    TRIG  145   --    --    GFR                                          Lab Results   Component Value Date    LABALBU 3.2 01/27/2018      Lab Results   Component Value Date    PHOS 3.0 01/29/2018      Lab Results   Component Value Date    MG 1.8 01/29/2018    No results found for: PREALBUMIN  Estimated Intake vs Estimated Needs: Intake Improving    Nutrition Risk Level: High  PO intakes have been initiated. Phosphorus and K+ have improved today.  Renal indices

## 2018-01-29 NOTE — CONSULTS
TPN Follow Up Note: Pharmacy Consult    Today's Labs:  Recent Labs      01/29/18   0500   NA  143     Recent Labs      01/29/18   0500   CL  107     Recent Labs      01/29/18   0500   K  3.7     Recent Labs      01/29/18   0500   MG  1.8     Recent Labs      01/29/18   0500   PHOS  3.0     Recent Labs      01/29/18   0500   GLUCOSE  122*     Recent Labs      01/29/18   0500   BUN  34*         Assessment: Phosphorus and potassium improved today. Glucose stable with 2 units of regular insulin/bag. Patient has begun oral intake with Dysphagia I Pureed diet. Electrolyte Replacement: none    TPN changes for (today) at 1800: none; will continue current rate per dietary    PN Product Dispensed: CLINIMIX-E  5/15    Option:(5/15     or    4.25/5)    Lipid Product Dispensed: Intralipid 20%    Rates of Infusion   Lipid Infusion Rate: 45.8  mL/hr continuously   Clinimix-E Infusion Rate: 11.4 mL/hr for 12 hours          Electrolytes (per bag) Clinimix E   Na= 70 mEq/Bag (2000ml)   K= 60 mEq/Bag (2000ml)              Mg= 10 mEq/Bag (2000ml)              Ca= 4.4 mmol/Bag (2000ml)              Acetate= 160 mEq/Bag (2000ml)  (140mEq/Bag-Peripheral Formula)              Chloride= 78 mEq/Bag (2000ml)              Phosphate= 30mMol/Bag (2000ml)                            MVI= 10 mL/Bag 2000ml   Trace Elements= 1 mL/Bag (2000ml)  **NOTE: Electrolytes are based upon the total volume of the TPN bag (2000ml). The amount of electrolytes received is based upon the rate/volume of the total infusion. Re-check BMP daily;  Mg, PO4 every MWF with next lab 1/31/18  Amol Chi.  Salvador,1/29/2018, 9:29 AM

## 2018-01-29 NOTE — PROGRESS NOTES
PALLIATIVE CARE  Follow up visit with Denver Caryals. She is sitting up in the chair during my brief visit. She has been more alert, talking and tells me she had some breakfast this morning. Speech Therapy here at present time to continue evaluation. Plan is to be discharged to [facility] for continued strengthening and speech therapy. Encouragement and support given. Pt was to have a PEG tube inserted but was not cleared for surgery last week. She was started on TPN. Will continue to follow and support. Yi Roca RN, [facility] and Bryan Cuevas Nurse Coordinator  1/29/2018 11:46 AM

## 2018-01-30 ENCOUNTER — APPOINTMENT (OUTPATIENT)
Dept: GENERAL RADIOLOGY | Age: 83
DRG: 286 | End: 2018-01-30
Payer: MEDICARE

## 2018-01-30 LAB
ANION GAP SERPL CALCULATED.3IONS-SCNC: 11 MMOL/L (ref 9–17)
BUN BLDV-MCNC: 38 MG/DL (ref 8–23)
BUN/CREAT BLD: 28 (ref 9–20)
CALCIUM SERPL-MCNC: 8.5 MG/DL (ref 8.6–10.4)
CHLORIDE BLD-SCNC: 104 MMOL/L (ref 98–107)
CO2: 25 MMOL/L (ref 20–31)
CREAT SERPL-MCNC: 1.36 MG/DL (ref 0.5–0.9)
GFR AFRICAN AMERICAN: 44 ML/MIN
GFR NON-AFRICAN AMERICAN: 37 ML/MIN
GFR SERPL CREATININE-BSD FRML MDRD: ABNORMAL ML/MIN/{1.73_M2}
GFR SERPL CREATININE-BSD FRML MDRD: ABNORMAL ML/MIN/{1.73_M2}
GLUCOSE BLD-MCNC: 115 MG/DL (ref 74–100)
GLUCOSE BLD-MCNC: 125 MG/DL (ref 74–100)
GLUCOSE BLD-MCNC: 126 MG/DL (ref 70–99)
GLUCOSE BLD-MCNC: 128 MG/DL (ref 74–100)
HCT VFR BLD CALC: 30.7 % (ref 36–46)
HEMOGLOBIN: 10 G/DL (ref 12–16)
INR BLD: 1.1 (ref 0.9–1.2)
MCH RBC QN AUTO: 31.4 PG (ref 26–34)
MCHC RBC AUTO-ENTMCNC: 32.5 G/DL (ref 31–37)
MCV RBC AUTO: 96.6 FL (ref 80–100)
NRBC AUTOMATED: ABNORMAL PER 100 WBC
PDW BLD-RTO: 14.9 % (ref 12.1–15.2)
PLATELET # BLD: 183 K/UL (ref 140–450)
PMV BLD AUTO: 8.2 FL (ref 6–12)
POTASSIUM SERPL-SCNC: 4.5 MMOL/L (ref 3.7–5.3)
PROTHROMBIN TIME: 10.9 SEC (ref 9.7–12.2)
RBC # BLD: 3.18 M/UL (ref 4–5.2)
SODIUM BLD-SCNC: 140 MMOL/L (ref 135–144)
WBC # BLD: 6.6 K/UL (ref 3.5–11)

## 2018-01-30 PROCEDURE — 82947 ASSAY GLUCOSE BLOOD QUANT: CPT

## 2018-01-30 PROCEDURE — 92611 MOTION FLUOROSCOPY/SWALLOW: CPT

## 2018-01-30 PROCEDURE — 80048 BASIC METABOLIC PNL TOTAL CA: CPT

## 2018-01-30 PROCEDURE — 6370000000 HC RX 637 (ALT 250 FOR IP): Performed by: PHYSICIAN ASSISTANT

## 2018-01-30 PROCEDURE — 97110 THERAPEUTIC EXERCISES: CPT

## 2018-01-30 PROCEDURE — G8998 SWALLOW D/C STATUS: HCPCS

## 2018-01-30 PROCEDURE — 2580000003 HC RX 258: Performed by: PHYSICIAN ASSISTANT

## 2018-01-30 PROCEDURE — 36415 COLL VENOUS BLD VENIPUNCTURE: CPT

## 2018-01-30 PROCEDURE — 74230 X-RAY XM SWLNG FUNCJ C+: CPT

## 2018-01-30 PROCEDURE — 85610 PROTHROMBIN TIME: CPT

## 2018-01-30 PROCEDURE — 6360000002 HC RX W HCPCS: Performed by: PHYSICIAN ASSISTANT

## 2018-01-30 PROCEDURE — 1200000000 HC SEMI PRIVATE

## 2018-01-30 PROCEDURE — 2580000003 HC RX 258: Performed by: FAMILY MEDICINE

## 2018-01-30 PROCEDURE — G8997 SWALLOW GOAL STATUS: HCPCS

## 2018-01-30 PROCEDURE — 85027 COMPLETE CBC AUTOMATED: CPT

## 2018-01-30 PROCEDURE — G8996 SWALLOW CURRENT STATUS: HCPCS

## 2018-01-30 PROCEDURE — 6370000000 HC RX 637 (ALT 250 FOR IP): Performed by: FAMILY MEDICINE

## 2018-01-30 RX ORDER — WARFARIN SODIUM 3 MG/1
3 TABLET ORAL
Status: COMPLETED | OUTPATIENT
Start: 2018-01-30 | End: 2018-01-30

## 2018-01-30 RX ORDER — ATORVASTATIN CALCIUM 40 MG/1
40 TABLET, FILM COATED ORAL NIGHTLY
Status: DISCONTINUED | OUTPATIENT
Start: 2018-01-30 | End: 2018-01-31 | Stop reason: HOSPADM

## 2018-01-30 RX ADMIN — Medication 20 ML: at 10:41

## 2018-01-30 RX ADMIN — ISOSORBIDE MONONITRATE 30 MG: 30 TABLET, EXTENDED RELEASE ORAL at 10:35

## 2018-01-30 RX ADMIN — CARVEDILOL 6.25 MG: 6.25 TABLET, FILM COATED ORAL at 20:06

## 2018-01-30 RX ADMIN — Medication 10 ML: at 20:09

## 2018-01-30 RX ADMIN — ATORVASTATIN CALCIUM 40 MG: 40 TABLET, FILM COATED ORAL at 20:06

## 2018-01-30 RX ADMIN — CARVEDILOL 6.25 MG: 6.25 TABLET, FILM COATED ORAL at 10:35

## 2018-01-30 RX ADMIN — Medication 10 ML: at 10:43

## 2018-01-30 RX ADMIN — ENOXAPARIN SODIUM 80 MG: 80 INJECTION SUBCUTANEOUS at 10:36

## 2018-01-30 RX ADMIN — DILTIAZEM HYDROCHLORIDE 120 MG: 120 CAPSULE, COATED, EXTENDED RELEASE ORAL at 10:36

## 2018-01-30 RX ADMIN — LEVOTHYROXINE SODIUM 50 MCG: 0.05 TABLET ORAL at 07:31

## 2018-01-30 RX ADMIN — WARFARIN SODIUM 3 MG: 3 TABLET ORAL at 17:39

## 2018-01-30 ASSESSMENT — PAIN SCALES - GENERAL: PAINLEVEL_OUTOF10: 0

## 2018-01-30 NOTE — PROGRESS NOTES
Nutrition Assessment    Type and Reason for Visit: Reassess        Nutrition Assessment:  · Subjective Assessment: Pt sitting up feeding herself lunch. She ate all of her fruit, drank all of the supplement, and ate most of her meat and vegetables. Pt daughters states she goes for her MBS this afternoon. · Nutrition-Focused Physical Findings: Pt with good appearance. Would expect TPN to be d/c'd if PO remains good and MBS results are without any restrictions on oral intakes.      Electronically signed by Arjun Grady RD, LD on 1/30/18 at 1:11 PM    Contact Number: [phone]

## 2018-01-30 NOTE — PROGRESS NOTES
Occupational Therapy  Facility/Department: [facility] AT THE [facility] MED SURG  Daily Treatment Note  NAME: Xochitl Muse  : 3/7/1927  MRN: [id number]    Date of Service: 2018    Patient Diagnosis(es): The encounter diagnosis was Acute on chronic congestive heart failure, unspecified congestive heart failure type ([address] has a past medical history of Anxiety; Asthma; CAD (coronary artery disease); Cardiac pacemaker; CHF (congestive heart failure) ([facility] [address] CHF (congestive heart failure) ([address] Chronic kidney disease; Diabetes mellitus ([facility] [address] DVT (deep venous thrombosis) ([facility] [address] Gout; H/O echocardiogram; Heart disease; Hyperlipidemia; Hypertension; Hypothyroidism; Mobitz type 2 second degree heart block; and Syncope and collapse.   has a past surgical history that includes Ovary removal (Right); Breast surgery (); Breast biopsy (Bilateral, ); eye surgery; Cardiac pacemaker placement (2017); Pacemaker insertion (2017); and Cardiac catheterization (Left, 2018). Restrictions  Restrictions/Precautions  Restrictions/Precautions: General Precautions, Fall Risk  Subjective   General  Chart Reviewed: Yes  Patient assessed for rehabilitation services?: Yes  Response to previous treatment: Patient with no complaints from previous session  Family / Caregiver Present: Yes  Subjective  Subjective: Pt awake in chair upon therapist arival to room. Family reported pt completed toileting task I. Pt very fatigued/tired, agreed to participate in UE strengthening exercises seated in chair. Session ended early d/t fatigue. General Comment  Comments: Pt responded to simple questions this date, appears to be less confused this date.    Pain Assessment  Patient Currently in Pain: Denies  Pain Assessment: 0-10  Vital Signs  Patient Currently in Pain: Denies   Orientation     Objective                                                                Type of ROM/Therapeutic Exercise  Type of ROM/Therapeutic Exercise: Free

## 2018-01-31 VITALS
BODY MASS INDEX: 31.43 KG/M2 | DIASTOLIC BLOOD PRESSURE: 84 MMHG | OXYGEN SATURATION: 93 % | TEMPERATURE: 97.9 F | HEIGHT: 62 IN | SYSTOLIC BLOOD PRESSURE: 150 MMHG | HEART RATE: 80 BPM | RESPIRATION RATE: 18 BRPM | WEIGHT: 170.8 LBS

## 2018-01-31 PROBLEM — N18.4 ACUTE RENAL FAILURE SUPERIMPOSED ON STAGE 4 CHRONIC KIDNEY DISEASE (HCC): Status: ACTIVE | Noted: 2018-01-29

## 2018-01-31 PROBLEM — N17.9 ACUTE RENAL FAILURE SUPERIMPOSED ON STAGE 4 CHRONIC KIDNEY DISEASE (HCC): Status: ACTIVE | Noted: 2018-01-29

## 2018-01-31 LAB
ANION GAP SERPL CALCULATED.3IONS-SCNC: 10 MMOL/L (ref 9–17)
BUN BLDV-MCNC: 42 MG/DL (ref 8–23)
BUN/CREAT BLD: 27 (ref 9–20)
CALCIUM SERPL-MCNC: 9.5 MG/DL (ref 8.6–10.4)
CHLORIDE BLD-SCNC: 106 MMOL/L (ref 98–107)
CO2: 26 MMOL/L (ref 20–31)
CREAT SERPL-MCNC: 1.56 MG/DL (ref 0.5–0.9)
GFR AFRICAN AMERICAN: 38 ML/MIN
GFR NON-AFRICAN AMERICAN: 31 ML/MIN
GFR SERPL CREATININE-BSD FRML MDRD: ABNORMAL ML/MIN/{1.73_M2}
GFR SERPL CREATININE-BSD FRML MDRD: ABNORMAL ML/MIN/{1.73_M2}
GLUCOSE BLD-MCNC: 102 MG/DL (ref 74–100)
GLUCOSE BLD-MCNC: 108 MG/DL (ref 70–99)
HCT VFR BLD CALC: 31.6 % (ref 36–46)
HEMOGLOBIN: 10.3 G/DL (ref 12–16)
INR BLD: 1 (ref 0.9–1.2)
MAGNESIUM: 2 MG/DL (ref 1.6–2.6)
MCH RBC QN AUTO: 31.3 PG (ref 26–34)
MCHC RBC AUTO-ENTMCNC: 32.6 G/DL (ref 31–37)
MCV RBC AUTO: 96.1 FL (ref 80–100)
NRBC AUTOMATED: ABNORMAL PER 100 WBC
PDW BLD-RTO: 15.2 % (ref 12.1–15.2)
PHOSPHORUS: 3.6 MG/DL (ref 2.6–4.5)
PLATELET # BLD: 185 K/UL (ref 140–450)
PMV BLD AUTO: 8.8 FL (ref 6–12)
POTASSIUM SERPL-SCNC: 4.7 MMOL/L (ref 3.7–5.3)
PROTHROMBIN TIME: 10.7 SEC (ref 9.7–12.2)
RBC # BLD: 3.28 M/UL (ref 4–5.2)
SODIUM BLD-SCNC: 142 MMOL/L (ref 135–144)
WBC # BLD: 6.7 K/UL (ref 3.5–11)

## 2018-01-31 PROCEDURE — 84100 ASSAY OF PHOSPHORUS: CPT

## 2018-01-31 PROCEDURE — 82947 ASSAY GLUCOSE BLOOD QUANT: CPT

## 2018-01-31 PROCEDURE — 36415 COLL VENOUS BLD VENIPUNCTURE: CPT

## 2018-01-31 PROCEDURE — 83735 ASSAY OF MAGNESIUM: CPT

## 2018-01-31 PROCEDURE — 80048 BASIC METABOLIC PNL TOTAL CA: CPT

## 2018-01-31 PROCEDURE — 85027 COMPLETE CBC AUTOMATED: CPT

## 2018-01-31 PROCEDURE — 6360000002 HC RX W HCPCS: Performed by: PHYSICIAN ASSISTANT

## 2018-01-31 PROCEDURE — 6370000000 HC RX 637 (ALT 250 FOR IP): Performed by: PHYSICIAN ASSISTANT

## 2018-01-31 PROCEDURE — 85610 PROTHROMBIN TIME: CPT

## 2018-01-31 RX ORDER — ISOSORBIDE MONONITRATE 30 MG/1
30 TABLET, EXTENDED RELEASE ORAL DAILY
Qty: 30 TABLET | Refills: 3 | DISCHARGE
Start: 2018-02-01 | End: 2018-02-12 | Stop reason: DRUGHIGH

## 2018-01-31 RX ORDER — CARVEDILOL 6.25 MG/1
6.25 TABLET ORAL 2 TIMES DAILY
Qty: 60 TABLET | Refills: 3 | DISCHARGE
Start: 2018-01-31 | End: 2018-02-12 | Stop reason: DRUGHIGH

## 2018-01-31 RX ORDER — WARFARIN SODIUM 3 MG/1
3 TABLET ORAL
Status: DISCONTINUED | OUTPATIENT
Start: 2018-01-31 | End: 2018-01-31 | Stop reason: HOSPADM

## 2018-01-31 RX ORDER — ALPRAZOLAM 0.25 MG/1
0.25 TABLET ORAL NIGHTLY PRN
Qty: 5 TABLET | Refills: 0 | Status: SHIPPED | OUTPATIENT
Start: 2018-01-31 | End: 2018-02-05

## 2018-01-31 RX ORDER — NITROGLYCERIN 0.4 MG/1
TABLET SUBLINGUAL
Qty: 25 TABLET | Refills: 3 | DISCHARGE
Start: 2018-01-31

## 2018-01-31 RX ORDER — ATORVASTATIN CALCIUM 40 MG/1
40 TABLET, FILM COATED ORAL NIGHTLY
Qty: 30 TABLET | Refills: 3 | DISCHARGE
Start: 2018-01-31

## 2018-01-31 RX ADMIN — LEVOTHYROXINE SODIUM 50 MCG: 0.05 TABLET ORAL at 07:59

## 2018-01-31 RX ADMIN — ENOXAPARIN SODIUM 80 MG: 80 INJECTION SUBCUTANEOUS at 09:45

## 2018-01-31 RX ADMIN — ISOSORBIDE MONONITRATE 30 MG: 30 TABLET, EXTENDED RELEASE ORAL at 09:45

## 2018-01-31 RX ADMIN — CARVEDILOL 6.25 MG: 6.25 TABLET, FILM COATED ORAL at 09:45

## 2018-01-31 RX ADMIN — DILTIAZEM HYDROCHLORIDE 120 MG: 120 CAPSULE, COATED, EXTENDED RELEASE ORAL at 09:45

## 2018-01-31 NOTE — PROGRESS NOTES
[facility] SPECIALTY [facility]  OCCUPATIONAL THERAPY  No Visit Note    [] ICU    [x] Acute   Patient: Carmelo Valadez  Room: 8989/9017-95      Carmelo Valadez not seen on 1/31/2018 at 10:54 AM due to patient refusal. RN notified and reported that patient did not sleep well and is going to [facility] this date.           Signature: Deisy ANTONIO/LESTER [id number]

## 2018-01-31 NOTE — PLAN OF CARE
Central monitor alarming low rate. Monitor SBA with PVC's Bi and trigeminy. Pt appears asymptomatic AEB: /94, r 24, SaO2 94 on 3L/NC, HR ranges from 26 to 80. Pt denies change in SOB and denies chest pain. Informed Dr. Cara Ibarra orders received to hold pm cardizem and consult cardiology, and 12 lead EKGs.
Consulted Dr Frankie Ortiz and sent 12 leads x 3 and strips. Updated on vs and admission dx. Orders received regarding transfer to ICU and agrees with holding PM dose of Cardizem.
Problem: ACTIVITY INTOLERANCE/IMPAIRED MOBILITY  Goal: Mobility/activity is maintained at optimum level for patient  Outcome: Ongoing  Able to maitain balance and ability to swallow appropriately, apply needs form swallow eval to diet as recomended    Problem: COMMUNICATION IMPAIRMENT  Goal: Ability to express needs and understand communication  Outcome: Ongoing  Ability to maintain clear speech. To ambulate appropriately.
Problem: Nutrition  Goal: Optimal nutrition therapy  Outcome: Ongoing  Nutrition Problem: Inadequate oral intake  Intervention: Food and/or Nutrient Delivery: Continue NPO  Nutritional Goals: Nutritional source for necessary nutrients
Problem: Risk for Impaired Skin Integrity  Goal: Tissue integrity - skin and mucous membranes  Structural intactness and normal physiological function of skin and  mucous membranes. Outcome: Ongoing  Continuing to assess and monitor. Problem: OXYGENATION/RESPIRATORY FUNCTION  Goal: Patient will achieve/maintain normal respiratory rate/effort  Respiratory rate and effort will be within normal limits for the patient   Outcome: Ongoing  Continuing to assess and monitor. Problem: FLUID AND ELECTROLYTE IMBALANCE  Goal: Fluid and electrolyte balance are achieved/maintained  Outcome: Ongoing  Continuing to assess and monitor. Problem: SAFETY  Goal: Free from accidental physical injury  Outcome: Ongoing  Call light in reach. Fall risk assessed daily. Pt able to call for assistance. Non slip socks in place. Bed in lowest position with wheels locked. ID band is on and visible. Will continue to assess and monitor.
Problem: Risk for Impaired Skin Integrity  Goal: Tissue integrity - skin and mucous membranes  Structural intactness and normal physiological function of skin and  mucous membranes. Outcome: Ongoing  Continuing to assess and monitor. Problem: OXYGENATION/RESPIRATORY FUNCTION  Goal: Patient will achieve/maintain normal respiratory rate/effort  Respiratory rate and effort will be within normal limits for the patient   Outcome: Ongoing  Continuing to assess and monitor. Problem: HEMODYNAMIC STATUS  Goal: Patient has stable vital signs and fluid balance  Outcome: Ongoing  Continuing to assess and monitor. Problem: SAFETY  Goal: Free from accidental physical injury  Outcome: Ongoing  Call light in reach. Fall risk assessed daily. Pt able to call for assistance. Non slip socks in place. Bed in lowest position with wheels locked. ID band is on and visible. Will continue to assess and monitor.
Problem: Risk for Impaired Skin Integrity  Goal: Tissue integrity - skin and mucous membranes  Structural intactness and normal physiological function of skin and  mucous membranes. Outcome: Ongoing  Patient able to turn self in bed. Hira scale complete. No open areas noted. Problem: OXYGENATION/RESPIRATORY FUNCTION  Goal: Patient will achieve/maintain normal respiratory rate/effort  Respiratory rate and effort will be within normal limits for the patient   Outcome: Ongoing  Respirations are symmetrical and unlabored. 0.5 lpm nasal cannula remain on. Breathing treatments given as ordered and PRN. Problem: HEMODYNAMIC STATUS  Goal: Patient has stable vital signs and fluid balance  Outcome: Ongoing  Intake and output recorded along with daily weight. Electrolytes monitored with daily lab work. Problem: SAFETY  Goal: Free from accidental physical injury  Outcome: Ongoing  Patient remain free from injury. Fall risk assessment complete. Bed in low position with bed wheels locked and bed alarm on. Call light within reach. ID band and fall band on. Problem: PAIN  Goal: Patient's pain/discomfort is manageable  Outcome: Ongoing  Pain scale of 0-10 being used to assess pain. Patient rates pain at 0/10, denies at this time. Pain will be assessed with hourly rounding and medication administered as ordered.
Problem: Risk for Impaired Skin Integrity  Goal: Tissue integrity - skin and mucous membranes  Structural intactness and normal physiological function of skin and  mucous membranes. Outcome: Ongoing  Patient is able to turn self while in the bed and can self regulate the bed as well. There are no new open areas or redness noted upon assessments. Will continue to assess       Problem: OXYGENATION/RESPIRATORY FUNCTION  Goal: Patient will achieve/maintain normal respiratory rate/effort  Respiratory rate and effort will be within normal limits for the patient   Outcome: Ongoing  RR is WNL, SPO2 in the 90's on room air, breathing is regular and unlabored. Will continue to monitor     Problem: HEMODYNAMIC STATUS  Goal: Patient has stable vital signs and fluid balance  Outcome: Ongoing  VS assessed and documented, PRN hydralazine given as ordered if needed, will continue to monitor     Problem: FLUID AND ELECTROLYTE IMBALANCE  Goal: Fluid and electrolyte balance are achieved/maintained  Outcome: Ongoing  Fluid and electrolytes being monitored daily     Problem: SAFETY  Goal: Free from accidental physical injury  Outcome: Ongoing  Bed in lowest position, wheels are locked, call light within reach, ID band on. Fall risk is assessed. Will continue to monitor. Problem: PAIN  Goal: Patient's pain/discomfort is manageable  Outcome: Ongoing  Patients pain assessed routinely and as needed with pain scale 0-10. PRN pain medication give as appropriate and pain is reassessed after administration.  Will continue to monitor
Problem: Risk for Impaired Skin Integrity  Goal: Tissue integrity - skin and mucous membranes  Structural intactness and normal physiological function of skin and  mucous membranes. Outcome: Ongoing  Skin assessment completed per shift and as needed. Pt encouraged to turn and repositioning every 2 hours and as needed. Skin clean and dry at all times. Monitoring skin every shift and as needed. Will continue to monitor. Problem: OXYGENATION/RESPIRATORY FUNCTION  Goal: Patient will achieve/maintain normal respiratory rate/effort  Respiratory rate and effort will be within normal limits for the patient   Outcome: Ongoing  O2 via nasal canula continues at 1L, SpO2 >90% t/o shift during spot checks. Patient encouraged to cough and deep breath hourly. No cyanosis noted. Unlabored breathing, chest expansion symmetrical. No ssx of respiratory distress noted/reported t/o shift. Will continue to monitor. Problem: HEMODYNAMIC STATUS  Goal: Patient has stable vital signs and fluid balance  Outcome: Ongoing  Vital signs monitored as ordered, stable vital signs t/o shift. Peripheral pulses noted/strong, cap refill WNL. Will continue to monitor. Problem: FLUID AND ELECTROLYTE IMBALANCE  Goal: Fluid and electrolyte balance are achieved/maintained  Outcome: Ongoing  Pt taking in adequate po fluids, tolerating solid food, able to feed self, increased fluid intake encouraged. Skin turgor good and WNL, skin color WNL, moist membranes noted. Will continue to monitor. Problem: SAFETY  Goal: Free from accidental physical injury  Outcome: Ongoing  Pt free from accidental physical injury, non-skid socks worn at all times, bed in low position and locked, call light within reach, pt denies all types of abuse, family visiting and interacts with pt in caring manner. Pt demonstrates safe transfer and ambulation. Will continue to monitor.        Problem: PAIN  Goal: Patient's pain/discomfort is manageable  Outcome:
Problem: Risk for Impaired Skin Integrity  Goal: Tissue integrity - skin and mucous membranes  Structural intactness and normal physiological function of skin and  mucous membranes. Outcome: Ongoing  Skin assessment completed qshift and as needed. Pt. Changes position independently and has adequate nutrition. Skin is kept clean and dry. Problem: OXYGENATION/RESPIRATORY FUNCTION  Goal: Patient will achieve/maintain normal respiratory rate/effort  Respiratory rate and effort will be within normal limits for the patient   Outcome: Ongoing  Pt.'s respiratory rate/depth/quality are within normal limits. Skin is pink. V.S. Monitored twice per shift and more often as needed. Problem: HEMODYNAMIC STATUS  Goal: Patient has stable vital signs and fluid balance  Outcome: Ongoing  Pt.'s v.s. Are within normal limits for this nurse. Will continue to monitor twice per shift and as needed. I/O recorded q shift. Problem: FLUID AND ELECTROLYTE IMBALANCE  Goal: Fluid and electrolyte balance are achieved/maintained  Outcome: Ongoing  Pt. Has adequate po intake. Will measure I/O qshift and monitor for s/s electrolyte imbalance. Problem: SAFETY  Goal: Free from accidental physical injury  Outcome: Ongoing  Fall precautions in place per protocol. Bed is in low, locked position. Siderails up x2. Pt. Is in chair with call paddle in place. Agrees to call for standby assist with ambulation. Demonstrates with use of call button.
communication  Outcome: Ongoing  Patient follows commands and seems to understand communication. Unable to verbalize and remains aphasic.  Using communication board to meet patient needs

## 2018-01-31 NOTE — DISCHARGE SUMMARY
with patient and/or surrogate about current care plan  Coordination with Case Management and/or   Coordination of care with Consultants (if applicable)   Coordination of care with Receiving Facility Physician (if applicable)  Completion of DME forms (if applicable)  Preparation of Discharge Summary  Preparation of Medication Reconciliation  Preparation of Discharge Prescriptions    Signed:  Hebert Cobb PA-C  1/31/2018, 12:58 PM

## 2018-01-31 NOTE — PROGRESS NOTES
Hospitalist Progress Note    SUBJECTIVE/INTERVAL HISTORY:    Patient seen in follow up for infarction of parietal lobe, unstable angina, CHF, acute on CKD, DM, HTN. Denies chest pain and SOB. Daughter at bedside. Speech improved as well. Verbally answers simple questions. Does continue to have difficulty with word finding. Cr increasing. TPN off yesterday. Barium swallow  No evidence of significant laryngeal penetration or aspiration with any consistency     OBJECTIVE:    Vitals:   Temp: 97.9 °F (36.6 °C)  BP: (!) 150/84  Resp: 18  Pulse: 80  SpO2: 93 %  24HR INTAKE/OUTPUT:      Intake/Output Summary (Last 24 hours) at 01/31/18 0891  Last data filed at 01/31/18 0402   Gross per 24 hour   Intake              410 ml   Output                0 ml   Net              410 ml       -----------------------------------------------------------------  Review of Systems:  Constitutional: negative  for fevers, and negative for chills. Eyes: negative for visual disturbance   ENT: negative for sore throat, negative nasal congestion, and negative for earache  Respiratory: negative for shortness of breath, negative for cough, and negative for wheezing  Cardiovascular: negative for chest pain, negative for palpitations, and negative for syncope  Gastrointestinal: negative for abdominal pain, negative for nausea,negative for vomiting, negative for diarrhea, negative for constipation, and negative for hematochezia or melena  Genitourinary: negative for dysuria, negative for urinary urgency, negative for urinary frequency, and negative for hematuria  Skin: negative for skin rash, and negative for skin lesions  Neurological: negative for unilateral weakness, numbness or tingling.     Exam:  GEN:  says yes, no, answers simple questions, oriented to year and self, in no acute distress  EYES:  PERRL  NECK: normal, supple,  no carotid bruits  PULM: clear to auscultation bilaterally- no wheezes, rales or rhonchi, normal air movement, no respiratory distress  COR:   regular rate & rhythm, systolic murmur 3/6  ABD:    soft, non-tender, non-distended, normal bowel sounds, no masses or organomegaly  EXT:    no cyanosis, clubbing or edema present    NEURO: follows commands, GARCIA, no weakness, no facial drop, still has some expressive aphasia   SKIN: bilateral UE bruising, Right upper picc    -----------------------------------------------------------------  Diagnostic Data:  Lab Results   Component Value Date    WBC 6.7 01/31/2018    HGB 10.3 (L) 01/31/2018    HCT 31.6 (L) 01/31/2018     01/31/2018    CHOL 145 01/20/2018    TRIG 145 01/27/2018    HDL 40 (L) 01/20/2018    ALT <5 (L) 01/27/2018    AST 12 01/27/2018     01/31/2018    K 4.7 01/31/2018     01/31/2018    CREATININE 1.56 (H) 01/31/2018    BUN 42 (H) 01/31/2018    CO2 26 01/31/2018    TSH 2.88 01/19/2018    INR 1.0 01/31/2018    LABA1C 5.4 11/06/2017       ASSESSMENT:    Principal Problem:    Unstable angina (HCC)  Active Problems:    Acquired hypothyroidism    Diabetes mellitus (HCC)    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), NYHA class 4 (HCC)    CKD (chronic kidney disease) stage 4, GFR 15-29 ml/min (HCC)    Pulmonary HTN    Cardiac pacemaker in situ    Idiopathic cardiomyopathy (HCC)    Infarction of parietal lobe (HCC)    Acute renal failure superimposed on stage 4 chronic kidney disease ([facility]      Patient Active Problem List    Diagnosis Date Noted    Syncope and collapse - secondary to 2nd degree AV block 06/25/2017     Priority: High    Acute on chronic combined systolic and diastolic CHF (congestive heart failure) ([facility] 12/31/2016     Priority: High     Class: Acute    Acute renal failure superimposed on stage 4 chronic kidney disease ([facility] 01/29/2018    Infarction of parietal lobe ([facility] 01/23/2018    Unstable angina ([facility] 30/19/6072    Acute systolic CHF (congestive heart failure) ([facility] 01/19/2018    Labile blood pressure 12/29/2017   

## 2018-02-02 ENCOUNTER — HOSPITAL ENCOUNTER (OUTPATIENT)
Age: 83
Setting detail: SPECIMEN
Discharge: HOME | End: 2018-02-02
Payer: MEDICARE

## 2018-02-02 LAB
ALBUMIN SERPL-MCNC: 2.9 G/DL (ref 3.5–5.2)
ANION GAP SERPL CALCULATED.3IONS-SCNC: 8 MMOL/L (ref 9–17)
BUN BLDV-MCNC: 30 MG/DL (ref 8–23)
BUN/CREAT BLD: 19 (ref 9–20)
CALCIUM SERPL-MCNC: 8.8 MG/DL (ref 8.6–10.4)
CHLORIDE BLD-SCNC: 105 MMOL/L (ref 98–107)
CO2: 27 MMOL/L (ref 20–31)
CREAT SERPL-MCNC: 1.55 MG/DL (ref 0.5–0.9)
GFR AFRICAN AMERICAN: 38 ML/MIN
GFR NON-AFRICAN AMERICAN: 31 ML/MIN
GFR SERPL CREATININE-BSD FRML MDRD: ABNORMAL ML/MIN/{1.73_M2}
GFR SERPL CREATININE-BSD FRML MDRD: ABNORMAL ML/MIN/{1.73_M2}
GLUCOSE BLD-MCNC: 98 MG/DL (ref 70–99)
PHOSPHORUS: 3.2 MG/DL (ref 2.6–4.5)
POTASSIUM SERPL-SCNC: 5 MMOL/L (ref 3.7–5.3)
SODIUM BLD-SCNC: 140 MMOL/L (ref 135–144)

## 2018-02-02 PROCEDURE — 80069 RENAL FUNCTION PANEL: CPT

## 2018-02-02 PROCEDURE — 36415 COLL VENOUS BLD VENIPUNCTURE: CPT

## 2018-02-02 PROCEDURE — P9604 ONE-WAY ALLOW PRORATED TRIP: HCPCS

## 2018-02-07 ENCOUNTER — OUTSIDE SERVICES (OUTPATIENT)
Dept: FAMILY MEDICINE CLINIC | Age: 83
End: 2018-02-07
Payer: MEDICARE

## 2018-02-07 DIAGNOSIS — N18.4 CKD (CHRONIC KIDNEY DISEASE) STAGE 4, GFR 15-29 ML/MIN (HCC): Chronic | ICD-10-CM

## 2018-02-07 DIAGNOSIS — I20.0 UNSTABLE ANGINA (HCC): Primary | ICD-10-CM

## 2018-02-07 DIAGNOSIS — I27.20 PULMONARY HTN (HCC): Chronic | ICD-10-CM

## 2018-02-07 DIAGNOSIS — E03.9 ACQUIRED HYPOTHYROIDISM: Chronic | ICD-10-CM

## 2018-02-07 DIAGNOSIS — N18.4 ACUTE RENAL FAILURE SUPERIMPOSED ON STAGE 4 CHRONIC KIDNEY DISEASE, UNSPECIFIED ACUTE RENAL FAILURE TYPE (HCC): ICD-10-CM

## 2018-02-07 DIAGNOSIS — I42.9 IDIOPATHIC CARDIOMYOPATHY (HCC): ICD-10-CM

## 2018-02-07 DIAGNOSIS — E08.10 DIABETES MELLITUS DUE TO UNDERLYING CONDITION WITH KETOACIDOSIS WITHOUT COMA, WITHOUT LONG-TERM CURRENT USE OF INSULIN (HCC): Chronic | ICD-10-CM

## 2018-02-07 DIAGNOSIS — N17.9 ACUTE RENAL FAILURE SUPERIMPOSED ON STAGE 4 CHRONIC KIDNEY DISEASE, UNSPECIFIED ACUTE RENAL FAILURE TYPE (HCC): ICD-10-CM

## 2018-02-07 DIAGNOSIS — I50.42: Chronic | ICD-10-CM

## 2018-02-07 DIAGNOSIS — I63.89 INFARCTION OF PARIETAL LOBE (HCC): ICD-10-CM

## 2018-02-07 PROCEDURE — 1123F ACP DISCUSS/DSCN MKR DOCD: CPT | Performed by: FAMILY MEDICINE

## 2018-02-07 PROCEDURE — 99306 1ST NF CARE HIGH MDM 50: CPT | Performed by: FAMILY MEDICINE

## 2018-02-07 NOTE — PROGRESS NOTES
capsule by mouth daily 10/5/17   Joi Cisneros MD   acetaminophen (TYLENOL) 325 MG tablet Take 2 tablets by mouth every 4 hours as needed for Pain 6/30/17   José Miguel Garcia CNP   levothyroxine (SYNTHROID) 50 MCG tablet Take 50 mcg by mouth daily Takes 1 tablet daily except for 2 tabs on Sunday    Historical Provider, MD   aspirin 81 MG tablet Take 81 mg by mouth every other day    Historical Provider, MD       ROS:  General Constitutional: Denies chills. Denies fever. Denies headache. Admits 30 second episode this morning of trouble with speech, twitching, and leg weakness. ST was with her at the time and stated that her legs started to get weak and she started to go down and they turned her around and sat her down. Ophthalmologic: Denies blurred vision. ENT: Denies troubles swallowing with advancement of diet. Respiratory: Denies cough. Denies shortness of breath. Denies wheezing. Cardiovascular: Denies chest pain at rest. Denies irregular heartbeat. Denies palpitations. Denies further angina or chest pain. Gastrointestinal: Denies abdominal pain. Denies blood in the stool. Denies constipation. Denies diarrhea. Denies nausea. Denies vomiting. Genitourinary: Denies blood in the urine. Denies difficulty urinating. Denies frequent urination. Denies painful urination. Denies urinary incontinence  Neurologic: Admits near fall this morning. Denies dizziness. Denies fainting. Denies tingling/numbness. Admits speech is improving.      Past Surgical History:   Procedure Laterality Date    BREAST BIOPSY Bilateral 1980    BREAST SURGERY  1965    cyst removed    CARDIAC CATHETERIZATION Left 01/23/2018    via right radial approach/ Inge Waller/ Dr. Machado Friendly  06/29/2017    DR Venkat Selby    EYE SURGERY      Bilat cataract    OVARY REMOVAL Right     PACEMAKER INSERTION  06/29/2017       Family History   Problem Relation Age of Onset    Heart Disease Mother      Acqurosemarie Karl (LIPITOR) 40 MG tablet Take 1 tablet by mouth nightly 30 tablet 3    carvedilol (COREG) 6.25 MG tablet Take 1 tablet by mouth 2 times daily 60 tablet 3    calcium carbonate (TUMS) 500 MG chewable tablet Take 1 tablet by mouth every 6 hours as needed for Heartburn      diphenhydrAMINE (BENADRYL) 25 MG capsule Take 25 mg by mouth every 6 hours as needed for Itching      polyethylene glycol (GLYCOLAX) packet Take 17 g by mouth daily      warfarin (COUMADIN) 2 MG tablet Take 2 mg by mouth daily COUMADIN CLINIC:  3 mg Friday; 2 mg all other days      diltiazem (CARDIZEM CD) 120 MG extended release capsule Take 1 capsule by mouth daily 30 capsule 3    acetaminophen (TYLENOL) 325 MG tablet Take 2 tablets by mouth every 4 hours as needed for Pain 120 tablet 3    levothyroxine (SYNTHROID) 50 MCG tablet Take 50 mcg by mouth daily Takes 1 tablet daily except for 2 tabs on Sunday      aspirin 81 MG tablet Take 81 mg by mouth every other day       No current facility-administered medications for this visit. Allergies   Allergen Reactions    Lisinopril Other (See Comments)     Intolerance    Milk Of Magnesia [Magnesium Hydroxide] Other (See Comments)     Contraindicated with kidney function    Norco [Hydrocodone-Acetaminophen] Other (See Comments)     Confusion  Takes plain tylenol without intolerance. PHYSICAL EXAM:    There were no vitals taken for this visit. Wt Readings from Last 3 Encounters:   01/28/18 170 lb 12.8 oz (77.5 kg)   12/29/17 172 lb 6.4 oz (78.2 kg)   12/20/17 168 lb (76.2 kg)     BP Readings from Last 3 Encounters:   01/31/18 (!) 150/84   12/29/17 115/68   12/21/17 117/64     Lewis Taylor is: sitting in her bedside chair with her feet elevated. General Appearance: in no acute distress, well developed, well nourished. Eyes: pupils equal, round reactive to light and accommodation. Oral Cavity: mucosa moist.  Neck/Thyroid: neck supple, full range of motion  Skin: warm and dry.  Small

## 2018-02-12 RX ORDER — CARVEDILOL 12.5 MG/1
12.5 TABLET ORAL 2 TIMES DAILY WITH MEALS
COMMUNITY

## 2018-02-12 RX ORDER — ISOSORBIDE MONONITRATE 60 MG/1
60 TABLET, EXTENDED RELEASE ORAL DAILY
COMMUNITY

## 2018-02-14 ENCOUNTER — OUTSIDE SERVICES (OUTPATIENT)
Dept: FAMILY MEDICINE CLINIC | Age: 83
End: 2018-02-14
Payer: MEDICARE

## 2018-02-14 DIAGNOSIS — N17.9 ACUTE RENAL FAILURE SUPERIMPOSED ON STAGE 4 CHRONIC KIDNEY DISEASE, UNSPECIFIED ACUTE RENAL FAILURE TYPE (HCC): ICD-10-CM

## 2018-02-14 DIAGNOSIS — I50.43 ACUTE ON CHRONIC COMBINED SYSTOLIC AND DIASTOLIC CHF (CONGESTIVE HEART FAILURE) (HCC): ICD-10-CM

## 2018-02-14 DIAGNOSIS — N18.4 CKD (CHRONIC KIDNEY DISEASE) STAGE 4, GFR 15-29 ML/MIN (HCC): Chronic | ICD-10-CM

## 2018-02-14 DIAGNOSIS — I63.89 INFARCTION OF PARIETAL LOBE (HCC): ICD-10-CM

## 2018-02-14 DIAGNOSIS — E08.10 DIABETES MELLITUS DUE TO UNDERLYING CONDITION WITH KETOACIDOSIS WITHOUT COMA, WITHOUT LONG-TERM CURRENT USE OF INSULIN (HCC): Chronic | ICD-10-CM

## 2018-02-14 DIAGNOSIS — I27.20 PULMONARY HTN (HCC): Chronic | ICD-10-CM

## 2018-02-14 DIAGNOSIS — N18.4 ACUTE RENAL FAILURE SUPERIMPOSED ON STAGE 4 CHRONIC KIDNEY DISEASE, UNSPECIFIED ACUTE RENAL FAILURE TYPE (HCC): ICD-10-CM

## 2018-02-14 DIAGNOSIS — E03.9 ACQUIRED HYPOTHYROIDISM: Primary | Chronic | ICD-10-CM

## 2018-02-14 PROCEDURE — 1123F ACP DISCUSS/DSCN MKR DOCD: CPT | Performed by: FAMILY MEDICINE

## 2018-02-14 PROCEDURE — 99309 SBSQ NF CARE MODERATE MDM 30: CPT | Performed by: FAMILY MEDICINE

## 2018-02-21 ENCOUNTER — OUTSIDE SERVICES (OUTPATIENT)
Dept: FAMILY MEDICINE CLINIC | Age: 83
End: 2018-02-21
Payer: MEDICARE

## 2018-02-21 DIAGNOSIS — N17.9 ACUTE RENAL FAILURE SUPERIMPOSED ON STAGE 4 CHRONIC KIDNEY DISEASE, UNSPECIFIED ACUTE RENAL FAILURE TYPE (HCC): ICD-10-CM

## 2018-02-21 DIAGNOSIS — I63.89 INFARCTION OF PARIETAL LOBE (HCC): ICD-10-CM

## 2018-02-21 DIAGNOSIS — E03.9 ACQUIRED HYPOTHYROIDISM: Chronic | ICD-10-CM

## 2018-02-21 DIAGNOSIS — N18.4 ACUTE RENAL FAILURE SUPERIMPOSED ON STAGE 4 CHRONIC KIDNEY DISEASE, UNSPECIFIED ACUTE RENAL FAILURE TYPE (HCC): ICD-10-CM

## 2018-02-21 DIAGNOSIS — E08.10 DIABETES MELLITUS DUE TO UNDERLYING CONDITION WITH KETOACIDOSIS WITHOUT COMA, WITHOUT LONG-TERM CURRENT USE OF INSULIN (HCC): Chronic | ICD-10-CM

## 2018-02-21 DIAGNOSIS — N18.4 CKD (CHRONIC KIDNEY DISEASE) STAGE 4, GFR 15-29 ML/MIN (HCC): Chronic | ICD-10-CM

## 2018-02-21 DIAGNOSIS — E78.49 OTHER HYPERLIPIDEMIA: Chronic | ICD-10-CM

## 2018-02-21 DIAGNOSIS — I27.20 PULMONARY HTN (HCC): Chronic | ICD-10-CM

## 2018-02-21 DIAGNOSIS — I50.43 ACUTE ON CHRONIC COMBINED SYSTOLIC AND DIASTOLIC CHF (CONGESTIVE HEART FAILURE) (HCC): Primary | ICD-10-CM

## 2018-02-21 PROCEDURE — 99309 SBSQ NF CARE MODERATE MDM 30: CPT | Performed by: FAMILY MEDICINE

## 2018-02-21 PROCEDURE — 1123F ACP DISCUSS/DSCN MKR DOCD: CPT | Performed by: FAMILY MEDICINE

## 2018-02-21 NOTE — PROGRESS NOTES
The following note was scribed by: Clement Lanes, [id number] [facility]  [address] [address]  [phone]  Dept: [facility] [place] is a 80 y.o. female here for Follow-up (Weekly [facility] Visit)      HPI:  Faxes/Correspondence received throughout the week:    -On 02/19, nurses report that patient had been complaining of SOB this am and that at that time, VS were WNL, BP slightly elevated prior to meds and SpO2 was 89-92% on RA, face flushed, red, LS clear in right base and has fine crackles in L base posteriorly. Prn Ativan dose was given due to possible anxiety. Upon reassessment, pt. reported to be not as SOB but still is some and has fine crackles bilateral posterior bases, BLE 2+ non-pitting edema with TEDS present. They note that Pt. is not currently taking Lasix. Orders are given to administer nitroglycerin 0.4 mg SL x1 dose and fax vital signs. Nurses update that pt. took nitro x1 dose and states that she feels much better LS were auscultated more clearly but still noted are some fine crackles. They note that daughter is present and said you talked about making this a daily med but that an order had not been given.  Orders are given for NTG 0.4 mg SL prn dyspnea and to increase carvedilol to 25 mg bid.   -On 02/20, an INR level is reviewed (2.9) and orders are given to continue the same dose (2.5 mg daily) coumadin and re-check INR in 1 week.      Last Visit:  -no changes were made.      Soheila:  -reports that nitro improved the recent episode of SOB.      Family reports:   -that her daughter fell while in the room and hit her head here at the facility and had to go to the ER and they think that this is what upset her and got her worked up causing the SOB episode.   -that her memory is improving.        Vitals: 02/20/2018:  BP: 122/62  Glucose: 106  SpO2: 94%  Pain level: 0  P: 81  R: 18  T: 97.9  W: 170 lb     Accompanied by: two of her daughters. Prior to Admission medications    Medication Sig Start Date End Date Taking? Authorizing Provider   LORazepam (ATIVAN) 0.5 MG tablet Take 0.5 mg by mouth every 6 hours as needed for Anxiety. Yes Historical Provider, MD   carvedilol (COREG) 12.5 MG tablet Take 12.5 mg by mouth 2 times daily (with meals)   Yes Historical Provider, MD   isosorbide mononitrate (IMDUR) 60 MG extended release tablet Take 60 mg by mouth daily   Yes Historical Provider, MD   nitroGLYCERIN (NITROSTAT) 0.4 MG SL tablet up to max of 3 total doses. If no relief after 1 dose, call 911. 1/31/18  Yes Jeffery Phelps PA-C   atorvastatin (LIPITOR) 40 MG tablet Take 1 tablet by mouth nightly 1/31/18  Yes Jeffery Phelps PA-C   calcium carbonate (TUMS) 500 MG chewable tablet Take 1 tablet by mouth every 6 hours as needed for Heartburn   Yes Historical Provider, MD   diphenhydrAMINE (BENADRYL) 25 MG capsule Take 25 mg by mouth every 6 hours as needed for Itching   Yes Historical Provider, MD   polyethylene glycol (GLYCOLAX) packet Take 17 g by mouth daily   Yes Historical Provider, MD   diltiazem (CARDIZEM CD) 120 MG extended release capsule Take 1 capsule by mouth daily 10/5/17  Yes Nichole Bynum MD   acetaminophen (TYLENOL) 325 MG tablet Take 2 tablets by mouth every 4 hours as needed for Pain 6/30/17  Yes Jaquelin Garner CNP   levothyroxine (SYNTHROID) 50 MCG tablet Takes 1 tablet daily except for 2 tabs on Sunday   Yes Historical Provider, MD   aspirin 81 MG tablet Take 81 mg by mouth every other day   Yes Historical Provider, MD   warfarin (COUMADIN) 2 MG tablet Take 3 mg by mouth daily COUMADIN CLINIC:  3 mg Friday; 2 mg all other days    Historical Provider, MD       ROS:  General Constitutional: Denies chills. Denies fever. Denies headache. Denies lightheadedness. Ophthalmologic: Denies blurred vision. Respiratory: Denies shortness of breath.   Cardiovascular: Denies chest pain at rest. Denies irregular Reactions    Lisinopril Other (See Comments)     Intolerance    Milk Of Magnesia [Magnesium Hydroxide] Other (See Comments)     Contraindicated with kidney function    Norco [Hydrocodone-Acetaminophen] Other (See Comments)     Confusion  Takes plain tylenol without intolerance. PHYSICAL EXAM:    There were no vitals taken for this visit. Wt Readings from Last 3 Encounters:   01/28/18 170 lb 12.8 oz (77.5 kg)   12/29/17 172 lb 6.4 oz (78.2 kg)   12/20/17 168 lb (76.2 kg)     BP Readings from Last 3 Encounters:   01/31/18 (!) 150/84   12/29/17 115/68   12/21/17 117/64       Kassandra Cano is sitting in her bedside chair with family members present.      General Appearance: in no acute distress, well developed, well nourished. Ambulates with walker. Eyes: pupils equal, round reactive to light and accommodation. Oral Cavity: mucosa moist.  Neck/Thyroid: full range of motion  Skin: warm and dry. Heart: regular rate and rhythm. S1, S2 normal, no gallops. Rate: 85 diminished tones 2/6 systolic murmor  Lungs: clear to auscultation bilaterally. Abdomen: bowel sounds present, soft, nontender, nondistended, no masses or organomegaly. Round. Musculoskeletal: normal, full range of motion in knees and hips, no swelling or tenderness. Extremities: no cyanosis;  Psych: normal affect, speech fluent. ASSESSMENT:  1. Acute on chronic combined systolic and diastolic CHF (congestive heart failure) ([address]     2. Infarction of parietal lobe ([address]     3. Acquired hypothyroidism     4. Other hyperlipidemia     5. CKD (chronic kidney disease) stage 4, GFR 15-29 ml/min ([facility])     6. Pulmonary HTN     7. Diabetes mellitus due to underlying condition with ketoacidosis without coma, without long-term current use of insulin ([address]     8. Acute renal failure superimposed on stage 4 chronic kidney disease, unspecified acute renal failure type ([address]         PLAN:  We discuss the episode of SOB from a few days ago.  I explain that when

## 2018-02-22 RX ORDER — LORAZEPAM 0.5 MG/1
0.5 TABLET ORAL EVERY 6 HOURS PRN
Status: ON HOLD | COMMUNITY
End: 2018-03-13

## 2018-03-06 ENCOUNTER — APPOINTMENT (OUTPATIENT)
Dept: GENERAL RADIOLOGY | Age: 83
DRG: 291 | End: 2018-03-06
Payer: MEDICARE

## 2018-03-06 ENCOUNTER — HOSPITAL ENCOUNTER (INPATIENT)
Age: 83
LOS: 3 days | Discharge: HOME HEALTH | DRG: 291 | End: 2018-03-09
Attending: FAMILY MEDICINE | Admitting: INTERNAL MEDICINE
Payer: MEDICARE

## 2018-03-06 DIAGNOSIS — N28.9 ACUTE ON CHRONIC RENAL INSUFFICIENCY: ICD-10-CM

## 2018-03-06 DIAGNOSIS — I50.42: Chronic | ICD-10-CM

## 2018-03-06 DIAGNOSIS — N18.9 ACUTE ON CHRONIC RENAL INSUFFICIENCY: ICD-10-CM

## 2018-03-06 DIAGNOSIS — I50.43 ACUTE ON CHRONIC COMBINED SYSTOLIC AND DIASTOLIC CONGESTIVE HEART FAILURE (HCC): Primary | ICD-10-CM

## 2018-03-06 PROBLEM — I50.31 ACUTE DIASTOLIC CHF (CONGESTIVE HEART FAILURE) (HCC): Status: ACTIVE | Noted: 2018-03-06

## 2018-03-06 LAB
ABSOLUTE EOS #: 0.14 K/UL (ref 0–0.44)
ABSOLUTE IMMATURE GRANULOCYTE: <0.03 K/UL (ref 0–0.3)
ABSOLUTE LYMPH #: 1.14 K/UL (ref 1.1–3.7)
ABSOLUTE MONO #: 0.55 K/UL (ref 0.1–1.2)
ANION GAP SERPL CALCULATED.3IONS-SCNC: 11 MMOL/L (ref 9–17)
BASOPHILS # BLD: 1 % (ref 0–2)
BASOPHILS ABSOLUTE: 0.04 K/UL (ref 0–0.2)
BNP INTERPRETATION: ABNORMAL
BUN BLDV-MCNC: 29 MG/DL (ref 8–23)
BUN/CREAT BLD: 18 (ref 9–20)
CALCIUM SERPL-MCNC: 9.7 MG/DL (ref 8.6–10.4)
CHLORIDE BLD-SCNC: 101 MMOL/L (ref 98–107)
CO2: 28 MMOL/L (ref 20–31)
CREAT SERPL-MCNC: 1.6 MG/DL (ref 0.5–0.9)
DIFFERENTIAL TYPE: ABNORMAL
EKG ATRIAL RATE: 75 BPM
EKG P AXIS: 70 DEGREES
EKG P-R INTERVAL: 216 MS
EKG Q-T INTERVAL: 412 MS
EKG QRS DURATION: 152 MS
EKG QTC CALCULATION (BAZETT): 460 MS
EKG R AXIS: -11 DEGREES
EKG T AXIS: -176 DEGREES
EKG VENTRICULAR RATE: 75 BPM
EOSINOPHILS RELATIVE PERCENT: 2 % (ref 1–4)
GFR AFRICAN AMERICAN: 37 ML/MIN
GFR NON-AFRICAN AMERICAN: 30 ML/MIN
GFR SERPL CREATININE-BSD FRML MDRD: ABNORMAL ML/MIN/{1.73_M2}
GFR SERPL CREATININE-BSD FRML MDRD: ABNORMAL ML/MIN/{1.73_M2}
GLUCOSE BLD-MCNC: 140 MG/DL (ref 70–99)
HCT VFR BLD CALC: 39.6 % (ref 36.3–47.1)
HEMOGLOBIN: 12.1 G/DL (ref 11.9–15.1)
IMMATURE GRANULOCYTES: 0 %
INR BLD: 3.8 (ref 0.9–1.2)
LYMPHOCYTES # BLD: 18 % (ref 24–43)
MCH RBC QN AUTO: 30.3 PG (ref 25.2–33.5)
MCHC RBC AUTO-ENTMCNC: 30.6 G/DL (ref 28.4–34.8)
MCV RBC AUTO: 99.2 FL (ref 82.6–102.9)
MONOCYTES # BLD: 8 % (ref 3–12)
NRBC AUTOMATED: 0 PER 100 WBC
PDW BLD-RTO: 14.8 % (ref 11.8–14.4)
PLATELET # BLD: 218 K/UL (ref 138–453)
PLATELET ESTIMATE: ABNORMAL
PMV BLD AUTO: 9.4 FL (ref 8.1–13.5)
POTASSIUM SERPL-SCNC: 5 MMOL/L (ref 3.7–5.3)
PRO-BNP: 9859 PG/ML
PROTHROMBIN TIME: 36.5 SEC (ref 9.7–12.2)
RBC # BLD: 3.99 M/UL (ref 3.95–5.11)
RBC # BLD: ABNORMAL 10*6/UL
SEG NEUTROPHILS: 71 % (ref 36–65)
SEGMENTED NEUTROPHILS ABSOLUTE COUNT: 4.63 K/UL (ref 1.5–8.1)
SODIUM BLD-SCNC: 140 MMOL/L (ref 135–144)
THYROXINE, FREE: 1.12 NG/DL (ref 0.93–1.7)
TROPONIN INTERP: NORMAL
TROPONIN T: <0.03 NG/ML
TSH SERPL DL<=0.05 MIU/L-ACNC: 6.82 MIU/L (ref 0.3–5)
WBC # BLD: 6.5 K/UL (ref 3.5–11.3)
WBC # BLD: ABNORMAL 10*3/UL

## 2018-03-06 PROCEDURE — 96375 TX/PRO/DX INJ NEW DRUG ADDON: CPT

## 2018-03-06 PROCEDURE — 84443 ASSAY THYROID STIM HORMONE: CPT

## 2018-03-06 PROCEDURE — 93005 ELECTROCARDIOGRAM TRACING: CPT

## 2018-03-06 PROCEDURE — 2000000000 HC ICU R&B

## 2018-03-06 PROCEDURE — 6370000000 HC RX 637 (ALT 250 FOR IP): Performed by: INTERNAL MEDICINE

## 2018-03-06 PROCEDURE — 96366 THER/PROPH/DIAG IV INF ADDON: CPT

## 2018-03-06 PROCEDURE — 85025 COMPLETE CBC W/AUTO DIFF WBC: CPT

## 2018-03-06 PROCEDURE — 51702 INSERT TEMP BLADDER CATH: CPT

## 2018-03-06 PROCEDURE — 80048 BASIC METABOLIC PNL TOTAL CA: CPT

## 2018-03-06 PROCEDURE — 6360000002 HC RX W HCPCS: Performed by: INTERNAL MEDICINE

## 2018-03-06 PROCEDURE — 36415 COLL VENOUS BLD VENIPUNCTURE: CPT

## 2018-03-06 PROCEDURE — 99285 EMERGENCY DEPT VISIT HI MDM: CPT

## 2018-03-06 PROCEDURE — 6360000002 HC RX W HCPCS: Performed by: EMERGENCY MEDICINE

## 2018-03-06 PROCEDURE — 84484 ASSAY OF TROPONIN QUANT: CPT

## 2018-03-06 PROCEDURE — 71045 X-RAY EXAM CHEST 1 VIEW: CPT

## 2018-03-06 PROCEDURE — 2500000003 HC RX 250 WO HCPCS: Performed by: EMERGENCY MEDICINE

## 2018-03-06 PROCEDURE — 85610 PROTHROMBIN TIME: CPT

## 2018-03-06 PROCEDURE — 84439 ASSAY OF FREE THYROXINE: CPT

## 2018-03-06 PROCEDURE — 96365 THER/PROPH/DIAG IV INF INIT: CPT

## 2018-03-06 PROCEDURE — 83880 ASSAY OF NATRIURETIC PEPTIDE: CPT

## 2018-03-06 PROCEDURE — 2580000003 HC RX 258: Performed by: INTERNAL MEDICINE

## 2018-03-06 RX ORDER — ASPIRIN 81 MG/1
81 TABLET ORAL EVERY OTHER DAY
Status: DISCONTINUED | OUTPATIENT
Start: 2018-03-06 | End: 2018-03-06

## 2018-03-06 RX ORDER — CARVEDILOL 12.5 MG/1
12.5 TABLET ORAL 2 TIMES DAILY WITH MEALS
Status: DISCONTINUED | OUTPATIENT
Start: 2018-03-06 | End: 2018-03-09 | Stop reason: HOSPADM

## 2018-03-06 RX ORDER — LORAZEPAM 0.5 MG/1
0.5 TABLET ORAL EVERY 6 HOURS PRN
Status: DISCONTINUED | OUTPATIENT
Start: 2018-03-06 | End: 2018-03-09 | Stop reason: HOSPADM

## 2018-03-06 RX ORDER — CITALOPRAM 10 MG/1
10 TABLET ORAL DAILY
COMMUNITY

## 2018-03-06 RX ORDER — SODIUM CHLORIDE 0.9 % (FLUSH) 0.9 %
10 SYRINGE (ML) INJECTION EVERY 12 HOURS SCHEDULED
Status: DISCONTINUED | OUTPATIENT
Start: 2018-03-06 | End: 2018-03-09 | Stop reason: HOSPADM

## 2018-03-06 RX ORDER — NITROGLYCERIN 20 MG/100ML
5 INJECTION INTRAVENOUS CONTINUOUS
Status: DISCONTINUED | OUTPATIENT
Start: 2018-03-06 | End: 2018-03-07

## 2018-03-06 RX ORDER — ISOSORBIDE MONONITRATE 60 MG/1
60 TABLET, EXTENDED RELEASE ORAL DAILY
Status: DISCONTINUED | OUTPATIENT
Start: 2018-03-07 | End: 2018-03-09 | Stop reason: HOSPADM

## 2018-03-06 RX ORDER — HYDRALAZINE HYDROCHLORIDE 25 MG/1
25 TABLET, FILM COATED ORAL 3 TIMES DAILY
Status: DISCONTINUED | OUTPATIENT
Start: 2018-03-06 | End: 2018-03-06

## 2018-03-06 RX ORDER — DILTIAZEM HYDROCHLORIDE 180 MG/1
180 CAPSULE, COATED, EXTENDED RELEASE ORAL DAILY
Status: DISCONTINUED | OUTPATIENT
Start: 2018-03-07 | End: 2018-03-09 | Stop reason: HOSPADM

## 2018-03-06 RX ORDER — ACETAMINOPHEN 325 MG/1
650 TABLET ORAL EVERY 4 HOURS PRN
Status: DISCONTINUED | OUTPATIENT
Start: 2018-03-06 | End: 2018-03-09 | Stop reason: HOSPADM

## 2018-03-06 RX ORDER — FAMOTIDINE 20 MG/1
20 TABLET, FILM COATED ORAL DAILY
Status: DISCONTINUED | OUTPATIENT
Start: 2018-03-06 | End: 2018-03-09 | Stop reason: HOSPADM

## 2018-03-06 RX ORDER — CITALOPRAM 10 MG/1
10 TABLET ORAL DAILY
Status: DISCONTINUED | OUTPATIENT
Start: 2018-03-06 | End: 2018-03-09 | Stop reason: HOSPADM

## 2018-03-06 RX ORDER — DILTIAZEM HYDROCHLORIDE 120 MG/1
120 CAPSULE, COATED, EXTENDED RELEASE ORAL DAILY
Status: DISCONTINUED | OUTPATIENT
Start: 2018-03-06 | End: 2018-03-06

## 2018-03-06 RX ORDER — POLYETHYLENE GLYCOL 3350 17 G/17G
17 POWDER, FOR SOLUTION ORAL DAILY
Status: DISCONTINUED | OUTPATIENT
Start: 2018-03-06 | End: 2018-03-09 | Stop reason: HOSPADM

## 2018-03-06 RX ORDER — ASPIRIN 81 MG/1
81 TABLET ORAL EVERY OTHER DAY
Status: DISCONTINUED | OUTPATIENT
Start: 2018-03-07 | End: 2018-03-09 | Stop reason: HOSPADM

## 2018-03-06 RX ORDER — ATORVASTATIN CALCIUM 40 MG/1
40 TABLET, FILM COATED ORAL NIGHTLY
Status: DISCONTINUED | OUTPATIENT
Start: 2018-03-06 | End: 2018-03-09 | Stop reason: HOSPADM

## 2018-03-06 RX ORDER — HYDRALAZINE HYDROCHLORIDE 50 MG/1
50 TABLET, FILM COATED ORAL 3 TIMES DAILY
Status: DISCONTINUED | OUTPATIENT
Start: 2018-03-06 | End: 2018-03-09 | Stop reason: HOSPADM

## 2018-03-06 RX ORDER — FUROSEMIDE 10 MG/ML
60 INJECTION INTRAMUSCULAR; INTRAVENOUS ONCE
Status: COMPLETED | OUTPATIENT
Start: 2018-03-06 | End: 2018-03-06

## 2018-03-06 RX ORDER — SODIUM CHLORIDE 0.9 % (FLUSH) 0.9 %
10 SYRINGE (ML) INJECTION PRN
Status: DISCONTINUED | OUTPATIENT
Start: 2018-03-06 | End: 2018-03-09 | Stop reason: HOSPADM

## 2018-03-06 RX ORDER — WARFARIN SODIUM 2 MG/1
2 TABLET ORAL DAILY
Status: DISCONTINUED | OUTPATIENT
Start: 2018-03-07 | End: 2018-03-07

## 2018-03-06 RX ORDER — HYDRALAZINE HYDROCHLORIDE 50 MG/1
50 TABLET, FILM COATED ORAL 3 TIMES DAILY
Status: DISCONTINUED | OUTPATIENT
Start: 2018-03-06 | End: 2018-03-06

## 2018-03-06 RX ORDER — FUROSEMIDE 10 MG/ML
40 INJECTION INTRAMUSCULAR; INTRAVENOUS 2 TIMES DAILY
Status: DISCONTINUED | OUTPATIENT
Start: 2018-03-06 | End: 2018-03-08 | Stop reason: ALTCHOICE

## 2018-03-06 RX ORDER — ONDANSETRON 2 MG/ML
4 INJECTION INTRAMUSCULAR; INTRAVENOUS EVERY 6 HOURS PRN
Status: DISCONTINUED | OUTPATIENT
Start: 2018-03-06 | End: 2018-03-09 | Stop reason: HOSPADM

## 2018-03-06 RX ADMIN — DILTIAZEM HYDROCHLORIDE 120 MG: 120 CAPSULE, COATED, EXTENDED RELEASE ORAL at 14:00

## 2018-03-06 RX ADMIN — CITALOPRAM 10 MG: 10 TABLET, FILM COATED ORAL at 14:00

## 2018-03-06 RX ADMIN — FUROSEMIDE 40 MG: 10 INJECTION, SOLUTION INTRAMUSCULAR; INTRAVENOUS at 17:59

## 2018-03-06 RX ADMIN — NITROGLYCERIN 5 MCG/MIN: 20 INJECTION INTRAVENOUS at 09:09

## 2018-03-06 RX ADMIN — FAMOTIDINE 20 MG: 20 TABLET, FILM COATED ORAL at 14:00

## 2018-03-06 RX ADMIN — CARVEDILOL 12.5 MG: 12.5 TABLET, FILM COATED ORAL at 16:43

## 2018-03-06 RX ADMIN — Medication 10 ML: at 20:24

## 2018-03-06 RX ADMIN — ATORVASTATIN CALCIUM 40 MG: 40 TABLET, FILM COATED ORAL at 20:22

## 2018-03-06 RX ADMIN — HYDRALAZINE HYDROCHLORIDE 50 MG: 50 TABLET, FILM COATED ORAL at 18:05

## 2018-03-06 RX ADMIN — LORAZEPAM 0.5 MG: 0.5 TABLET ORAL at 20:22

## 2018-03-06 RX ADMIN — FUROSEMIDE 60 MG: 10 INJECTION, SOLUTION INTRAMUSCULAR; INTRAVENOUS at 09:07

## 2018-03-06 ASSESSMENT — ENCOUNTER SYMPTOMS
DIARRHEA: 0
COLOR CHANGE: 0
CONSTIPATION: 0
COUGH: 1
BACK PAIN: 0
ABDOMINAL PAIN: 0
ABDOMINAL DISTENTION: 0
WHEEZING: 0
EYE DISCHARGE: 0
SHORTNESS OF BREATH: 1

## 2018-03-06 ASSESSMENT — PAIN SCALES - GENERAL
PAINLEVEL_OUTOF10: 0
PAINLEVEL_OUTOF10: 0

## 2018-03-06 NOTE — PLAN OF CARE
Facsimile Transmission Cover Sheet    Information contained in this transmission is for the sole use of the intended recipients and may contain confidential and privileged information. Any unauthorized review, use, disclosure or distribution is prohibited. If you are not the intended recipient, please contact the sender and destroy all copies of the original message. Disclosure is made for the purpose of healthcare operations and continuity of care. To: __Dr. Leggett________________________    From: ICU    Fax Number: [phone] [address] [facility] [address], RN_________________ Phone Number:_[phone]____________      This request is: Urgent - Yes    Information and/or questions regarding patient condition:    Dr. Seymour Blizzard -     Renato Reema came up to ICU with a NTG drip at 15 mcg. Per ER staff, this was started for the CHF and her BP. BP continues to be elevated. Last 2 reading were 162/98 & 163/100. Do you want us to titrate the nitro drip to keep BP with in specific limits or do you want me to decrease it to the 5 mcg that are ordered and give her something else for the BP?     FYI- she stroked on her last admission. Thanks!    Pamela Munoz, RN

## 2018-03-06 NOTE — H&P
needed for Anxiety. Yes Historical Provider, MD   carvedilol (COREG) 12.5 MG tablet Take 12.5 mg by mouth 2 times daily (with meals)   Yes Historical Provider, MD   isosorbide mononitrate (IMDUR) 60 MG extended release tablet Take 60 mg by mouth daily   Yes Historical Provider, MD   nitroGLYCERIN (NITROSTAT) 0.4 MG SL tablet up to max of 3 total doses.  If no relief after 1 dose, call 911. 1/31/18  Yes Jeffery Phelps PA-C   atorvastatin (LIPITOR) 40 MG tablet Take 1 tablet by mouth nightly 1/31/18  Yes Jeffery Phelps PA-C   polyethylene glycol (GLYCOLAX) packet Take 17 g by mouth daily   Yes Historical Provider, MD   warfarin (COUMADIN) 2 MG tablet Take 2.5 mg by mouth daily COUMADIN CLINIC:  3 mg Friday; 2 mg all other days   Yes Historical Provider, MD   diltiazem (CARDIZEM CD) 120 MG extended release capsule Take 1 capsule by mouth daily 10/5/17  Yes Paddy Jennings MD   levothyroxine (SYNTHROID) 50 MCG tablet Takes 1 tablet daily except for 2 tabs on Sunday   Yes Historical Provider, MD   aspirin 81 MG tablet Take 81 mg by mouth every other day   Yes Historical Provider, MD   calcium carbonate (TUMS) 500 MG chewable tablet Take 1 tablet by mouth every 6 hours as needed for Heartburn    Historical Provider, MD   diphenhydrAMINE (BENADRYL) 25 MG capsule Take 25 mg by mouth every 6 hours as needed for Itching    Historical Provider, MD   acetaminophen (TYLENOL) 325 MG tablet Take 2 tablets by mouth every 4 hours as needed for Pain 6/30/17   Jarrod Merida CNP       Review of Systems:      Physical Exam:    Vitals:   Temp: 98.6 °F (37 °C)  BP: (!) 150/93  Resp: 24  Pulse: 79  SpO2: 96 %  24HR INTAKE/OUTPUT:    Intake/Output Summary (Last 24 hours) at 03/06/18 1755  Last data filed at 03/06/18 1400   Gross per 24 hour   Intake                0 ml   Output             1600 ml   Net            -1600 ml       Exam:  GEN:  alert and oriented to person, place and time, well-developed and well-nourished,

## 2018-03-06 NOTE — ED NOTES
Bed: 12  Expected date:   Expected time:   Means of arrival:   Comments:  EMS       Bony Hughes RN  03/06/18 6758

## 2018-03-07 LAB
ABSOLUTE EOS #: 0.14 K/UL (ref 0–0.44)
ABSOLUTE IMMATURE GRANULOCYTE: <0.03 K/UL (ref 0–0.3)
ABSOLUTE LYMPH #: 1.37 K/UL (ref 1.1–3.7)
ABSOLUTE MONO #: 0.71 K/UL (ref 0.1–1.2)
ANION GAP SERPL CALCULATED.3IONS-SCNC: 8 MMOL/L (ref 9–17)
BASOPHILS # BLD: 1 % (ref 0–2)
BASOPHILS ABSOLUTE: 0.03 K/UL (ref 0–0.2)
BNP INTERPRETATION: ABNORMAL
BUN BLDV-MCNC: 28 MG/DL (ref 8–23)
BUN/CREAT BLD: 17 (ref 9–20)
CALCIUM SERPL-MCNC: 9.4 MG/DL (ref 8.6–10.4)
CHLORIDE BLD-SCNC: 95 MMOL/L (ref 98–107)
CO2: 32 MMOL/L (ref 20–31)
CREAT SERPL-MCNC: 1.63 MG/DL (ref 0.5–0.9)
DIFFERENTIAL TYPE: ABNORMAL
EOSINOPHILS RELATIVE PERCENT: 2 % (ref 1–4)
GFR AFRICAN AMERICAN: 36 ML/MIN
GFR NON-AFRICAN AMERICAN: 30 ML/MIN
GFR SERPL CREATININE-BSD FRML MDRD: ABNORMAL ML/MIN/{1.73_M2}
GFR SERPL CREATININE-BSD FRML MDRD: ABNORMAL ML/MIN/{1.73_M2}
GLUCOSE BLD-MCNC: 94 MG/DL (ref 70–99)
HCT VFR BLD CALC: 37.3 % (ref 36.3–47.1)
HEMOGLOBIN: 11.7 G/DL (ref 11.9–15.1)
IMMATURE GRANULOCYTES: 0 %
INR BLD: 3.7 (ref 0.9–1.2)
LYMPHOCYTES # BLD: 22 % (ref 24–43)
MCH RBC QN AUTO: 31 PG (ref 25.2–33.5)
MCHC RBC AUTO-ENTMCNC: 31.4 G/DL (ref 28.4–34.8)
MCV RBC AUTO: 98.9 FL (ref 82.6–102.9)
MONOCYTES # BLD: 11 % (ref 3–12)
NRBC AUTOMATED: 0 PER 100 WBC
PDW BLD-RTO: 14.6 % (ref 11.8–14.4)
PLATELET # BLD: 212 K/UL (ref 138–453)
PLATELET ESTIMATE: ABNORMAL
PMV BLD AUTO: 9.6 FL (ref 8.1–13.5)
POTASSIUM SERPL-SCNC: 3.9 MMOL/L (ref 3.7–5.3)
PRO-BNP: ABNORMAL PG/ML
PROTHROMBIN TIME: 36.4 SEC (ref 9.7–12.2)
RBC # BLD: 3.77 M/UL (ref 3.95–5.11)
RBC # BLD: ABNORMAL 10*6/UL
SEG NEUTROPHILS: 64 % (ref 36–65)
SEGMENTED NEUTROPHILS ABSOLUTE COUNT: 4.01 K/UL (ref 1.5–8.1)
SODIUM BLD-SCNC: 135 MMOL/L (ref 135–144)
WBC # BLD: 6.3 K/UL (ref 3.5–11.3)
WBC # BLD: ABNORMAL 10*3/UL

## 2018-03-07 PROCEDURE — 6370000000 HC RX 637 (ALT 250 FOR IP): Performed by: INTERNAL MEDICINE

## 2018-03-07 PROCEDURE — 83880 ASSAY OF NATRIURETIC PEPTIDE: CPT

## 2018-03-07 PROCEDURE — 6360000002 HC RX W HCPCS: Performed by: INTERNAL MEDICINE

## 2018-03-07 PROCEDURE — 2580000003 HC RX 258: Performed by: INTERNAL MEDICINE

## 2018-03-07 PROCEDURE — 80048 BASIC METABOLIC PNL TOTAL CA: CPT

## 2018-03-07 PROCEDURE — 36415 COLL VENOUS BLD VENIPUNCTURE: CPT

## 2018-03-07 PROCEDURE — 85610 PROTHROMBIN TIME: CPT

## 2018-03-07 PROCEDURE — 1200000000 HC SEMI PRIVATE

## 2018-03-07 PROCEDURE — 85025 COMPLETE CBC W/AUTO DIFF WBC: CPT

## 2018-03-07 PROCEDURE — 99221 1ST HOSP IP/OBS SF/LOW 40: CPT | Performed by: INTERNAL MEDICINE

## 2018-03-07 RX ADMIN — FAMOTIDINE 20 MG: 20 TABLET, FILM COATED ORAL at 08:51

## 2018-03-07 RX ADMIN — FUROSEMIDE 40 MG: 10 INJECTION, SOLUTION INTRAMUSCULAR; INTRAVENOUS at 17:13

## 2018-03-07 RX ADMIN — ISOSORBIDE MONONITRATE 60 MG: 60 TABLET, EXTENDED RELEASE ORAL at 08:50

## 2018-03-07 RX ADMIN — HYDRALAZINE HYDROCHLORIDE 50 MG: 50 TABLET, FILM COATED ORAL at 13:47

## 2018-03-07 RX ADMIN — Medication 10 ML: at 17:13

## 2018-03-07 RX ADMIN — DILTIAZEM HYDROCHLORIDE 180 MG: 180 CAPSULE, COATED, EXTENDED RELEASE ORAL at 08:50

## 2018-03-07 RX ADMIN — LORAZEPAM 0.5 MG: 0.5 TABLET ORAL at 20:38

## 2018-03-07 RX ADMIN — POLYETHYLENE GLYCOL 3350 17 G: 17 POWDER, FOR SOLUTION ORAL at 08:50

## 2018-03-07 RX ADMIN — ASPIRIN 81 MG: 81 TABLET, COATED ORAL at 08:50

## 2018-03-07 RX ADMIN — CITALOPRAM 10 MG: 10 TABLET, FILM COATED ORAL at 08:51

## 2018-03-07 RX ADMIN — Medication 10 ML: at 08:55

## 2018-03-07 RX ADMIN — CARVEDILOL 12.5 MG: 12.5 TABLET, FILM COATED ORAL at 17:13

## 2018-03-07 RX ADMIN — ATORVASTATIN CALCIUM 40 MG: 40 TABLET, FILM COATED ORAL at 20:38

## 2018-03-07 RX ADMIN — CARVEDILOL 12.5 MG: 12.5 TABLET, FILM COATED ORAL at 08:51

## 2018-03-07 RX ADMIN — Medication 10 ML: at 20:40

## 2018-03-07 RX ADMIN — FUROSEMIDE 40 MG: 10 INJECTION, SOLUTION INTRAMUSCULAR; INTRAVENOUS at 08:51

## 2018-03-07 RX ADMIN — HYDRALAZINE HYDROCHLORIDE 50 MG: 50 TABLET, FILM COATED ORAL at 20:38

## 2018-03-07 RX ADMIN — HYDRALAZINE HYDROCHLORIDE 50 MG: 50 TABLET, FILM COATED ORAL at 08:51

## 2018-03-07 ASSESSMENT — PAIN SCALES - GENERAL
PAINLEVEL_OUTOF10: 0
PAINLEVEL_OUTOF10: 0

## 2018-03-07 NOTE — PLAN OF CARE
Problem: Nutrition  Goal: Optimal nutrition therapy  Outcome: Ongoing  Nutrition Problem: Predicted suboptimal energy intake  Intervention: Food and/or Nutrient Delivery: Continue current diet  Nutritional Goals: PO>75% meals  Encourage low sodium choices

## 2018-03-07 NOTE — PROGRESS NOTES
Nutrition Assessment    Type and Reason for Visit: Initial    Nutrition Recommendations:  Encourage protein foods    Malnutrition Assessment:  · Malnutrition Status: At risk for malnutrition  · Context: Acute illness or injury  · Findings of the 6 clinical characteristics of malnutrition (Minimum of 2 out of 6 clinical characteristics is required to make the diagnosis of moderate or severe Protein Calorie Malnutrition based on AND/ASPEN Guidelines):  1. Energy Intake-Greater than 75%,      2. Weight Loss-2% loss or greater, in 1 week  3. Fat Loss-No significant subcutaneous fat loss,    4. Muscle Loss-No significant muscle mass loss,    5. Fluid Accumulation-Mild fluid accumulation, Extremities  6.  Strength-Not measured    Nutrition Diagnosis:   · Problem: Predicted suboptimal energy intake  · Etiology: related to Impaired respiratory function-inability to consume food     Signs and symptoms:  as evidenced by Weight loss greater than or equal to 2% in 1 week    Nutrition Assessment:  · Subjective Assessment: Nursing in to do AM assessments. Denies any use of added salt, but may use a frozen dinner in her room.    · Nutrition-Focused Physical Findings: wnl  · Wound Type: None  · Current Nutrition Therapies:  · Oral Diet Orders: Cardiac   · Oral Diet intake: Unable to assess (No record, but lower intakes reported from ECF)  · Oral Nutrition Supplement (ONS) Orders: None  · Anthropometric Measures:  · Ht: 5' 3\" (160 cm)   · Current Body Wt: 163 lb 1.6 oz (74 kg)  · Admission Body Wt: 171 lb (77.6 kg)  · Usual Body Wt: 168 lb (76.2 kg)  · % Weight Change: 4.6% loss,  acute, intentional  · Ideal Body Wt: 115 lb (52.2 kg), % Ideal Body 142%  · Adjusted Body Wt: 127 lb 3.3 oz (57.7 kg), body weight adjusted for Obesity  · BMI Classification: BMI 25.0 - 29.9 Overweight  · Comparative Standards (Estimated Nutrition Needs):  · Estimated Daily Total Kcal: 3641-6492  · Estimated Daily Protein (g): 69-75    Lab Results

## 2018-03-07 NOTE — PROGRESS NOTES
Progress Note    SUBJECTIVE:  FU related to  SOB. Better with some dyspnea but no CP    OBJECTIVE:    Vitals:   TEMPERATURE:  Current - Temp: 98.5 °F (36.9 °C);  Max - Temp  Av °F (36.7 °C)  Min: 97.3 °F (36.3 °C)  Max: 98.6 °F (37 °C)  RESPIRATIONS RANGE: Resp  Av.6  Min: 12  Max: 53  PULSE RANGE: Pulse  Av  Min: 69  Max: 94  BLOOD PRESSURE RANGE:  Systolic (27PIX), VPQ:666 , Min:108 , HECTOR:870   ; Diastolic (70VKM), QPW:15, Min:34, Max:109    PULSE OXIMETRY RANGE: SpO2  Av %  Min: 82 %  Max: 99 %  24HR INTAKE/OUTPUT:    Intake/Output Summary (Last 24 hours) at 18  Last data filed at 18 0353   Gross per 24 hour   Intake              538 ml   Output             3950 ml   Net            -3412 ml     -----------------------------------------------------------------  Exam:  General: A & O x3  HEENT: Supple neck & negative  Heart: Regular  Lungs: trace rales left base but overall clear  Abdomen: Normal & soft, No tenderness and BS normal  Extremities:  1+   Neuro: NonFocal     -----------------------------------------------------------------  Diagnostic Data:  Lab Results   Component Value Date    WBC 6.3 2018    HGB 11.7 (L) 2018     2018       Lab Results   Component Value Date    BUN 28 (H) 2018    CREATININE 1.63 (H) 2018     2018    K 3.9 2018    CALCIUM 9.4 2018    CL 95 (L) 2018    CO2 32 (H) 2018    LABGLOM 30 (L) 2018       Lab Results   Component Value Date    WBCUA None 10/23/2017    RBCUA 2 TO 5 10/23/2017    EPITHUA 0 TO 2 10/23/2017    LEUKOCYTESUR NEGATIVE 10/23/2017    SPECGRAV 1.010 10/23/2017    GLUCOSEU NEGATIVE 10/23/2017    KETUA NEGATIVE 10/23/2017    PROTEINU NEGATIVE 10/23/2017    HGBUR 1+ (A) 10/23/2017    CASTUA NOT REPORTED 10/23/2017    CRYSTUA NOT REPORTED 10/23/2017    BACTERIA 1+ (A) 10/23/2017    YEAST NOT REPORTED 10/23/2017       Lab Results   Component Value Date MYOGLOBIN 55 09/27/2017    TROPONINT <0.03 03/06/2018    CKTOTAL 23 (L) 09/28/2017    CKMB 2.7 09/27/2017    PROBNP 21,581 (H) 03/07/2018       Xr Chest Portable    Result Date: 3/6/2018  REPORT: Portable AP radiograph of the chest INDICATION: Shortness of breath FINDINGS: Compared to 1/19/2018. Pulmonary vascular congestion on top of chronic interstitial changes. Overall stable appearing slightly loculated left pleural effusion. Small right pleural effusion. Stable cardiomegaly. Left-sided pacemaker. Final report electronically signed by Elena Huynh on 3/6/2018 8:41 AM    Slight worsening of CHF since most recent exam      ASSESSMENT:    Principal Problem:    Acute diastolic CHF (congestive heart failure) ([address]  Active Problems:    Hypertensive emergency  Resolved Problems:    * No resolved hospital problems.  *      Patient Active Problem List    Diagnosis Date Noted    Syncope and collapse - secondary to 2nd degree AV block 06/25/2017     Priority: High    Acute on chronic combined systolic and diastolic CHF (congestive heart failure) ([address] 12/31/2016     Priority: High     Class: Acute    Acute diastolic CHF (congestive heart failure) ([address] 03/06/2018    Acute renal failure superimposed on stage 4 chronic kidney disease ([address] 01/29/2018    Infarction of parietal lobe ([address] 01/23/2018    Unstable angina ([address] 69/24/0388    Acute systolic CHF (congestive heart failure) ([address] 01/19/2018    Labile blood pressure 12/29/2017    Hypoxia 12/18/2017    Idiopathic cardiomyopathy ([address] 12/18/2017    Acute on chronic combined systolic and diastolic congestive heart failure ([address] 12/17/2017    Long-term (current) use of anticoagulants 10/24/2017    DVT (deep venous thrombosis) ([address] 10/24/2017    Orthostatic hypotension     GRAYSON (acute kidney injury) ([address] 10/02/2017    Abnormal EEG 10/02/2017    Hyperkalemia 09/28/2017    Syncope 09/27/2017    Acute cystitis 09/27/2017    Hypertensive urgency 09/27/2017  Hypertensive emergency 08/31/2017    Flash pulmonary edema ([facility] 08/31/2017    Cardiac pacemaker in situ 07/12/2017    Chronic midline low back pain with left-sided sciatica 07/10/2017    Essential hypertension 07/10/2017    AV block, 2nd degree 06/28/2017    Acute renal failure superimposed on stage 4 chronic kidney disease ([facility] 06/28/2017    Mobitz type 2 second degree heart block 06/28/2017    Acute on chronic renal insufficiency 06/26/2017    Dehydration, moderate 06/26/2017    Left hip pain     Non-rheumatic tricuspid valve insufficiency 04/26/2017    Pulmonary HTN 04/26/2017    CKD (chronic kidney disease) stage 4, GFR 15-29 ml/min ([facility]) 02/22/2017    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

## 2018-03-08 PROBLEM — N18.4 ACUTE RENAL FAILURE SUPERIMPOSED ON STAGE 4 CHRONIC KIDNEY DISEASE (HCC): Status: ACTIVE | Noted: 2018-03-08

## 2018-03-08 PROBLEM — N17.9 ACUTE RENAL FAILURE SUPERIMPOSED ON STAGE 4 CHRONIC KIDNEY DISEASE (HCC): Status: ACTIVE | Noted: 2018-03-08

## 2018-03-08 PROBLEM — R09.02 HYPOXIA: Status: ACTIVE | Noted: 2018-03-08

## 2018-03-08 LAB
ANION GAP SERPL CALCULATED.3IONS-SCNC: 10 MMOL/L (ref 9–17)
BUN BLDV-MCNC: 37 MG/DL (ref 8–23)
BUN/CREAT BLD: 14 (ref 9–20)
CALCIUM SERPL-MCNC: 9.4 MG/DL (ref 8.6–10.4)
CHLORIDE BLD-SCNC: 98 MMOL/L (ref 98–107)
CO2: 33 MMOL/L (ref 20–31)
CREAT SERPL-MCNC: 2.58 MG/DL (ref 0.5–0.9)
GFR AFRICAN AMERICAN: 21 ML/MIN
GFR NON-AFRICAN AMERICAN: 17 ML/MIN
GFR SERPL CREATININE-BSD FRML MDRD: ABNORMAL ML/MIN/{1.73_M2}
GFR SERPL CREATININE-BSD FRML MDRD: ABNORMAL ML/MIN/{1.73_M2}
GLUCOSE BLD-MCNC: 123 MG/DL (ref 70–99)
INR BLD: 3 (ref 0.9–1.2)
POTASSIUM SERPL-SCNC: 4.4 MMOL/L (ref 3.7–5.3)
PROTHROMBIN TIME: 30 SEC (ref 9.7–12.2)
SODIUM BLD-SCNC: 141 MMOL/L (ref 135–144)

## 2018-03-08 PROCEDURE — 97162 PT EVAL MOD COMPLEX 30 MIN: CPT

## 2018-03-08 PROCEDURE — 97166 OT EVAL MOD COMPLEX 45 MIN: CPT

## 2018-03-08 PROCEDURE — G8979 MOBILITY GOAL STATUS: HCPCS

## 2018-03-08 PROCEDURE — G8988 SELF CARE GOAL STATUS: HCPCS

## 2018-03-08 PROCEDURE — 97116 GAIT TRAINING THERAPY: CPT

## 2018-03-08 PROCEDURE — 36415 COLL VENOUS BLD VENIPUNCTURE: CPT

## 2018-03-08 PROCEDURE — G8978 MOBILITY CURRENT STATUS: HCPCS

## 2018-03-08 PROCEDURE — 2580000003 HC RX 258: Performed by: INTERNAL MEDICINE

## 2018-03-08 PROCEDURE — 6370000000 HC RX 637 (ALT 250 FOR IP): Performed by: INTERNAL MEDICINE

## 2018-03-08 PROCEDURE — 97110 THERAPEUTIC EXERCISES: CPT

## 2018-03-08 PROCEDURE — 85610 PROTHROMBIN TIME: CPT

## 2018-03-08 PROCEDURE — G8987 SELF CARE CURRENT STATUS: HCPCS

## 2018-03-08 PROCEDURE — 1200000000 HC SEMI PRIVATE

## 2018-03-08 PROCEDURE — 80048 BASIC METABOLIC PNL TOTAL CA: CPT

## 2018-03-08 RX ORDER — WARFARIN SODIUM 1 MG/1
0.5 TABLET ORAL
Status: COMPLETED | OUTPATIENT
Start: 2018-03-08 | End: 2018-03-08

## 2018-03-08 RX ADMIN — FAMOTIDINE 20 MG: 20 TABLET, FILM COATED ORAL at 09:34

## 2018-03-08 RX ADMIN — Medication 10 ML: at 20:17

## 2018-03-08 RX ADMIN — DILTIAZEM HYDROCHLORIDE 180 MG: 180 CAPSULE, COATED, EXTENDED RELEASE ORAL at 09:33

## 2018-03-08 RX ADMIN — HYDRALAZINE HYDROCHLORIDE 50 MG: 50 TABLET, FILM COATED ORAL at 15:11

## 2018-03-08 RX ADMIN — CARVEDILOL 12.5 MG: 12.5 TABLET, FILM COATED ORAL at 07:54

## 2018-03-08 RX ADMIN — WARFARIN SODIUM 0.5 MG: 1 TABLET ORAL at 17:12

## 2018-03-08 RX ADMIN — CARVEDILOL 12.5 MG: 12.5 TABLET, FILM COATED ORAL at 17:12

## 2018-03-08 RX ADMIN — CITALOPRAM 10 MG: 10 TABLET, FILM COATED ORAL at 09:34

## 2018-03-08 RX ADMIN — ISOSORBIDE MONONITRATE 60 MG: 60 TABLET, EXTENDED RELEASE ORAL at 09:34

## 2018-03-08 RX ADMIN — HYDRALAZINE HYDROCHLORIDE 50 MG: 50 TABLET, FILM COATED ORAL at 09:33

## 2018-03-08 RX ADMIN — LORAZEPAM 0.5 MG: 0.5 TABLET ORAL at 20:16

## 2018-03-08 RX ADMIN — Medication 10 ML: at 09:35

## 2018-03-08 RX ADMIN — FUROSEMIDE 60 MG: 20 TABLET ORAL at 09:32

## 2018-03-08 RX ADMIN — POLYETHYLENE GLYCOL 3350 17 G: 17 POWDER, FOR SOLUTION ORAL at 09:34

## 2018-03-08 RX ADMIN — HYDRALAZINE HYDROCHLORIDE 50 MG: 50 TABLET, FILM COATED ORAL at 20:16

## 2018-03-08 RX ADMIN — ATORVASTATIN CALCIUM 40 MG: 40 TABLET, FILM COATED ORAL at 20:16

## 2018-03-08 ASSESSMENT — PAIN SCALES - WONG BAKER
WONGBAKER_NUMERICALRESPONSE: 0

## 2018-03-08 ASSESSMENT — PAIN SCALES - GENERAL
PAINLEVEL_OUTOF10: 0
PAINLEVEL_OUTOF10: 0

## 2018-03-08 NOTE — CONSULTS
[facility] Pharmacy Department    Clinical Pharmacy Note    Warfarin consult follow-up    Recent Labs      03/08/18   0535   INR  3.0*     Recent Labs      03/06/18   0755  03/07/18   0531   HGB  12.1  11.7*   HCT  39.6  37.3   PLT  218  212       Target INR Range: 2.0-3.0    Significant Drug-Drug Interactions:  New warfarin drug-drug interactions: no changes  Discontinued drug-drug interactions: no changes      Notes:  Patient to take warfarin 0.5mg po today. Pharmacy will continue to watch closely. Daily PT/INR until stable within therapeutic range.    Thank you,  ROLY De La Vega.Ph., 3/8/2018,7:26 AM
exhibits no organomegaly, mass or bruit. Extremities: 1+ pitting pedal edema up to the knees bilaterally. No cyanosis, or clubbing. Pulses are 2+ radial/carotid/dorsalis pedis and posterior tibial bilaterally. Neurological: She is alert and oriented to person, place, and time. No evidence of gross cranial nerve deficit. Coordination appeared normal.   Skin: Skin is warm and dry. There is no rash or diaphoresis. Psychiatric: She has a normal mood and affect. Her speech is normal and behavior is normal.      MOST RECENT LABS ON RECORD:   Lab Results   Component Value Date    WBC 6.3 03/07/2018    HGB 11.7 (L) 03/07/2018    HCT 37.3 03/07/2018     03/07/2018    CHOL 145 01/20/2018    TRIG 145 01/27/2018    HDL 40 (L) 01/20/2018    ALT <5 (L) 01/27/2018    AST 12 01/27/2018     03/07/2018    K 3.9 03/07/2018    CL 95 (L) 03/07/2018    CREATININE 1.63 (H) 03/07/2018    BUN 28 (H) 03/07/2018    CO2 32 (H) 03/07/2018    TSH 6.82 (H) 03/06/2018    INR 3.7 (H) 03/07/2018    LABA1C 5.4 11/06/2017       ASSESSMENT:  Acute on chronic combined ( systolic and diastolic) CHF, NYHA class IV. Atherosclerotic heart disease, Medical management. History of stroke. Chronic anticoagulation with Coumadin. PLAN:  From a cardiovascular standpoint, I believe that Ms. Hemal Trinh is current suffering from [address] class IV symptoms with shortness of breath at rest. She has 1+ pitting pedal edema above the knees bilaterally as well as a chest X-ray showing evidence of pulmonary congestion. Fortunately, Ms. Hemal Trinh appears to be improving since admission with her current treatment including Lasix 40 mg IV bid. I also agree with her current treatment including carvedilol, diltiazem and Imdur. Closely monitor kidney function and electrolytes. Strict input/output. Low salt diet. Will follow-up. Once again, thank you for allowing me to participate in this patients care.  Please do not hesitate to

## 2018-03-08 NOTE — PROGRESS NOTES
Christin/ Dr. Hall Head  06/29/2017    DR Leatha Muller    EYE SURGERY      Bilat cataract    OVARY REMOVAL Right     PACEMAKER INSERTION  06/29/2017       Restrictions  Restrictions/Precautions  Restrictions/Precautions: General Precautions, Fall Risk  Subjective   General  Chart Reviewed: Yes  Subjective  Subjective: Pt reported no pain but with increased fatigue. Pain Screening  Patient Currently in Pain: Denies  Pain Assessment  Pain Level: 0  Vital Signs  Patient Currently in Pain: Denies       Orientation  Orientation  Overall Orientation Status: Within Normal Limits  Objective   Bed mobility  Supine to Sit: Stand by assistance  Sit to Supine: Contact guard assistance;Stand by assistance  Transfers  Sit to Stand: Contact guard assistance  Stand to sit: Contact guard assistance  Comment: Verbal cues for safety. Ambulation  Ambulation?: Yes  WB Status: unrestricted   Ambulation 1  Surface: level tile  Device: Rollator  Assistance: Contact guard assistance  Distance: 25 feet x 1  Comments: Pt fatigued quickly with amb and BURKS noted. SPO2 94-95% after amb. Stairs/Curb  Stairs?: No     Balance  Posture: Fair  Sitting - Static: Good  Sitting - Dynamic: Good  Standing - Static: Fair  Standing - Dynamic: Fair  Exercises  Hip Flexion: 20x  Hip Abduction: 20x  Knee Long Arc Quad: 20x  Ankle Pumps: 20x  Comments: Above exercises completed in sitting. Assessment      Patient Education: Educated pt on safety with transfers/amb. Pt with fair to good understanding. REQUIRES PT FOLLOW UP: Yes  Activity Tolerance  Activity Tolerance: Patient limited by endurance; Patient limited by fatigue       Discharge Recommendations:  Continue to assess pending progress    G-Code  PT G-Codes  Functional Limitation: Mobility: Walking and moving around  Mobility: Walking and Moving Around Current Status ():  At least 40 percent but less than 60 percent impaired, limited or restricted  Mobility: Walking and Moving Around Goal Status (): At least 20 percent but less than 40 percent impaired, limited or restricted  OutComes Score    AM-PAC Score    Goals  Short term goals  Time Frame for Short term goals: 10 days  Short term goal 1: Pt will be educated on her POC  Short term goal 2: Pt will increase activity tolerance to >25 minutes  Short term goal 3: Pt will ambulate 300 feet with FWW with minimal SOB  Short term goal 4: Pt will increase dynamic standing balance to Good in order to reduce fall risk   Patient Goals   Patient goals : \"to feel better\"    Plan    Plan  Times per week: 7x/wk   Times per day: Twice a day  Plan weeks: 2x/daily except weekends 1x/daily   Current Treatment Recommendations: Strengthening, Neuromuscular Re-education, Home Exercise Program, Safety Education & Training, Balance Training, Endurance Training, Functional Mobility Training, Transfer Training, Gait Training  Safety Devices  Type of devices:  All fall risk precautions in place, Bed alarm in place, Call light within reach, Nurse notified, Left in bed     Therapy Time   Individual Concurrent Group Co-treatment   Time In 1455         Time Out 1523         Minutes [place], [place]

## 2018-03-08 NOTE — PROGRESS NOTES
bowel sounds, no masses or organomegaly  EXT:    no cyanosis, clubbing or edema present    NEURO: follows commands, GARCIA  SKIN: no rash      -----------------------------------------------------------------  Diagnostic Data:  Lab Results   Component Value Date    WBC 6.3 03/07/2018    HGB 11.7 (L) 03/07/2018    HCT 37.3 03/07/2018     03/07/2018    CHOL 145 01/20/2018    TRIG 145 01/27/2018    HDL 40 (L) 01/20/2018    ALT <5 (L) 01/27/2018    AST 12 01/27/2018     03/08/2018    K 4.4 03/08/2018    CL 98 03/08/2018    CREATININE 2.58 (H) 03/08/2018    BUN 37 (H) 03/08/2018    CO2 33 (H) 03/08/2018    TSH 6.82 (H) 03/06/2018    INR 3.0 (H) 03/08/2018    LABA1C 5.4 11/06/2017       ASSESSMENT:    Principal Problem:    Acute on chronic combined systolic and diastolic congestive heart failure (HCC)  Active Problems:    Pulmonary HTN    Hypertensive emergency    Long-term (current) use of anticoagulants    Acute renal failure superimposed on stage 4 chronic kidney disease ([facility]    Hypoxia  Resolved Problems:    * No resolved hospital problems.  *      Patient Active Problem List    Diagnosis Date Noted    Syncope and collapse - secondary to 2nd degree AV block 06/25/2017     Priority: High    Acute on chronic combined systolic and diastolic CHF (congestive heart failure) ([facility] 12/31/2016     Priority: High     Class: Acute    Acute renal failure superimposed on stage 4 chronic kidney disease ([facility] 03/08/2018    Hypoxia 03/08/2018    Acute diastolic CHF (congestive heart failure) ([facility] 03/06/2018    Acute renal failure superimposed on stage 4 chronic kidney disease ([facility] 01/29/2018    Infarction of parietal lobe ([facility] 01/23/2018    Unstable angina ([facility] 58/47/9279    [facility] systolic [facility] 01/19/2018    Labile blood pressure 12/29/2017    Hypoxia 12/18/2017    Idiopathic cardiomyopathy ([facility] 12/18/2017    Acute on chronic combined systolic and diastolic congest[facility] failure [facility] 12/17/2017    Long-term (current) use of anticoagulants 10/24/2017    DVT (deep venous thrombosis) [facility] 10/24/2017    Orthostatic hypotension     GRAYSON (acute kidney injury) ([facility] 10/02/2017    Abnormal EEG 10/02/2017    Hyperkalemia 09/28/2017    Syncope 09/27/2017    Acute cystitis 09/27/2017    Hypertensive urgency 09/27/2017    Hypertensive emergency 08/31/2017    Flash pulmonary edema ([facility] 08/31/2017    Cardiac pacemaker in situ 07/12/2017    Chronic midline low back pain with left-sided sciatica 07/10/2017    Essential hypertension 07/10/2017    AV block, 2nd degree 06/28/2017    Acute renal failure superimposed on stage 4 chronic kidney disease ([facility] 06/28/2017    Mobitz type 2 second degree heart block 06/28/2017    Acute on chronic renal insufficiency 06/26/2017    Dehydration, moderate 06/26/2017    Left hip pain     Non-rheumatic tricuspid valve insufficiency 04/26/2017    Pulmonary HTN 04/26/2017    CKD (chronic kidney disease) stage 4, GFR 15-29 ml/min [facility] 02/22/2017    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), NYHA class 4 ([facility] 01/18/2017    Physical deconditioning 01/18/2017    Acute on chronic congestive heart failure (HCC)     Acquired hypothyroidism     Hyperlipidemia     Diabetes mellitus (HCC)     Anxiety        PLAN:    Acute on chronic combined systolic and diastolic congestive heart failure   Change Lasix to 60mg po daily   Appreciate cardiology    D/c garcia    Pulmonary HTN    Hypertensive emergency   Continue current meds    Long-term (use of anticoagulants    Acute renal failure superimposed on stage 4 chronic kidney disease    Monitor labs    Hypoxia   Wean O2  Resolved Problems:    * No resolved hospital problems.  *      · High risk medication: coumadin     EMMA Paulino PA-C  3/8/2018, 12:42 PM

## 2018-03-09 VITALS
RESPIRATION RATE: 18 BRPM | OXYGEN SATURATION: 92 % | WEIGHT: 164.1 LBS | SYSTOLIC BLOOD PRESSURE: 148 MMHG | DIASTOLIC BLOOD PRESSURE: 68 MMHG | TEMPERATURE: 98.8 F | BODY MASS INDEX: 29.07 KG/M2 | HEART RATE: 72 BPM | HEIGHT: 63 IN

## 2018-03-09 PROBLEM — J96.01 ACUTE RESPIRATORY FAILURE WITH HYPOXIA (HCC): Status: RESOLVED | Noted: 2018-03-06 | Resolved: 2018-03-09

## 2018-03-09 LAB
ANION GAP SERPL CALCULATED.3IONS-SCNC: 12 MMOL/L (ref 9–17)
BNP INTERPRETATION: ABNORMAL
BUN BLDV-MCNC: 41 MG/DL (ref 8–23)
BUN/CREAT BLD: 15 (ref 9–20)
CALCIUM SERPL-MCNC: 9.2 MG/DL (ref 8.6–10.4)
CHLORIDE BLD-SCNC: 96 MMOL/L (ref 98–107)
CO2: 32 MMOL/L (ref 20–31)
CREAT SERPL-MCNC: 2.77 MG/DL (ref 0.5–0.9)
GFR AFRICAN AMERICAN: 19 ML/MIN
GFR NON-AFRICAN AMERICAN: 16 ML/MIN
GFR SERPL CREATININE-BSD FRML MDRD: ABNORMAL ML/MIN/{1.73_M2}
GFR SERPL CREATININE-BSD FRML MDRD: ABNORMAL ML/MIN/{1.73_M2}
GLUCOSE BLD-MCNC: 110 MG/DL (ref 70–99)
INR BLD: 2.4 (ref 0.9–1.2)
POTASSIUM SERPL-SCNC: 4.4 MMOL/L (ref 3.7–5.3)
PRO-BNP: ABNORMAL PG/ML
PROTHROMBIN TIME: 24.4 SEC (ref 9.7–12.2)
SODIUM BLD-SCNC: 140 MMOL/L (ref 135–144)

## 2018-03-09 PROCEDURE — 83880 ASSAY OF NATRIURETIC PEPTIDE: CPT

## 2018-03-09 PROCEDURE — 36415 COLL VENOUS BLD VENIPUNCTURE: CPT

## 2018-03-09 PROCEDURE — G0009 ADMIN PNEUMOCOCCAL VACCINE: HCPCS | Performed by: INTERNAL MEDICINE

## 2018-03-09 PROCEDURE — 97535 SELF CARE MNGMENT TRAINING: CPT

## 2018-03-09 PROCEDURE — 90732 PPSV23 VACC 2 YRS+ SUBQ/IM: CPT | Performed by: INTERNAL MEDICINE

## 2018-03-09 PROCEDURE — 80048 BASIC METABOLIC PNL TOTAL CA: CPT

## 2018-03-09 PROCEDURE — 85610 PROTHROMBIN TIME: CPT

## 2018-03-09 PROCEDURE — 6370000000 HC RX 637 (ALT 250 FOR IP): Performed by: INTERNAL MEDICINE

## 2018-03-09 PROCEDURE — 2580000003 HC RX 258: Performed by: INTERNAL MEDICINE

## 2018-03-09 PROCEDURE — 6360000002 HC RX W HCPCS: Performed by: INTERNAL MEDICINE

## 2018-03-09 RX ORDER — WARFARIN SODIUM 3 MG/1
3 TABLET ORAL
Status: DISCONTINUED | OUTPATIENT
Start: 2018-03-09 | End: 2018-03-09 | Stop reason: HOSPADM

## 2018-03-09 RX ORDER — DILTIAZEM HYDROCHLORIDE 180 MG/1
180 CAPSULE, COATED, EXTENDED RELEASE ORAL DAILY
Qty: 30 CAPSULE | Refills: 0 | Status: SHIPPED | OUTPATIENT
Start: 2018-03-09 | End: 2018-04-08

## 2018-03-09 RX ORDER — HYDRALAZINE HYDROCHLORIDE 50 MG/1
50 TABLET, FILM COATED ORAL 3 TIMES DAILY
Qty: 90 TABLET | Refills: 0 | Status: SHIPPED | OUTPATIENT
Start: 2018-03-09

## 2018-03-09 RX ORDER — FUROSEMIDE 20 MG/1
TABLET ORAL
Qty: 20 TABLET | Refills: 0 | Status: SHIPPED | OUTPATIENT
Start: 2018-03-09

## 2018-03-09 RX ADMIN — PNEUMOCOCCAL VACCINE POLYVALENT 0.5 ML
25; 25; 25; 25; 25; 25; 25; 25; 25; 25; 25; 25; 25; 25; 25; 25; 25; 25; 25; 25; 25; 25; 25 INJECTION, SOLUTION INTRAMUSCULAR; SUBCUTANEOUS at 11:30

## 2018-03-09 RX ADMIN — POLYETHYLENE GLYCOL 3350 17 G: 17 POWDER, FOR SOLUTION ORAL at 08:29

## 2018-03-09 RX ADMIN — DILTIAZEM HYDROCHLORIDE 180 MG: 180 CAPSULE, COATED, EXTENDED RELEASE ORAL at 08:28

## 2018-03-09 RX ADMIN — CARVEDILOL 12.5 MG: 12.5 TABLET, FILM COATED ORAL at 08:29

## 2018-03-09 RX ADMIN — ISOSORBIDE MONONITRATE 60 MG: 60 TABLET, EXTENDED RELEASE ORAL at 08:28

## 2018-03-09 RX ADMIN — FAMOTIDINE 20 MG: 20 TABLET, FILM COATED ORAL at 08:29

## 2018-03-09 RX ADMIN — CITALOPRAM 10 MG: 10 TABLET, FILM COATED ORAL at 08:28

## 2018-03-09 RX ADMIN — FUROSEMIDE 60 MG: 20 TABLET ORAL at 08:28

## 2018-03-09 RX ADMIN — ASPIRIN 81 MG: 81 TABLET, COATED ORAL at 08:29

## 2018-03-09 RX ADMIN — Medication 10 ML: at 08:29

## 2018-03-09 RX ADMIN — HYDRALAZINE HYDROCHLORIDE 50 MG: 50 TABLET, FILM COATED ORAL at 08:28

## 2018-03-09 ASSESSMENT — PAIN SCALES - WONG BAKER
WONGBAKER_NUMERICALRESPONSE: 0

## 2018-03-09 ASSESSMENT — PAIN SCALES - GENERAL: PAINLEVEL_OUTOF10: 0

## 2018-03-09 NOTE — DISCHARGE SUMMARY
FKT:[id number]    Printed on:03/09/18 5438   Medication Information                      acetaminophen (TYLENOL) 325 MG tablet  Take 2 tablets by mouth every 4 hours as needed for Pain             aspirin 81 MG tablet  Take 81 mg by mouth every other day             atorvastatin (LIPITOR) 40 MG tablet  Take 1 tablet by mouth nightly             calcium carbonate (TUMS) 500 MG chewable tablet  Take 1 tablet by mouth every 6 hours as needed for Heartburn             carvedilol (COREG) 12.5 MG tablet  Take 12.5 mg by mouth 2 times daily (with meals)             citalopram (CELEXA) 10 MG tablet  Take 10 mg by mouth daily             diltiazem (CARDIZEM CD) 180 MG extended release capsule  Take 1 capsule by mouth daily             diphenhydrAMINE (BENADRYL) 25 MG capsule  Take 25 mg by mouth every 6 hours as needed for Itching             furosemide (LASIX) 20 MG tablet  take 20mg po daily prn, Weigh yourself daily, take medication if you gain more than 3 pounds from normal weight (164lb)             hydrALAZINE (APRESOLINE) 50 MG tablet  Take 1 tablet by mouth 3 times daily             isosorbide mononitrate (IMDUR) 60 MG extended release tablet  Take 60 mg by mouth daily             levothyroxine (SYNTHROID) 50 MCG tablet  Takes 1 tablet daily except for 2 tabs on Sunday             LORazepam (ATIVAN) 0.5 MG tablet  Take 0.5 mg by mouth every 6 hours as needed for Anxiety. nitroGLYCERIN (NITROSTAT) 0.4 MG SL tablet  up to max of 3 total doses. If no relief after 1 dose, call 911. polyethylene glycol (GLYCOLAX) packet  Take 17 g by mouth daily             warfarin (COUMADIN) 2 MG tablet  Take 2.5 mg by mouth daily COUMADIN CLINIC:  3 mg Friday; 2 mg all other days                 Patient Instructions:    Activity: activity as tolerated  Diet: cardiac diet  Wound Care: none needed  Other: daily weights     Disposition:   Discharge to Home with home health    Follow up:  Patient will be followed by Gentry Betancourt MD in 1-2 weeks  Dr. Vida Merino 2 weeks    CORE MEASURES on Discharge (if applicable)  ACE/ARB in CHF: Yes  Statin in [place]: NA  ASA in [place]: NA  Statin in CVA: NA  Antiplatelet in CVA: NA    Total time spent on discharge services: 45 minutes    Including the following activities:  Evaluation and Management of patient  Discussion with patient and/or surrogate about current care plan  Coordination with Case Management and/or   Coordination of care with Consultants (if applicable)   Coordination of care with Receiving Facility Physician (if applicable)  Completion of DME forms (if applicable)  Preparation of Discharge Summary  Preparation of Medication Reconciliation  Preparation of Discharge Prescriptions    Signed:  Rolan Lorenzo PA-C  3/9/2018, 10:09 AM

## 2018-03-09 NOTE — PROGRESS NOTES
[facility] Pharmacy Department    Clinical Pharmacy Note    Warfarin consult follow-up    Recent Labs      03/09/18   0558   INR  2.4*     Recent Labs      03/06/18   0755  03/07/18   0531   HGB  12.1  11.7*   HCT  39.6  37.3   PLT  218  212       Target INR Range: 2.0-3.0    Significant Drug-Drug Interactions:  New warfarin drug-drug interactions: no change  Discontinued drug-drug interactions: no change      Notes:   Patient to take home dose of warfarin 3mg po daily. Daily PT/INR until stable within therapeutic range.    Thank you,  Tyshawn Whitfield, R.Ph., 3/9/2018,7:51 AM

## 2018-03-09 NOTE — PROGRESS NOTES
Occupational Therapy  Facility/Department: [facility] AT THE [facility] MED SURG  Daily Treatment Note  NAME: Jeovany Odell  : 3/7/1927  MRN: [id number]    Date of Service: 3/9/2018    Patient Diagnosis(es): The encounter diagnosis was Acute on chronic combined systolic and diastolic congestive heart failure ([address] has a past medical history of Anxiety; Asthma; CAD (coronary artery disease); Cardiac pacemaker; CHF (congestive heart failure) ([address] CHF (congestive heart failure) ([address] Chronic kidney disease; Diabetes mellitus ([facility] DVT (deep venous thrombosis) ([facility] Gout; H/O echocardiogram; Heart disease; History of left heart catheterization (LHC); Hyperlipidemia; Hypertension; Hypothyroidism; Mobitz type 2 second degree heart block; and Syncope and collapse.   has a past surgical history that includes Ovary removal (Right); Breast surgery (); Breast biopsy (Bilateral, ); eye surgery; Cardiac pacemaker placement (2017); Pacemaker insertion (2017); and Cardiac catheterization (Left, 2018). Restrictions  Restrictions/Precautions  Restrictions/Precautions: General Precautions, Fall Risk  Subjective   General  Chart Reviewed: Yes  Patient assessed for rehabilitation services?: Yes  Family / Caregiver Present: No  Diagnosis: CHF  Subjective  Subjective:  (Pt with no c/o pain, demo'd SOB throughout tx. )  General Comment  Comments:  (Educated Pt on EC techniques with G verbal understanding.  Pt demo'd G EC techniques throughout tx. )  Pain Assessment  Patient Currently in Pain: Denies  Randle-Ralph Pain Rating: No hurt  Vital Signs  Patient Currently in Pain: Denies   Orientation     Objective    ADL  UE Bathing: Stand by assistance (Standing sinkside with SBA for increased I with ADLs. )  LE Bathing: Stand by assistance  UE Dressing: Stand by assistance  LE Dressing: Stand by assistance (Pt utilized reacher to doff/jorge brief from/to B feet d/t unable to reach while seated. )        Balance  Standing

## 2018-03-09 NOTE — DISCHARGE INSTR - DIET

## 2018-03-11 ENCOUNTER — APPOINTMENT (OUTPATIENT)
Dept: GENERAL RADIOLOGY | Age: 83
DRG: 291 | End: 2018-03-11
Payer: MEDICARE

## 2018-03-11 ENCOUNTER — HOSPITAL ENCOUNTER (INPATIENT)
Age: 83
LOS: 2 days | Discharge: NURSING, REHAB OR LONG-TERM CARE | DRG: 291 | End: 2018-03-13
Attending: INTERNAL MEDICINE | Admitting: INTERNAL MEDICINE
Payer: MEDICARE

## 2018-03-11 DIAGNOSIS — N18.9 ACUTE RENAL FAILURE SUPERIMPOSED ON CHRONIC KIDNEY DISEASE, UNSPECIFIED CKD STAGE, UNSPECIFIED ACUTE RENAL FAILURE TYPE (HCC): Primary | ICD-10-CM

## 2018-03-11 DIAGNOSIS — N17.9 ACUTE RENAL FAILURE SUPERIMPOSED ON CHRONIC KIDNEY DISEASE, UNSPECIFIED CKD STAGE, UNSPECIFIED ACUTE RENAL FAILURE TYPE (HCC): Primary | ICD-10-CM

## 2018-03-11 DIAGNOSIS — I50.40 COMBINED SYSTOLIC AND DIASTOLIC CONGESTIVE HEART FAILURE, UNSPECIFIED CONGESTIVE HEART FAILURE CHRONICITY: ICD-10-CM

## 2018-03-11 DIAGNOSIS — F41.9 ANXIETY: ICD-10-CM

## 2018-03-11 PROBLEM — I50.43 CHF (CONGESTIVE HEART FAILURE), NYHA CLASS I, ACUTE ON CHRONIC, COMBINED (HCC): Status: ACTIVE | Noted: 2018-03-11

## 2018-03-11 LAB
-: ABNORMAL
AMMONIA: 30 UMOL/L (ref 11–51)
AMORPHOUS: ABNORMAL
ANION GAP SERPL CALCULATED.3IONS-SCNC: 14 MMOL/L (ref 9–17)
BACTERIA: ABNORMAL
BILIRUBIN URINE: NEGATIVE
BNP INTERPRETATION: ABNORMAL
BUN BLDV-MCNC: 46 MG/DL (ref 8–23)
BUN/CREAT BLD: 20 (ref 9–20)
CALCIUM SERPL-MCNC: 9.5 MG/DL (ref 8.6–10.4)
CASTS UA: ABNORMAL /LPF
CHLORIDE BLD-SCNC: 96 MMOL/L (ref 98–107)
CO2: 28 MMOL/L (ref 20–31)
COLOR: YELLOW
COMMENT UA: ABNORMAL
CREAT SERPL-MCNC: 2.33 MG/DL (ref 0.5–0.9)
CRYSTALS, UA: ABNORMAL /HPF
EKG ATRIAL RATE: 75 BPM
EKG P AXIS: 7 DEGREES
EKG P-R INTERVAL: 240 MS
EKG Q-T INTERVAL: 418 MS
EKG QRS DURATION: 158 MS
EKG QTC CALCULATION (BAZETT): 466 MS
EKG R AXIS: -28 DEGREES
EKG T AXIS: 166 DEGREES
EKG VENTRICULAR RATE: 75 BPM
EPITHELIAL CELLS UA: ABNORMAL /HPF (ref 0–25)
GFR AFRICAN AMERICAN: 24 ML/MIN
GFR NON-AFRICAN AMERICAN: 20 ML/MIN
GFR SERPL CREATININE-BSD FRML MDRD: ABNORMAL ML/MIN/{1.73_M2}
GFR SERPL CREATININE-BSD FRML MDRD: ABNORMAL ML/MIN/{1.73_M2}
GLUCOSE BLD-MCNC: 116 MG/DL (ref 70–99)
GLUCOSE URINE: NEGATIVE
HCT VFR BLD CALC: 37.2 % (ref 36.3–47.1)
HEMOGLOBIN: 11.6 G/DL (ref 11.9–15.1)
INR BLD: 2 (ref 0.9–1.2)
KETONES, URINE: NEGATIVE
LEUKOCYTE ESTERASE, URINE: NEGATIVE
MCH RBC QN AUTO: 30.4 PG (ref 25.2–33.5)
MCHC RBC AUTO-ENTMCNC: 31.2 G/DL (ref 28.4–34.8)
MCV RBC AUTO: 97.4 FL (ref 82.6–102.9)
MUCUS: ABNORMAL
NITRITE, URINE: NEGATIVE
NRBC AUTOMATED: 0 PER 100 WBC
OTHER OBSERVATIONS UA: ABNORMAL
PDW BLD-RTO: 14.9 % (ref 11.8–14.4)
PH UA: 7 (ref 5–9)
PLATELET # BLD: 238 K/UL (ref 138–453)
PMV BLD AUTO: 9.9 FL (ref 8.1–13.5)
POTASSIUM SERPL-SCNC: 4.4 MMOL/L (ref 3.7–5.3)
PRO-BNP: ABNORMAL PG/ML
PROTEIN UA: ABNORMAL
PROTHROMBIN TIME: 20.1 SEC (ref 9.7–12.2)
RBC # BLD: 3.82 M/UL (ref 3.95–5.11)
RBC UA: ABNORMAL /HPF (ref 0–2)
RENAL EPITHELIAL, UA: ABNORMAL /HPF
SODIUM BLD-SCNC: 138 MMOL/L (ref 135–144)
SPECIFIC GRAVITY UA: 1.01 (ref 1.01–1.02)
TRICHOMONAS: ABNORMAL
TURBIDITY: CLEAR
URINE HGB: NEGATIVE
UROBILINOGEN, URINE: NORMAL
WBC # BLD: 6.5 K/UL (ref 3.5–11.3)
WBC UA: ABNORMAL /HPF (ref 0–5)
YEAST: ABNORMAL

## 2018-03-11 PROCEDURE — 82140 ASSAY OF AMMONIA: CPT

## 2018-03-11 PROCEDURE — 93005 ELECTROCARDIOGRAM TRACING: CPT

## 2018-03-11 PROCEDURE — 6370000000 HC RX 637 (ALT 250 FOR IP): Performed by: INTERNAL MEDICINE

## 2018-03-11 PROCEDURE — 36415 COLL VENOUS BLD VENIPUNCTURE: CPT

## 2018-03-11 PROCEDURE — 81001 URINALYSIS AUTO W/SCOPE: CPT

## 2018-03-11 PROCEDURE — 85027 COMPLETE CBC AUTOMATED: CPT

## 2018-03-11 PROCEDURE — 99285 EMERGENCY DEPT VISIT HI MDM: CPT

## 2018-03-11 PROCEDURE — 51701 INSERT BLADDER CATHETER: CPT

## 2018-03-11 PROCEDURE — 71045 X-RAY EXAM CHEST 1 VIEW: CPT

## 2018-03-11 PROCEDURE — 1200000000 HC SEMI PRIVATE

## 2018-03-11 PROCEDURE — 83880 ASSAY OF NATRIURETIC PEPTIDE: CPT

## 2018-03-11 PROCEDURE — 2580000003 HC RX 258: Performed by: INTERNAL MEDICINE

## 2018-03-11 PROCEDURE — 85610 PROTHROMBIN TIME: CPT

## 2018-03-11 PROCEDURE — 94760 N-INVAS EAR/PLS OXIMETRY 1: CPT

## 2018-03-11 PROCEDURE — 6360000002 HC RX W HCPCS: Performed by: INTERNAL MEDICINE

## 2018-03-11 PROCEDURE — 80048 BASIC METABOLIC PNL TOTAL CA: CPT

## 2018-03-11 RX ORDER — CARVEDILOL 12.5 MG/1
12.5 TABLET ORAL 2 TIMES DAILY WITH MEALS
Status: DISCONTINUED | OUTPATIENT
Start: 2018-03-11 | End: 2018-03-13 | Stop reason: HOSPADM

## 2018-03-11 RX ORDER — SODIUM CHLORIDE 0.9 % (FLUSH) 0.9 %
10 SYRINGE (ML) INJECTION EVERY 12 HOURS SCHEDULED
Status: DISCONTINUED | OUTPATIENT
Start: 2018-03-11 | End: 2018-03-13 | Stop reason: HOSPADM

## 2018-03-11 RX ORDER — WARFARIN SODIUM 2.5 MG/1
2.5 TABLET ORAL DAILY
Status: DISCONTINUED | OUTPATIENT
Start: 2018-03-11 | End: 2018-03-12 | Stop reason: DRUGHIGH

## 2018-03-11 RX ORDER — DIPHENHYDRAMINE HCL 25 MG
25 CAPSULE ORAL EVERY 6 HOURS PRN
Status: DISCONTINUED | OUTPATIENT
Start: 2018-03-11 | End: 2018-03-12

## 2018-03-11 RX ORDER — ASPIRIN 81 MG/1
81 TABLET, CHEWABLE ORAL EVERY OTHER DAY
Status: DISCONTINUED | OUTPATIENT
Start: 2018-03-11 | End: 2018-03-13 | Stop reason: HOSPADM

## 2018-03-11 RX ORDER — HYDRALAZINE HYDROCHLORIDE 50 MG/1
50 TABLET, FILM COATED ORAL 3 TIMES DAILY
Status: DISCONTINUED | OUTPATIENT
Start: 2018-03-11 | End: 2018-03-13 | Stop reason: HOSPADM

## 2018-03-11 RX ORDER — CITALOPRAM 10 MG/1
10 TABLET ORAL DAILY
Status: DISCONTINUED | OUTPATIENT
Start: 2018-03-11 | End: 2018-03-13 | Stop reason: HOSPADM

## 2018-03-11 RX ORDER — CALCIUM CARBONATE 200(500)MG
500 TABLET,CHEWABLE ORAL EVERY 6 HOURS PRN
Status: DISCONTINUED | OUTPATIENT
Start: 2018-03-11 | End: 2018-03-13 | Stop reason: HOSPADM

## 2018-03-11 RX ORDER — ACETAMINOPHEN 325 MG/1
650 TABLET ORAL EVERY 4 HOURS PRN
Status: DISCONTINUED | OUTPATIENT
Start: 2018-03-11 | End: 2018-03-11

## 2018-03-11 RX ORDER — LEVOTHYROXINE SODIUM 0.05 MG/1
50 TABLET ORAL DAILY
Status: DISCONTINUED | OUTPATIENT
Start: 2018-03-11 | End: 2018-03-13 | Stop reason: HOSPADM

## 2018-03-11 RX ORDER — FUROSEMIDE 20 MG/1
20 TABLET ORAL DAILY PRN
Status: DISCONTINUED | OUTPATIENT
Start: 2018-03-11 | End: 2018-03-13 | Stop reason: HOSPADM

## 2018-03-11 RX ORDER — ATORVASTATIN CALCIUM 40 MG/1
40 TABLET, FILM COATED ORAL NIGHTLY
Status: DISCONTINUED | OUTPATIENT
Start: 2018-03-11 | End: 2018-03-13 | Stop reason: HOSPADM

## 2018-03-11 RX ORDER — NITROGLYCERIN 0.4 MG/1
0.4 TABLET SUBLINGUAL EVERY 5 MIN PRN
Status: DISCONTINUED | OUTPATIENT
Start: 2018-03-11 | End: 2018-03-13 | Stop reason: HOSPADM

## 2018-03-11 RX ORDER — LORAZEPAM 0.5 MG/1
0.5 TABLET ORAL EVERY 6 HOURS PRN
Status: DISCONTINUED | OUTPATIENT
Start: 2018-03-11 | End: 2018-03-13 | Stop reason: HOSPADM

## 2018-03-11 RX ORDER — ONDANSETRON 2 MG/ML
4 INJECTION INTRAMUSCULAR; INTRAVENOUS EVERY 6 HOURS PRN
Status: DISCONTINUED | OUTPATIENT
Start: 2018-03-11 | End: 2018-03-13 | Stop reason: HOSPADM

## 2018-03-11 RX ORDER — ISOSORBIDE MONONITRATE 60 MG/1
60 TABLET, EXTENDED RELEASE ORAL DAILY
Status: DISCONTINUED | OUTPATIENT
Start: 2018-03-11 | End: 2018-03-13 | Stop reason: HOSPADM

## 2018-03-11 RX ORDER — DILTIAZEM HYDROCHLORIDE 180 MG/1
180 CAPSULE, COATED, EXTENDED RELEASE ORAL DAILY
Status: DISCONTINUED | OUTPATIENT
Start: 2018-03-11 | End: 2018-03-13 | Stop reason: HOSPADM

## 2018-03-11 RX ORDER — SODIUM CHLORIDE 0.9 % (FLUSH) 0.9 %
10 SYRINGE (ML) INJECTION PRN
Status: DISCONTINUED | OUTPATIENT
Start: 2018-03-11 | End: 2018-03-13 | Stop reason: HOSPADM

## 2018-03-11 RX ORDER — POLYETHYLENE GLYCOL 3350 17 G/17G
17 POWDER, FOR SOLUTION ORAL DAILY
Status: DISCONTINUED | OUTPATIENT
Start: 2018-03-11 | End: 2018-03-13 | Stop reason: HOSPADM

## 2018-03-11 RX ORDER — ACETAMINOPHEN 325 MG/1
650 TABLET ORAL EVERY 4 HOURS PRN
Status: DISCONTINUED | OUTPATIENT
Start: 2018-03-11 | End: 2018-03-13 | Stop reason: HOSPADM

## 2018-03-11 RX ADMIN — Medication 10 ML: at 20:23

## 2018-03-11 RX ADMIN — ENOXAPARIN SODIUM 30 MG: 30 INJECTION SUBCUTANEOUS at 20:27

## 2018-03-11 RX ADMIN — ATORVASTATIN CALCIUM 40 MG: 40 TABLET, FILM COATED ORAL at 20:23

## 2018-03-11 RX ADMIN — HYDRALAZINE HYDROCHLORIDE 50 MG: 50 TABLET, FILM COATED ORAL at 20:23

## 2018-03-11 RX ADMIN — POLYETHYLENE GLYCOL 3350 17 G: 17 POWDER, FOR SOLUTION ORAL at 20:24

## 2018-03-11 ASSESSMENT — ENCOUNTER SYMPTOMS
BLOOD IN STOOL: 0
VOMITING: 0
SHORTNESS OF BREATH: 1
EYE DISCHARGE: 0
CHEST TIGHTNESS: 0
NAUSEA: 0
EYE REDNESS: 0
SORE THROAT: 0
CONSTIPATION: 0
WHEEZING: 0
COUGH: 0
ABDOMINAL PAIN: 0
DIARRHEA: 0
BACK PAIN: 0
RHINORRHEA: 0

## 2018-03-11 ASSESSMENT — PAIN SCALES - GENERAL
PAINLEVEL_OUTOF10: 0

## 2018-03-11 ASSESSMENT — PAIN SCALES - WONG BAKER
WONGBAKER_NUMERICALRESPONSE: 0

## 2018-03-11 NOTE — ED PROVIDER NOTES
2 MG TABLET    Take 2.5 mg by mouth daily COUMADIN CLINIC:  3 mg Friday; 2 mg all other days       ALLERGIES     Lisinopril; Milk of magnesia [magnesium hydroxide]; and Norco [hydrocodone-acetaminophen]    FAMILY HISTORY       Family History   Problem Relation Age of Onset    Heart Disease Mother      chf    Heart Disease Father     Heart Attack Father           SOCIAL HISTORY       Social History     Social History    Marital status:      Spouse name: N/A    Number of children: N/A    Years of education: N/A     Social History Main Topics    Smoking status: Never Smoker    Smokeless tobacco: Never Used    Alcohol use 0.6 oz/week     1 Cans of beer per week      Comment: weekly    Drug use: No    Sexual activity: Not Asked     Other Topics Concern    None     Social History Narrative    None       SCREENINGS             PHYSICAL EXAM    (up to 7 for level 4, 8 or more for level 5)     ED Triage Vitals [03/11/18 1503]   BP Temp Temp Source Pulse Resp SpO2 Height Weight   (!) 169/89 97.9 °F (36.6 °C) Tympanic 71 16 -- -- 165 lb (74.8 kg)       Physical Exam   Constitutional: She is oriented to person, place, and time. She appears well-developed and well-nourished. No distress. HENT:   Head: Normocephalic and atraumatic. Right Ear: External ear normal.   Left Ear: External ear normal.   Mouth/Throat: Oropharynx is clear and moist. No oropharyngeal exudate. Eyes: Conjunctivae and EOM are normal. Pupils are equal, round, and reactive to light. Right eye exhibits no discharge. Left eye exhibits no discharge. No scleral icterus. Neck: Normal range of motion. Neck supple. No tracheal deviation present. Cardiovascular: Normal rate, regular rhythm and intact distal pulses. Exam reveals no gallop and no friction rub. No murmur heard. Pulmonary/Chest: Effort normal. No accessory muscle usage or stridor. No respiratory distress. She has decreased breath sounds. She has no wheezes.  She has no admission. 3/11/18  4:14 PM  I called and spoke with patient's primary care provider who actually just discharge her from the hospital.  He agrees with plan to admit. He asked if patient's family was willing to go to nursing facility after admission they are in agreement of this and he understands that. He will admit the patient. Family and patient agree with plans to admit. Procedures    FINAL IMPRESSION      1. Acute renal failure superimposed on chronic kidney disease, unspecified CKD stage, unspecified acute renal failure type ([address]    2. Combined systolic and diastolic congestive heart failure, unspecified congestive heart failure chronicity ([address]          DISPOSITION/PLAN   DISPOSITION Decision To Admit 03/11/2018 04:15:26 PM      PATIENT REFERRED TO:  No follow-up provider specified. DISCHARGE MEDICATIONS:  New Prescriptions    No medications on file              Summation      Patient Course:      ED Medications administered this visit:  Medications - No data to display    New Prescriptions from this visit:    New Prescriptions    No medications on file       Follow-up:  No follow-up provider specified. Final Impression:   1. Acute renal failure superimposed on chronic kidney disease, unspecified CKD stage, unspecified acute renal failure type ([address]    2.  Combined systolic and diastolic congestive heart failure, unspecified congestive heart failure chronicity ([address]               (Please note that portions of this note were completed with a voice recognition program.  Efforts were made to edit the dictations but occasionally words are mis-transcribed.)            Andrew Harrison PA-C  03/11/18 1835

## 2018-03-12 ENCOUNTER — APPOINTMENT (OUTPATIENT)
Dept: GENERAL RADIOLOGY | Age: 83
DRG: 291 | End: 2018-03-12
Payer: MEDICARE

## 2018-03-12 ENCOUNTER — APPOINTMENT (OUTPATIENT)
Dept: CT IMAGING | Age: 83
DRG: 291 | End: 2018-03-12
Payer: MEDICARE

## 2018-03-12 PROBLEM — R40.0 SOMNOLENCE: Status: ACTIVE | Noted: 2018-03-12

## 2018-03-12 PROBLEM — N18.9 ACUTE ON CHRONIC KIDNEY FAILURE (HCC): Status: ACTIVE | Noted: 2018-03-08

## 2018-03-12 PROBLEM — E87.1 HYPONATREMIA: Status: ACTIVE | Noted: 2018-03-12

## 2018-03-12 LAB
ALBUMIN SERPL-MCNC: 3.4 G/DL (ref 3.5–5.2)
ALBUMIN/GLOBULIN RATIO: 1 (ref 1–2.5)
ALP BLD-CCNC: 65 U/L (ref 35–104)
ALT SERPL-CCNC: <5 U/L (ref 5–33)
ANION GAP SERPL CALCULATED.3IONS-SCNC: 11 MMOL/L (ref 9–17)
AST SERPL-CCNC: 10 U/L
BILIRUB SERPL-MCNC: 1 MG/DL (ref 0.3–1.2)
BUN BLDV-MCNC: 42 MG/DL (ref 8–23)
BUN/CREAT BLD: 18 (ref 9–20)
CALCIUM SERPL-MCNC: 9.2 MG/DL (ref 8.6–10.4)
CHLORIDE BLD-SCNC: 92 MMOL/L (ref 98–107)
CO2: 30 MMOL/L (ref 20–31)
CREAT SERPL-MCNC: 2.31 MG/DL (ref 0.5–0.9)
GFR AFRICAN AMERICAN: 24 ML/MIN
GFR NON-AFRICAN AMERICAN: 20 ML/MIN
GFR SERPL CREATININE-BSD FRML MDRD: ABNORMAL ML/MIN/{1.73_M2}
GFR SERPL CREATININE-BSD FRML MDRD: ABNORMAL ML/MIN/{1.73_M2}
GLUCOSE BLD-MCNC: 111 MG/DL (ref 70–99)
HCT VFR BLD CALC: 37.8 % (ref 36.3–47.1)
HEMOGLOBIN: 11.6 G/DL (ref 11.9–15.1)
INR BLD: 2 (ref 0.9–1.2)
MCH RBC QN AUTO: 30.4 PG (ref 25.2–33.5)
MCHC RBC AUTO-ENTMCNC: 30.7 G/DL (ref 28.4–34.8)
MCV RBC AUTO: 99 FL (ref 82.6–102.9)
NRBC AUTOMATED: 0 PER 100 WBC
PDW BLD-RTO: 14.8 % (ref 11.8–14.4)
PLATELET # BLD: 225 K/UL (ref 138–453)
PMV BLD AUTO: 10 FL (ref 8.1–13.5)
POTASSIUM SERPL-SCNC: 4.2 MMOL/L (ref 3.7–5.3)
PROTHROMBIN TIME: 20.1 SEC (ref 9.7–12.2)
RBC # BLD: 3.82 M/UL (ref 3.95–5.11)
SODIUM BLD-SCNC: 133 MMOL/L (ref 135–144)
TOTAL PROTEIN: 6.7 G/DL (ref 6.4–8.3)
WBC # BLD: 6 K/UL (ref 3.5–11.3)

## 2018-03-12 PROCEDURE — 6370000000 HC RX 637 (ALT 250 FOR IP): Performed by: INTERNAL MEDICINE

## 2018-03-12 PROCEDURE — 80053 COMPREHEN METABOLIC PANEL: CPT

## 2018-03-12 PROCEDURE — 85610 PROTHROMBIN TIME: CPT

## 2018-03-12 PROCEDURE — G8979 MOBILITY GOAL STATUS: HCPCS

## 2018-03-12 PROCEDURE — 1200000000 HC SEMI PRIVATE

## 2018-03-12 PROCEDURE — 97162 PT EVAL MOD COMPLEX 30 MIN: CPT

## 2018-03-12 PROCEDURE — 99222 1ST HOSP IP/OBS MODERATE 55: CPT | Performed by: FAMILY MEDICINE

## 2018-03-12 PROCEDURE — 2580000003 HC RX 258: Performed by: INTERNAL MEDICINE

## 2018-03-12 PROCEDURE — 97166 OT EVAL MOD COMPLEX 45 MIN: CPT

## 2018-03-12 PROCEDURE — 97530 THERAPEUTIC ACTIVITIES: CPT

## 2018-03-12 PROCEDURE — 36415 COLL VENOUS BLD VENIPUNCTURE: CPT

## 2018-03-12 PROCEDURE — G8988 SELF CARE GOAL STATUS: HCPCS

## 2018-03-12 PROCEDURE — G8978 MOBILITY CURRENT STATUS: HCPCS

## 2018-03-12 PROCEDURE — 85027 COMPLETE CBC AUTOMATED: CPT

## 2018-03-12 PROCEDURE — G8987 SELF CARE CURRENT STATUS: HCPCS

## 2018-03-12 PROCEDURE — 70450 CT HEAD/BRAIN W/O DYE: CPT

## 2018-03-12 RX ORDER — WARFARIN SODIUM 2 MG/1
2 TABLET ORAL
Status: COMPLETED | OUTPATIENT
Start: 2018-03-12 | End: 2018-03-12

## 2018-03-12 RX ADMIN — Medication 10 ML: at 20:38

## 2018-03-12 RX ADMIN — POLYETHYLENE GLYCOL 3350 17 G: 17 POWDER, FOR SOLUTION ORAL at 08:59

## 2018-03-12 RX ADMIN — WARFARIN SODIUM 2 MG: 2 TABLET ORAL at 17:15

## 2018-03-12 RX ADMIN — DILTIAZEM HYDROCHLORIDE 180 MG: 180 CAPSULE, COATED, EXTENDED RELEASE ORAL at 08:59

## 2018-03-12 RX ADMIN — Medication 10 ML: at 09:05

## 2018-03-12 RX ADMIN — ATORVASTATIN CALCIUM 40 MG: 40 TABLET, FILM COATED ORAL at 20:38

## 2018-03-12 RX ADMIN — HYDRALAZINE HYDROCHLORIDE 50 MG: 50 TABLET, FILM COATED ORAL at 14:27

## 2018-03-12 RX ADMIN — ISOSORBIDE MONONITRATE 60 MG: 60 TABLET, EXTENDED RELEASE ORAL at 08:58

## 2018-03-12 RX ADMIN — CITALOPRAM 10 MG: 10 TABLET, FILM COATED ORAL at 08:58

## 2018-03-12 RX ADMIN — HYDRALAZINE HYDROCHLORIDE 50 MG: 50 TABLET, FILM COATED ORAL at 20:38

## 2018-03-12 RX ADMIN — CARVEDILOL 12.5 MG: 12.5 TABLET, FILM COATED ORAL at 08:58

## 2018-03-12 RX ADMIN — HYDRALAZINE HYDROCHLORIDE 50 MG: 50 TABLET, FILM COATED ORAL at 08:58

## 2018-03-12 RX ADMIN — CARVEDILOL 12.5 MG: 12.5 TABLET, FILM COATED ORAL at 17:15

## 2018-03-12 RX ADMIN — LEVOTHYROXINE SODIUM 50 MCG: 50 TABLET ORAL at 09:05

## 2018-03-12 ASSESSMENT — PAIN SCALES - WONG BAKER

## 2018-03-12 ASSESSMENT — PAIN SCALES - GENERAL
PAINLEVEL_OUTOF10: 0
PAINLEVEL_OUTOF10: 0

## 2018-03-12 NOTE — PROGRESS NOTES
Occupational Therapy   Occupational Therapy Initial Assessment  Date: 3/12/2018   Patient Name: Xochitl Muse  MRN: [id number]     : 3/7/1927    Patient Diagnosis(es): The primary encounter diagnosis was Acute renal failure superimposed on chronic kidney disease, unspecified CKD stage, unspecified acute renal failure type ([facility] A diagnosis of Combined systolic and diastolic congestive heart failure, unspecified congestive heart failure chronicity (HCC) was also pertinent to this visit. has a past medical history of Anxiety; Asthma; CAD (coronary artery disease); Cardiac pacemaker; CHF (congestive heart failure) [address] CHF (congestive heart failure) ([facility] Chronic kidney disease; Diabetes mellitus ([facility] DVT (deep venous thrombosis) ([facility] Gout; H/O echocardiogram; Heart disease; History of left heart catheterization (LHC); Hyperlipidemia; Hypertension; Hypothyroidism; Mobitz type 2 second degree heart block; and Syncope and collapse.   has a past surgical history that includes Ovary removal (Right); Breast surgery (); Breast biopsy (Bilateral, ); eye surgery; Cardiac pacemaker placement (2017); Pacemaker insertion (2017); and Cardiac catheterization (Left, 2018).            Restrictions  Restrictions/Precautions  Restrictions/Precautions: General Precautions, Fall Risk    Subjective   General  Patient assessed for rehabilitation services?: Yes  Pain Assessment  Patient Currently in Pain: Denies  Pain Assessment: FLACC  Randle-Ralph Pain Rating: No hurt  Pain Level: 0  Height and Weight  Height: 5' 2\" (157.5 cm)  Social/Functional History  Social/Functional History  Lives With: Daughter  Type of Home: House  ADL Assistance: Needs assistance  Homemaking Assistance: Needs assistance  Ambulation Assistance: Independent  Transfer Assistance: Independent  Additional Comments: pt was living with her daughter after a recent hospital stay and was recieving home health nursing        Objective

## 2018-03-12 NOTE — PROGRESS NOTES
Received call back from Dr. Seymour Blizzard regarding Lisbeth's call earlier in response to patient's poor level of responsiveness. See new orders.

## 2018-03-12 NOTE — PLAN OF CARE
Problem: Falls - Risk of  Goal: Absence of falls  Outcome: Ongoing  Bed alarm on. Bed in low position and wheels locked. Call light in reach. Falling star posted on door. Yellow bracelet on. Non skid socks on. Side rails up. Will continue to assess. Problem: Daily Care:  Goal: Daily care needs are met  Daily care needs are met   Outcome: Ongoing  Patient's daily cares and needs are being assessed each shift. Consideration is given according to patient's ability to assist with ADL's, and hourly rounding consists of asking patient if any help is needed. Will continue to monitor. Problem: Pain:  Goal: Patient's pain/discomfort is manageable  Patient's pain/discomfort is manageable   Outcome: Ongoing  Pain assessment complete during hourly rounding. Pain scale of 0-10 being used to assess patients pain level. Pain medication administered as ordered. Will continue to monitor. Problem: Skin Integrity:  Goal: Skin integrity will stabilize  Skin integrity will stabilize   Outcome: Ongoing  Patient's skin condition is being assessed each shift and changes are being charted. Pillows are being used to relieve bony prominences and patient is being reminded to turn frequently to help maintain integrity of skin. Heels are also being elevated when patient is in bed. Will continue to monitor.

## 2018-03-12 NOTE — PROGRESS NOTES
[facility]  Inpatient/Observation/Outpatient Rehabilitation    Date: 3/12/2018  Patient Name: Asael Mills       [x] Inpatient Acute/Observation       []  Outpatient  : 3/7/1927       [] Pt no showed for scheduled appointment    [] Pt refused/declined therapy at this time due to:           [x] Pt cancelled due to:  [] No Reason Given   [] Sick/ill   [] Other: Pt eval held due to pt going down for a CTscan  Will attempt evaluation at our earliest opportunity.        Gilles Herman Date: 3/12/2018

## 2018-03-12 NOTE — PROGRESS NOTES
Discussed discharge plans with the family. Patient is not responding at this time. Patient is a 80year old female here with hypoxia, somnolence, CHF, acute on chronic kidney injury. Patient is a  and lives with the daughter at this time due to all the admissions to the hospital. She has [id number] home care. Patient uses a walker. Both the patient and family manages her medications. The family does the driving. Her PCP is Dr. Ti Cornell. She has medical insurance that helps with medication costs. Spoke in length with the family about comfort care and hospice care. The one daughter brought about a feeding tube. Explain how a feeding tube can make the patient more uncomfortable when they are in the dyeing process. Gave them written information on the different hospice programs in the area. They understand if she goes to an ECF it will be a self pay. They are going to talk tonight and decided on a discharge plan. Printed patient's advance directives off and gave to the family and explain that their mom has initial that she does not want artifical hydration and or feeding.     Karine Wong, LSW

## 2018-03-12 NOTE — H&P
NOT REPORTED 03/11/2018       Lab Results   Component Value Date    MYOGLOBIN 55 09/27/2017    TROPONINT <0.03 03/06/2018    CKTOTAL 23 (L) 09/28/2017    CKMB 2.7 09/27/2017    PROBNP 10,281 (H) 03/11/2018       Xr Chest Portable    Result Date: 3/11/2018  FINAL REPORT EXAM:  XR CHEST PORTABLE HISTORY:  weakness TECHNIQUE:  Frontal chest radiograph. PRIORS:  03/06/2018. FINDINGS:  Unchanged left chest pacemaker. Unchanged atherosclerotic calcifications in the thoracic aorta. Unchanged cardiomegaly. Patchy left lower lobe opacities are improved. Unchanged chronic appearing interstitial opacities. The moderate left pleural effusion is decreased in size. Unchanged small right pleural effusion. No pneumothorax. No acute osseous abnormality. Impression:  Improved left lower lobe atelectasis versus pneumonia. Decreased size of the small left pleural effusion. Unchanged small right pleural effusion. Electronically Signed By: Kamila Joyce   on  03/11/2018  16:02        Assessment:    Principal Problem:    Hypoxia  Active Problems:    Physical deconditioning    Pulmonary HTN    Long-term (current) use of anticoagulants    CHF (congestive heart failure), NYHA class I, acute on chronic, combined ([facility])    Hyponatremia    Somnolence  Resolved Problems:    * No resolved hospital problems.  *      Patient Active Problem List    Diagnosis Date Noted    Syncope and collapse - secondary to 2nd degree AV block 06/25/2017     Priority: High    Acute on chronic combined systolic and diastolic CHF (congestive heart failure) ([address] 12/31/2016     Priority: High     Class: Acute    Hyponatremia 03/12/2018    Somnolence 03/12/2018    CHF (congestive heart failure), NYHA class I, acute on chronic, combined ([address] 03/11/2018    Acute on chronic kidney failure ([address] 03/08/2018    Hypoxia 03/08/2018    Acute diastolic CHF (congestive heart failure) ([address] 03/06/2018    Acute renal failure superimposed on stage 4 chronic kidney disease ([facility] 01/29/2018    Infarction of parietal lobe ([address] 01/23/2018    Unstable angina ([address] 39/38/0814    Acute systolic CHF (congestive heart failure) ([address] 01/19/2018    Labile blood pressure 12/29/2017    Hypoxia 12/18/2017    Idiopathic cardiomyopathy ([address] 12/18/2017    Acute on chronic combined systolic and diastolic congestive heart failure ([address] 12/17/2017    Long-term (current) use of anticoagulants 10/24/2017    DVT (deep venous thrombosis) ([address] 10/24/2017    Orthostatic hypotension     GRAYSON (acute kidney injury) ([address] 10/02/2017    Abnormal EEG 10/02/2017    Hyperkalemia 09/28/2017    Syncope 09/27/2017    Acute cystitis 09/27/2017    Hypertensive urgency 09/27/2017    Hypertensive emergency 08/31/2017    Flash pulmonary edema ([address] 08/31/2017    Cardiac pacemaker in situ 07/12/2017    Chronic midline low back pain with left-sided sciatica 07/10/2017    Essential hypertension 07/10/2017    AV block, 2nd degree 06/28/2017    Acute renal failure superimposed on stage 4 chronic kidney disease ([address] 06/28/2017    Mobitz type 2 second degree heart block 06/28/2017    Acute on chronic renal insufficiency 06/26/2017    Dehydration, moderate 06/26/2017    Left hip pain     Non-rheumatic tricuspid valve insufficiency 04/26/2017    Pulmonary HTN 04/26/2017    CKD (chronic kidney disease) stage 4, GFR 15-29 ml/min ([facility]) 02/22/2017    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), NYHA class 4 ([address] 01/18/2017    Physical deconditioning 01/18/2017    Acute on chronic congestive heart failure (HCC)     Acquired hypothyroidism     Hyperlipidemia     Diabetes mellitus ([address]     Anxiety        Plan:     · This patient requires inpatient admission because of hypoxia, somnolence, CHF, acute on chronic kidney injury  · Factors affecting the medical complexity of this patient include prior TIA, CHF, CKD, HTN, CAD  · Estimated length of stay is 2 days  · f/u CT head  · BP medications  · Coumadin dosing  · Telemetry  · Prn lasix  · Monitor labs  · PT/OT  · High risk medication monitoring: coumadin    CORE MEASURES  DVT prophylaxis: already on chronic anticoagulation  Decubitus ulcer present on admission: No  CODE STATUS: [id number]  Nutrition Status: fair   Physical therapy: Yes   Old Charts reviewed: Yes  EKG Reviewed: Yes  Advance Directive Addressed:  Yes    Deepti Davis PA-C  3/12/2018, 1:33 PM

## 2018-03-12 NOTE — PROGRESS NOTES
Patient opened eyes and asked where she was. Speech is somewhat slurred but patient is verbally responsive to questions and cooperative for the first time this evening.

## 2018-03-12 NOTE — PROGRESS NOTES
Within Normal Limits    Social/Functional History  Social/Functional History  Lives With: Daughter  Type of Home: House  ADL Assistance: Needs assistance  Homemaking Assistance: Needs assistance  Ambulation Assistance: Independent  Transfer Assistance: Independent  Additional Comments: pt was living with her daughter after a recent hospital stay and was recieving home health nursing   Objective          AROM RLE (degrees)  RLE AROM: WFL  AROM LLE (degrees)  LLE AROM : WFL  Strength RLE  Comment: grossly 3/5   Strength LLE  Comment: grossly 3/5         Bed mobility  Supine to Sit: Maximum assistance  Sit to Supine: Maximum assistance  Scooting: Maximal assistance     Ambulation  Ambulation?: No     Balance  Posture: Fair  Sitting - Static: Fair  Sitting - Dynamic: Fair;-        Assessment   Body structures, Functions, Activity limitations: Decreased functional mobility ; Decreased endurance;Decreased ADL status; Decreased balance;Decreased strength;Decreased high-level IADLs  Assessment: Pt is a 80year old female that was admitted to the hospital for CHF. Pt was difficult to arouse and unable to keep eyes open during therapy. Pt sat her edge of bed but pt was unable to tolerate more than 1 minute. At this time pt is no appropriate to sit in a chair or attempt ambulation. PT will reassess if pt's condition improves.    Treatment Diagnosis: general weakness   Prognosis: Fair  Decision Making: Medium Complexity  Patient Education: family educated on her POC   REQUIRES PT FOLLOW UP: Yes  Activity Tolerance  Activity Tolerance: Patient limited by fatigue;Patient limited by cognitive status     Discharge Recommendations:  Continue to assess pending progress      Plan   Plan  Times per week: 7x/wk   Times per day: Twice a day  Plan weeks: 2x/daily except wekeends 1x/daily   Current Treatment Recommendations: Strengthening, Neuromuscular Re-education, Home Exercise Program, Safety Education & Training, Balance Training,

## 2018-03-13 ENCOUNTER — ANTI-COAG VISIT (OUTPATIENT)
Dept: PHARMACY | Age: 83
End: 2018-03-13

## 2018-03-13 VITALS
RESPIRATION RATE: 16 BRPM | DIASTOLIC BLOOD PRESSURE: 64 MMHG | HEIGHT: 62 IN | SYSTOLIC BLOOD PRESSURE: 118 MMHG | OXYGEN SATURATION: 93 % | HEART RATE: 78 BPM | WEIGHT: 163.7 LBS | TEMPERATURE: 97.4 F | BODY MASS INDEX: 30.12 KG/M2

## 2018-03-13 DIAGNOSIS — Z79.01 LONG-TERM (CURRENT) USE OF ANTICOAGULANTS: ICD-10-CM

## 2018-03-13 PROBLEM — I82.409 DVT (DEEP VENOUS THROMBOSIS) (HCC): Status: RESOLVED | Noted: 2017-10-24 | Resolved: 2018-03-13

## 2018-03-13 LAB
INR BLD: 2.5 (ref 0.9–1.2)
PROTHROMBIN TIME: 24.6 SEC (ref 9.7–12.2)

## 2018-03-13 PROCEDURE — 85610 PROTHROMBIN TIME: CPT

## 2018-03-13 PROCEDURE — 2580000003 HC RX 258: Performed by: INTERNAL MEDICINE

## 2018-03-13 PROCEDURE — 97110 THERAPEUTIC EXERCISES: CPT

## 2018-03-13 PROCEDURE — 36415 COLL VENOUS BLD VENIPUNCTURE: CPT

## 2018-03-13 PROCEDURE — 97116 GAIT TRAINING THERAPY: CPT

## 2018-03-13 PROCEDURE — 6370000000 HC RX 637 (ALT 250 FOR IP): Performed by: INTERNAL MEDICINE

## 2018-03-13 RX ORDER — LORAZEPAM 0.5 MG/1
0.5 TABLET ORAL EVERY 6 HOURS PRN
Qty: 20 TABLET | Refills: 0 | Status: SHIPPED | OUTPATIENT
Start: 2018-03-13 | End: 2018-03-18

## 2018-03-13 RX ADMIN — LEVOTHYROXINE SODIUM 50 MCG: 50 TABLET ORAL at 09:40

## 2018-03-13 RX ADMIN — DILTIAZEM HYDROCHLORIDE 180 MG: 180 CAPSULE, COATED, EXTENDED RELEASE ORAL at 09:36

## 2018-03-13 RX ADMIN — HYDRALAZINE HYDROCHLORIDE 50 MG: 50 TABLET, FILM COATED ORAL at 09:36

## 2018-03-13 RX ADMIN — Medication 10 ML: at 09:36

## 2018-03-13 RX ADMIN — CARVEDILOL 12.5 MG: 12.5 TABLET, FILM COATED ORAL at 09:40

## 2018-03-13 RX ADMIN — ISOSORBIDE MONONITRATE 60 MG: 60 TABLET, EXTENDED RELEASE ORAL at 09:36

## 2018-03-13 RX ADMIN — ASPIRIN 81 MG 81 MG: 81 TABLET ORAL at 09:36

## 2018-03-13 RX ADMIN — POLYETHYLENE GLYCOL 3350 17 G: 17 POWDER, FOR SOLUTION ORAL at 09:36

## 2018-03-13 RX ADMIN — HYDRALAZINE HYDROCHLORIDE 50 MG: 50 TABLET, FILM COATED ORAL at 14:21

## 2018-03-13 RX ADMIN — CITALOPRAM 10 MG: 10 TABLET, FILM COATED ORAL at 09:36

## 2018-03-13 NOTE — PROGRESS NOTES
Education & Training, Balance Training, Endurance Training, Functional Mobility Training, Transfer Training, Gait Training, Patient/Caregiver Education & Training  Safety Devices  Type of devices: Left in chair, Call light within reach     Therapy Time   Individual Concurrent Group Co-treatment   Time In 1315         Time Out 1330         Minutes 3 [facility]

## 2018-03-13 NOTE — PROGRESS NOTES
Acute on chronic renal insufficiency 06/26/2017    Dehydration, moderate 06/26/2017    Left hip pain     Non-rheumatic tricuspid valve insufficiency 04/26/2017    Pulmonary HTN 04/26/2017    CKD (chronic kidney disease) stage 4, GFR 15-29 ml/min ([facility]) 02/22/2017    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), NYHA class 4 ([facility] 01/18/2017    Physical deconditioning 01/18/2017    Acute on chronic congestive heart failure (HCC)     Acquired hypothyroidism     Hyperlipidemia     Diabetes mellitus ([facility]     Anxiety        Past Medical History:   Diagnosis Date    Anxiety     Asthma     CAD (coronary artery disease)     Cardiac pacemaker 06/29/2017    Biotronik Pacemaker implant    CHF (congestive heart failure) ([facility] 2016    CHF (congestive heart failure) ([facility] 10/23/2017    Chronic kidney disease     Diabetes mellitus ([facility]     DVT (deep venous thrombosis) ([facility] 10/23/2017    Gout     H/O echocardiogram 12/18/2017    Global LV systolic function appears moderately reduced w/EF: 40%. the septal & inferosepotal walls are dyskinetic in their motion. Mildly increased LV wall thickness w/ normal LV cavity size. Sigmoid interventricular septum. LA severely dilated (>40) w/left atrial volume index 40 m1/m2. Aortic leaflet calcification w/mild aortic stenosis. Mitral annular calcification seen w/mild mitral regurg    Heart disease     History of left heart catheterization (LHC) 01/23/2018    LMCA: Lesion on LMCA: Distal subsection . 30% stenosis. LAD: Lesion on Mid LAD: Mid subsection . 60% stenosis. LCx: Mild irregularties 30-40% and Diffuse irregularites 10-20%. RCA: Lesion on Prox RCA: Ostial .70% stenosis. EF 40%.  LV function assessed as Abnormal.     Hyperlipidemia     Hypertension     Hypothyroidism     Mobitz type 2 second degree heart block     Syncope and collapse 06/25/2017     Past Surgical History:   Procedure Laterality Date    BREAST BIOPSY Bilateral 1980    BREAST SURGERY  1965    cyst removed    CARDIAC CATHETERIZATION Left 01/23/2018    via right radial approach/ Inge Waller/ Dr. Adelaida Sicard  06/29/2017    DR Christine Saleh    EYE SURGERY      Bilat cataract    OVARY REMOVAL Right     PACEMAKER INSERTION  06/29/2017       Restrictions  Restrictions/Precautions  Restrictions/Precautions: General Precautions, Fall Risk  Subjective   General  Chart Reviewed: Yes  Family / Caregiver Present: Yes  Referring Practitioner: Dr. Laury Pulido: Pt denies pain, states she feels a little better today.   Pain Screening  Patient Currently in Pain: Denies  Vital Signs  Patient Currently in Pain: Denies       Orientation  Orientation  Overall Orientation Status: Within Normal Limits  Objective   Bed mobility  Rolling to Left: Contact guard assistance  Rolling to Right: Contact guard assistance  Supine to Sit: Contact guard assistance;Minimal assistance  Transfers  Sit to Stand: Contact guard assistance;Minimal Assistance  Stand to sit: Contact guard assistance;Minimal Assistance  Comment: Pt required VCs for safety  Ambulation  Ambulation?: Yes  Ambulation 1  Surface: level tile  Device: Rolling Walker  Other Apparatus: O2  Assistance: Contact guard assistance;Minimal assistance  Quality of Gait: steady tuyet  Distance: 10 ft x1  Comments: Pt required VCs for posture and directional changes  Stairs/Curb  Stairs?: No     Balance  Posture: Fair  Sitting - Static: Fair  Sitting - Dynamic: Fair;-  Standing - Static: Fair  Standing - Dynamic: Fair  Exercises  Straight Leg Raise: x15  Quad Sets: x15  Heelslides: x15  Gluteal Sets: x15  Hip Flexion: x15  Hip Abduction: 15x2  Knee Long Arc Quad: x15  Knee Short Arc Quad: x15  Ankle Pumps: 2x15  Comments: ther ex completed in seated and supine frequent RBs needed d/t fatigue, SPO2 89-92% throughout tx                        Assessment   Treatment Diagnosis: general weakness   Prognosis: Fair  Patient

## 2018-03-13 NOTE — PROGRESS NOTES
anticoagulants 10/24/2017    DVT (deep venous thrombosis) ([facility]) 10/24/2017    Orthostatic hypotension     GRAYSON (acute kidney injury) ([facility] 10/02/2017    Abnormal EEG 10/02/2017    Hyperkalemia 09/28/2017    Syncope 09/27/2017    Acute cystitis 09/27/2017    Hypertensive urgency 09/27/2017    Hypertensive emergency 08/31/2017    Flash pulmonary edema ([facility] 08/31/2017    Cardiac pacemaker in situ 07/12/2017    Chronic midline low back pain with left-sided sciatica 07/10/2017    Essential hypertension 07/10/2017    AV block, 2nd degree 06/28/2017    Acute renal failure superimposed on stage 4 chronic kidney disease ([facility] 06/28/2017    Mobitz type 2 second degree heart block 06/28/2017    Acute on chronic renal insufficiency 06/26/2017    Dehydration, moderate 06/26/2017    Left hip pain     Non-rheumatic tricuspid valve insufficiency 04/26/2017    Pulmonary HTN 04/26/2017    CKD (chronic kidney disease) stage 4, GFR 15-29 ml/min ([facility]) 02/22/2017    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), NYHA class 4 ([facility] 01/18/2017    Physical deconditioning 01/18/2017    Acute on chronic congestive heart failure ([facility])     Acquired hypothyroidism     Hyperlipidemia     Diabetes mellitus ([facility])     Anxiety        PLAN:    Hypoxia - improved   Wean O2    Somnolence - resolved    Physical deconditioning    Pulmonary HTN    Long-term use of anticoagulants    CHF, NYHA class I, acute on chronic, combined   Diuresis prn   Discharge to SNF      Resolved Problems:    * No resolved hospital problems.  *    · High risk medication: coumadin     EMMA Mendez PA-C  3/13/2018, 10:25 AM

## 2018-03-13 NOTE — LETTER
?             Date:  3/13/18      Doctor: Kavitha Dumont    Re: [address] Agreement    Patient Name: Madeline Randle     : [birth year]    Due to the reason indicated above we regret to advise you that we, (Debbie), will no longer share with you the responsibility of managing your patients anticoagulation therapy. As required by [facility] 4729.39 we hereby provide you notice that we are terminating our consult agreement for this patient. If applicable to compliance, your patient has been notified prior to this termination through three letters requesting follow-up. These letters may be accessed through the medical record if needed. We appreciate your referrals and thank you for allowing us to participate in the care of your patient. Should you have any questions or concerns please feel free to contact our office at [phone]. Sincerely,   _________________________________  STACEY Rodgers Ph.  Director, Medication Management  [address] [place], [place] [address]Kel BOCANEGRA, PharmD  Clinic Pharmacist    Torin Barreto. Ph.  Clinic Pharmacist    Torin Tomas. Ph  Clinic Pharmacist    Jessika Bobo, PharmD  Clinic Pharmacist    Torin Joseph. Ph.  Clinic Pharmacist

## 2018-03-13 NOTE — CONSULTS
mg    Discontinued Lovenox. Daily PT/INR until stable within therapeutic range. Thank you for the consult.    Mirna Jean, PharmD 3/12/2018 8:56 AM
nerve deficit. Coordination appeared normal.   Skin: Skin is warm and dry. There is no rash or diaphoresis. Psychiatric: She has a normal mood and affect.  Her speech is normal and behavior is normal.      MOST RECENT LABS ON RECORD:   Lab Results   Component Value Date    WBC 6.0 03/12/2018    HGB 11.6 (L) 03/12/2018    HCT 37.8 03/12/2018     03/12/2018    CHOL 145 01/20/2018    TRIG 145 01/27/2018    HDL 40 (L) 01/20/2018    ALT <5 (L) 03/12/2018    AST 10 03/12/2018     (L) 03/12/2018    K 4.2 03/12/2018    CL 92 (L) 03/12/2018    CREATININE 2.31 (H) 03/12/2018    BUN 42 (H) 03/12/2018    CO2 30 03/12/2018    TSH 6.82 (H) 03/06/2018    INR 2.0 (H) 03/12/2018    LABA1C 5.4 11/06/2017       ASSESSMENT:  Patient Active Problem List    Diagnosis Date Noted    Syncope and collapse - secondary to 2nd degree AV block 06/25/2017     Priority: High    Acute on chronic combined systolic and diastolic CHF (congestive heart failure) ([facility]) 12/31/2016     Priority: High     Class: Acute    Hyponatremia 03/12/2018    Somnolence 03/12/2018    CHF (congestive heart failure), NYHA class I, acute on chronic, combined ([address] 03/11/2018    Acute on chronic kidney failure ([address] 03/08/2018    Hypoxia 03/08/2018    Acute diastolic CHF (congestive heart failure) ([address] 03/06/2018    Acute renal failure superimposed on stage 4 chronic kidney disease ([address] 01/29/2018    Infarction of parietal lobe ([address] 01/23/2018    Unstable angina ([address] 96/32/5917    Acute systolic CHF (congestive heart failure) ([address] 01/19/2018    Labile blood pressure 12/29/2017    Hypoxia 12/18/2017    Idiopathic cardiomyopathy ([address] 12/18/2017    Acute on chronic combined systolic and diastolic congestive heart failure ([address] 12/17/2017    Long-term (current) use of anticoagulants 10/24/2017    DVT (deep venous thrombosis) ([facility] 10/24/2017    Orthostatic hypotension     GRAYSON (acute kidney injury) ([facility] 10/02/2017    Abnormal EEG

## 2018-03-16 PROBLEM — I50.84 END STAGE HEART FAILURE (HCC): Status: ACTIVE | Noted: 2018-03-16

## 2018-03-16 NOTE — DISCHARGE SUMMARY
Clarification: Patient primary dx during admission was END stage CHF. No acute was treated for that during prior admission. Has chronic combined CHF.       Rosa Maria Alexander PA-C  3/16/2018, 3:18 PM

## 2018-03-19 ENCOUNTER — HOSPITAL ENCOUNTER (OUTPATIENT)
Age: 83
Setting detail: SPECIMEN
Discharge: HOME | End: 2018-03-19
Payer: MEDICARE

## 2018-03-19 LAB
ANION GAP SERPL CALCULATED.3IONS-SCNC: 9 MMOL/L (ref 9–17)
BUN BLDV-MCNC: 46 MG/DL (ref 8–23)
BUN/CREAT BLD: 23 (ref 9–20)
CALCIUM SERPL-MCNC: 8.7 MG/DL (ref 8.6–10.4)
CHLORIDE BLD-SCNC: 99 MMOL/L (ref 98–107)
CO2: 33 MMOL/L (ref 20–31)
CREAT SERPL-MCNC: 2.02 MG/DL (ref 0.5–0.9)
GFR AFRICAN AMERICAN: 28 ML/MIN
GFR NON-AFRICAN AMERICAN: 23 ML/MIN
GFR SERPL CREATININE-BSD FRML MDRD: ABNORMAL ML/MIN/{1.73_M2}
GFR SERPL CREATININE-BSD FRML MDRD: ABNORMAL ML/MIN/{1.73_M2}
GLUCOSE BLD-MCNC: 106 MG/DL (ref 70–99)
HCT VFR BLD CALC: 31 % (ref 36.3–47.1)
HEMOGLOBIN: 9.6 G/DL (ref 11.9–15.1)
MCH RBC QN AUTO: 30.4 PG (ref 25.2–33.5)
MCHC RBC AUTO-ENTMCNC: 31 G/DL (ref 28.4–34.8)
MCV RBC AUTO: 98.1 FL (ref 82.6–102.9)
NRBC AUTOMATED: 0 PER 100 WBC
PDW BLD-RTO: 14.5 % (ref 11.8–14.4)
PLATELET # BLD: 197 K/UL (ref 138–453)
PMV BLD AUTO: 10.9 FL (ref 8.1–13.5)
POTASSIUM SERPL-SCNC: 4 MMOL/L (ref 3.7–5.3)
RBC # BLD: 3.16 M/UL (ref 3.95–5.11)
SODIUM BLD-SCNC: 141 MMOL/L (ref 135–144)
WBC # BLD: 5.5 K/UL (ref 3.5–11.3)

## 2018-03-19 PROCEDURE — 85027 COMPLETE CBC AUTOMATED: CPT

## 2018-03-19 PROCEDURE — P9604 ONE-WAY ALLOW PRORATED TRIP: HCPCS

## 2018-03-19 PROCEDURE — 36415 COLL VENOUS BLD VENIPUNCTURE: CPT

## 2018-03-19 PROCEDURE — 80048 BASIC METABOLIC PNL TOTAL CA: CPT

## 2018-03-23 ENCOUNTER — HOSPITAL ENCOUNTER (OUTPATIENT)
Age: 83
Setting detail: SPECIMEN
Discharge: HOME | End: 2018-03-23
Payer: MEDICARE

## 2018-03-23 LAB
ANION GAP SERPL CALCULATED.3IONS-SCNC: 9 MMOL/L (ref 9–17)
BUN BLDV-MCNC: 44 MG/DL (ref 8–23)
BUN/CREAT BLD: 21 (ref 9–20)
CALCIUM SERPL-MCNC: 8.9 MG/DL (ref 8.6–10.4)
CHLORIDE BLD-SCNC: 97 MMOL/L (ref 98–107)
CO2: 35 MMOL/L (ref 20–31)
CREAT SERPL-MCNC: 2.12 MG/DL (ref 0.5–0.9)
GFR AFRICAN AMERICAN: 26 ML/MIN
GFR NON-AFRICAN AMERICAN: 22 ML/MIN
GFR SERPL CREATININE-BSD FRML MDRD: ABNORMAL ML/MIN/{1.73_M2}
GFR SERPL CREATININE-BSD FRML MDRD: ABNORMAL ML/MIN/{1.73_M2}
GLUCOSE BLD-MCNC: 97 MG/DL (ref 70–99)
POTASSIUM SERPL-SCNC: 3.6 MMOL/L (ref 3.7–5.3)
SODIUM BLD-SCNC: 141 MMOL/L (ref 135–144)

## 2018-03-23 PROCEDURE — 36415 COLL VENOUS BLD VENIPUNCTURE: CPT

## 2018-03-23 PROCEDURE — 80048 BASIC METABOLIC PNL TOTAL CA: CPT

## 2018-03-23 PROCEDURE — P9604 ONE-WAY ALLOW PRORATED TRIP: HCPCS

## 2018-03-30 ENCOUNTER — HOSPITAL ENCOUNTER (OUTPATIENT)
Age: 83
Setting detail: SPECIMEN
Discharge: HOME | End: 2018-03-30
Payer: MEDICARE

## 2018-03-30 LAB
ANION GAP SERPL CALCULATED.3IONS-SCNC: 9 MMOL/L (ref 9–17)
BUN BLDV-MCNC: 42 MG/DL (ref 8–23)
BUN/CREAT BLD: 19 (ref 9–20)
CALCIUM SERPL-MCNC: 8.6 MG/DL (ref 8.6–10.4)
CHLORIDE BLD-SCNC: 97 MMOL/L (ref 98–107)
CO2: 34 MMOL/L (ref 20–31)
CREAT SERPL-MCNC: 2.16 MG/DL (ref 0.5–0.9)
GFR AFRICAN AMERICAN: 26 ML/MIN
GFR NON-AFRICAN AMERICAN: 21 ML/MIN
GFR SERPL CREATININE-BSD FRML MDRD: ABNORMAL ML/MIN/{1.73_M2}
GFR SERPL CREATININE-BSD FRML MDRD: ABNORMAL ML/MIN/{1.73_M2}
GLUCOSE BLD-MCNC: 100 MG/DL (ref 70–99)
POTASSIUM SERPL-SCNC: 3.9 MMOL/L (ref 3.7–5.3)
SODIUM BLD-SCNC: 140 MMOL/L (ref 135–144)

## 2018-03-30 PROCEDURE — 36415 COLL VENOUS BLD VENIPUNCTURE: CPT

## 2018-03-30 PROCEDURE — 80048 BASIC METABOLIC PNL TOTAL CA: CPT

## 2018-03-30 PROCEDURE — P9604 ONE-WAY ALLOW PRORATED TRIP: HCPCS

## 2018-05-23 NOTE — PROGRESS NOTES
Nutrition Assessment    Type and Reason for Visit: Initial, Consult (diet education)    Nutrition Recommendations:   1. Continue current diet. 2. F/u as needed, Pt declined diet education. Malnutrition Assessment:  · Malnutrition Status: No malnutrition  · Context: Acute illness or injury  · Findings of the 6 clinical characteristics of malnutrition (Minimum of 2 out of 6 clinical characteristics is required to make the diagnosis of moderate or severe Protein Calorie Malnutrition based on AND/ASPEN Guidelines):  1. Energy Intake-Greater than 75%,      2. Weight Loss-No significant weight loss,    3. Fat Loss-No significant subcutaneous fat loss,    4. Muscle Loss-No significant muscle mass loss,    5. Fluid Accumulation-No significant fluid accumulation,    6.  Strength-Not measured    Nutrition Diagnosis:   · Problem: Altered nutrition-related lab values  · Etiology: related to Cardiac dysfunction     Signs and symptoms:  as evidenced by Other (BNP 5,604. 2 gm sodium diet, CC 4 carbs per meal, declines diet education)    Nutrition Assessment:  · Subjective Assessment: Pt states she really watches the sodium in her diet. She prepares her own meals and acknowledges staying under 2,000 mg per day and does not use salt. She eats 3 meals per day, \"I see to that. \" \"I get too hungry if I don't. \"  States she counts carbs \"some\" and is knowledgeable about vitamin K foods and coumadin. She does ask which lettuces are higher in vitamin K. Declines diet education. States she has requested small portions. -170# due to fluid gains.    · Nutrition-Focused Physical Findings: Pt appears well-nourished  · Wound Type: None  · Current Nutrition Therapies:  · Oral Diet Orders: 2gm Sodium, Carb Control 4 Carbs/Meal   · Oral Diet intake: %, Select  · Oral Nutrition Supplement (ONS) Orders: None  · Anthropometric Measures:  · Ht: 5' 2\" (720.8 cm) ('s license)   · Current Body Wt: 171 lb 9.6 oz (77.8 HTN (hypertension)

## 2022-05-13 NOTE — PROGRESS NOTES
0.4 MG SL tablet up to max of 3 total doses. If no relief after 1 dose, call 911. 1/31/18   Lulú Phelps PA-C   atorvastatin (LIPITOR) 40 MG tablet Take 1 tablet by mouth nightly 1/31/18   Lulú Phelps PA-C   calcium carbonate (TUMS) 500 MG chewable tablet Take 1 tablet by mouth every 6 hours as needed for Heartburn    Historical Provider, MD   diphenhydrAMINE (BENADRYL) 25 MG capsule Take 25 mg by mouth every 6 hours as needed for Itching    Historical Provider, MD   polyethylene glycol (GLYCOLAX) packet Take 17 g by mouth daily    Historical Provider, MD   warfarin (COUMADIN) 2 MG tablet Take 3 mg by mouth daily COUMADIN CLINIC:  3 mg Friday; 2 mg all other days    Historical Provider, MD   diltiazem (CARDIZEM CD) 120 MG extended release capsule Take 1 capsule by mouth daily 10/5/17   Joi Marcelo MD   acetaminophen (TYLENOL) 325 MG tablet Take 2 tablets by mouth every 4 hours as needed for Pain 6/30/17   Adonis Bautista CNP   levothyroxine (SYNTHROID) 50 MCG tablet Take 50 mcg by mouth daily Takes 1 tablet daily except for 2 tabs on Sunday    Historical Provider, MD   aspirin 81 MG tablet Take 81 mg by mouth every other day    Historical Provider, MD       ROS:  General Constitutional: Denies chills. Denies fever. Denies headache. Denies lightheadedness. Ophthalmologic: Denies blurred vision. ENT: Denies trouble swallowing. Respiratory: Denies shortness of breath. Cardiovascular: Denies chest pain at rest. Denies irregular heartbeat. Denies palpitations. Gastrointestinal: Denies abdominal pain. Denies blood in the stool. Denies constipation. Denies diarrhea. Denies nausea. Denies vomiting. Genitourinary: Denies blood in the urine. Denies difficulty urinating. Denies frequent urination. Denies painful urination. Denies urinary incontinence  Skin: Denies dry skin. Denies itching. Denies rash. Psychiatric: Denies sleep disturbance. Denies anxiety. Denies depressed mood.     Past Surgical History:   Procedure Laterality Date    BREAST BIOPSY Bilateral 1980    BREAST SURGERY  1965    cyst removed    CARDIAC [facility] Left 01/23/2018    [facility]  06/29/2017    DR Raven Pabon    EYE SURGERY      Bilat cataract    OVARY REMOVAL Right     PACEMAKER INSERTION  06/29/2017       Family History   Problem Relation Age of Onset    Heart Disease Mother      chf    Heart Disease Father     Heart Attack Father        Past Medical History:   Diagnosis Date    Anxiety     Asthma     CAD (coronary artery disease)     Cardiac pacemaker 06/29/2017    B[employer]ron[employer] Pacemaker implant    CHF (congestive heart failure) ([address] 2016    CHF (congestive heart failure) ([address] 10/23/2017    Chronic kidney disease     Diabetes mellitus ([facility]     DVT (deep venous thrombosis) ([facility] 10/23/2017    Gout     H/O echocardiogram 12/18/2017    Global LV systolic function appears moderately reduced w/EF: 40%. the septal & inferosepotal walls are dyskinetic in their motion. Mildly increased LV wall thickness w/ normal LV cavity size. Sigmoid interventricular septum. LA severely dilated (>40) w/left atrial volume index 40 m1/m2. Aortic leaflet calcification w/mild aortic stenosis. Mitral annular calcification seen w/mild mitral regurg    Heart disease     History of left heart catheterization (LHC) 01/23/2018    LMCA: Lesion on LMCA: Distal subsection . 30% stenosis. LAD: Lesion on Mid LAD: Mid subsection . 60% stenosis. LCx: Mild irregularties 30-40% and Diffuse irregularites 10-20%. RCA: Lesion on Prox RCA: Ostial .70% stenosis. EF 40%.  LV function assessed as Abnormal.     Hyperlipidemia     Hypertension     Hypothyroidism     Mobitz type 2 second degree heart block     Syncope and collapse 06/25/2017      Social History   Substance Use Topics    Smoking status: Never Smoker    Smokeless tobacco: Never Used    Alcohol use 0.6 oz/week 1 Cans of beer per week      Comment: weekly      Current Outpatient Prescriptions   Medication Sig Dispense Refill    carvedilol (COREG) 12.5 MG tablet Take 12.5 mg by mouth 2 times daily (with meals)      isosorbide mononitrate (IMDUR) 60 MG extended release tablet Take 60 mg by mouth daily      nitroGLYCERIN (NITROSTAT) 0.4 MG SL tablet up to max of 3 total doses. If no relief after 1 dose, call 911. 25 tablet 3    atorvastatin (LIPITOR) 40 MG tablet Take 1 tablet by mouth nightly 30 tablet 3    calcium carbonate (TUMS) 500 MG chewable tablet Take 1 tablet by mouth every 6 hours as needed for Heartburn      diphenhydrAMINE (BENADRYL) 25 MG capsule Take 25 mg by mouth every 6 hours as needed for Itching      polyethylene glycol (GLYCOLAX) packet Take 17 g by mouth daily      warfarin (COUMADIN) 2 MG tablet Take 3 mg by mouth daily COUMADIN CLINIC:  3 mg Friday; 2 mg all other days      diltiazem (CARDIZEM CD) 120 MG extended release capsule Take 1 capsule by mouth daily 30 capsule 3    acetaminophen (TYLENOL) 325 MG tablet Take 2 tablets by mouth every 4 hours as needed for Pain 120 tablet 3    levothyroxine (SYNTHROID) 50 MCG tablet Take 50 mcg by mouth daily Takes 1 tablet daily except for 2 tabs on Sunday      aspirin 81 MG tablet Take 81 mg by mouth every other day       No current facility-administered medications for this visit. Allergies   Allergen Reactions    Lisinopril Other (See Comments)     Intolerance    Milk Of Magnesia [Magnesium Hydroxide] Other (See Comments)     Contraindicated with kidney function    Norco [Hydrocodone-Acetaminophen] Other (See Comments)     Confusion  Takes plain tylenol without intolerance. PHYSICAL EXAM:    There were no vitals taken for this visit.   Wt Readings from Last 3 Encounters:   01/28/18 170 lb 12.8 oz (77.5 kg)   12/29/17 172 lb 6.4 oz (78.2 kg)   12/20/17 168 lb (76.2 kg)     BP Readings from Last 3 Encounters:   01/31/18 (!) 150/84 The patient is a 57y year old Female complaining of swelling of lower extremities. 12/29/17 115/68   12/21/17 117/64       Regina Chavez is in therapy sitting on the bed.       General Appearance: in no acute distress, well developed, well nourished. Ambulates with walker. Eyes: pupils equal, round reactive to light and accommodation. Oral Cavity: mucosa moist.  Neck/Thyroid: full range of motion  Skin: warm and dry. Heart: regular rate and rhythm. S1, S2 normal, no gallops. Rate: 75 2/6 blowing systolic murmur aortic outflow tract. Lungs: clear to auscultation bilaterally. Abdomen: bowel sounds present, soft, nontender, nondistended, no masses or organomegaly. Round. Musculoskeletal: normal, full range of motion in knees and hips, no swelling or tenderness. Extremities: no cyanosis; 1+ edema ankles, pretibial wearing LAYLA hose. Psych: normal affect, speech fluent. ASSESSMENT:  1. Acquired hypothyroidism     2. Diabetes mellitus due to underlying condition with ketoacidosis without coma, without long-term current use of insulin ([facility]     3. Acute on chronic combined systolic and diastolic CHF (congestive heart failure) ([address]     4. CKD (chronic kidney disease) stage 4, GFR 15-29 ml/min ([facility])     5. Pulmonary HTN     6. Infarction of parietal lobe ([address]     7. Acute renal failure superimposed on stage 4 chronic kidney disease, unspecified acute renal failure type [facility])         PLAN:  The therapist reports her continued progression and the possibility of going home next week. I am thrilled to see how well she is doing. However, I caution her on not trying to over-do things. It's great to see her progressing, but we don't want to have setbacks that can be prevented. She is doing well. I will see her back next week if she is still here. No orders of the defined types were placed in this encounter. No orders of the defined types were placed in this encounter.       Scribed by: RON Gomez

## 2023-04-20 NOTE — PROGRESS NOTES
Patient is discharge to [address] [place] [address] home and will go by [facility]. Discharge paper work done and fax to SNF.   BLAKE Garcia The patient is Stable - Low risk of patient condition declining or worsening    Shift Goals  Clinical Goals: NPO at midnight for LAVERNE/CV, HR will not sustain above 130 this shift  Patient Goals: comfort  Family Goals: RICKY    Progress made toward(s) clinical / shift goals:  Pt's HR is currently averaging in the 120s, pt is aware of NPO status and plan for LAVERNE/CV in AM, pt has remained free from falls on this admit    Patient is not progressing towards the following goals:      Problem: Fall Risk  Goal: Patient will remain free from falls  Outcome: Progressing     Problem: Hemodynamics  Goal: Patient's hemodynamics, fluid balance and neurologic status will be stable or improve  Outcome: Progressing